# Patient Record
Sex: FEMALE | Race: WHITE | Employment: OTHER | URBAN - METROPOLITAN AREA
[De-identification: names, ages, dates, MRNs, and addresses within clinical notes are randomized per-mention and may not be internally consistent; named-entity substitution may affect disease eponyms.]

---

## 2017-01-11 RX ORDER — FENOFIBRATE 145 MG/1
TABLET, COATED ORAL
Qty: 30 TABLET | Refills: 4 | Status: SHIPPED | OUTPATIENT
Start: 2017-01-11 | End: 2017-06-13 | Stop reason: SDUPTHER

## 2017-01-30 ENCOUNTER — OFFICE VISIT (OUTPATIENT)
Dept: FAMILY MEDICINE CLINIC | Age: 62
End: 2017-01-30

## 2017-01-30 VITALS
SYSTOLIC BLOOD PRESSURE: 120 MMHG | DIASTOLIC BLOOD PRESSURE: 80 MMHG | HEIGHT: 63 IN | BODY MASS INDEX: 17.89 KG/M2 | TEMPERATURE: 98.8 F | RESPIRATION RATE: 16 BRPM | WEIGHT: 101 LBS | HEART RATE: 88 BPM

## 2017-01-30 DIAGNOSIS — J43.9 PULMONARY EMPHYSEMA, UNSPECIFIED EMPHYSEMA TYPE (HCC): ICD-10-CM

## 2017-01-30 DIAGNOSIS — Z72.0 TOBACCO ABUSE: ICD-10-CM

## 2017-01-30 DIAGNOSIS — F33.42 RECURRENT MAJOR DEPRESSIVE DISORDER, IN FULL REMISSION (HCC): ICD-10-CM

## 2017-01-30 DIAGNOSIS — F33.41 MAJOR DEPRESSIVE DISORDER, RECURRENT, IN PARTIAL REMISSION (HCC): ICD-10-CM

## 2017-01-30 DIAGNOSIS — M48.061 SPINAL STENOSIS, LUMBAR: ICD-10-CM

## 2017-01-30 DIAGNOSIS — J44.9 CHRONIC OBSTRUCTIVE PULMONARY DISEASE, UNSPECIFIED COPD TYPE (HCC): ICD-10-CM

## 2017-01-30 DIAGNOSIS — R29.6 FREQUENT FALLS: ICD-10-CM

## 2017-01-30 DIAGNOSIS — Z00.00 MEDICARE ANNUAL WELLNESS VISIT, SUBSEQUENT: Primary | ICD-10-CM

## 2017-01-30 DIAGNOSIS — Z11.59 NEED FOR HEPATITIS C SCREENING TEST: ICD-10-CM

## 2017-01-30 DIAGNOSIS — Z11.4 SCREENING FOR HIV (HUMAN IMMUNODEFICIENCY VIRUS): ICD-10-CM

## 2017-01-30 DIAGNOSIS — E78.00 HYPERCHOLESTEREMIA: ICD-10-CM

## 2017-01-30 DIAGNOSIS — M79.7 FIBROMYALGIA: ICD-10-CM

## 2017-01-30 DIAGNOSIS — M41.30 THORACOGENIC SCOLIOSIS, UNSPECIFIED SPINAL REGION: ICD-10-CM

## 2017-01-30 DIAGNOSIS — R26.89 BALANCE DISORDER: ICD-10-CM

## 2017-01-30 PROCEDURE — G8419 CALC BMI OUT NRM PARAM NOF/U: HCPCS | Performed by: FAMILY MEDICINE

## 2017-01-30 PROCEDURE — 3014F SCREEN MAMMO DOC REV: CPT | Performed by: FAMILY MEDICINE

## 2017-01-30 PROCEDURE — G8484 FLU IMMUNIZE NO ADMIN: HCPCS | Performed by: FAMILY MEDICINE

## 2017-01-30 PROCEDURE — G8926 SPIRO NO PERF OR DOC: HCPCS | Performed by: FAMILY MEDICINE

## 2017-01-30 PROCEDURE — 99214 OFFICE O/P EST MOD 30 MIN: CPT | Performed by: FAMILY MEDICINE

## 2017-01-30 PROCEDURE — G0444 DEPRESSION SCREEN ANNUAL: HCPCS | Performed by: FAMILY MEDICINE

## 2017-01-30 PROCEDURE — G0439 PPPS, SUBSEQ VISIT: HCPCS | Performed by: FAMILY MEDICINE

## 2017-01-30 PROCEDURE — 3017F COLORECTAL CA SCREEN DOC REV: CPT | Performed by: FAMILY MEDICINE

## 2017-01-30 PROCEDURE — 4004F PT TOBACCO SCREEN RCVD TLK: CPT | Performed by: FAMILY MEDICINE

## 2017-01-30 PROCEDURE — 3023F SPIROM DOC REV: CPT | Performed by: FAMILY MEDICINE

## 2017-01-30 PROCEDURE — G8427 DOCREV CUR MEDS BY ELIG CLIN: HCPCS | Performed by: FAMILY MEDICINE

## 2017-01-30 ASSESSMENT — ANXIETY QUESTIONNAIRES
GAD7 TOTAL SCORE: 2
GAD7 TOTAL SCORE: 2

## 2017-01-30 ASSESSMENT — LIFESTYLE VARIABLES: HOW OFTEN DO YOU HAVE A DRINK CONTAINING ALCOHOL: 0

## 2017-02-07 RX ORDER — FLUTICASONE PROPIONATE 50 MCG
SPRAY, SUSPENSION (ML) NASAL
Qty: 1 BOTTLE | Refills: 0 | Status: SHIPPED | OUTPATIENT
Start: 2017-02-07 | End: 2017-10-23 | Stop reason: SDUPTHER

## 2017-02-08 DIAGNOSIS — J43.9 PULMONARY EMPHYSEMA, UNSPECIFIED EMPHYSEMA TYPE (HCC): ICD-10-CM

## 2017-02-08 DIAGNOSIS — Z11.4 SCREENING FOR HIV (HUMAN IMMUNODEFICIENCY VIRUS): ICD-10-CM

## 2017-02-08 DIAGNOSIS — E78.00 HYPERCHOLESTEREMIA: ICD-10-CM

## 2017-02-08 DIAGNOSIS — Z11.59 NEED FOR HEPATITIS C SCREENING TEST: ICD-10-CM

## 2017-02-08 LAB
BASOPHILS ABSOLUTE: 0 K/UL (ref 0–0.2)
BASOPHILS RELATIVE PERCENT: 0.3 %
CHOLESTEROL, TOTAL: 175 MG/DL (ref 0–199)
EOSINOPHILS ABSOLUTE: 0.2 K/UL (ref 0–0.6)
EOSINOPHILS RELATIVE PERCENT: 3.1 %
HCT VFR BLD CALC: 37.7 % (ref 36–48)
HDLC SERPL-MCNC: 59 MG/DL (ref 40–60)
HEMOGLOBIN: 12.7 G/DL (ref 12–16)
HEPATITIS C ANTIBODY INTERPRETATION: NORMAL
LDL CHOLESTEROL CALCULATED: 100 MG/DL
LYMPHOCYTES ABSOLUTE: 1.7 K/UL (ref 1–5.1)
LYMPHOCYTES RELATIVE PERCENT: 22.8 %
MCH RBC QN AUTO: 35.6 PG (ref 26–34)
MCHC RBC AUTO-ENTMCNC: 33.8 G/DL (ref 31–36)
MCV RBC AUTO: 105.4 FL (ref 80–100)
MONOCYTES ABSOLUTE: 0.6 K/UL (ref 0–1.3)
MONOCYTES RELATIVE PERCENT: 7.4 %
NEUTROPHILS ABSOLUTE: 5.1 K/UL (ref 1.7–7.7)
NEUTROPHILS RELATIVE PERCENT: 66.4 %
PDW BLD-RTO: 13.5 % (ref 12.4–15.4)
PLATELET # BLD: 375 K/UL (ref 135–450)
PMV BLD AUTO: 7.7 FL (ref 5–10.5)
RBC # BLD: 3.58 M/UL (ref 4–5.2)
TRIGL SERPL-MCNC: 78 MG/DL (ref 0–150)
VLDLC SERPL CALC-MCNC: 16 MG/DL
WBC # BLD: 7.7 K/UL (ref 4–11)

## 2017-02-09 LAB — HIV-1 AND HIV-2 ANTIBODIES: NORMAL

## 2017-03-08 RX ORDER — GABAPENTIN 800 MG/1
TABLET ORAL
Qty: 270 TABLET | Refills: 1 | Status: SHIPPED | OUTPATIENT
Start: 2017-03-08 | End: 2017-08-02 | Stop reason: SDUPTHER

## 2017-03-12 RX ORDER — VALACYCLOVIR HYDROCHLORIDE 1 G/1
TABLET, FILM COATED ORAL
Qty: 90 TABLET | Refills: 1 | Status: SHIPPED | OUTPATIENT
Start: 2017-03-12 | End: 2017-09-07 | Stop reason: SDUPTHER

## 2017-06-14 RX ORDER — FENOFIBRATE 145 MG/1
TABLET, COATED ORAL
Qty: 30 TABLET | Refills: 3 | Status: SHIPPED | OUTPATIENT
Start: 2017-06-14 | End: 2017-10-14 | Stop reason: SDUPTHER

## 2017-08-02 ENCOUNTER — OFFICE VISIT (OUTPATIENT)
Dept: FAMILY MEDICINE CLINIC | Age: 62
End: 2017-08-02

## 2017-08-02 VITALS
HEIGHT: 63 IN | TEMPERATURE: 99.1 F | HEART RATE: 92 BPM | WEIGHT: 103 LBS | BODY MASS INDEX: 18.25 KG/M2 | RESPIRATION RATE: 16 BRPM | SYSTOLIC BLOOD PRESSURE: 120 MMHG | DIASTOLIC BLOOD PRESSURE: 70 MMHG

## 2017-08-02 DIAGNOSIS — M79.7 FIBROMYALGIA: ICD-10-CM

## 2017-08-02 DIAGNOSIS — T14.8XXA ANIMAL BITE: ICD-10-CM

## 2017-08-02 DIAGNOSIS — M54.81 BILATERAL OCCIPITAL NEURALGIA: ICD-10-CM

## 2017-08-02 DIAGNOSIS — F33.42 RECURRENT MAJOR DEPRESSIVE DISORDER, IN FULL REMISSION (HCC): ICD-10-CM

## 2017-08-02 DIAGNOSIS — E78.00 HYPERCHOLESTEREMIA: ICD-10-CM

## 2017-08-02 DIAGNOSIS — F41.9 ANXIETY: ICD-10-CM

## 2017-08-02 DIAGNOSIS — J43.9 PULMONARY EMPHYSEMA, UNSPECIFIED EMPHYSEMA TYPE (HCC): Primary | ICD-10-CM

## 2017-08-02 DIAGNOSIS — Z72.0 TOBACCO ABUSE: ICD-10-CM

## 2017-08-02 PROCEDURE — 3023F SPIROM DOC REV: CPT | Performed by: FAMILY MEDICINE

## 2017-08-02 PROCEDURE — 3014F SCREEN MAMMO DOC REV: CPT | Performed by: FAMILY MEDICINE

## 2017-08-02 PROCEDURE — G8427 DOCREV CUR MEDS BY ELIG CLIN: HCPCS | Performed by: FAMILY MEDICINE

## 2017-08-02 PROCEDURE — G8926 SPIRO NO PERF OR DOC: HCPCS | Performed by: FAMILY MEDICINE

## 2017-08-02 PROCEDURE — 4004F PT TOBACCO SCREEN RCVD TLK: CPT | Performed by: FAMILY MEDICINE

## 2017-08-02 PROCEDURE — G8420 CALC BMI NORM PARAMETERS: HCPCS | Performed by: FAMILY MEDICINE

## 2017-08-02 PROCEDURE — 90715 TDAP VACCINE 7 YRS/> IM: CPT | Performed by: FAMILY MEDICINE

## 2017-08-02 PROCEDURE — 3017F COLORECTAL CA SCREEN DOC REV: CPT | Performed by: FAMILY MEDICINE

## 2017-08-02 PROCEDURE — 99214 OFFICE O/P EST MOD 30 MIN: CPT | Performed by: FAMILY MEDICINE

## 2017-08-02 PROCEDURE — 90471 IMMUNIZATION ADMIN: CPT | Performed by: FAMILY MEDICINE

## 2017-08-02 RX ORDER — QUETIAPINE FUMARATE 50 MG/1
100 TABLET, FILM COATED ORAL NIGHTLY
COMMUNITY
End: 2019-01-18

## 2017-08-02 RX ORDER — GABAPENTIN 800 MG/1
800 TABLET ORAL 4 TIMES DAILY
Qty: 360 TABLET | Refills: 1 | Status: SHIPPED | OUTPATIENT
Start: 2017-08-02 | End: 2018-12-07 | Stop reason: SDUPTHER

## 2017-09-07 RX ORDER — VALACYCLOVIR HYDROCHLORIDE 1 G/1
TABLET, FILM COATED ORAL
Qty: 90 TABLET | Refills: 3 | Status: SHIPPED | OUTPATIENT
Start: 2017-09-07 | End: 2018-09-02 | Stop reason: SDUPTHER

## 2017-10-16 RX ORDER — FENOFIBRATE 145 MG/1
TABLET, COATED ORAL
Qty: 30 TABLET | Refills: 5 | Status: SHIPPED | OUTPATIENT
Start: 2017-10-16 | End: 2018-04-11 | Stop reason: SDUPTHER

## 2017-10-24 RX ORDER — FLUTICASONE PROPIONATE 50 MCG
SPRAY, SUSPENSION (ML) NASAL
Qty: 1 BOTTLE | Refills: 12 | Status: SHIPPED | OUTPATIENT
Start: 2017-10-24 | End: 2018-11-04 | Stop reason: SDUPTHER

## 2017-11-13 ENCOUNTER — HOSPITAL ENCOUNTER (OUTPATIENT)
Dept: MRI IMAGING | Age: 62
Discharge: HOME | End: 2017-11-13
Admitting: NEUROLOGICAL SURGERY

## 2017-11-13 DIAGNOSIS — R51.9 NONINTRACTABLE EPISODIC HEADACHE, UNSPECIFIED HEADACHE TYPE: ICD-10-CM

## 2017-11-13 DIAGNOSIS — M54.2 CERVICALGIA: ICD-10-CM

## 2017-11-13 DIAGNOSIS — M54.2 NECK PAIN: ICD-10-CM

## 2018-01-22 ENCOUNTER — HOSPITAL ENCOUNTER (OUTPATIENT)
Dept: WOMENS IMAGING | Age: 63
Discharge: HOME | End: 2018-01-22
Attending: FAMILY MEDICINE | Admitting: FAMILY MEDICINE

## 2018-01-22 DIAGNOSIS — Z12.31 VISIT FOR SCREENING MAMMOGRAM: ICD-10-CM

## 2018-02-02 ENCOUNTER — OFFICE VISIT (OUTPATIENT)
Dept: FAMILY MEDICINE CLINIC | Age: 63
End: 2018-02-02

## 2018-02-02 VITALS
HEART RATE: 86 BPM | BODY MASS INDEX: 19.51 KG/M2 | TEMPERATURE: 98.7 F | HEIGHT: 62 IN | WEIGHT: 106 LBS | RESPIRATION RATE: 16 BRPM | SYSTOLIC BLOOD PRESSURE: 128 MMHG | DIASTOLIC BLOOD PRESSURE: 80 MMHG

## 2018-02-02 DIAGNOSIS — J43.9 PULMONARY EMPHYSEMA, UNSPECIFIED EMPHYSEMA TYPE (HCC): ICD-10-CM

## 2018-02-02 DIAGNOSIS — R26.89 BALANCE DISORDER: ICD-10-CM

## 2018-02-02 DIAGNOSIS — R29.6 FREQUENT FALLS: ICD-10-CM

## 2018-02-02 DIAGNOSIS — Z80.3 FAMILY HISTORY OF MALIGNANT NEOPLASM OF BREAST IN FIRST DEGREE RELATIVE DIAGNOSED WHEN YOUNGER THAN 50 YEARS OF AGE: ICD-10-CM

## 2018-02-02 DIAGNOSIS — Z98.890 HISTORY OF CEREBRAL ANEURYSM REPAIR: ICD-10-CM

## 2018-02-02 DIAGNOSIS — M79.7 FIBROMYALGIA: ICD-10-CM

## 2018-02-02 DIAGNOSIS — M54.81 BILATERAL OCCIPITAL NEURALGIA: ICD-10-CM

## 2018-02-02 DIAGNOSIS — Z00.00 MEDICARE ANNUAL WELLNESS VISIT, SUBSEQUENT: Primary | ICD-10-CM

## 2018-02-02 DIAGNOSIS — E78.1 HYPERTRIGLYCERIDEMIA: ICD-10-CM

## 2018-02-02 DIAGNOSIS — F33.42 RECURRENT MAJOR DEPRESSIVE DISORDER, IN FULL REMISSION (HCC): ICD-10-CM

## 2018-02-02 DIAGNOSIS — K63.5 POLYP OF COLON, UNSPECIFIED PART OF COLON, UNSPECIFIED TYPE: ICD-10-CM

## 2018-02-02 DIAGNOSIS — E78.00 HYPERCHOLESTEREMIA: ICD-10-CM

## 2018-02-02 DIAGNOSIS — Z86.79 HISTORY OF CEREBRAL ANEURYSM REPAIR: ICD-10-CM

## 2018-02-02 DIAGNOSIS — Z72.0 TOBACCO ABUSE: ICD-10-CM

## 2018-02-02 LAB
A/G RATIO: 2.4 (ref 1.1–2.2)
ALBUMIN SERPL-MCNC: 4.4 G/DL (ref 3.4–5)
ALP BLD-CCNC: 57 U/L (ref 40–129)
ALT SERPL-CCNC: 10 U/L (ref 10–40)
ANION GAP SERPL CALCULATED.3IONS-SCNC: 14 MMOL/L (ref 3–16)
AST SERPL-CCNC: 27 U/L (ref 15–37)
BASOPHILS ABSOLUTE: 0 K/UL (ref 0–0.2)
BASOPHILS RELATIVE PERCENT: 0.2 %
BILIRUB SERPL-MCNC: 0.3 MG/DL (ref 0–1)
BUN BLDV-MCNC: 16 MG/DL (ref 7–20)
CALCIUM SERPL-MCNC: 9.8 MG/DL (ref 8.3–10.6)
CHLORIDE BLD-SCNC: 105 MMOL/L (ref 99–110)
CHOLESTEROL, TOTAL: 174 MG/DL (ref 0–199)
CO2: 28 MMOL/L (ref 21–32)
CREAT SERPL-MCNC: 0.8 MG/DL (ref 0.6–1.2)
EOSINOPHILS ABSOLUTE: 0.2 K/UL (ref 0–0.6)
EOSINOPHILS RELATIVE PERCENT: 1.6 %
GFR AFRICAN AMERICAN: >60
GFR NON-AFRICAN AMERICAN: >60
GLOBULIN: 1.8 G/DL
GLUCOSE BLD-MCNC: 90 MG/DL (ref 70–99)
HCT VFR BLD CALC: 37.9 % (ref 36–48)
HDLC SERPL-MCNC: 55 MG/DL (ref 40–60)
HEMOGLOBIN: 12.7 G/DL (ref 12–16)
LDL CHOLESTEROL CALCULATED: 103 MG/DL
LYMPHOCYTES ABSOLUTE: 1.8 K/UL (ref 1–5.1)
LYMPHOCYTES RELATIVE PERCENT: 17.2 %
MCH RBC QN AUTO: 35.1 PG (ref 26–34)
MCHC RBC AUTO-ENTMCNC: 33.4 G/DL (ref 31–36)
MCV RBC AUTO: 105 FL (ref 80–100)
MONOCYTES ABSOLUTE: 0.8 K/UL (ref 0–1.3)
MONOCYTES RELATIVE PERCENT: 7.6 %
NEUTROPHILS ABSOLUTE: 7.5 K/UL (ref 1.7–7.7)
NEUTROPHILS RELATIVE PERCENT: 73.4 %
PDW BLD-RTO: 14 % (ref 12.4–15.4)
PLATELET # BLD: 382 K/UL (ref 135–450)
PMV BLD AUTO: 8.1 FL (ref 5–10.5)
POTASSIUM SERPL-SCNC: 4.5 MMOL/L (ref 3.5–5.1)
RBC # BLD: 3.61 M/UL (ref 4–5.2)
SODIUM BLD-SCNC: 147 MMOL/L (ref 136–145)
TOTAL PROTEIN: 6.2 G/DL (ref 6.4–8.2)
TRIGL SERPL-MCNC: 78 MG/DL (ref 0–150)
VLDLC SERPL CALC-MCNC: 16 MG/DL
WBC # BLD: 10.2 K/UL (ref 4–11)

## 2018-02-02 PROCEDURE — G8427 DOCREV CUR MEDS BY ELIG CLIN: HCPCS | Performed by: FAMILY MEDICINE

## 2018-02-02 PROCEDURE — G8420 CALC BMI NORM PARAMETERS: HCPCS | Performed by: FAMILY MEDICINE

## 2018-02-02 PROCEDURE — 3017F COLORECTAL CA SCREEN DOC REV: CPT | Performed by: FAMILY MEDICINE

## 2018-02-02 PROCEDURE — 3014F SCREEN MAMMO DOC REV: CPT | Performed by: FAMILY MEDICINE

## 2018-02-02 PROCEDURE — 99214 OFFICE O/P EST MOD 30 MIN: CPT | Performed by: FAMILY MEDICINE

## 2018-02-02 PROCEDURE — G0439 PPPS, SUBSEQ VISIT: HCPCS | Performed by: FAMILY MEDICINE

## 2018-02-02 PROCEDURE — G8926 SPIRO NO PERF OR DOC: HCPCS | Performed by: FAMILY MEDICINE

## 2018-02-02 PROCEDURE — G8484 FLU IMMUNIZE NO ADMIN: HCPCS | Performed by: FAMILY MEDICINE

## 2018-02-02 PROCEDURE — 3023F SPIROM DOC REV: CPT | Performed by: FAMILY MEDICINE

## 2018-02-02 PROCEDURE — 4004F PT TOBACCO SCREEN RCVD TLK: CPT | Performed by: FAMILY MEDICINE

## 2018-02-02 ASSESSMENT — PATIENT HEALTH QUESTIONNAIRE - PHQ9: SUM OF ALL RESPONSES TO PHQ QUESTIONS 1-9: 2

## 2018-02-02 ASSESSMENT — LIFESTYLE VARIABLES: HOW OFTEN DO YOU HAVE A DRINK CONTAINING ALCOHOL: 0

## 2018-02-02 ASSESSMENT — ANXIETY QUESTIONNAIRES: GAD7 TOTAL SCORE: 4

## 2018-02-02 NOTE — PATIENT INSTRUCTIONS
FYI: While Medicare provides you with a FREE ANNUAL PREVENTIVE PHYSICAL, this visit does NOT include management of chronic medical problems or physical examination. Dr. Kuldip Peralta usually does a combination visit if you have other medical problems so you don't have to come back for another visit. However, this means that there will be a co-pay. INSTRUCTIONS  NEXT APPOINTMENT: Please schedule check-up in 6 months. · PLEASE GET BLOODWORK DRAWN TODAY ON FIRST FLOOR in 170. Take orders with you. RESULTS- most blood tests back in couple days. We will call you if any problems. If bloodwork good, you will get letter in mail or notified thru [address] (if signed up) within 2 weeks. If you do not, please call office. · Call GI to schedule colonoscopy soon for colon cancer screening and prevention. Will need to do bowel prep the day before and someone to drive home afterwards. · See genetics to discuss genetics testing.    Patient Education       Personalized Preventive Plan  Health Maintenance Due   Topic Date Due    Colon cancer screen colonoscopy  2017     Other Preventive Recommendations:  · A preventive eye exam performed by an eye specialist is recommended every 1-2 years to screen for glaucoma; cataracts, macular degeneration, and other eye disorders  · A preventive dental visit is recommended every 6 months  · Try to get at least 150 minutes of exercise per week or 10,000 steps per day on a pedometer  · Order FREE \"Exercise and Physical Activity\" book from the [employer] on Agin1-866.851.4740 or https://order.[place].nih.gov/health/publication/order/  · You need 0110-9769 mg of calcium and 0827-3954 IU of vitamin D per day- it is possible to meet your calcium requirement with diet alone, but a vitamin D supplement is usually necessary to meet this goal  · When exposed to the sun, use a sunscreen that protects against both UVA and UVB radiation with an SPF of 30 or greater- reapply every 2 to

## 2018-04-12 RX ORDER — FENOFIBRATE 145 MG/1
TABLET, COATED ORAL
Qty: 30 TABLET | Refills: 4 | Status: SHIPPED | OUTPATIENT
Start: 2018-04-12 | End: 2018-09-11 | Stop reason: SDUPTHER

## 2018-06-08 ENCOUNTER — OFFICE VISIT (OUTPATIENT)
Dept: FAMILY MEDICINE CLINIC | Age: 63
End: 2018-06-08

## 2018-06-08 VITALS
BODY MASS INDEX: 19.02 KG/M2 | WEIGHT: 104 LBS | DIASTOLIC BLOOD PRESSURE: 60 MMHG | SYSTOLIC BLOOD PRESSURE: 120 MMHG | HEART RATE: 93 BPM | RESPIRATION RATE: 12 BRPM

## 2018-06-08 DIAGNOSIS — M21.611 BUNION, RIGHT FOOT: Primary | ICD-10-CM

## 2018-06-08 DIAGNOSIS — Z98.890 HISTORY OF CEREBRAL ANEURYSM REPAIR: ICD-10-CM

## 2018-06-08 DIAGNOSIS — K83.1 BILIARY OBSTRUCTION: ICD-10-CM

## 2018-06-08 DIAGNOSIS — Z86.79 HISTORY OF CEREBRAL ANEURYSM REPAIR: ICD-10-CM

## 2018-06-08 DIAGNOSIS — Z01.810 PREOP CARDIOVASCULAR EXAM: ICD-10-CM

## 2018-06-08 DIAGNOSIS — J43.9 PULMONARY EMPHYSEMA, UNSPECIFIED EMPHYSEMA TYPE (HCC): ICD-10-CM

## 2018-06-08 DIAGNOSIS — Z72.0 TOBACCO ABUSE: ICD-10-CM

## 2018-06-08 PROCEDURE — 99214 OFFICE O/P EST MOD 30 MIN: CPT | Performed by: FAMILY MEDICINE

## 2018-06-08 PROCEDURE — G8420 CALC BMI NORM PARAMETERS: HCPCS | Performed by: FAMILY MEDICINE

## 2018-06-08 PROCEDURE — G8427 DOCREV CUR MEDS BY ELIG CLIN: HCPCS | Performed by: FAMILY MEDICINE

## 2018-06-08 PROCEDURE — 3017F COLORECTAL CA SCREEN DOC REV: CPT | Performed by: FAMILY MEDICINE

## 2018-06-08 PROCEDURE — 3023F SPIROM DOC REV: CPT | Performed by: FAMILY MEDICINE

## 2018-06-08 PROCEDURE — 4004F PT TOBACCO SCREEN RCVD TLK: CPT | Performed by: FAMILY MEDICINE

## 2018-06-08 PROCEDURE — G8926 SPIRO NO PERF OR DOC: HCPCS | Performed by: FAMILY MEDICINE

## 2018-06-08 RX ORDER — TERBINAFINE HYDROCHLORIDE 250 MG/1
250 TABLET ORAL DAILY
COMMUNITY
End: 2018-08-02

## 2018-06-18 ENCOUNTER — HOSPITAL ENCOUNTER (OUTPATIENT)
Dept: PREADMISSION TESTING | Age: 63
Discharge: HOME | End: 2018-06-19
Attending: PODIATRIST | Admitting: PODIATRIST

## 2018-06-18 VITALS — BODY MASS INDEX: 19.14 KG/M2 | WEIGHT: 104 LBS | HEIGHT: 62 IN

## 2018-06-19 ENCOUNTER — HOSPITAL ENCOUNTER (OUTPATIENT)
Dept: SURGERY | Age: 63
Discharge: HOME | End: 2018-06-19
Admitting: PODIATRIST

## 2018-06-19 VITALS
SYSTOLIC BLOOD PRESSURE: 135 MMHG | BODY MASS INDEX: 19.14 KG/M2 | RESPIRATION RATE: 14 BRPM | TEMPERATURE: 98.2 F | DIASTOLIC BLOOD PRESSURE: 78 MMHG | WEIGHT: 104 LBS | HEIGHT: 62 IN | HEART RATE: 78 BPM | OXYGEN SATURATION: 96 %

## 2018-06-19 DIAGNOSIS — M21.611 BUNION OF RIGHT FOOT: Primary | ICD-10-CM

## 2018-06-19 DIAGNOSIS — G89.18 ACUTE POST-OPERATIVE PAIN: ICD-10-CM

## 2018-06-19 RX ORDER — OXYCODONE HYDROCHLORIDE AND ACETAMINOPHEN 5; 325 MG/1; MG/1
1 TABLET ORAL ONCE
Status: COMPLETED | OUTPATIENT
Start: 2018-06-19 | End: 2018-06-19

## 2018-06-19 RX ORDER — LABETALOL HYDROCHLORIDE 5 MG/ML
5 INJECTION, SOLUTION INTRAVENOUS EVERY 10 MIN PRN
Status: DISCONTINUED | OUTPATIENT
Start: 2018-06-19 | End: 2018-06-20 | Stop reason: HOSPADM

## 2018-06-19 RX ORDER — PROMETHAZINE HYDROCHLORIDE 25 MG/ML
6.25 INJECTION, SOLUTION INTRAMUSCULAR; INTRAVENOUS
Status: ACTIVE | OUTPATIENT
Start: 2018-06-19 | End: 2018-06-19

## 2018-06-19 RX ORDER — FENTANYL CITRATE 50 UG/ML
25 INJECTION, SOLUTION INTRAMUSCULAR; INTRAVENOUS EVERY 5 MIN PRN
Status: DISCONTINUED | OUTPATIENT
Start: 2018-06-19 | End: 2018-06-20 | Stop reason: HOSPADM

## 2018-06-19 RX ORDER — SODIUM CHLORIDE, SODIUM LACTATE, POTASSIUM CHLORIDE, CALCIUM CHLORIDE 600; 310; 30; 20 MG/100ML; MG/100ML; MG/100ML; MG/100ML
INJECTION, SOLUTION INTRAVENOUS CONTINUOUS
Status: DISCONTINUED | OUTPATIENT
Start: 2018-06-19 | End: 2018-06-20 | Stop reason: HOSPADM

## 2018-06-19 RX ORDER — MEPERIDINE HYDROCHLORIDE 25 MG/ML
12.5 INJECTION INTRAMUSCULAR; INTRAVENOUS; SUBCUTANEOUS EVERY 5 MIN PRN
Status: DISCONTINUED | OUTPATIENT
Start: 2018-06-19 | End: 2018-06-20 | Stop reason: HOSPADM

## 2018-06-19 RX ORDER — OXYCODONE HYDROCHLORIDE AND ACETAMINOPHEN 5; 325 MG/1; MG/1
1 TABLET ORAL EVERY 4 HOURS PRN
Qty: 36 TABLET | Refills: 0 | Status: SHIPPED | OUTPATIENT
Start: 2018-06-19 | End: 2018-06-22

## 2018-06-19 RX ORDER — OXYCODONE HYDROCHLORIDE AND ACETAMINOPHEN 5; 325 MG/1; MG/1
TABLET ORAL
Status: COMPLETED
Start: 2018-06-19 | End: 2018-06-19

## 2018-06-19 RX ORDER — ACETAMINOPHEN 500 MG
500 TABLET ORAL ONCE
Status: COMPLETED | OUTPATIENT
Start: 2018-06-19 | End: 2018-06-19

## 2018-06-19 RX ORDER — HYDRALAZINE HYDROCHLORIDE 20 MG/ML
5 INJECTION INTRAMUSCULAR; INTRAVENOUS EVERY 10 MIN PRN
Status: DISCONTINUED | OUTPATIENT
Start: 2018-06-19 | End: 2018-06-20 | Stop reason: HOSPADM

## 2018-06-19 RX ORDER — ONDANSETRON 2 MG/ML
4 INJECTION INTRAMUSCULAR; INTRAVENOUS
Status: ACTIVE | OUTPATIENT
Start: 2018-06-19 | End: 2018-06-19

## 2018-06-19 RX ADMIN — OXYCODONE HYDROCHLORIDE AND ACETAMINOPHEN 1 TABLET: 5; 325 TABLET ORAL at 11:35

## 2018-06-19 RX ADMIN — SODIUM CHLORIDE, SODIUM LACTATE, POTASSIUM CHLORIDE, CALCIUM CHLORIDE: 600; 310; 30; 20 INJECTION, SOLUTION INTRAVENOUS at 06:57

## 2018-06-19 RX ADMIN — Medication 500 MG: at 06:57

## 2018-06-19 ASSESSMENT — PAIN DESCRIPTION - ONSET: ONSET: ON-GOING

## 2018-06-19 ASSESSMENT — PAIN DESCRIPTION - FREQUENCY: FREQUENCY: CONTINUOUS

## 2018-06-19 ASSESSMENT — PAIN SCALES - GENERAL
PAINLEVEL_OUTOF10: 8
PAINLEVEL_OUTOF10: 0
PAINLEVEL_OUTOF10: 7
PAINLEVEL_OUTOF10: 0
PAINLEVEL_OUTOF10: 8
PAINLEVEL_OUTOF10: 0

## 2018-06-19 ASSESSMENT — PAIN DESCRIPTION - DESCRIPTORS
DESCRIPTORS: ACHING;CONSTANT
DESCRIPTORS: ACHING;DISCOMFORT;DULL;HEADACHE

## 2018-06-19 ASSESSMENT — PAIN - FUNCTIONAL ASSESSMENT: PAIN_FUNCTIONAL_ASSESSMENT: 0-10

## 2018-06-19 ASSESSMENT — PAIN DESCRIPTION - LOCATION: LOCATION: HEAD;NECK

## 2018-06-19 ASSESSMENT — PAIN DESCRIPTION - PAIN TYPE: TYPE: CHRONIC PAIN

## 2018-08-02 ENCOUNTER — OFFICE VISIT (OUTPATIENT)
Dept: FAMILY MEDICINE CLINIC | Age: 63
End: 2018-08-02

## 2018-08-02 VITALS
HEART RATE: 74 BPM | BODY MASS INDEX: 19.69 KG/M2 | WEIGHT: 107 LBS | RESPIRATION RATE: 16 BRPM | TEMPERATURE: 98.3 F | DIASTOLIC BLOOD PRESSURE: 72 MMHG | HEIGHT: 62 IN | SYSTOLIC BLOOD PRESSURE: 120 MMHG

## 2018-08-02 DIAGNOSIS — F33.42 RECURRENT MAJOR DEPRESSIVE DISORDER, IN FULL REMISSION (HCC): Primary | ICD-10-CM

## 2018-08-02 DIAGNOSIS — Z72.0 TOBACCO ABUSE: ICD-10-CM

## 2018-08-02 DIAGNOSIS — Z23 NEED FOR PNEUMOCOCCAL VACCINATION: ICD-10-CM

## 2018-08-02 DIAGNOSIS — J43.9 PULMONARY EMPHYSEMA, UNSPECIFIED EMPHYSEMA TYPE (HCC): ICD-10-CM

## 2018-08-02 PROCEDURE — 4004F PT TOBACCO SCREEN RCVD TLK: CPT | Performed by: FAMILY MEDICINE

## 2018-08-02 PROCEDURE — G8427 DOCREV CUR MEDS BY ELIG CLIN: HCPCS | Performed by: FAMILY MEDICINE

## 2018-08-02 PROCEDURE — G0009 ADMIN PNEUMOCOCCAL VACCINE: HCPCS | Performed by: FAMILY MEDICINE

## 2018-08-02 PROCEDURE — 99213 OFFICE O/P EST LOW 20 MIN: CPT | Performed by: FAMILY MEDICINE

## 2018-08-02 PROCEDURE — 90670 PCV13 VACCINE IM: CPT | Performed by: FAMILY MEDICINE

## 2018-08-02 PROCEDURE — G8420 CALC BMI NORM PARAMETERS: HCPCS | Performed by: FAMILY MEDICINE

## 2018-08-02 PROCEDURE — 3017F COLORECTAL CA SCREEN DOC REV: CPT | Performed by: FAMILY MEDICINE

## 2018-08-02 PROCEDURE — 3023F SPIROM DOC REV: CPT | Performed by: FAMILY MEDICINE

## 2018-08-02 PROCEDURE — 4040F PNEUMOC VAC/ADMIN/RCVD: CPT | Performed by: FAMILY MEDICINE

## 2018-08-02 PROCEDURE — 1101F PT FALLS ASSESS-DOCD LE1/YR: CPT | Performed by: FAMILY MEDICINE

## 2018-08-02 PROCEDURE — 1123F ACP DISCUSS/DSCN MKR DOCD: CPT | Performed by: FAMILY MEDICINE

## 2018-08-02 PROCEDURE — G8400 PT W/DXA NO RESULTS DOC: HCPCS | Performed by: FAMILY MEDICINE

## 2018-08-02 PROCEDURE — G8926 SPIRO NO PERF OR DOC: HCPCS | Performed by: FAMILY MEDICINE

## 2018-08-02 PROCEDURE — 1090F PRES/ABSN URINE INCON ASSESS: CPT | Performed by: FAMILY MEDICINE

## 2018-08-02 ASSESSMENT — PATIENT HEALTH QUESTIONNAIRE - PHQ9
SUM OF ALL RESPONSES TO PHQ QUESTIONS 1-9: 0
SUM OF ALL RESPONSES TO PHQ9 QUESTIONS 1 & 2: 0
2. FEELING DOWN, DEPRESSED OR HOPELESS: 0
1. LITTLE INTEREST OR PLEASURE IN DOING THINGS: 0

## 2018-08-02 NOTE — PATIENT INSTRUCTIONS
INSTRUCTIONS  · NEXT APPOINTMENT: Please schedule annual complete physical (30 minutes) in 5-6 months. · PLEASE TAKE THIS FORM TO CHECK-OUT WINDOW TO SCHEDULE NEXT VISIT.   · REFILL POLICY:  If not getting refills today, then PLEASE make next appointment on way out today. Will need to see that future appointment scheudled when pharmmcy contacts Dr. John Johns for a refill. · Please get flu vaccine when available in fall. Can get either at this office or at stores such as [employer].

## 2018-08-02 NOTE — PROGRESS NOTES
Scribe documentation   I, Quilla Cogan , am scribing for Teddy Barclay MD. Electronically signed by Quilla Cogan  on 8/2/2018 at 1:11 PM.  MD Lane Lee,  personally performed the services described in this documentation, as scribed by the user listed above, and it is both accurate and complete. I agree with the Chief Complaint, ROS, and Past Histories independently gathered by the clinical support staff. CHART REVIEW  Health Maintenance   Topic Date Due    Shingles Vaccine (1 of 2 - 2 Dose Series) 12/15/2013    Pneumococcal low/med risk (1 of 2 - PCV13) 07/15/2018    Flu vaccine (1) 09/01/2018    Cervical cancer screen  12/10/2018    Breast cancer screen  01/22/2020    Colon cancer screen colonoscopy  05/24/2021    Lipid screen  02/02/2023    DTaP/Tdap/Td vaccine (2 - Td) 08/02/2027    DEXA (modify frequency per FRAX score)  Completed    Hepatitis C screen  Completed    HIV screen  Completed     Social History     Social History    Marital status: Single     Spouse name: N/A    Number of children: 3    Years of education: N/A     Occupational History    disabled      Social History Main Topics    Smoking status: Current Every Day Smoker     Packs/day: 0.50     Years: 48.00     Types: Cigarettes    Smokeless tobacco: Never Used      Comment: 7cig a day    Alcohol use No    Drug use: No    Sexual activity: No     Other Topics Concern    None     Social History Narrative    Exercise:  never. Living will:  no,   additional information provided. Lives alone. Seatbelt use: Always. Prior to Visit Medications    Medication Sig Taking?  Authorizing Provider   Probiotic Product (PROBIOTIC DAILY PO) Take by mouth daily Yes Historical Provider, MD   fenofibrate (TRICOR) 145 MG tablet TAKE ONE TABLET BY MOUTH DAILY Yes Angelique Zapata MD   fluticasone (FLONASE) 50 MCG/ACT nasal spray SHAKE LIQUID AND USE 2 SPRAYS IN EACH NOSTRIL DAILY Yes Angelique Zapata MD   valACYclovir (VALTREX) 1 g tablet TAKE ONE TABLET BY MOUTH DAILY Yes Pool Sharp MD   QUEtiapine (SEROQUEL) 50 MG tablet Take 100 mg by mouth nightly  Yes Historical Provider, MD   gabapentin (NEURONTIN) 800 MG tablet Take 1 tablet by mouth 4 times daily Yes Pool Sharp MD   guaiFENesin (MUCINEX) 600 MG extended release tablet Take 1 tablet by mouth 2 times daily  Patient taking differently: Take 600 mg by mouth 2 times daily as needed  Yes Narcisa Cowan MD   cetirizine (ZYRTEC) 10 MG tablet Take 10 mg by mouth nightly Yes Historical Provider, MD   Cranberry Fruit Concentrate 58847 MG CAPS Take by mouth Yes Historical Provider, MD   buPROPion (WELLBUTRIN XL) 150 MG XL tablet 450 mg every morning [address] Yes Pool Sharp MD   mirtazapine (REMERON) 30 MG tablet Take 30 mg by mouth nightly [address] Yes Pool Sharp MD   DULoxetine (CYMBALTA) 60 MG capsule Take 1 capsule by mouth daily. [address] Yes Pool Sharp MD   therapeutic multivitamin-minerals [facility]) tablet Take 1 tablet by mouth daily.    Yes Historical Provider, MD      Patient Active Problem List   Diagnosis    COPD (chronic obstructive pulmonary disease) mild- PFT 2008    Scoliosis, thoracogenic    Hypercholesteremia    Disc disorder of cervical region    Fibromyalgia    Anxiety    Tobacco abuse    Stress incontinence    Lumbar radiculopathy    Spinal stenosis, lumbar    Major depression, recurrent- Hx of multiple OD attempts    Herpes simplex- leg    Colon polyp    Screening mammogram, encounter for    Screening for cervical cancer    Hearing loss in left ear    Osteopenia- DXA -1.9 wrist (8/11)    Allergic rhinitis    Hypertriglyceridemia    Cholelithiases    Sciatic nerve disease    Frequent falls    Balance disorder    Bilateral occipital neuralgia    History of cerebral aneurysm repair 2015 anterior communicating clipped    Cervical stenosis of spine    Biliary obstruction      Cholesterol, Total   Date Value Ref Blake

## 2018-09-04 RX ORDER — VALACYCLOVIR HYDROCHLORIDE 1 G/1
TABLET, FILM COATED ORAL
Qty: 90 TABLET | Refills: 0 | Status: SHIPPED | OUTPATIENT
Start: 2018-09-04 | End: 2018-11-29 | Stop reason: SDUPTHER

## 2018-09-11 RX ORDER — FENOFIBRATE 145 MG/1
TABLET, COATED ORAL
Qty: 30 TABLET | Refills: 3 | Status: SHIPPED | OUTPATIENT
Start: 2018-09-11 | End: 2019-01-06 | Stop reason: SDUPTHER

## 2018-09-14 ENCOUNTER — OFFICE VISIT (OUTPATIENT)
Dept: FAMILY MEDICINE CLINIC | Age: 63
End: 2018-09-14

## 2018-09-14 VITALS
SYSTOLIC BLOOD PRESSURE: 138 MMHG | HEART RATE: 84 BPM | HEIGHT: 62 IN | DIASTOLIC BLOOD PRESSURE: 80 MMHG | BODY MASS INDEX: 18.95 KG/M2 | WEIGHT: 103 LBS | RESPIRATION RATE: 16 BRPM | TEMPERATURE: 98.5 F

## 2018-09-14 DIAGNOSIS — R51.9 SCALP PAIN: Primary | ICD-10-CM

## 2018-09-14 PROCEDURE — 99213 OFFICE O/P EST LOW 20 MIN: CPT | Performed by: FAMILY MEDICINE

## 2018-09-14 PROCEDURE — 1123F ACP DISCUSS/DSCN MKR DOCD: CPT | Performed by: FAMILY MEDICINE

## 2018-09-14 PROCEDURE — 1101F PT FALLS ASSESS-DOCD LE1/YR: CPT | Performed by: FAMILY MEDICINE

## 2018-09-14 PROCEDURE — G8400 PT W/DXA NO RESULTS DOC: HCPCS | Performed by: FAMILY MEDICINE

## 2018-09-14 PROCEDURE — G8420 CALC BMI NORM PARAMETERS: HCPCS | Performed by: FAMILY MEDICINE

## 2018-09-14 PROCEDURE — 4040F PNEUMOC VAC/ADMIN/RCVD: CPT | Performed by: FAMILY MEDICINE

## 2018-09-14 PROCEDURE — G8427 DOCREV CUR MEDS BY ELIG CLIN: HCPCS | Performed by: FAMILY MEDICINE

## 2018-09-14 PROCEDURE — 4004F PT TOBACCO SCREEN RCVD TLK: CPT | Performed by: FAMILY MEDICINE

## 2018-09-14 PROCEDURE — 1090F PRES/ABSN URINE INCON ASSESS: CPT | Performed by: FAMILY MEDICINE

## 2018-09-14 PROCEDURE — 3017F COLORECTAL CA SCREEN DOC REV: CPT | Performed by: FAMILY MEDICINE

## 2018-09-14 NOTE — PROGRESS NOTES
Scribe documentation: Fátima [place] , am scribing for Yvette Kirk MD. Electronically signed by Clarence Lin  on 2018 at 4:01 PM  MD Attestation: Corinne Anon,  personally performed the services described in this documentation, as scribed by the user listed above, and it is both accurate and complete. CHART REVIEW  Health Maintenance   Topic Date Due    Shingles Vaccine (1 of 2 - 2 Dose Series) 12/15/2013    Cervical cancer screen  12/10/2018    Pneumococcal low/med risk (2 of 2 - PPSV23) 2019    Breast cancer screen  2020    Colon cancer screen colonoscopy  2021    Lipid screen  2023    DTaP/Tdap/Td vaccine (2 - Td) 2027    DEXA (modify frequency per FRAX score)  Completed    Flu vaccine  Completed    Hepatitis C screen  Completed    HIV screen  Completed     Social History     Social History    Marital status: Single     Spouse name: N/A    Number of children: 3    Years of education: N/A     Occupational History    disabled      Social History Main Topics    Smoking status: Current Every Day Smoker     Packs/day: 0.50     Years: 48.00     Types: Cigarettes    Smokeless tobacco: Never Used      Comment: 7cig a day    Alcohol use No    Drug use: No    Sexual activity: No     Other Topics Concern    None     Social History Narrative    Exercise:  never. Living will:  no,   additional information provided. Lives alone. Seatbelt use: Always. The 10-year ASCVD risk score (James Crane., et al., 2013) is: 10.9%    Values used to calculate the score:      Age: 70 years      Sex: Female      Is Non- : No      Diabetic: No      Tobacco smoker: Yes      Systolic Blood Pressure: 242 mmHg      Is BP treated: No      HDL Cholesterol: 55 mg/dL      Total Cholesterol: 174 mg/dL  Prior to Visit Medications    Medication Sig Taking?  Authorizing Provider   fenofibrate (TRICOR) 145 MG tablet TAKE ONE TABLET BY MOUTH DAILY Yes 08/02/2019    Breast cancer screen  01/22/2020    Colon cancer screen colonoscopy  05/24/2021    Lipid screen  02/02/2023    DTaP/Tdap/Td vaccine (2 - Td) 08/02/2027    DEXA (modify frequency per FRAX score)  Completed    Flu vaccine  Completed    Hepatitis C screen  Completed    HIV screen  Completed      Patient Active Problem List   Diagnosis    COPD (chronic obstructive pulmonary disease) mild- PFT 2008    Scoliosis, thoracogenic    Hypercholesteremia    Disc disorder of cervical region    Fibromyalgia    Anxiety    Tobacco abuse    Stress incontinence    Lumbar radiculopathy    Spinal stenosis, lumbar    Major depression, recurrent- Hx of multiple OD attempts    Herpes simplex- leg    Colon polyp    Screening mammogram, encounter for    Screening for cervical cancer    Hearing loss in left ear    Osteopenia- DXA -1.9 wrist (8/11)    Allergic rhinitis    Hypertriglyceridemia    Cholelithiases    Sciatic nerve disease    Frequent falls    Balance disorder    Bilateral occipital neuralgia    History of cerebral aneurysm repair 2015 anterior communicating clipped    Cervical stenosis of spine    Biliary obstruction     Cholesterol, Total   Date Value Ref Range Status   02/02/2018 174 0 - 199 mg/dL Final     LDL Calculated   Date Value Ref Range Status   02/02/2018 103 (H) <100 mg/dL Final     HDL   Date Value Ref Range Status   02/02/2018 55 40 - 60 mg/dL Final     Triglycerides   Date Value Ref Range Status   02/02/2018 78 0 - 150 mg/dL Final     Lab Results   Component Value Date    GLUCOSE 90 02/02/2018     Lab Results   Component Value Date     (H) 02/02/2018    K 4.5 02/02/2018    CREATININE 0.8 02/02/2018     Lab Results   Component Value Date    WBC 10.2 02/02/2018    HGB 12.7 02/02/2018    HCT 37.9 02/02/2018    .0 (H) 02/02/2018     02/02/2018     Lab Results   Component Value Date    ALT 10 02/02/2018    AST 27 02/02/2018    ALKPHOS 57 02/02/2018 BILITOT 0.3 02/02/2018     TSH (uIU/mL)   Date Value   12/10/2015 2.36     No results found for: LABA1C  Current Outpatient Prescriptions   Medication Sig Dispense Refill    fenofibrate (TRICOR) 145 MG tablet TAKE ONE TABLET BY MOUTH DAILY 30 tablet 3    valACYclovir (VALTREX) 1 g tablet TAKE ONE TABLET BY MOUTH DAILY 90 tablet 0    Probiotic Product (PROBIOTIC DAILY PO) Take by mouth daily      fluticasone (FLONASE) 50 MCG/ACT nasal spray SHAKE LIQUID AND USE 2 SPRAYS IN EACH NOSTRIL DAILY 1 Bottle 12    QUEtiapine (SEROQUEL) 50 MG tablet Take 100 mg by mouth nightly       gabapentin (NEURONTIN) 800 MG tablet Take 1 tablet by mouth 4 times daily 360 tablet 1    guaiFENesin (MUCINEX) 600 MG extended release tablet Take 1 tablet by mouth 2 times daily (Patient taking differently: Take 600 mg by mouth 2 times daily as needed ) 30 tablet 0    cetirizine (ZYRTEC) 10 MG tablet Take 10 mg by mouth nightly      Cranberry Fruit Concentrate 56430 MG CAPS Take by mouth      buPROPion (WELLBUTRIN XL) 150 MG XL tablet 450 mg every morning [facility] 30 tablet     mirtazapine (REMERON) 30 MG tablet Take 30 mg by mouth nightly [facility] 30 tablet     DULoxetine (CYMBALTA) 60 MG capsule Take 1 capsule by mouth daily. [facility] 30 capsule     therapeutic multivitamin-minerals (THERAGRAN-M) tablet Take 1 tablet by mouth daily. No current facility-administered medications for this visit. Review of Systems   General ROS: fever? No,    Night sweats? No  Endocrine ROS:malaise/lethargy? No   Unexpected weight changes? No  Respiratory ROS: cough? No   Shortness of breath? No  Cardiovascular ROS:chest pain? No   Shortness of breath with exertion? No  Gastrointestinal ROS: abdominal pain? No   Change in stools? No  Genito-Urinary ROS: painful urination? No   Trouble voiding? No  Neurological ROS: TIA or stroke symptoms? No   Numbness/tingling in feet?  No     Objective:    PHYSICAL EXAM   /80 (Site: Right Upper Arm, Position: Sitting, Cuff Size: Small Adult)   Pulse 84   Temp 98.5 °F (36.9 °C) (Oral)   Resp 16   Ht 5' 2\" (1.575 m)   Wt 103 lb (46.7 kg)   LMP 08/11/1993 (Approximate)   BMI 18.84 kg/m²   Blood pressure is Good. BP Readings from Last 5 Encounters:   09/14/18 138/80   08/02/18 120/72   06/19/18 135/78   06/08/18 120/60   02/02/18 128/80     Weight is decreased. Wt Readings from Last 5 Encounters:   09/14/18 103 lb (46.7 kg)   08/02/18 107 lb (48.5 kg)   06/19/18 104 lb (47.2 kg)   06/18/18 104 lb (47.2 kg)   06/08/18 104 lb (47.2 kg)      GENERAL:   · well-developed, well-nourished, alert, no distress. SKIN: warm and dry  · No rashes or suspicious lesions; old scars right temporal region  · No nodules or induration  MUSCULOSKEL:    · Skull irregular left temple, non-tender to palpation  · Gait normal, assistive device: none  · No significant finger or nail findings     Assessment and Plan:      Diagnosis Orders   1. Scalp pain     Previous CT and MRI reviewed. Somatic scalp pain in old trauma region. Not neuropathic. INSTRUCTIONS  Alternate heat and cold. Tylenol as needed. Use lidocaine topically for pain.

## 2018-11-05 RX ORDER — FLUTICASONE PROPIONATE 50 MCG
SPRAY, SUSPENSION (ML) NASAL
Qty: 1 BOTTLE | Refills: 11 | Status: SHIPPED | OUTPATIENT
Start: 2018-11-05 | End: 2019-12-18 | Stop reason: SDUPTHER

## 2018-11-29 RX ORDER — VALACYCLOVIR HYDROCHLORIDE 1 G/1
TABLET, FILM COATED ORAL
Qty: 90 TABLET | Refills: 0 | Status: SHIPPED | OUTPATIENT
Start: 2018-11-29 | End: 2019-06-11 | Stop reason: SDUPTHER

## 2018-12-10 RX ORDER — GABAPENTIN 800 MG/1
TABLET ORAL
Qty: 360 TABLET | Refills: 0 | Status: SHIPPED | OUTPATIENT
Start: 2018-12-10 | End: 2019-06-30 | Stop reason: SDUPTHER

## 2019-01-07 RX ORDER — FENOFIBRATE 145 MG/1
TABLET, COATED ORAL
Qty: 30 TABLET | Refills: 2 | Status: SHIPPED | OUTPATIENT
Start: 2019-01-07 | End: 2019-04-09 | Stop reason: SDUPTHER

## 2019-01-18 ENCOUNTER — OFFICE VISIT (OUTPATIENT)
Dept: FAMILY MEDICINE CLINIC | Age: 64
End: 2019-01-18
Payer: MEDICARE

## 2019-01-18 VITALS
TEMPERATURE: 98.6 F | RESPIRATION RATE: 16 BRPM | SYSTOLIC BLOOD PRESSURE: 118 MMHG | WEIGHT: 103 LBS | BODY MASS INDEX: 18.95 KG/M2 | DIASTOLIC BLOOD PRESSURE: 74 MMHG | HEIGHT: 62 IN | HEART RATE: 88 BPM

## 2019-01-18 DIAGNOSIS — K13.79 MOUTH SORES: ICD-10-CM

## 2019-01-18 DIAGNOSIS — B00.9 RECURRENT HERPES SIMPLEX: ICD-10-CM

## 2019-01-18 DIAGNOSIS — J43.9 PULMONARY EMPHYSEMA, UNSPECIFIED EMPHYSEMA TYPE (HCC): ICD-10-CM

## 2019-01-18 DIAGNOSIS — M79.7 FIBROMYALGIA: ICD-10-CM

## 2019-01-18 DIAGNOSIS — Z72.0 TOBACCO ABUSE: ICD-10-CM

## 2019-01-18 DIAGNOSIS — Z00.00 MEDICARE ANNUAL WELLNESS VISIT, SUBSEQUENT: Primary | ICD-10-CM

## 2019-01-18 DIAGNOSIS — Z23 NEED FOR ZOSTER VACCINATION: ICD-10-CM

## 2019-01-18 DIAGNOSIS — M41.30 THORACOGENIC SCOLIOSIS, UNSPECIFIED SPINAL REGION: ICD-10-CM

## 2019-01-18 DIAGNOSIS — F33.42 RECURRENT MAJOR DEPRESSIVE DISORDER, IN FULL REMISSION (HCC): ICD-10-CM

## 2019-01-18 DIAGNOSIS — E78.00 HYPERCHOLESTEREMIA: ICD-10-CM

## 2019-01-18 PROCEDURE — 1101F PT FALLS ASSESS-DOCD LE1/YR: CPT | Performed by: FAMILY MEDICINE

## 2019-01-18 PROCEDURE — 1090F PRES/ABSN URINE INCON ASSESS: CPT | Performed by: FAMILY MEDICINE

## 2019-01-18 PROCEDURE — 4004F PT TOBACCO SCREEN RCVD TLK: CPT | Performed by: FAMILY MEDICINE

## 2019-01-18 PROCEDURE — G0439 PPPS, SUBSEQ VISIT: HCPCS | Performed by: FAMILY MEDICINE

## 2019-01-18 PROCEDURE — G8482 FLU IMMUNIZE ORDER/ADMIN: HCPCS | Performed by: FAMILY MEDICINE

## 2019-01-18 PROCEDURE — G8420 CALC BMI NORM PARAMETERS: HCPCS | Performed by: FAMILY MEDICINE

## 2019-01-18 PROCEDURE — 3023F SPIROM DOC REV: CPT | Performed by: FAMILY MEDICINE

## 2019-01-18 PROCEDURE — 4040F PNEUMOC VAC/ADMIN/RCVD: CPT | Performed by: FAMILY MEDICINE

## 2019-01-18 PROCEDURE — G8400 PT W/DXA NO RESULTS DOC: HCPCS | Performed by: FAMILY MEDICINE

## 2019-01-18 PROCEDURE — 3017F COLORECTAL CA SCREEN DOC REV: CPT | Performed by: FAMILY MEDICINE

## 2019-01-18 PROCEDURE — 99214 OFFICE O/P EST MOD 30 MIN: CPT | Performed by: FAMILY MEDICINE

## 2019-01-18 PROCEDURE — G8926 SPIRO NO PERF OR DOC: HCPCS | Performed by: FAMILY MEDICINE

## 2019-01-18 PROCEDURE — G8427 DOCREV CUR MEDS BY ELIG CLIN: HCPCS | Performed by: FAMILY MEDICINE

## 2019-01-18 PROCEDURE — 1123F ACP DISCUSS/DSCN MKR DOCD: CPT | Performed by: FAMILY MEDICINE

## 2019-01-18 ASSESSMENT — LIFESTYLE VARIABLES
HOW OFTEN DURING THE LAST YEAR HAVE YOU FAILED TO DO WHAT WAS NORMALLY EXPECTED FROM YOU BECAUSE OF DRINKING: 0
AUDIT TOTAL SCORE: 1
AUDIT-C TOTAL SCORE: 1
HOW OFTEN DURING THE LAST YEAR HAVE YOU FOUND THAT YOU WERE NOT ABLE TO STOP DRINKING ONCE YOU HAD STARTED: 0
HOW OFTEN DO YOU HAVE SIX OR MORE DRINKS ON ONE OCCASION: 0
HOW OFTEN DURING THE LAST YEAR HAVE YOU HAD A FEELING OF GUILT OR REMORSE AFTER DRINKING: 0
HOW MANY STANDARD DRINKS CONTAINING ALCOHOL DO YOU HAVE ON A TYPICAL DAY: 0
HOW OFTEN DURING THE LAST YEAR HAVE YOU BEEN UNABLE TO REMEMBER WHAT HAPPENED THE NIGHT BEFORE BECAUSE YOU HAD BEEN DRINKING: 0
HOW OFTEN DO YOU HAVE A DRINK CONTAINING ALCOHOL: 1
HAVE YOU OR SOMEONE ELSE BEEN INJURED AS A RESULT OF YOUR DRINKING: 0
HAS A RELATIVE, FRIEND, DOCTOR, OR ANOTHER HEALTH PROFESSIONAL EXPRESSED CONCERN ABOUT YOUR DRINKING OR SUGGESTED YOU CUT DOWN: 0
HOW OFTEN DURING THE LAST YEAR HAVE YOU NEEDED AN ALCOHOLIC DRINK FIRST THING IN THE MORNING TO GET YOURSELF GOING AFTER A NIGHT OF HEAVY DRINKING: 0

## 2019-01-18 ASSESSMENT — ANXIETY QUESTIONNAIRES: GAD7 TOTAL SCORE: 3

## 2019-01-18 ASSESSMENT — PATIENT HEALTH QUESTIONNAIRE - PHQ9
SUM OF ALL RESPONSES TO PHQ QUESTIONS 1-9: 2
SUM OF ALL RESPONSES TO PHQ QUESTIONS 1-9: 2

## 2019-02-06 ENCOUNTER — HOSPITAL ENCOUNTER (INPATIENT)
Age: 64
LOS: 1 days | Discharge: HOME | DRG: 189 | End: 2019-02-08
Attending: EMERGENCY MEDICINE | Admitting: HOSPITALIST
Payer: COMMERCIAL

## 2019-02-06 ENCOUNTER — APPOINTMENT (OUTPATIENT)
Dept: CT IMAGING | Age: 64
DRG: 189 | End: 2019-02-06
Payer: COMMERCIAL

## 2019-02-06 DIAGNOSIS — J96.01 ACUTE RESPIRATORY FAILURE WITH HYPOXIA (HCC): ICD-10-CM

## 2019-02-06 DIAGNOSIS — J44.1 ACUTE EXACERBATION OF COPD WITH ASTHMA (HCC): Primary | ICD-10-CM

## 2019-02-06 DIAGNOSIS — J45.901 ACUTE EXACERBATION OF COPD WITH ASTHMA (HCC): Primary | ICD-10-CM

## 2019-02-06 DIAGNOSIS — R91.1 PULMONARY NODULE SEEN ON IMAGING STUDY: ICD-10-CM

## 2019-02-06 LAB
A/G RATIO: 1.3 (ref 1.1–2.2)
ALBUMIN SERPL-MCNC: 3.5 G/DL (ref 3.4–5)
ALP BLD-CCNC: 72 U/L (ref 40–129)
ALT SERPL-CCNC: 10 U/L (ref 10–40)
ANION GAP SERPL CALCULATED.3IONS-SCNC: 13 MMOL/L (ref 3–16)
AST SERPL-CCNC: 20 U/L (ref 15–37)
BASOPHILS ABSOLUTE: 0.2 K/UL (ref 0–0.2)
BASOPHILS RELATIVE PERCENT: 1.3 %
BILIRUB SERPL-MCNC: 0.3 MG/DL (ref 0–1)
BILIRUBIN URINE: NEGATIVE
BLOOD, URINE: NEGATIVE
BUN BLDV-MCNC: 16 MG/DL (ref 7–20)
CALCIUM SERPL-MCNC: 9.7 MG/DL (ref 8.3–10.6)
CHLORIDE BLD-SCNC: 107 MMOL/L (ref 99–110)
CLARITY: CLEAR
CO2: 22 MMOL/L (ref 21–32)
COLOR: YELLOW
CREAT SERPL-MCNC: 0.7 MG/DL (ref 0.6–1.2)
EOSINOPHILS ABSOLUTE: 0.1 K/UL (ref 0–0.6)
EOSINOPHILS RELATIVE PERCENT: 0.4 %
GFR AFRICAN AMERICAN: >60
GFR NON-AFRICAN AMERICAN: >60
GLOBULIN: 2.8 G/DL
GLUCOSE BLD-MCNC: 141 MG/DL (ref 70–99)
GLUCOSE URINE: NEGATIVE MG/DL
HCT VFR BLD CALC: 30.5 % (ref 36–48)
HEMOGLOBIN: 10.3 G/DL (ref 12–16)
KETONES, URINE: NEGATIVE MG/DL
LACTIC ACID, SEPSIS: 0.7 MMOL/L (ref 0.4–1.9)
LACTIC ACID, SEPSIS: 1.2 MMOL/L (ref 0.4–1.9)
LEUKOCYTE ESTERASE, URINE: NEGATIVE
LYMPHOCYTES ABSOLUTE: 1.1 K/UL (ref 1–5.1)
LYMPHOCYTES RELATIVE PERCENT: 8.4 %
MCH RBC QN AUTO: 34.6 PG (ref 26–34)
MCHC RBC AUTO-ENTMCNC: 33.7 G/DL (ref 31–36)
MCV RBC AUTO: 102.5 FL (ref 80–100)
MICROSCOPIC EXAMINATION: NORMAL
MONOCYTES ABSOLUTE: 1.3 K/UL (ref 0–1.3)
MONOCYTES RELATIVE PERCENT: 9.8 %
NEUTROPHILS ABSOLUTE: 10.3 K/UL (ref 1.7–7.7)
NEUTROPHILS RELATIVE PERCENT: 80.1 %
NITRITE, URINE: NEGATIVE
PDW BLD-RTO: 13.3 % (ref 12.4–15.4)
PH UA: 7.5
PLATELET # BLD: 533 K/UL (ref 135–450)
PMV BLD AUTO: 7.8 FL (ref 5–10.5)
POTASSIUM REFLEX MAGNESIUM: 4.1 MMOL/L (ref 3.5–5.1)
PRO-BNP: 1119 PG/ML (ref 0–124)
PROTEIN UA: NEGATIVE MG/DL
RAPID INFLUENZA  B AGN: NEGATIVE
RAPID INFLUENZA A AGN: NEGATIVE
RBC # BLD: 2.97 M/UL (ref 4–5.2)
SODIUM BLD-SCNC: 142 MMOL/L (ref 136–145)
SPECIFIC GRAVITY UA: 1.01
TOTAL PROTEIN: 6.3 G/DL (ref 6.4–8.2)
TROPONIN: 0.01 NG/ML
URINE REFLEX TO CULTURE: NORMAL
URINE TYPE: NORMAL
UROBILINOGEN, URINE: 0.2 E.U./DL
WBC # BLD: 12.9 K/UL (ref 4–11)

## 2019-02-06 PROCEDURE — 36415 COLL VENOUS BLD VENIPUNCTURE: CPT

## 2019-02-06 PROCEDURE — 96365 THER/PROPH/DIAG IV INF INIT: CPT

## 2019-02-06 PROCEDURE — 99285 EMERGENCY DEPT VISIT HI MDM: CPT

## 2019-02-06 PROCEDURE — 2580000003 HC RX 258: Performed by: EMERGENCY MEDICINE

## 2019-02-06 PROCEDURE — 96361 HYDRATE IV INFUSION ADD-ON: CPT

## 2019-02-06 PROCEDURE — 83605 ASSAY OF LACTIC ACID: CPT

## 2019-02-06 PROCEDURE — 83880 ASSAY OF NATRIURETIC PEPTIDE: CPT

## 2019-02-06 PROCEDURE — 71260 CT THORAX DX C+: CPT

## 2019-02-06 PROCEDURE — 6370000000 HC RX 637 (ALT 250 FOR IP): Performed by: EMERGENCY MEDICINE

## 2019-02-06 PROCEDURE — 87040 BLOOD CULTURE FOR BACTERIA: CPT

## 2019-02-06 PROCEDURE — 87804 INFLUENZA ASSAY W/OPTIC: CPT

## 2019-02-06 PROCEDURE — 81003 URINALYSIS AUTO W/O SCOPE: CPT

## 2019-02-06 PROCEDURE — 80053 COMPREHEN METABOLIC PANEL: CPT

## 2019-02-06 PROCEDURE — 85025 COMPLETE CBC W/AUTO DIFF WBC: CPT

## 2019-02-06 PROCEDURE — 96375 TX/PRO/DX INJ NEW DRUG ADDON: CPT

## 2019-02-06 PROCEDURE — 84484 ASSAY OF TROPONIN QUANT: CPT

## 2019-02-06 PROCEDURE — 6360000004 HC RX CONTRAST MEDICATION: Performed by: EMERGENCY MEDICINE

## 2019-02-06 PROCEDURE — 93005 ELECTROCARDIOGRAM TRACING: CPT | Performed by: EMERGENCY MEDICINE

## 2019-02-06 PROCEDURE — 6360000002 HC RX W HCPCS: Performed by: EMERGENCY MEDICINE

## 2019-02-06 RX ORDER — SODIUM CHLORIDE 0.9 % (FLUSH) 0.9 %
10 SYRINGE (ML) INJECTION PRN
Status: DISCONTINUED | OUTPATIENT
Start: 2019-02-06 | End: 2019-02-07

## 2019-02-06 RX ORDER — SODIUM CHLORIDE 0.9 % (FLUSH) 0.9 %
10 SYRINGE (ML) INJECTION EVERY 12 HOURS SCHEDULED
Status: DISCONTINUED | OUTPATIENT
Start: 2019-02-06 | End: 2019-02-07

## 2019-02-06 RX ORDER — IPRATROPIUM BROMIDE AND ALBUTEROL SULFATE 2.5; .5 MG/3ML; MG/3ML
1 SOLUTION RESPIRATORY (INHALATION) ONCE
Status: COMPLETED | OUTPATIENT
Start: 2019-02-06 | End: 2019-02-06

## 2019-02-06 RX ORDER — 0.9 % SODIUM CHLORIDE 0.9 %
500 INTRAVENOUS SOLUTION INTRAVENOUS ONCE
Status: COMPLETED | OUTPATIENT
Start: 2019-02-06 | End: 2019-02-06

## 2019-02-06 RX ORDER — METHYLPREDNISOLONE SODIUM SUCCINATE 125 MG/2ML
125 INJECTION, POWDER, LYOPHILIZED, FOR SOLUTION INTRAMUSCULAR; INTRAVENOUS ONCE
Status: COMPLETED | OUTPATIENT
Start: 2019-02-06 | End: 2019-02-06

## 2019-02-06 RX ORDER — ALBUTEROL SULFATE 2.5 MG/3ML
5 SOLUTION RESPIRATORY (INHALATION) ONCE
Status: DISCONTINUED | OUTPATIENT
Start: 2019-02-06 | End: 2019-02-07

## 2019-02-06 RX ORDER — MAGNESIUM SULFATE IN WATER 40 MG/ML
2 INJECTION, SOLUTION INTRAVENOUS ONCE
Status: COMPLETED | OUTPATIENT
Start: 2019-02-06 | End: 2019-02-06

## 2019-02-06 RX ADMIN — IOVERSOL 100 ML: 678 INJECTION INTRA-ARTERIAL; INTRAVENOUS at 17:58

## 2019-02-06 RX ADMIN — IPRATROPIUM BROMIDE AND ALBUTEROL SULFATE 1 AMPULE: .5; 3 SOLUTION RESPIRATORY (INHALATION) at 20:03

## 2019-02-06 RX ADMIN — IPRATROPIUM BROMIDE AND ALBUTEROL SULFATE 1 AMPULE: .5; 3 SOLUTION RESPIRATORY (INHALATION) at 18:52

## 2019-02-06 RX ADMIN — METHYLPREDNISOLONE SODIUM SUCCINATE 125 MG: 125 INJECTION, POWDER, FOR SOLUTION INTRAMUSCULAR; INTRAVENOUS at 18:51

## 2019-02-06 RX ADMIN — MAGNESIUM SULFATE HEPTAHYDRATE 2 G: 40 INJECTION, SOLUTION INTRAVENOUS at 20:03

## 2019-02-06 RX ADMIN — SODIUM CHLORIDE 500 ML: 9 INJECTION, SOLUTION INTRAVENOUS at 20:03

## 2019-02-07 PROBLEM — J45.901 ACUTE EXACERBATION OF COPD WITH ASTHMA (HCC): Status: ACTIVE | Noted: 2019-02-06

## 2019-02-07 LAB
EKG ATRIAL RATE: 85 BPM
EKG DIAGNOSIS: NORMAL
EKG P AXIS: 83 DEGREES
EKG P-R INTERVAL: 144 MS
EKG Q-T INTERVAL: 382 MS
EKG QRS DURATION: 94 MS
EKG QTC CALCULATION (BAZETT): 454 MS
EKG R AXIS: -14 DEGREES
EKG T AXIS: 60 DEGREES
EKG VENTRICULAR RATE: 85 BPM
PROCALCITONIN: 0.08 NG/ML (ref 0–0.15)
REPORT: NORMAL
RESPIRATORY PANEL PCR: NORMAL

## 2019-02-07 PROCEDURE — 94760 N-INVAS EAR/PLS OXIMETRY 1: CPT

## 2019-02-07 PROCEDURE — 1200000000 HC SEMI PRIVATE

## 2019-02-07 PROCEDURE — 6370000000 HC RX 637 (ALT 250 FOR IP): Performed by: NURSE PRACTITIONER

## 2019-02-07 PROCEDURE — 6370000000 HC RX 637 (ALT 250 FOR IP): Performed by: HOSPITALIST

## 2019-02-07 PROCEDURE — G0378 HOSPITAL OBSERVATION PER HR: HCPCS

## 2019-02-07 PROCEDURE — 87581 M.PNEUMON DNA AMP PROBE: CPT

## 2019-02-07 PROCEDURE — 93010 ELECTROCARDIOGRAM REPORT: CPT | Performed by: INTERNAL MEDICINE

## 2019-02-07 PROCEDURE — 99223 1ST HOSP IP/OBS HIGH 75: CPT | Performed by: INTERNAL MEDICINE

## 2019-02-07 PROCEDURE — 87486 CHLMYD PNEUM DNA AMP PROBE: CPT

## 2019-02-07 PROCEDURE — 36415 COLL VENOUS BLD VENIPUNCTURE: CPT

## 2019-02-07 PROCEDURE — 6360000002 HC RX W HCPCS: Performed by: NURSE PRACTITIONER

## 2019-02-07 PROCEDURE — 87798 DETECT AGENT NOS DNA AMP: CPT

## 2019-02-07 PROCEDURE — 2700000000 HC OXYGEN THERAPY PER DAY

## 2019-02-07 PROCEDURE — 94640 AIRWAY INHALATION TREATMENT: CPT

## 2019-02-07 PROCEDURE — 2580000003 HC RX 258: Performed by: NURSE PRACTITIONER

## 2019-02-07 PROCEDURE — 84145 PROCALCITONIN (PCT): CPT

## 2019-02-07 PROCEDURE — 87633 RESP VIRUS 12-25 TARGETS: CPT

## 2019-02-07 PROCEDURE — 94664 DEMO&/EVAL PT USE INHALER: CPT

## 2019-02-07 RX ORDER — DULOXETIN HYDROCHLORIDE 60 MG/1
120 CAPSULE, DELAYED RELEASE ORAL DAILY
Status: DISCONTINUED | OUTPATIENT
Start: 2019-02-07 | End: 2019-02-08 | Stop reason: HOSPADM

## 2019-02-07 RX ORDER — CETIRIZINE HYDROCHLORIDE 10 MG/1
10 TABLET ORAL NIGHTLY
Status: DISCONTINUED | OUTPATIENT
Start: 2019-02-07 | End: 2019-02-08 | Stop reason: HOSPADM

## 2019-02-07 RX ORDER — BUPROPION HYDROCHLORIDE 150 MG/1
450 TABLET ORAL DAILY
Status: DISCONTINUED | OUTPATIENT
Start: 2019-02-07 | End: 2019-02-08 | Stop reason: HOSPADM

## 2019-02-07 RX ORDER — SODIUM CHLORIDE 0.9 % (FLUSH) 0.9 %
10 SYRINGE (ML) INJECTION PRN
Status: DISCONTINUED | OUTPATIENT
Start: 2019-02-07 | End: 2019-02-08 | Stop reason: HOSPADM

## 2019-02-07 RX ORDER — ALBUTEROL SULFATE 2.5 MG/3ML
2.5 SOLUTION RESPIRATORY (INHALATION)
Status: DISCONTINUED | OUTPATIENT
Start: 2019-02-07 | End: 2019-02-08 | Stop reason: HOSPADM

## 2019-02-07 RX ORDER — MIRTAZAPINE 30 MG/1
30 TABLET, FILM COATED ORAL NIGHTLY
Status: DISCONTINUED | OUTPATIENT
Start: 2019-02-07 | End: 2019-02-08 | Stop reason: HOSPADM

## 2019-02-07 RX ORDER — METHYLPREDNISOLONE SODIUM SUCCINATE 40 MG/ML
40 INJECTION, POWDER, LYOPHILIZED, FOR SOLUTION INTRAMUSCULAR; INTRAVENOUS EVERY 8 HOURS
Status: DISCONTINUED | OUTPATIENT
Start: 2019-02-07 | End: 2019-02-07

## 2019-02-07 RX ORDER — ONDANSETRON 2 MG/ML
4 INJECTION INTRAMUSCULAR; INTRAVENOUS EVERY 6 HOURS PRN
Status: DISCONTINUED | OUTPATIENT
Start: 2019-02-07 | End: 2019-02-08 | Stop reason: HOSPADM

## 2019-02-07 RX ORDER — SODIUM CHLORIDE 0.9 % (FLUSH) 0.9 %
10 SYRINGE (ML) INJECTION EVERY 12 HOURS SCHEDULED
Status: DISCONTINUED | OUTPATIENT
Start: 2019-02-07 | End: 2019-02-08 | Stop reason: HOSPADM

## 2019-02-07 RX ORDER — IPRATROPIUM BROMIDE AND ALBUTEROL SULFATE 2.5; .5 MG/3ML; MG/3ML
1 SOLUTION RESPIRATORY (INHALATION)
Status: DISCONTINUED | OUTPATIENT
Start: 2019-02-07 | End: 2019-02-08 | Stop reason: HOSPADM

## 2019-02-07 RX ORDER — PREDNISONE 20 MG/1
40 TABLET ORAL DAILY
Status: DISCONTINUED | OUTPATIENT
Start: 2019-02-07 | End: 2019-02-08 | Stop reason: HOSPADM

## 2019-02-07 RX ORDER — ACETAMINOPHEN 325 MG/1
650 TABLET ORAL EVERY 4 HOURS PRN
Status: DISCONTINUED | OUTPATIENT
Start: 2019-02-07 | End: 2019-02-08 | Stop reason: HOSPADM

## 2019-02-07 RX ORDER — FAMOTIDINE 20 MG/1
20 TABLET, FILM COATED ORAL 2 TIMES DAILY
Status: DISCONTINUED | OUTPATIENT
Start: 2019-02-07 | End: 2019-02-08 | Stop reason: HOSPADM

## 2019-02-07 RX ORDER — GABAPENTIN 400 MG/1
800 CAPSULE ORAL 3 TIMES DAILY
Status: DISCONTINUED | OUTPATIENT
Start: 2019-02-07 | End: 2019-02-08 | Stop reason: HOSPADM

## 2019-02-07 RX ADMIN — IPRATROPIUM BROMIDE AND ALBUTEROL SULFATE 1 AMPULE: .5; 3 SOLUTION RESPIRATORY (INHALATION) at 20:23

## 2019-02-07 RX ADMIN — METHYLPREDNISOLONE SODIUM SUCCINATE 40 MG: 40 INJECTION, POWDER, FOR SOLUTION INTRAMUSCULAR; INTRAVENOUS at 02:59

## 2019-02-07 RX ADMIN — FAMOTIDINE 20 MG: 20 TABLET, FILM COATED ORAL at 20:56

## 2019-02-07 RX ADMIN — BUPROPION HYDROCHLORIDE 450 MG: 150 TABLET, FILM COATED, EXTENDED RELEASE ORAL at 12:04

## 2019-02-07 RX ADMIN — MIRTAZAPINE 30 MG: 30 TABLET, FILM COATED ORAL at 20:57

## 2019-02-07 RX ADMIN — Medication 10 ML: at 20:57

## 2019-02-07 RX ADMIN — GABAPENTIN 800 MG: 400 CAPSULE ORAL at 20:56

## 2019-02-07 RX ADMIN — CEFTRIAXONE 1 G: 1 INJECTION, POWDER, FOR SOLUTION INTRAMUSCULAR; INTRAVENOUS at 02:58

## 2019-02-07 RX ADMIN — IPRATROPIUM BROMIDE AND ALBUTEROL SULFATE 1 AMPULE: .5; 3 SOLUTION RESPIRATORY (INHALATION) at 07:53

## 2019-02-07 RX ADMIN — FAMOTIDINE 20 MG: 20 TABLET, FILM COATED ORAL at 08:42

## 2019-02-07 RX ADMIN — PREDNISONE 40 MG: 20 TABLET ORAL at 10:57

## 2019-02-07 RX ADMIN — CETIRIZINE HYDROCHLORIDE 10 MG: 10 TABLET, FILM COATED ORAL at 20:57

## 2019-02-07 RX ADMIN — ENOXAPARIN SODIUM 40 MG: 40 INJECTION SUBCUTANEOUS at 08:42

## 2019-02-07 RX ADMIN — DULOXETINE HYDROCHLORIDE 120 MG: 60 CAPSULE, DELAYED RELEASE ORAL at 12:04

## 2019-02-07 RX ADMIN — MOMETASONE FUROATE AND FORMOTEROL FUMARATE DIHYDRATE 2 PUFF: 100; 5 AEROSOL RESPIRATORY (INHALATION) at 11:16

## 2019-02-07 RX ADMIN — MOMETASONE FUROATE AND FORMOTEROL FUMARATE DIHYDRATE 2 PUFF: 100; 5 AEROSOL RESPIRATORY (INHALATION) at 20:23

## 2019-02-07 RX ADMIN — GABAPENTIN 800 MG: 400 CAPSULE ORAL at 13:50

## 2019-02-07 RX ADMIN — IPRATROPIUM BROMIDE AND ALBUTEROL SULFATE 1 AMPULE: .5; 3 SOLUTION RESPIRATORY (INHALATION) at 15:45

## 2019-02-07 RX ADMIN — IPRATROPIUM BROMIDE AND ALBUTEROL SULFATE 1 AMPULE: .5; 3 SOLUTION RESPIRATORY (INHALATION) at 11:16

## 2019-02-07 RX ADMIN — AZITHROMYCIN MONOHYDRATE 500 MG: 500 INJECTION, POWDER, LYOPHILIZED, FOR SOLUTION INTRAVENOUS at 03:45

## 2019-02-07 RX ADMIN — Medication 10 ML: at 08:42

## 2019-02-07 ASSESSMENT — PAIN SCALES - GENERAL: PAINLEVEL_OUTOF10: 4

## 2019-02-07 ASSESSMENT — PAIN DESCRIPTION - LOCATION: LOCATION: CHEST;RIB CAGE

## 2019-02-07 ASSESSMENT — PAIN DESCRIPTION - PAIN TYPE: TYPE: ACUTE PAIN

## 2019-02-08 VITALS
WEIGHT: 106.7 LBS | HEIGHT: 62 IN | RESPIRATION RATE: 16 BRPM | SYSTOLIC BLOOD PRESSURE: 123 MMHG | DIASTOLIC BLOOD PRESSURE: 66 MMHG | OXYGEN SATURATION: 95 % | BODY MASS INDEX: 19.64 KG/M2 | HEART RATE: 76 BPM | TEMPERATURE: 98.3 F

## 2019-02-08 LAB
ANION GAP SERPL CALCULATED.3IONS-SCNC: 12 MMOL/L (ref 3–16)
BASOPHILS ABSOLUTE: 0 K/UL (ref 0–0.2)
BASOPHILS RELATIVE PERCENT: 0.4 %
BUN BLDV-MCNC: 28 MG/DL (ref 7–20)
CALCIUM SERPL-MCNC: 9.1 MG/DL (ref 8.3–10.6)
CHLORIDE BLD-SCNC: 111 MMOL/L (ref 99–110)
CO2: 23 MMOL/L (ref 21–32)
CREAT SERPL-MCNC: 0.7 MG/DL (ref 0.6–1.2)
EOSINOPHILS ABSOLUTE: 0 K/UL (ref 0–0.6)
EOSINOPHILS RELATIVE PERCENT: 0.2 %
ESTIMATED AVERAGE GLUCOSE: 108.3 MG/DL
GFR AFRICAN AMERICAN: >60
GFR NON-AFRICAN AMERICAN: >60
GLUCOSE BLD-MCNC: 107 MG/DL (ref 70–99)
HBA1C MFR BLD: 5.4 %
HCT VFR BLD CALC: 27.9 % (ref 36–48)
HEMOGLOBIN: 9.8 G/DL (ref 12–16)
LYMPHOCYTES ABSOLUTE: 1.2 K/UL (ref 1–5.1)
LYMPHOCYTES RELATIVE PERCENT: 10.2 %
MCH RBC QN AUTO: 35.1 PG (ref 26–34)
MCHC RBC AUTO-ENTMCNC: 35 G/DL (ref 31–36)
MCV RBC AUTO: 100.3 FL (ref 80–100)
MONOCYTES ABSOLUTE: 0.9 K/UL (ref 0–1.3)
MONOCYTES RELATIVE PERCENT: 7.2 %
NEUTROPHILS ABSOLUTE: 9.7 K/UL (ref 1.7–7.7)
NEUTROPHILS RELATIVE PERCENT: 82 %
PDW BLD-RTO: 13.5 % (ref 12.4–15.4)
PLATELET # BLD: 501 K/UL (ref 135–450)
PMV BLD AUTO: 7.5 FL (ref 5–10.5)
POTASSIUM REFLEX MAGNESIUM: 4.1 MMOL/L (ref 3.5–5.1)
RBC # BLD: 2.78 M/UL (ref 4–5.2)
SODIUM BLD-SCNC: 146 MMOL/L (ref 136–145)
WBC # BLD: 11.9 K/UL (ref 4–11)

## 2019-02-08 PROCEDURE — 6370000000 HC RX 637 (ALT 250 FOR IP): Performed by: NURSE PRACTITIONER

## 2019-02-08 PROCEDURE — 80048 BASIC METABOLIC PNL TOTAL CA: CPT

## 2019-02-08 PROCEDURE — 83036 HEMOGLOBIN GLYCOSYLATED A1C: CPT

## 2019-02-08 PROCEDURE — 6360000002 HC RX W HCPCS: Performed by: NURSE PRACTITIONER

## 2019-02-08 PROCEDURE — 94640 AIRWAY INHALATION TREATMENT: CPT

## 2019-02-08 PROCEDURE — 99232 SBSQ HOSP IP/OBS MODERATE 35: CPT | Performed by: INTERNAL MEDICINE

## 2019-02-08 PROCEDURE — 85025 COMPLETE CBC W/AUTO DIFF WBC: CPT

## 2019-02-08 PROCEDURE — 2580000003 HC RX 258: Performed by: NURSE PRACTITIONER

## 2019-02-08 PROCEDURE — 2700000000 HC OXYGEN THERAPY PER DAY

## 2019-02-08 PROCEDURE — 6370000000 HC RX 637 (ALT 250 FOR IP): Performed by: HOSPITALIST

## 2019-02-08 PROCEDURE — 94761 N-INVAS EAR/PLS OXIMETRY MLT: CPT

## 2019-02-08 RX ORDER — PREDNISONE 20 MG/1
40 TABLET ORAL DAILY
Qty: 6 TABLET | Refills: 0 | Status: SHIPPED | OUTPATIENT
Start: 2019-02-08 | End: 2019-02-11

## 2019-02-08 RX ORDER — ALBUTEROL SULFATE 90 UG/1
2 AEROSOL, METERED RESPIRATORY (INHALATION) 4 TIMES DAILY PRN
Qty: 3 INHALER | Refills: 1 | Status: SHIPPED | OUTPATIENT
Start: 2019-02-08 | End: 2019-03-20 | Stop reason: ALTCHOICE

## 2019-02-08 RX ORDER — AZITHROMYCIN 500 MG/1
500 TABLET, FILM COATED ORAL DAILY
Qty: 3 TABLET | Refills: 0 | Status: SHIPPED | OUTPATIENT
Start: 2019-02-08 | End: 2019-02-08 | Stop reason: HOSPADM

## 2019-02-08 RX ORDER — DOXYCYCLINE HYCLATE 100 MG
100 TABLET ORAL 2 TIMES DAILY
Qty: 8 TABLET | Refills: 0 | Status: SHIPPED | OUTPATIENT
Start: 2019-02-08 | End: 2019-02-12

## 2019-02-08 RX ADMIN — PREDNISONE 40 MG: 20 TABLET ORAL at 08:17

## 2019-02-08 RX ADMIN — ENOXAPARIN SODIUM 40 MG: 40 INJECTION SUBCUTANEOUS at 08:16

## 2019-02-08 RX ADMIN — IPRATROPIUM BROMIDE AND ALBUTEROL SULFATE 1 AMPULE: .5; 3 SOLUTION RESPIRATORY (INHALATION) at 12:32

## 2019-02-08 RX ADMIN — FAMOTIDINE 20 MG: 20 TABLET, FILM COATED ORAL at 08:16

## 2019-02-08 RX ADMIN — GABAPENTIN 800 MG: 400 CAPSULE ORAL at 08:16

## 2019-02-08 RX ADMIN — CEFTRIAXONE 1 G: 1 INJECTION, POWDER, FOR SOLUTION INTRAMUSCULAR; INTRAVENOUS at 02:01

## 2019-02-08 RX ADMIN — AZITHROMYCIN MONOHYDRATE 500 MG: 500 INJECTION, POWDER, LYOPHILIZED, FOR SOLUTION INTRAVENOUS at 02:51

## 2019-02-08 RX ADMIN — MOMETASONE FUROATE AND FORMOTEROL FUMARATE DIHYDRATE 2 PUFF: 100; 5 AEROSOL RESPIRATORY (INHALATION) at 08:42

## 2019-02-08 RX ADMIN — Medication 10 ML: at 08:19

## 2019-02-08 RX ADMIN — DULOXETINE HYDROCHLORIDE 120 MG: 60 CAPSULE, DELAYED RELEASE ORAL at 08:17

## 2019-02-08 RX ADMIN — BUPROPION HYDROCHLORIDE 450 MG: 150 TABLET, FILM COATED, EXTENDED RELEASE ORAL at 08:16

## 2019-02-08 RX ADMIN — IPRATROPIUM BROMIDE AND ALBUTEROL SULFATE 1 AMPULE: .5; 3 SOLUTION RESPIRATORY (INHALATION) at 08:31

## 2019-02-09 ENCOUNTER — CARE COORDINATION (OUTPATIENT)
Dept: CASE MANAGEMENT | Age: 64
End: 2019-02-09

## 2019-02-09 DIAGNOSIS — J44.1 ACUTE EXACERBATION OF COPD WITH ASTHMA (HCC): Primary | ICD-10-CM

## 2019-02-09 DIAGNOSIS — J45.901 ACUTE EXACERBATION OF COPD WITH ASTHMA (HCC): Primary | ICD-10-CM

## 2019-02-09 PROCEDURE — 1111F DSCHRG MED/CURRENT MED MERGE: CPT | Performed by: FAMILY MEDICINE

## 2019-02-12 LAB
BLOOD CULTURE, ROUTINE: NORMAL
CULTURE, BLOOD 2: NORMAL

## 2019-02-20 ENCOUNTER — HOSPITAL ENCOUNTER (OUTPATIENT)
Dept: WOMENS IMAGING | Age: 64
Discharge: HOME | End: 2019-02-20
Payer: COMMERCIAL

## 2019-02-20 DIAGNOSIS — Z12.31 VISIT FOR SCREENING MAMMOGRAM: ICD-10-CM

## 2019-02-20 PROCEDURE — 77063 BREAST TOMOSYNTHESIS BI: CPT

## 2019-02-22 ENCOUNTER — TELEPHONE (OUTPATIENT)
Dept: PULMONOLOGY | Age: 64
End: 2019-02-22

## 2019-02-22 ENCOUNTER — HOSPITAL ENCOUNTER (OUTPATIENT)
Dept: PET IMAGING | Age: 64
Discharge: HOME | End: 2019-02-22
Payer: COMMERCIAL

## 2019-02-22 DIAGNOSIS — R91.1 LUNG NODULE: ICD-10-CM

## 2019-02-25 ENCOUNTER — CARE COORDINATION (OUTPATIENT)
Dept: CASE MANAGEMENT | Age: 64
End: 2019-02-25

## 2019-03-01 ENCOUNTER — HOSPITAL ENCOUNTER (OUTPATIENT)
Dept: PET IMAGING | Age: 64
Discharge: HOME | End: 2019-03-01
Payer: COMMERCIAL

## 2019-03-01 VITALS — WEIGHT: 105 LBS | BODY MASS INDEX: 19.32 KG/M2 | HEIGHT: 62 IN

## 2019-03-01 DIAGNOSIS — R91.1 LUNG NODULE: ICD-10-CM

## 2019-03-01 PROCEDURE — A9552 F18 FDG: HCPCS | Performed by: INTERNAL MEDICINE

## 2019-03-01 PROCEDURE — 78815 PET IMAGE W/CT SKULL-THIGH: CPT

## 2019-03-01 PROCEDURE — 3430000000 HC RX DIAGNOSTIC RADIOPHARMACEUTICAL: Performed by: INTERNAL MEDICINE

## 2019-03-01 RX ORDER — FLUDEOXYGLUCOSE F 18 200 MCI/ML
14.3 INJECTION, SOLUTION INTRAVENOUS
Status: COMPLETED | OUTPATIENT
Start: 2019-03-01 | End: 2019-03-01

## 2019-03-01 RX ADMIN — FLUDEOXYGLUCOSE F 18 14.3 MILLICURIE: 200 INJECTION, SOLUTION INTRAVENOUS at 09:06

## 2019-03-04 ENCOUNTER — TELEPHONE (OUTPATIENT)
Dept: INTERVENTIONAL RADIOLOGY/VASCULAR | Age: 64
End: 2019-03-04

## 2019-03-05 ENCOUNTER — TELEPHONE (OUTPATIENT)
Dept: PULMONOLOGY | Age: 64
End: 2019-03-05

## 2019-03-05 DIAGNOSIS — R91.1 PULMONARY NODULE SEEN ON IMAGING STUDY: Primary | ICD-10-CM

## 2019-03-06 ENCOUNTER — TELEPHONE (OUTPATIENT)
Dept: PULMONOLOGY | Age: 64
End: 2019-03-06

## 2019-03-06 DIAGNOSIS — R91.1 PULMONARY NODULE SEEN ON IMAGING STUDY: Primary | ICD-10-CM

## 2019-03-06 NOTE — TELEPHONE ENCOUNTER
Cole Mayers called and stated dr Alber Childs, who is supposed to preform her  Biopsy and that he wont be back until 3/25/19 and that's when they scheduled her for then but wanted to check and make sure that was ok it being a few weeks out.  Please advise

## 2019-03-08 NOTE — TELEPHONE ENCOUNTER
Call [phone] to schedule your procedure / test.     And call Dr. Kareen Russell to schedule consult. Nany Jules

## 2019-03-13 ENCOUNTER — TELEPHONE (OUTPATIENT)
Dept: FAMILY MEDICINE CLINIC | Age: 64
End: 2019-03-13

## 2019-03-13 RX ORDER — GUAIFENESIN 600 MG/1
600-1200 TABLET, EXTENDED RELEASE ORAL 2 TIMES DAILY PRN
Qty: 120 TABLET | Refills: 1 | Status: SHIPPED | OUTPATIENT
Start: 2019-03-13 | End: 2019-03-20 | Stop reason: ALTCHOICE

## 2019-03-15 ENCOUNTER — OFFICE VISIT (OUTPATIENT)
Dept: CARDIOTHORACIC SURGERY | Age: 64
End: 2019-03-15
Payer: COMMERCIAL

## 2019-03-15 VITALS
SYSTOLIC BLOOD PRESSURE: 158 MMHG | DIASTOLIC BLOOD PRESSURE: 86 MMHG | OXYGEN SATURATION: 96 % | BODY MASS INDEX: 19.25 KG/M2 | WEIGHT: 104.6 LBS | TEMPERATURE: 97.9 F | HEART RATE: 90 BPM | HEIGHT: 62 IN

## 2019-03-15 DIAGNOSIS — R91.1 PULMONARY NODULE: Primary | ICD-10-CM

## 2019-03-15 PROCEDURE — 1101F PT FALLS ASSESS-DOCD LE1/YR: CPT | Performed by: THORACIC SURGERY (CARDIOTHORACIC VASCULAR SURGERY)

## 2019-03-15 PROCEDURE — 3017F COLORECTAL CA SCREEN DOC REV: CPT | Performed by: THORACIC SURGERY (CARDIOTHORACIC VASCULAR SURGERY)

## 2019-03-15 PROCEDURE — 1090F PRES/ABSN URINE INCON ASSESS: CPT | Performed by: THORACIC SURGERY (CARDIOTHORACIC VASCULAR SURGERY)

## 2019-03-15 PROCEDURE — G8427 DOCREV CUR MEDS BY ELIG CLIN: HCPCS | Performed by: THORACIC SURGERY (CARDIOTHORACIC VASCULAR SURGERY)

## 2019-03-15 PROCEDURE — 99205 OFFICE O/P NEW HI 60 MIN: CPT | Performed by: THORACIC SURGERY (CARDIOTHORACIC VASCULAR SURGERY)

## 2019-03-15 PROCEDURE — G8400 PT W/DXA NO RESULTS DOC: HCPCS | Performed by: THORACIC SURGERY (CARDIOTHORACIC VASCULAR SURGERY)

## 2019-03-15 PROCEDURE — G8420 CALC BMI NORM PARAMETERS: HCPCS | Performed by: THORACIC SURGERY (CARDIOTHORACIC VASCULAR SURGERY)

## 2019-03-15 PROCEDURE — 1123F ACP DISCUSS/DSCN MKR DOCD: CPT | Performed by: THORACIC SURGERY (CARDIOTHORACIC VASCULAR SURGERY)

## 2019-03-15 PROCEDURE — 4040F PNEUMOC VAC/ADMIN/RCVD: CPT | Performed by: THORACIC SURGERY (CARDIOTHORACIC VASCULAR SURGERY)

## 2019-03-15 PROCEDURE — 4004F PT TOBACCO SCREEN RCVD TLK: CPT | Performed by: THORACIC SURGERY (CARDIOTHORACIC VASCULAR SURGERY)

## 2019-03-15 PROCEDURE — G8482 FLU IMMUNIZE ORDER/ADMIN: HCPCS | Performed by: THORACIC SURGERY (CARDIOTHORACIC VASCULAR SURGERY)

## 2019-03-15 RX ORDER — OXCARBAZEPINE 300 MG/1
300 TABLET, FILM COATED ORAL 2 TIMES DAILY
COMMUNITY
End: 2019-08-21 | Stop reason: DRUGHIGH

## 2019-03-19 ENCOUNTER — TELEPHONE (OUTPATIENT)
Dept: VASCULAR SURGERY | Age: 64
End: 2019-03-19

## 2019-03-20 ENCOUNTER — HOSPITAL ENCOUNTER (OUTPATIENT)
Dept: GENERAL RADIOLOGY | Age: 64
Discharge: HOME | End: 2019-03-20
Payer: COMMERCIAL

## 2019-03-20 ENCOUNTER — HOSPITAL ENCOUNTER (OUTPATIENT)
Dept: PULMONOLOGY | Age: 64
Discharge: HOME | End: 2019-03-20
Payer: COMMERCIAL

## 2019-03-20 ENCOUNTER — ANESTHESIA EVENT (OUTPATIENT)
Dept: OPERATING ROOM | Age: 64
DRG: 163 | End: 2019-03-20
Payer: COMMERCIAL

## 2019-03-20 ENCOUNTER — HOSPITAL ENCOUNTER (OUTPATIENT)
Dept: PREADMISSION TESTING | Age: 64
Discharge: HOME | DRG: 163 | End: 2019-03-24
Payer: COMMERCIAL

## 2019-03-20 DIAGNOSIS — R91.1 PULMONARY NODULE: ICD-10-CM

## 2019-03-20 DIAGNOSIS — Z01.811 PRE-OP CHEST EXAM: ICD-10-CM

## 2019-03-20 DIAGNOSIS — R91.1 PULMONARY NODULE SEEN ON IMAGING STUDY: ICD-10-CM

## 2019-03-20 LAB
ABO/RH: NORMAL
ANION GAP SERPL CALCULATED.3IONS-SCNC: 9 MMOL/L (ref 3–16)
ANTIBODY SCREEN: NORMAL
BASE EXCESS ARTERIAL: 0.2 MMOL/L (ref -3–3)
BASOPHILS ABSOLUTE: 0 K/UL (ref 0–0.2)
BASOPHILS RELATIVE PERCENT: 0.4 %
BILIRUBIN URINE: NEGATIVE
BLOOD, URINE: NEGATIVE
BUN BLDV-MCNC: 18 MG/DL (ref 7–20)
CALCIUM SERPL-MCNC: 9.4 MG/DL (ref 8.3–10.6)
CARBOXYHEMOGLOBIN ARTERIAL: 3.9 % (ref 0–1.5)
CHLORIDE BLD-SCNC: 104 MMOL/L (ref 99–110)
CLARITY: CLEAR
CO2: 25 MMOL/L (ref 21–32)
COLOR: ABNORMAL
CREAT SERPL-MCNC: 0.7 MG/DL (ref 0.6–1.2)
EOSINOPHILS ABSOLUTE: 0.2 K/UL (ref 0–0.6)
EOSINOPHILS RELATIVE PERCENT: 2.3 %
EPITHELIAL CELLS, UA: 2 /HPF (ref 0–5)
GFR AFRICAN AMERICAN: >60
GFR NON-AFRICAN AMERICAN: >60
GLUCOSE BLD-MCNC: 99 MG/DL (ref 70–99)
GLUCOSE URINE: NEGATIVE MG/DL
HCO3 ARTERIAL: 23.8 MMOL/L (ref 21–29)
HCT VFR BLD CALC: 33.6 % (ref 36–48)
HEMOGLOBIN, ART, EXTENDED: 11.4 G/DL (ref 12–16)
HEMOGLOBIN: 11.6 G/DL (ref 12–16)
HYALINE CASTS: 2 /LPF (ref 0–8)
KETONES, URINE: NEGATIVE MG/DL
LEUKOCYTE ESTERASE, URINE: ABNORMAL
LYMPHOCYTES ABSOLUTE: 1.4 K/UL (ref 1–5.1)
LYMPHOCYTES RELATIVE PERCENT: 20.1 %
MCH RBC QN AUTO: 35.6 PG (ref 26–34)
MCHC RBC AUTO-ENTMCNC: 34.5 G/DL (ref 31–36)
MCV RBC AUTO: 103.2 FL (ref 80–100)
METHEMOGLOBIN ARTERIAL: 0.8 %
MICROSCOPIC EXAMINATION: YES
MONOCYTES ABSOLUTE: 0.7 K/UL (ref 0–1.3)
MONOCYTES RELATIVE PERCENT: 10.4 %
NEUTROPHILS ABSOLUTE: 4.5 K/UL (ref 1.7–7.7)
NEUTROPHILS RELATIVE PERCENT: 66.8 %
NITRITE, URINE: NEGATIVE
O2 CONTENT ARTERIAL: 15 ML/DL
O2 SAT, ARTERIAL: 97 %
O2 THERAPY: ABNORMAL
PCO2 ARTERIAL: 36.6 MMHG (ref 35–45)
PDW BLD-RTO: 14.1 % (ref 12.4–15.4)
PH ARTERIAL: 7.43 (ref 7.35–7.45)
PH UA: 5.5 (ref 5–8)
PLATELET # BLD: 337 K/UL (ref 135–450)
PMV BLD AUTO: 7.6 FL (ref 5–10.5)
PO2 ARTERIAL: 73.9 MMHG (ref 75–108)
POTASSIUM SERPL-SCNC: 3.6 MMOL/L (ref 3.5–5.1)
PROTEIN UA: NEGATIVE MG/DL
RBC # BLD: 3.25 M/UL (ref 4–5.2)
RBC UA: 5 /HPF (ref 0–4)
SODIUM BLD-SCNC: 138 MMOL/L (ref 136–145)
SPECIFIC GRAVITY UA: >1.03 (ref 1–1.03)
TCO2 ARTERIAL: 24.9 MMOL/L
URINE REFLEX TO CULTURE: YES
URINE TYPE: ABNORMAL
UROBILINOGEN, URINE: 1 E.U./DL
WBC # BLD: 6.8 K/UL (ref 4–11)
WBC UA: 10 /HPF (ref 0–5)

## 2019-03-20 PROCEDURE — 71046 X-RAY EXAM CHEST 2 VIEWS: CPT

## 2019-03-20 PROCEDURE — 94640 AIRWAY INHALATION TREATMENT: CPT

## 2019-03-20 PROCEDURE — 94729 DIFFUSING CAPACITY: CPT | Performed by: INTERNAL MEDICINE

## 2019-03-20 PROCEDURE — 87186 SC STD MICRODIL/AGAR DIL: CPT

## 2019-03-20 PROCEDURE — 86900 BLOOD TYPING SEROLOGIC ABO: CPT

## 2019-03-20 PROCEDURE — 94726 PLETHYSMOGRAPHY LUNG VOLUMES: CPT

## 2019-03-20 PROCEDURE — 94060 EVALUATION OF WHEEZING: CPT

## 2019-03-20 PROCEDURE — 6370000000 HC RX 637 (ALT 250 FOR IP): Performed by: THORACIC SURGERY (CARDIOTHORACIC VASCULAR SURGERY)

## 2019-03-20 PROCEDURE — 86850 RBC ANTIBODY SCREEN: CPT

## 2019-03-20 PROCEDURE — 85025 COMPLETE CBC W/AUTO DIFF WBC: CPT

## 2019-03-20 PROCEDURE — 82803 BLOOD GASES ANY COMBINATION: CPT

## 2019-03-20 PROCEDURE — 94727 GAS DIL/WSHOT DETER LNG VOL: CPT | Performed by: INTERNAL MEDICINE

## 2019-03-20 PROCEDURE — 94664 DEMO&/EVAL PT USE INHALER: CPT

## 2019-03-20 PROCEDURE — 87077 CULTURE AEROBIC IDENTIFY: CPT

## 2019-03-20 PROCEDURE — 93005 ELECTROCARDIOGRAM TRACING: CPT | Performed by: FAMILY MEDICINE

## 2019-03-20 PROCEDURE — 36415 COLL VENOUS BLD VENIPUNCTURE: CPT

## 2019-03-20 PROCEDURE — 81001 URINALYSIS AUTO W/SCOPE: CPT

## 2019-03-20 PROCEDURE — 36600 WITHDRAWAL OF ARTERIAL BLOOD: CPT

## 2019-03-20 PROCEDURE — 87086 URINE CULTURE/COLONY COUNT: CPT

## 2019-03-20 PROCEDURE — 94060 EVALUATION OF WHEEZING: CPT | Performed by: INTERNAL MEDICINE

## 2019-03-20 PROCEDURE — 80048 BASIC METABOLIC PNL TOTAL CA: CPT

## 2019-03-20 PROCEDURE — 86901 BLOOD TYPING SEROLOGIC RH(D): CPT

## 2019-03-20 PROCEDURE — 94729 DIFFUSING CAPACITY: CPT

## 2019-03-20 RX ORDER — ALBUTEROL SULFATE 90 UG/1
4 AEROSOL, METERED RESPIRATORY (INHALATION) ONCE
Status: COMPLETED | OUTPATIENT
Start: 2019-03-20 | End: 2019-03-20

## 2019-03-20 RX ORDER — OXYCODONE HYDROCHLORIDE 30 MG/1
TABLET, FILM COATED, EXTENDED RELEASE ORAL
Refills: 0 | Status: ON HOLD | COMMUNITY
Start: 2019-03-05 | End: 2021-09-26

## 2019-03-20 RX ADMIN — ALBUTEROL SULFATE 4 PUFF: 90 AEROSOL, METERED RESPIRATORY (INHALATION) at 08:26

## 2019-03-20 NOTE — PROGRESS NOTES
[facility] CARDIOVASCULAR AND THORACIC SURGEONS    PREOPERATIVE INSTRUCTIONS    Arrival time _1030___________        Surgery time_1230___________    [place] do not need to see your primary care physician (PCP) for a history and physical prior to your surgery. If you are having heart surgery, stop taking your ACE (e.g. lisinopril, enalapril, ramipril, benazepril) or ARB (e.g. candesartan, losartan, olmesartan, valsartan) 1 day (24 hours) prior to your surgery. You will need to stop blood thinners such as: clopidogrel (Plavix), ticagrelor (Brilinta), prasugrel (Effient), warfarin (Coumadin), dabigatran (Pradaxa), rivaroxaban (Xarelto), apixaban (Eliquis), edoxaban (Savaysa), and enoxaparin (Lovenox). If you have not been given instructions, please contact the cardiac surgeons' office at [phone]. You may be asked to stop blood thinners such as: ibuprofen, Advil, naproxen prior to your surgery. We also ask that you stop any over the counter medications such as fish oil, vitamin E, glucosamine, and garlic. If you take sildenafil (Viagra, Revatio), tadalafil (Cialis, Adcirca), vardenafil (Levitra, Staxyn), or avanafil [place]), please contact the cardiac surgeons' office at [phone] to discuss when these will need to be stopped prior to surgery. If you are diabetic and take long-acting insulin, take half of your usual dose the night or evening prior to your surgery. Take no insulin the morning of your surgery. If you are diabetic and take oral medication, do not take these medications the evening prior to or the morning of your surgery. Take only the following medications the morning of surgery with a sip of water: Beta Blocker (e.g.  metoprolol, carvedilol or atenolol) and/or rate or rhythm controlling heart medications (e.g. diltiazem and amiodarone). Do not eat or drink anything after 12:00 midnight prior to your surgery. This includes water, chewing gum, mints and ice chips. You may brush your teeth and gargle the morning of your surgery, but do not swallow the water. Please do not smoke 24 hours prior to surgery- no marijuana    Please do not drink any alcoholic beverages or chamomile tea 24 hours prior to surgery    For your comfort, please wear simple loose fitting clothing to the hospital.  Please do not bring valuables. Do not wear any make-up or nail polish on your fingers or toes    For your safety, please do not wear any jewelry or body piercings on the day of surgery. All jewelry must be removed. If you have dentures, they will be removed before going to operating room. For your convenience, we will provide you with a container. If you wear contact lenses or glasses, they will be removed, please bring a case for them. If you have a living will and a durable power of  for healthcare, please bring in a copy. As part of our patient safety program to minimize surgical site infections, we ask you to do the following:    · Please notify your surgeon if you develop any illness between now and the day of your surgery. This includes a cough, cold, fever, sore throat, nausea, or vomiting, and diarrhea, etc.  · Please notify your surgeon if you experience dizziness, shortness of breath or blurred vision between now and the time of your surgery. Do not shave your chest, legs, or arms for 96 hours prior to surgery. An electric razor may be used on your neck and face for up to 48 hours prior to surgery. Shower the night before surgery with the bottle of Hibiclens that has been provided. All over your body including your hair    You will need to bring a photo ID and insurance card    If you use a C-pap machine, please bring your C-pap machine with you to the hospital the day of surgery. Please contact the surgeon's office at [phone] if you have any further questions regarding your surgery.

## 2019-03-21 ENCOUNTER — ANESTHESIA (OUTPATIENT)
Dept: OPERATING ROOM | Age: 64
DRG: 163 | End: 2019-03-21
Payer: COMMERCIAL

## 2019-03-21 LAB
DLCO %PRED: 45 %
DLCO PRED: NORMAL ML/MIN/MMHG
DLCO/VA %PRED: NORMAL %
DLCO/VA PRED: NORMAL ML/MIN/MMHG
DLCO/VA: NORMAL ML/MIN/MMHG
DLCO: NORMAL ML/MIN/MMHG
EKG ATRIAL RATE: 60 BPM
EKG DIAGNOSIS: NORMAL
EKG P AXIS: 69 DEGREES
EKG P-R INTERVAL: 150 MS
EKG Q-T INTERVAL: 426 MS
EKG QRS DURATION: 92 MS
EKG QTC CALCULATION (BAZETT): 426 MS
EKG R AXIS: -28 DEGREES
EKG T AXIS: 28 DEGREES
EKG VENTRICULAR RATE: 60 BPM
EXPIRATORY TIME-POST: NORMAL SEC
EXPIRATORY TIME: NORMAL SEC
FEF 25-75% %CHNG: NORMAL
FEF 25-75% %PRED-POST: NORMAL %
FEF 25-75% %PRED-PRE: NORMAL L/SEC
FEF 25-75% PRED: NORMAL L/SEC
FEF 25-75%-POST: NORMAL L/SEC
FEF 25-75%-PRE: NORMAL L/SEC
FEV1 %PRED-POST: 96 %
FEV1 %PRED-PRE: 100 %
FEV1 PRED: NORMAL L
FEV1-POST: NORMAL L
FEV1-PRE: NORMAL L
FEV1/FVC %PRED-POST: NORMAL %
FEV1/FVC %PRED-PRE: NORMAL %
FEV1/FVC PRED: NORMAL %
FEV1/FVC-POST: 77 %
FEV1/FVC-PRE: 63 %
FVC %PRED-POST: NORMAL L
FVC %PRED-PRE: NORMAL %
FVC PRED: NORMAL L
FVC-POST: NORMAL L
FVC-PRE: NORMAL L
GAW %PRED: NORMAL %
GAW PRED: NORMAL L/S/CMH2O
GAW: NORMAL L/S/CMH2O
IC %PRED: NORMAL %
IC PRED: NORMAL L
IC: NORMAL L
MEP: NORMAL
MIP: NORMAL
MVV %PRED-PRE: NORMAL %
MVV PRED: NORMAL L/MIN
MVV-PRE: NORMAL L/MIN
PEF %PRED-POST: NORMAL %
PEF %PRED-PRE: NORMAL L/SEC
PEF PRED: NORMAL L/SEC
PEF%CHNG: NORMAL
PEF-POST: NORMAL L/SEC
PEF-PRE: NORMAL L/SEC
RAW %PRED: NORMAL %
RAW PRED: NORMAL CMH2O/L/S
RAW: NORMAL CMH2O/L/S
RV %PRED: NORMAL %
RV PRED: NORMAL L
RV: NORMAL L
SVC %PRED: NORMAL %
SVC PRED: NORMAL L
SVC: NORMAL L
TLC %PRED: 109 %
TLC PRED: NORMAL L
TLC: NORMAL L
VA %PRED: NORMAL %
VA PRED: NORMAL L
VA: NORMAL L
VTG %PRED: NORMAL %
VTG PRED: NORMAL L
VTG: NORMAL L

## 2019-03-21 PROCEDURE — 2500000003 HC RX 250 WO HCPCS

## 2019-03-21 PROCEDURE — 93010 ELECTROCARDIOGRAM REPORT: CPT | Performed by: INTERNAL MEDICINE

## 2019-03-21 ASSESSMENT — PULMONARY FUNCTION TESTS
FEV1/FVC_PRE: 63
FEV1_PERCENT_PREDICTED_PRE: 100
FEV1_PERCENT_PREDICTED_POST: 96
FEV1/FVC_POST: 77

## 2019-03-22 ENCOUNTER — TELEPHONE (OUTPATIENT)
Dept: CARDIOTHORACIC SURGERY | Age: 64
End: 2019-03-22

## 2019-03-22 DIAGNOSIS — N39.0 URINARY TRACT INFECTION WITHOUT HEMATURIA, SITE UNSPECIFIED: Primary | ICD-10-CM

## 2019-03-22 LAB
ORGANISM: ABNORMAL
URINE CULTURE, ROUTINE: ABNORMAL
URINE CULTURE, ROUTINE: ABNORMAL

## 2019-03-22 RX ORDER — CIPROFLOXACIN 500 MG/1
500 TABLET, FILM COATED ORAL 2 TIMES DAILY
Qty: 6 TABLET | Refills: 0 | Status: ON HOLD | OUTPATIENT
Start: 2019-03-22 | End: 2019-04-02 | Stop reason: HOSPADM

## 2019-03-22 NOTE — PROGRESS NOTES
CALL PLACED TO DR. BREAUX'S OFFICE REGARDING URINE CULTURE RESULTS-LEFT MESSAGE WITH RICHY FOR DR. BREAUX

## 2019-03-25 ENCOUNTER — HOSPITAL ENCOUNTER (INPATIENT)
Age: 64
LOS: 8 days | Discharge: HOME | DRG: 163 | End: 2019-04-02
Attending: THORACIC SURGERY (CARDIOTHORACIC VASCULAR SURGERY) | Admitting: THORACIC SURGERY (CARDIOTHORACIC VASCULAR SURGERY)
Payer: COMMERCIAL

## 2019-03-25 ENCOUNTER — APPOINTMENT (OUTPATIENT)
Dept: GENERAL RADIOLOGY | Age: 64
DRG: 163 | End: 2019-03-25
Attending: THORACIC SURGERY (CARDIOTHORACIC VASCULAR SURGERY)
Payer: COMMERCIAL

## 2019-03-25 VITALS
OXYGEN SATURATION: 100 % | SYSTOLIC BLOOD PRESSURE: 97 MMHG | DIASTOLIC BLOOD PRESSURE: 51 MMHG | RESPIRATION RATE: 36 BRPM | TEMPERATURE: 96.4 F

## 2019-03-25 DIAGNOSIS — Z98.890 S/P THORACOTOMY: Primary | ICD-10-CM

## 2019-03-25 DIAGNOSIS — R91.1 PULMONARY NODULE: ICD-10-CM

## 2019-03-25 LAB
ABO/RH: NORMAL
ANTIBODY SCREEN: NORMAL

## 2019-03-25 PROCEDURE — 2700000000 HC OXYGEN THERAPY PER DAY

## 2019-03-25 PROCEDURE — 2580000003 HC RX 258: Performed by: THORACIC SURGERY (CARDIOTHORACIC VASCULAR SURGERY)

## 2019-03-25 PROCEDURE — 3700000000 HC ANESTHESIA ATTENDED CARE: Performed by: THORACIC SURGERY (CARDIOTHORACIC VASCULAR SURGERY)

## 2019-03-25 PROCEDURE — 88342 IMHCHEM/IMCYTCHM 1ST ANTB: CPT

## 2019-03-25 PROCEDURE — C2626 INFUSION PUMP, NON-PROG,TEMP: HCPCS | Performed by: THORACIC SURGERY (CARDIOTHORACIC VASCULAR SURGERY)

## 2019-03-25 PROCEDURE — 71045 X-RAY EXAM CHEST 1 VIEW: CPT

## 2019-03-25 PROCEDURE — 88341 IMHCHEM/IMCYTCHM EA ADD ANTB: CPT

## 2019-03-25 PROCEDURE — 2500000003 HC RX 250 WO HCPCS: Performed by: THORACIC SURGERY (CARDIOTHORACIC VASCULAR SURGERY)

## 2019-03-25 PROCEDURE — 2780000010 HC IMPLANT OTHER: Performed by: THORACIC SURGERY (CARDIOTHORACIC VASCULAR SURGERY)

## 2019-03-25 PROCEDURE — 2140000000 HC CCU INTERMEDIATE R&B

## 2019-03-25 PROCEDURE — 86901 BLOOD TYPING SEROLOGIC RH(D): CPT

## 2019-03-25 PROCEDURE — 2500000003 HC RX 250 WO HCPCS: Performed by: ANESTHESIOLOGY

## 2019-03-25 PROCEDURE — 94770 HC ETCO2 MONITOR DAILY: CPT

## 2019-03-25 PROCEDURE — 07T70ZZ RESECTION OF THORAX LYMPHATIC, OPEN APPROACH: ICD-10-PCS | Performed by: THORACIC SURGERY (CARDIOTHORACIC VASCULAR SURGERY)

## 2019-03-25 PROCEDURE — 6360000002 HC RX W HCPCS: Performed by: THORACIC SURGERY (CARDIOTHORACIC VASCULAR SURGERY)

## 2019-03-25 PROCEDURE — 88331 PATH CONSLTJ SURG 1 BLK 1SPC: CPT

## 2019-03-25 PROCEDURE — 2709999900 HC NON-CHARGEABLE SUPPLY: Performed by: THORACIC SURGERY (CARDIOTHORACIC VASCULAR SURGERY)

## 2019-03-25 PROCEDURE — C1729 CATH, DRAINAGE: HCPCS | Performed by: THORACIC SURGERY (CARDIOTHORACIC VASCULAR SURGERY)

## 2019-03-25 PROCEDURE — 7100000000 HC PACU RECOVERY - FIRST 15 MIN

## 2019-03-25 PROCEDURE — 2720000010 HC SURG SUPPLY STERILE: Performed by: THORACIC SURGERY (CARDIOTHORACIC VASCULAR SURGERY)

## 2019-03-25 PROCEDURE — 94664 DEMO&/EVAL PT USE INHALER: CPT

## 2019-03-25 PROCEDURE — 94762 N-INVAS EAR/PLS OXIMTRY CONT: CPT

## 2019-03-25 PROCEDURE — 3E0T3BZ INTRODUCTION OF ANESTHETIC AGENT INTO PERIPHERAL NERVES AND PLEXI, PERCUTANEOUS APPROACH: ICD-10-PCS | Performed by: THORACIC SURGERY (CARDIOTHORACIC VASCULAR SURGERY)

## 2019-03-25 PROCEDURE — 2580000003 HC RX 258: Performed by: ANESTHESIOLOGY

## 2019-03-25 PROCEDURE — 6370000000 HC RX 637 (ALT 250 FOR IP): Performed by: THORACIC SURGERY (CARDIOTHORACIC VASCULAR SURGERY)

## 2019-03-25 PROCEDURE — 3600000018 HC SURGERY OHS ADDTL 15MIN: Performed by: THORACIC SURGERY (CARDIOTHORACIC VASCULAR SURGERY)

## 2019-03-25 PROCEDURE — 0BBJ0ZX EXCISION OF LEFT LOWER LUNG LOBE, OPEN APPROACH, DIAGNOSTIC: ICD-10-PCS | Performed by: THORACIC SURGERY (CARDIOTHORACIC VASCULAR SURGERY)

## 2019-03-25 PROCEDURE — 6360000002 HC RX W HCPCS: Performed by: NURSE ANESTHETIST, CERTIFIED REGISTERED

## 2019-03-25 PROCEDURE — C1713 ANCHOR/SCREW BN/BN,TIS/BN: HCPCS | Performed by: THORACIC SURGERY (CARDIOTHORACIC VASCULAR SURGERY)

## 2019-03-25 PROCEDURE — 2500000003 HC RX 250 WO HCPCS: Performed by: NURSE ANESTHETIST, CERTIFIED REGISTERED

## 2019-03-25 PROCEDURE — 32097 OPEN WEDGE/BX LUNG NODULE: CPT | Performed by: THORACIC SURGERY (CARDIOTHORACIC VASCULAR SURGERY)

## 2019-03-25 PROCEDURE — 88305 TISSUE EXAM BY PATHOLOGIST: CPT

## 2019-03-25 PROCEDURE — 38746 REMOVE THORACIC LYMPH NODES: CPT | Performed by: THORACIC SURGERY (CARDIOTHORACIC VASCULAR SURGERY)

## 2019-03-25 PROCEDURE — 86900 BLOOD TYPING SEROLOGIC ABO: CPT

## 2019-03-25 PROCEDURE — 86850 RBC ANTIBODY SCREEN: CPT

## 2019-03-25 PROCEDURE — 36415 COLL VENOUS BLD VENIPUNCTURE: CPT

## 2019-03-25 PROCEDURE — 3700000001 HC ADD 15 MINUTES (ANESTHESIA): Performed by: THORACIC SURGERY (CARDIOTHORACIC VASCULAR SURGERY)

## 2019-03-25 PROCEDURE — 3600000008 HC SURGERY OHS BASE: Performed by: THORACIC SURGERY (CARDIOTHORACIC VASCULAR SURGERY)

## 2019-03-25 PROCEDURE — 88307 TISSUE EXAM BY PATHOLOGIST: CPT

## 2019-03-25 PROCEDURE — 7100000001 HC PACU RECOVERY - ADDTL 15 MIN

## 2019-03-25 PROCEDURE — 0JHT3VZ INSERTION OF INFUSION PUMP INTO TRUNK SUBCUTANEOUS TISSUE AND FASCIA, PERCUTANEOUS APPROACH: ICD-10-PCS | Performed by: THORACIC SURGERY (CARDIOTHORACIC VASCULAR SURGERY)

## 2019-03-25 DEVICE — SEALANT TISS GLUE 4ML PLEUR AIR LEAK PROGEL: Type: IMPLANTABLE DEVICE | Site: LUNG | Status: FUNCTIONAL

## 2019-03-25 RX ORDER — SODIUM CHLORIDE 9 MG/ML
INJECTION, SOLUTION INTRAVENOUS CONTINUOUS
Status: DISCONTINUED | OUTPATIENT
Start: 2019-03-25 | End: 2019-03-26

## 2019-03-25 RX ORDER — MIDAZOLAM HYDROCHLORIDE 1 MG/ML
INJECTION INTRAMUSCULAR; INTRAVENOUS PRN
Status: DISCONTINUED | OUTPATIENT
Start: 2019-03-25 | End: 2019-03-25 | Stop reason: SDUPTHER

## 2019-03-25 RX ORDER — HYDRALAZINE HYDROCHLORIDE 20 MG/ML
INJECTION INTRAMUSCULAR; INTRAVENOUS PRN
Status: DISCONTINUED | OUTPATIENT
Start: 2019-03-25 | End: 2019-03-25 | Stop reason: SDUPTHER

## 2019-03-25 RX ORDER — GABAPENTIN 400 MG/1
800 CAPSULE ORAL 3 TIMES DAILY
Status: DISCONTINUED | OUTPATIENT
Start: 2019-03-25 | End: 2019-04-02 | Stop reason: HOSPADM

## 2019-03-25 RX ORDER — SODIUM CHLORIDE 0.9 % (FLUSH) 0.9 %
10 SYRINGE (ML) INJECTION PRN
Status: DISCONTINUED | OUTPATIENT
Start: 2019-03-25 | End: 2019-04-02 | Stop reason: HOSPADM

## 2019-03-25 RX ORDER — FENOFIBRATE 160 MG/1
160 TABLET ORAL
Status: DISCONTINUED | OUTPATIENT
Start: 2019-03-26 | End: 2019-04-02 | Stop reason: HOSPADM

## 2019-03-25 RX ORDER — SODIUM CHLORIDE 0.9 % (FLUSH) 0.9 %
10 SYRINGE (ML) INJECTION EVERY 12 HOURS SCHEDULED
Status: DISCONTINUED | OUTPATIENT
Start: 2019-03-25 | End: 2019-03-25 | Stop reason: SDUPTHER

## 2019-03-25 RX ORDER — KETAMINE HCL IN NACL, ISO-OSM 100MG/10ML
SYRINGE (ML) INJECTION PRN
Status: DISCONTINUED | OUTPATIENT
Start: 2019-03-25 | End: 2019-03-25 | Stop reason: SDUPTHER

## 2019-03-25 RX ORDER — LIDOCAINE HYDROCHLORIDE 20 MG/ML
INJECTION, SOLUTION EPIDURAL; INFILTRATION; INTRACAUDAL; PERINEURAL PRN
Status: DISCONTINUED | OUTPATIENT
Start: 2019-03-25 | End: 2019-03-25 | Stop reason: SDUPTHER

## 2019-03-25 RX ORDER — DULOXETIN HYDROCHLORIDE 60 MG/1
120 CAPSULE, DELAYED RELEASE ORAL DAILY
Status: DISCONTINUED | OUTPATIENT
Start: 2019-03-25 | End: 2019-04-02 | Stop reason: HOSPADM

## 2019-03-25 RX ORDER — MORPHINE SULFATE/0.9% NACL/PF 1 MG/ML
SYRINGE (ML) INJECTION CONTINUOUS
Status: DISCONTINUED | OUTPATIENT
Start: 2019-03-25 | End: 2019-03-26

## 2019-03-25 RX ORDER — FLUTICASONE PROPIONATE 50 MCG
2 SPRAY, SUSPENSION (ML) NASAL DAILY
Status: DISCONTINUED | OUTPATIENT
Start: 2019-03-25 | End: 2019-04-02 | Stop reason: HOSPADM

## 2019-03-25 RX ORDER — SODIUM CHLORIDE 0.9 % (FLUSH) 0.9 %
10 SYRINGE (ML) INJECTION EVERY 12 HOURS SCHEDULED
Status: DISCONTINUED | OUTPATIENT
Start: 2019-03-25 | End: 2019-03-31 | Stop reason: SDUPTHER

## 2019-03-25 RX ORDER — SUCCINYLCHOLINE/SOD CL,ISO/PF 200MG/10ML
SYRINGE (ML) INTRAVENOUS PRN
Status: DISCONTINUED | OUTPATIENT
Start: 2019-03-25 | End: 2019-03-25 | Stop reason: SDUPTHER

## 2019-03-25 RX ORDER — NALOXONE HYDROCHLORIDE 0.4 MG/ML
0.4 INJECTION, SOLUTION INTRAMUSCULAR; INTRAVENOUS; SUBCUTANEOUS PRN
Status: DISCONTINUED | OUTPATIENT
Start: 2019-03-25 | End: 2019-03-28

## 2019-03-25 RX ORDER — MAGNESIUM HYDROXIDE 1200 MG/15ML
LIQUID ORAL
Status: COMPLETED | OUTPATIENT
Start: 2019-03-25 | End: 2019-03-25

## 2019-03-25 RX ORDER — FAMOTIDINE 20 MG/1
20 TABLET, FILM COATED ORAL 2 TIMES DAILY
Status: DISCONTINUED | OUTPATIENT
Start: 2019-03-25 | End: 2019-04-02 | Stop reason: HOSPADM

## 2019-03-25 RX ORDER — GLYCOPYRROLATE 0.2 MG/ML
INJECTION INTRAMUSCULAR; INTRAVENOUS PRN
Status: DISCONTINUED | OUTPATIENT
Start: 2019-03-25 | End: 2019-03-25 | Stop reason: SDUPTHER

## 2019-03-25 RX ORDER — SODIUM CHLORIDE 0.9 % (FLUSH) 0.9 %
10 SYRINGE (ML) INJECTION EVERY 12 HOURS SCHEDULED
Status: DISCONTINUED | OUTPATIENT
Start: 2019-03-25 | End: 2019-04-02 | Stop reason: HOSPADM

## 2019-03-25 RX ORDER — ONDANSETRON 2 MG/ML
INJECTION INTRAMUSCULAR; INTRAVENOUS PRN
Status: DISCONTINUED | OUTPATIENT
Start: 2019-03-25 | End: 2019-03-25 | Stop reason: SDUPTHER

## 2019-03-25 RX ORDER — BUPIVACAINE HYDROCHLORIDE 5 MG/ML
INJECTION, SOLUTION EPIDURAL; INTRACAUDAL
Status: COMPLETED | OUTPATIENT
Start: 2019-03-25 | End: 2019-03-25

## 2019-03-25 RX ORDER — ROCURONIUM BROMIDE 10 MG/ML
INJECTION, SOLUTION INTRAVENOUS PRN
Status: DISCONTINUED | OUTPATIENT
Start: 2019-03-25 | End: 2019-03-25 | Stop reason: SDUPTHER

## 2019-03-25 RX ORDER — FENTANYL CITRATE 50 UG/ML
INJECTION, SOLUTION INTRAMUSCULAR; INTRAVENOUS PRN
Status: DISCONTINUED | OUTPATIENT
Start: 2019-03-25 | End: 2019-03-25 | Stop reason: SDUPTHER

## 2019-03-25 RX ORDER — PROPOFOL 10 MG/ML
INJECTION, EMULSION INTRAVENOUS PRN
Status: DISCONTINUED | OUTPATIENT
Start: 2019-03-25 | End: 2019-03-25 | Stop reason: SDUPTHER

## 2019-03-25 RX ORDER — MAGNESIUM HYDROXIDE 1200 MG/15ML
LIQUID ORAL CONTINUOUS PRN
Status: COMPLETED | OUTPATIENT
Start: 2019-03-25 | End: 2019-03-25

## 2019-03-25 RX ORDER — DOCUSATE SODIUM 100 MG/1
100 CAPSULE, LIQUID FILLED ORAL 2 TIMES DAILY
Status: DISCONTINUED | OUTPATIENT
Start: 2019-03-25 | End: 2019-04-02 | Stop reason: HOSPADM

## 2019-03-25 RX ORDER — BUPROPION HYDROCHLORIDE 150 MG/1
450 TABLET ORAL DAILY
Status: DISCONTINUED | OUTPATIENT
Start: 2019-03-25 | End: 2019-04-02 | Stop reason: HOSPADM

## 2019-03-25 RX ORDER — DIPHENHYDRAMINE HYDROCHLORIDE 50 MG/ML
25 INJECTION INTRAMUSCULAR; INTRAVENOUS EVERY 6 HOURS PRN
Status: DISCONTINUED | OUTPATIENT
Start: 2019-03-25 | End: 2019-03-28

## 2019-03-25 RX ORDER — ACETAMINOPHEN 10 MG/ML
INJECTION, SOLUTION INTRAVENOUS PRN
Status: DISCONTINUED | OUTPATIENT
Start: 2019-03-25 | End: 2019-03-25 | Stop reason: SDUPTHER

## 2019-03-25 RX ORDER — DEXAMETHASONE SODIUM PHOSPHATE 4 MG/ML
INJECTION, SOLUTION INTRA-ARTICULAR; INTRALESIONAL; INTRAMUSCULAR; INTRAVENOUS; SOFT TISSUE PRN
Status: DISCONTINUED | OUTPATIENT
Start: 2019-03-25 | End: 2019-03-25 | Stop reason: SDUPTHER

## 2019-03-25 RX ORDER — SODIUM CHLORIDE 0.9 % (FLUSH) 0.9 %
10 SYRINGE (ML) INJECTION PRN
Status: DISCONTINUED | OUTPATIENT
Start: 2019-03-25 | End: 2019-03-25 | Stop reason: SDUPTHER

## 2019-03-25 RX ADMIN — GLYCOPYRROLATE 0.2 MG: 0.2 INJECTION, SOLUTION INTRAMUSCULAR; INTRAVENOUS at 13:11

## 2019-03-25 RX ADMIN — PROPOFOL 50 MG: 10 INJECTION, EMULSION INTRAVENOUS at 11:58

## 2019-03-25 RX ADMIN — SODIUM CHLORIDE: 9 INJECTION, SOLUTION INTRAVENOUS at 16:36

## 2019-03-25 RX ADMIN — ROCURONIUM BROMIDE 10 MG: 10 INJECTION INTRAVENOUS at 13:46

## 2019-03-25 RX ADMIN — DOCUSATE SODIUM 100 MG: 100 CAPSULE, LIQUID FILLED ORAL at 21:14

## 2019-03-25 RX ADMIN — Medication 15 MG: at 12:01

## 2019-03-25 RX ADMIN — MORPHINE SULFATE 30 MG: 1 INJECTION INTRAVENOUS at 18:59

## 2019-03-25 RX ADMIN — ROCURONIUM BROMIDE 10 MG: 10 INJECTION INTRAVENOUS at 13:16

## 2019-03-25 RX ADMIN — ROCURONIUM BROMIDE 10 MG: 10 INJECTION INTRAVENOUS at 14:18

## 2019-03-25 RX ADMIN — PROPOFOL 150 MG: 10 INJECTION, EMULSION INTRAVENOUS at 11:55

## 2019-03-25 RX ADMIN — DEXAMETHASONE SODIUM PHOSPHATE 8 MG: 4 INJECTION, SOLUTION INTRAMUSCULAR; INTRAVENOUS at 12:01

## 2019-03-25 RX ADMIN — SODIUM CHLORIDE: 9 INJECTION, SOLUTION INTRAVENOUS at 11:44

## 2019-03-25 RX ADMIN — BUPROPION HYDROCHLORIDE 450 MG: 150 TABLET, FILM COATED, EXTENDED RELEASE ORAL at 21:15

## 2019-03-25 RX ADMIN — Medication 100 MG: at 11:55

## 2019-03-25 RX ADMIN — FENTANYL CITRATE 50 MCG: 50 INJECTION INTRAMUSCULAR; INTRAVENOUS at 12:29

## 2019-03-25 RX ADMIN — GABAPENTIN 800 MG: 400 CAPSULE ORAL at 21:14

## 2019-03-25 RX ADMIN — ROCURONIUM BROMIDE 10 MG: 10 INJECTION INTRAVENOUS at 12:50

## 2019-03-25 RX ADMIN — HYDRALAZINE HYDROCHLORIDE 5 MG: 20 INJECTION INTRAMUSCULAR; INTRAVENOUS at 12:49

## 2019-03-25 RX ADMIN — Medication 15 MG: at 12:25

## 2019-03-25 RX ADMIN — FENTANYL CITRATE 100 MCG: 50 INJECTION INTRAMUSCULAR; INTRAVENOUS at 11:55

## 2019-03-25 RX ADMIN — GLYCOPYRROLATE 0.2 MG: 0.2 INJECTION, SOLUTION INTRAMUSCULAR; INTRAVENOUS at 14:56

## 2019-03-25 RX ADMIN — MIDAZOLAM 2 MG: 1 INJECTION INTRAMUSCULAR; INTRAVENOUS at 11:52

## 2019-03-25 RX ADMIN — CEFAZOLIN SODIUM 1 G: 1 INJECTION, POWDER, FOR SOLUTION INTRAMUSCULAR; INTRAVENOUS at 21:26

## 2019-03-25 RX ADMIN — ROCURONIUM BROMIDE 20 MG: 10 INJECTION INTRAVENOUS at 12:25

## 2019-03-25 RX ADMIN — HYDROMORPHONE HYDROCHLORIDE 0.5 MG: 1 INJECTION, SOLUTION INTRAMUSCULAR; INTRAVENOUS; SUBCUTANEOUS at 15:04

## 2019-03-25 RX ADMIN — FENTANYL CITRATE 50 MCG: 50 INJECTION INTRAMUSCULAR; INTRAVENOUS at 12:43

## 2019-03-25 RX ADMIN — ACETAMINOPHEN 1000 MG: 10 INJECTION, SOLUTION INTRAVENOUS at 12:12

## 2019-03-25 RX ADMIN — FAMOTIDINE 20 MG: 10 INJECTION, SOLUTION INTRAVENOUS at 11:46

## 2019-03-25 RX ADMIN — ROCURONIUM BROMIDE 50 MG: 10 INJECTION INTRAVENOUS at 12:01

## 2019-03-25 RX ADMIN — ONDANSETRON 4 MG: 2 INJECTION INTRAMUSCULAR; INTRAVENOUS at 14:49

## 2019-03-25 RX ADMIN — LIDOCAINE HYDROCHLORIDE 60 MG: 20 INJECTION, SOLUTION EPIDURAL; INFILTRATION; INTRACAUDAL; PERINEURAL at 11:55

## 2019-03-25 RX ADMIN — SUGAMMADEX 200 MG: 100 INJECTION, SOLUTION INTRAVENOUS at 15:33

## 2019-03-25 RX ADMIN — FAMOTIDINE 20 MG: 20 TABLET ORAL at 21:14

## 2019-03-25 ASSESSMENT — PULMONARY FUNCTION TESTS
PIF_VALUE: 16
PIF_VALUE: 20
PIF_VALUE: 17
PIF_VALUE: 19
PIF_VALUE: 16
PIF_VALUE: 16
PIF_VALUE: 23
PIF_VALUE: 24
PIF_VALUE: 16
PIF_VALUE: 17
PIF_VALUE: 21
PIF_VALUE: 19
PIF_VALUE: 21
PIF_VALUE: 16
PIF_VALUE: 15
PIF_VALUE: 26
PIF_VALUE: 16
PIF_VALUE: 15
PIF_VALUE: 16
PIF_VALUE: 16
PIF_VALUE: 17
PIF_VALUE: 21
PIF_VALUE: 16
PIF_VALUE: 17
PIF_VALUE: 17
PIF_VALUE: 15
PIF_VALUE: 24
PIF_VALUE: 24
PIF_VALUE: 16
PIF_VALUE: 17
PIF_VALUE: 17
PIF_VALUE: 16
PIF_VALUE: 16
PIF_VALUE: 19
PIF_VALUE: 17
PIF_VALUE: 16
PIF_VALUE: 23
PIF_VALUE: 21
PIF_VALUE: 16
PIF_VALUE: 18
PIF_VALUE: 21
PIF_VALUE: 17
PIF_VALUE: 16
PIF_VALUE: 15
PIF_VALUE: 16
PIF_VALUE: 17
PIF_VALUE: 17
PIF_VALUE: 36
PIF_VALUE: 17
PIF_VALUE: 20
PIF_VALUE: 17
PIF_VALUE: 16
PIF_VALUE: 21
PIF_VALUE: 17
PIF_VALUE: 21
PIF_VALUE: 17
PIF_VALUE: 16
PIF_VALUE: 2
PIF_VALUE: 24
PIF_VALUE: 16
PIF_VALUE: 18
PIF_VALUE: 21
PIF_VALUE: 15
PIF_VALUE: 16
PIF_VALUE: 34
PIF_VALUE: 21
PIF_VALUE: 16
PIF_VALUE: 18
PIF_VALUE: 19
PIF_VALUE: 19
PIF_VALUE: 17
PIF_VALUE: 17
PIF_VALUE: 24
PIF_VALUE: 21
PIF_VALUE: 16
PIF_VALUE: 16
PIF_VALUE: 19
PIF_VALUE: 17
PIF_VALUE: 16
PIF_VALUE: 20
PIF_VALUE: 18
PIF_VALUE: 22
PIF_VALUE: 20
PIF_VALUE: 17
PIF_VALUE: 2
PIF_VALUE: 16
PIF_VALUE: 17
PIF_VALUE: 16
PIF_VALUE: 24
PIF_VALUE: 16
PIF_VALUE: 20
PIF_VALUE: 21
PIF_VALUE: 21
PIF_VALUE: 16
PIF_VALUE: 21
PIF_VALUE: 22
PIF_VALUE: 20
PIF_VALUE: 16
PIF_VALUE: 17
PIF_VALUE: 24
PIF_VALUE: 6
PIF_VALUE: 16
PIF_VALUE: 16
PIF_VALUE: 18
PIF_VALUE: 17
PIF_VALUE: 19
PIF_VALUE: 16
PIF_VALUE: 19
PIF_VALUE: 17
PIF_VALUE: 24
PIF_VALUE: 17
PIF_VALUE: 16
PIF_VALUE: 18
PIF_VALUE: 24
PIF_VALUE: 22
PIF_VALUE: 16
PIF_VALUE: 17
PIF_VALUE: 17
PIF_VALUE: 16
PIF_VALUE: 17
PIF_VALUE: 16
PIF_VALUE: 17
PIF_VALUE: 16
PIF_VALUE: 16
PIF_VALUE: 17
PIF_VALUE: 16
PIF_VALUE: 23
PIF_VALUE: 1
PIF_VALUE: 17
PIF_VALUE: 16
PIF_VALUE: 7
PIF_VALUE: 16
PIF_VALUE: 16
PIF_VALUE: 22
PIF_VALUE: 16
PIF_VALUE: 17
PIF_VALUE: 20
PIF_VALUE: 17
PIF_VALUE: 16
PIF_VALUE: 21
PIF_VALUE: 16
PIF_VALUE: 2
PIF_VALUE: 21
PIF_VALUE: 16
PIF_VALUE: 16
PIF_VALUE: 23
PIF_VALUE: 15
PIF_VALUE: 16
PIF_VALUE: 16
PIF_VALUE: 17
PIF_VALUE: 17
PIF_VALUE: 20
PIF_VALUE: 17
PIF_VALUE: 17
PIF_VALUE: 23
PIF_VALUE: 16
PIF_VALUE: 20
PIF_VALUE: 16
PIF_VALUE: 15
PIF_VALUE: 21
PIF_VALUE: 1
PIF_VALUE: 16
PIF_VALUE: 15
PIF_VALUE: 15
PIF_VALUE: 17
PIF_VALUE: 17
PIF_VALUE: 16
PIF_VALUE: 17
PIF_VALUE: 9
PIF_VALUE: 17
PIF_VALUE: 16
PIF_VALUE: 15
PIF_VALUE: 16
PIF_VALUE: 16
PIF_VALUE: 24
PIF_VALUE: 18
PIF_VALUE: 14
PIF_VALUE: 16
PIF_VALUE: 16
PIF_VALUE: 17
PIF_VALUE: 17
PIF_VALUE: 16
PIF_VALUE: 21
PIF_VALUE: 16
PIF_VALUE: 17
PIF_VALUE: 17
PIF_VALUE: 18
PIF_VALUE: 16
PIF_VALUE: 21
PIF_VALUE: 17
PIF_VALUE: 16
PIF_VALUE: 19
PIF_VALUE: 16
PIF_VALUE: 21
PIF_VALUE: 17
PIF_VALUE: 21
PIF_VALUE: 18
PIF_VALUE: 16

## 2019-03-25 ASSESSMENT — PAIN DESCRIPTION - DESCRIPTORS: DESCRIPTORS: SHARP

## 2019-03-25 ASSESSMENT — PAIN DESCRIPTION - ONSET: ONSET: ON-GOING

## 2019-03-25 ASSESSMENT — PAIN SCALES - GENERAL
PAINLEVEL_OUTOF10: 8
PAINLEVEL_OUTOF10: 5
PAINLEVEL_OUTOF10: 7
PAINLEVEL_OUTOF10: 6
PAINLEVEL_OUTOF10: 8
PAINLEVEL_OUTOF10: 6
PAINLEVEL_OUTOF10: 6
PAINLEVEL_OUTOF10: 0
PAINLEVEL_OUTOF10: 9

## 2019-03-25 ASSESSMENT — PAIN DESCRIPTION - ORIENTATION
ORIENTATION: LEFT
ORIENTATION: LEFT;ANTERIOR

## 2019-03-25 ASSESSMENT — PAIN DESCRIPTION - PAIN TYPE
TYPE: SURGICAL PAIN
TYPE: SURGICAL PAIN

## 2019-03-25 ASSESSMENT — PAIN DESCRIPTION - PROGRESSION
CLINICAL_PROGRESSION: NOT CHANGED
CLINICAL_PROGRESSION: GRADUALLY IMPROVING

## 2019-03-25 ASSESSMENT — PAIN - FUNCTIONAL ASSESSMENT: PAIN_FUNCTIONAL_ASSESSMENT: PREVENTS OR INTERFERES SOME ACTIVE ACTIVITIES AND ADLS

## 2019-03-25 ASSESSMENT — PAIN DESCRIPTION - LOCATION
LOCATION: CHEST;RIB CAGE
LOCATION: CHEST

## 2019-03-25 ASSESSMENT — PAIN DESCRIPTION - FREQUENCY: FREQUENCY: CONTINUOUS

## 2019-03-25 ASSESSMENT — LIFESTYLE VARIABLES: SMOKING_STATUS: 1

## 2019-03-25 ASSESSMENT — ENCOUNTER SYMPTOMS: SHORTNESS OF BREATH: 0

## 2019-03-25 NOTE — BRIEF OP NOTE
Date: 3/25/2019  Patient:  Reny Rodriguez        Brief Operative Note       Pre-op Diagnosis:  LLL nodules x 2    Post-op Diagnosis:  same    Procedure:  L thoracotomy, takedown of adhesions, wedge excision LLL nodule, debulking LLL nodule #2, mediastinal lymph node sampling, intercostal cryo nerve block 3-8 (AtriCure), placement of OnQ pain pump    Surgeon:  Lester Chavez MD    Assistant(s):  Josephine    Anesthesia:  General    EBL: <50cc    Specimens:  LLL nodule #1, inferior pulm lig lymph nodes, fragments of LLL nodule #2    Findings: #1 adenoCa. #2 debulked, attached to PA     Drains:  Left pleural x 2     Drips:  none    Complications:  none    Disposition: To CVU in stable condition    Post-Op Note:  Dictated.     Jessenia Baez MD  3/25/2019  3:44 PM

## 2019-03-25 NOTE — PROGRESS NOTES
1550 Patient arrived to CVU from OR for L VATs procedure. Chest tubes x 2 to water seal per Dr Burke Bernheim. Chest tube sites C/D/I with no crepitus palpated. Patient drowsy, flutters eyes open to verbal command and nods head yes/no to questions then returns to rest. ON-Q ball intact and unclamped. [place] at the bedside for ordered test. Dr Burke Bernheim at bedside to review. 1800 Patient remains drowsy. Resting with eyes closed. Awakens with verbal command. Mouth care provided. 1900 Morphine PCA started as patient more alert and B/P increasing into the 160's, rating pain 9/10. López Choudhary, RN to take over care at the bedside.

## 2019-03-25 NOTE — PROGRESS NOTES
Pt verified information regarding surgery, name, birth date, surgeon, procedure and allergies: Adhesive tape ibuprofen, naproxen, and vicodin. Consent and labs reviewed. Patient transferred to  preop for surgery. Appropriate antibiotics ordered: Ancef 2g . Beta blocker: contraindicated . DVT prophyaxis: SCDs applied. Pt washed with 2% chlorhexidine gluconate skin prep and alcohol wipe down. Patient educated about surgery and pain management. Arterial line placed in right radial at 1122 by Dr. Lauryn Pimentel. VSS. Tolerated well. To surgery per bed at 1147.

## 2019-03-25 NOTE — H&P
PCP:  Brent Boucher MD      [facility]    LLL nodule x 2 -  Previous H+P on chart 3/15/19:  Quentin Nelson is a 70 y.o. female who was admitted 2/6/19, resp issues/viral pna, abx, steroids. CT chest - LLL nodule x 2. D/c 2/8.   CT/PET, L infrahilar nodule 1.4cm SUV 19.8, LLL 1cm nodule near diaphragm, SUV 9.1  PFTs scheduled for 3/20\"  No changes      Past Medical History:  Past Medical History:   Diagnosis Date    Anxiety     Chronic headaches     CT done 4/2016    Colon polyp     COPD, mild ([address]     PFT 8/08 a smoker no symptoms-no meds    DDD (degenerative disc disease)     Depression     Fibromyalgia     Frequent UTI     Full dentures     Hallux valgus     Hearing loss in left ear 7/29/2011    HSV (herpes simplex virus) anogenital infection     leg    Hyperlipidemia     Lung nodule     2019    Menopausal state 1994    Occipital neuralgia     Osteoarthritis     Scoliosis     neck and back brace intermittently, based on pain    Smoker     Spinal stenosis     cane, walker    Unexplained weight loss     Patient states over 30# weight loss over 6 months and pcp is aware       Past Surgical History:  Past Surgical History:   Procedure Laterality Date    BLADDER SUSPENSION  1988    BRAIN ANEURYSM SURGERY  2015    anterior communicating clipped    BUNIONECTOMY Left 08/23/2016    LEFT ARCHANA BUNIONECTOMY                  BUNIONECTOMY Left 2016    CATARACT REMOVAL  2008    bilat    CERVICAL DISCECTOMY  2010    cervical    CERVICAL LAMINECTOMY  2003    cervical reese ant. c4-7    CERVICAL LAMINECTOMY N/A 11/18/2016    Posterior, C2 - C5    CHOLECYSTECTOMY, LAPAROSCOPIC  2013    COLONOSCOPY      DILATION AND CURETTAGE OF UTERUS      LUMBAR LAMINECTOMY  2009    lumbar reese x2    SINUS SURGERY      x2    SPINAL FUSION  2009, 2012    post T12-L5 fusion and redone    TUBAL LIGATION         Home Medications:   Prior to Admission medications    Medication Sig Start Date End Date Taking? Authorizing Provider   OXYCONTIN 30 MG T12A extended release tablet TK 1 T PO QID PRN P 3/5/19  Yes Historical Provider, MD   OXcarbazepine (TRILEPTAL) 300 MG tablet Take 300 mg by mouth 2 times daily   Yes Historical Provider, MD   fenofibrate (TRICOR) 145 MG tablet TAKE ONE TABLET BY MOUTH DAILY 1/7/19  Yes Jason Corona MD   valACYclovir (VALTREX) 1 g tablet TAKE ONE TABLET BY MOUTH DAILY 11/29/18  Yes Jason Corona MD   fluticasone (FLONASE) 50 MCG/ACT nasal spray SHAKE AND SPRAY TWO SPRAYS IN EACH NOSTRIL ONCE DAILY 11/5/18  Yes Jason Corona MD   Probiotic Product (PROBIOTIC DAILY PO) Take 1 tablet by mouth daily    Yes Historical Provider, MD   cetirizine (ZYRTEC) 10 MG tablet Take 10 mg by mouth nightly as needed    Yes Historical Provider, MD   Cranberry Fruit Concentrate 36413 MG CAPS Take 1 tablet by mouth daily    Yes Historical Provider, MD   buPROPion (WELLBUTRIN XL) 150 MG XL tablet 450 mg every morning [facility] 2/25/15  Yes Jason Corona MD   mirtazapine (REMERON) 30 MG tablet Take 30 mg by mouth nightly [employer] 2/25/15  Yes Jason Corona MD   DULoxetine (CYMBALTA) 60 MG capsule Take 120 mg by mouth daily [employer] 2/25/15  Yes Jason Corona MD   therapeutic multivitamin-minerals [facility]) tablet Take 1 tablet by mouth daily. Yes Historical Provider, MD   ciprofloxacin (CIPRO) 500 MG tablet Take 1 tablet by mouth 2 times daily 3/22/19   Yessy Arce MD   Acetaminophen (TYLENOL) 325 MG CAPS Take 650 mg by mouth    Historical Provider, MD   gabapentin (NEURONTIN) 800 MG tablet TAKE ONE TABLET BY MOUTH FOUR TIMES A DAY  Patient taking differently: TAKE ONE TABLET BY MOUTH THREE TIMES A DAY 12/10/18 3/10/19  Jason Corona MD        Facility Administered Medications:    sodium chloride flush  10 mL Intravenous 2 times per day    ceFAZolin (ANCEF) IVPB  2 g Intravenous On Call to OR       Allergies:     Allergies   Allergen Reactions    Adhesive Tape      Blisters on GLUCOSEU NEGATIVE 04/26/2012    AMORPHOUS 2+ 04/26/2012       Reviewed above, reason for surgery, diagnosis and treatment plan. Will proceed with resection LLL nodulesx2 for definitive diagnosis.       Gayla Morley MD  3/25/2019  11:20 AM

## 2019-03-26 LAB
ANION GAP SERPL CALCULATED.3IONS-SCNC: 12 MMOL/L (ref 3–16)
BUN BLDV-MCNC: 15 MG/DL (ref 7–20)
CALCIUM SERPL-MCNC: 8.5 MG/DL (ref 8.3–10.6)
CHLORIDE BLD-SCNC: 112 MMOL/L (ref 99–110)
CO2: 21 MMOL/L (ref 21–32)
CREAT SERPL-MCNC: <0.5 MG/DL (ref 0.6–1.2)
GFR AFRICAN AMERICAN: >60
GFR NON-AFRICAN AMERICAN: >60
GLUCOSE BLD-MCNC: 124 MG/DL (ref 70–99)
HCT VFR BLD CALC: 34.4 % (ref 36–48)
HEMOGLOBIN: 11.7 G/DL (ref 12–16)
MCH RBC QN AUTO: 34.9 PG (ref 26–34)
MCHC RBC AUTO-ENTMCNC: 33.9 G/DL (ref 31–36)
MCV RBC AUTO: 102.8 FL (ref 80–100)
PDW BLD-RTO: 14.7 % (ref 12.4–15.4)
PLATELET # BLD: 378 K/UL (ref 135–450)
PMV BLD AUTO: 7.3 FL (ref 5–10.5)
POTASSIUM REFLEX MAGNESIUM: 4.1 MMOL/L (ref 3.5–5.1)
RBC # BLD: 3.35 M/UL (ref 4–5.2)
SODIUM BLD-SCNC: 145 MMOL/L (ref 136–145)
WBC # BLD: 10.6 K/UL (ref 4–11)

## 2019-03-26 PROCEDURE — 94770 HC ETCO2 MONITOR DAILY: CPT

## 2019-03-26 PROCEDURE — 80048 BASIC METABOLIC PNL TOTAL CA: CPT

## 2019-03-26 PROCEDURE — 6370000000 HC RX 637 (ALT 250 FOR IP): Performed by: THORACIC SURGERY (CARDIOTHORACIC VASCULAR SURGERY)

## 2019-03-26 PROCEDURE — 85027 COMPLETE CBC AUTOMATED: CPT

## 2019-03-26 PROCEDURE — 97110 THERAPEUTIC EXERCISES: CPT

## 2019-03-26 PROCEDURE — 97530 THERAPEUTIC ACTIVITIES: CPT

## 2019-03-26 PROCEDURE — 2580000003 HC RX 258: Performed by: ANESTHESIOLOGY

## 2019-03-26 PROCEDURE — 2700000000 HC OXYGEN THERAPY PER DAY

## 2019-03-26 PROCEDURE — 6360000002 HC RX W HCPCS: Performed by: THORACIC SURGERY (CARDIOTHORACIC VASCULAR SURGERY)

## 2019-03-26 PROCEDURE — 2580000003 HC RX 258: Performed by: THORACIC SURGERY (CARDIOTHORACIC VASCULAR SURGERY)

## 2019-03-26 PROCEDURE — 94762 N-INVAS EAR/PLS OXIMTRY CONT: CPT

## 2019-03-26 PROCEDURE — 2140000000 HC CCU INTERMEDIATE R&B

## 2019-03-26 PROCEDURE — 99024 POSTOP FOLLOW-UP VISIT: CPT | Performed by: THORACIC SURGERY (CARDIOTHORACIC VASCULAR SURGERY)

## 2019-03-26 PROCEDURE — 6360000002 HC RX W HCPCS: Performed by: NURSE PRACTITIONER

## 2019-03-26 PROCEDURE — 6370000000 HC RX 637 (ALT 250 FOR IP): Performed by: NURSE PRACTITIONER

## 2019-03-26 PROCEDURE — 97162 PT EVAL MOD COMPLEX 30 MIN: CPT

## 2019-03-26 RX ORDER — 0.9 % SODIUM CHLORIDE 0.9 %
500 INTRAVENOUS SOLUTION INTRAVENOUS ONCE
Status: DISCONTINUED | OUTPATIENT
Start: 2019-03-26 | End: 2019-04-02 | Stop reason: HOSPADM

## 2019-03-26 RX ORDER — OXYCODONE HCL 10 MG/1
30 TABLET, FILM COATED, EXTENDED RELEASE ORAL EVERY 12 HOURS SCHEDULED
Status: DISCONTINUED | OUTPATIENT
Start: 2019-03-26 | End: 2019-04-02 | Stop reason: HOSPADM

## 2019-03-26 RX ORDER — MORPHINE SULFATE 2 MG/ML
2 INJECTION, SOLUTION INTRAMUSCULAR; INTRAVENOUS EVERY 4 HOURS PRN
Status: DISCONTINUED | OUTPATIENT
Start: 2019-03-26 | End: 2019-03-27

## 2019-03-26 RX ADMIN — FENOFIBRATE 160 MG: 160 TABLET ORAL at 08:57

## 2019-03-26 RX ADMIN — MORPHINE SULFATE 2 MG: 2 INJECTION, SOLUTION INTRAMUSCULAR; INTRAVENOUS at 15:32

## 2019-03-26 RX ADMIN — GABAPENTIN 800 MG: 400 CAPSULE ORAL at 14:45

## 2019-03-26 RX ADMIN — BUPROPION HYDROCHLORIDE 450 MG: 150 TABLET, FILM COATED, EXTENDED RELEASE ORAL at 08:57

## 2019-03-26 RX ADMIN — CEFAZOLIN SODIUM 1 G: 1 INJECTION, POWDER, FOR SOLUTION INTRAMUSCULAR; INTRAVENOUS at 05:01

## 2019-03-26 RX ADMIN — DOCUSATE SODIUM 100 MG: 100 CAPSULE, LIQUID FILLED ORAL at 21:04

## 2019-03-26 RX ADMIN — DOCUSATE SODIUM 100 MG: 100 CAPSULE, LIQUID FILLED ORAL at 08:57

## 2019-03-26 RX ADMIN — MORPHINE SULFATE 2 MG: 2 INJECTION, SOLUTION INTRAMUSCULAR; INTRAVENOUS at 19:35

## 2019-03-26 RX ADMIN — DULOXETINE HYDROCHLORIDE 120 MG: 60 CAPSULE, DELAYED RELEASE ORAL at 08:57

## 2019-03-26 RX ADMIN — FLUTICASONE PROPIONATE 2 SPRAY: 50 SPRAY, METERED NASAL at 11:08

## 2019-03-26 RX ADMIN — Medication 10 ML: at 21:08

## 2019-03-26 RX ADMIN — Medication 10 ML: at 21:04

## 2019-03-26 RX ADMIN — GABAPENTIN 800 MG: 400 CAPSULE ORAL at 21:04

## 2019-03-26 RX ADMIN — OXYCODONE HYDROCHLORIDE 30 MG: 10 TABLET, FILM COATED, EXTENDED RELEASE ORAL at 21:07

## 2019-03-26 RX ADMIN — OXYCODONE HYDROCHLORIDE 30 MG: 10 TABLET, FILM COATED, EXTENDED RELEASE ORAL at 11:08

## 2019-03-26 RX ADMIN — GABAPENTIN 800 MG: 400 CAPSULE ORAL at 08:57

## 2019-03-26 RX ADMIN — FAMOTIDINE 20 MG: 20 TABLET ORAL at 21:04

## 2019-03-26 RX ADMIN — FAMOTIDINE 20 MG: 20 TABLET ORAL at 08:57

## 2019-03-26 ASSESSMENT — PAIN SCALES - GENERAL
PAINLEVEL_OUTOF10: 5
PAINLEVEL_OUTOF10: 4
PAINLEVEL_OUTOF10: 5
PAINLEVEL_OUTOF10: 8
PAINLEVEL_OUTOF10: 8
PAINLEVEL_OUTOF10: 1
PAINLEVEL_OUTOF10: 5
PAINLEVEL_OUTOF10: 5
PAINLEVEL_OUTOF10: 6
PAINLEVEL_OUTOF10: 5
PAINLEVEL_OUTOF10: 8
PAINLEVEL_OUTOF10: 5
PAINLEVEL_OUTOF10: 9
PAINLEVEL_OUTOF10: 5

## 2019-03-26 ASSESSMENT — PAIN DESCRIPTION - LOCATION
LOCATION: RIB CAGE
LOCATION: HEAD;RIB CAGE

## 2019-03-26 ASSESSMENT — PAIN DESCRIPTION - ORIENTATION
ORIENTATION: LEFT

## 2019-03-26 ASSESSMENT — PAIN DESCRIPTION - PAIN TYPE
TYPE: SURGICAL PAIN
TYPE: SURGICAL PAIN
TYPE: ACUTE PAIN;SURGICAL PAIN
TYPE: SURGICAL PAIN

## 2019-03-26 ASSESSMENT — PAIN DESCRIPTION - FREQUENCY
FREQUENCY: CONTINUOUS
FREQUENCY: CONTINUOUS

## 2019-03-26 ASSESSMENT — PAIN DESCRIPTION - PROGRESSION
CLINICAL_PROGRESSION: GRADUALLY IMPROVING
CLINICAL_PROGRESSION: GRADUALLY IMPROVING

## 2019-03-26 ASSESSMENT — PAIN - FUNCTIONAL ASSESSMENT
PAIN_FUNCTIONAL_ASSESSMENT: PREVENTS OR INTERFERES SOME ACTIVE ACTIVITIES AND ADLS
PAIN_FUNCTIONAL_ASSESSMENT: PREVENTS OR INTERFERES SOME ACTIVE ACTIVITIES AND ADLS

## 2019-03-26 ASSESSMENT — PAIN DESCRIPTION - DESCRIPTORS
DESCRIPTORS: SHARP
DESCRIPTORS: SHARP;STABBING

## 2019-03-26 ASSESSMENT — PAIN DESCRIPTION - ONSET: ONSET: ON-GOING

## 2019-03-26 NOTE — PROGRESS NOTES
7789- handoff with [address] [place] [address] [address]- patient in chair having dinner, family at bedside, CT x 2 with intermittent air leak in Apical # 1  1930- patient called requesting pain meds, pain is 8/10 sharp at surgical site  2000-  Shift assessment completed, VSS, denies SOB, c/o pain @ 5/10 sharp surgical site, CT x 2, PIV in place  2100- in bed asleep  0000- reassessed, no significant changes, c/o pain 4/10, denies SOB, N& V, CT/PIV intact  0500- reassessed, no significant changes, c/o pain 4/10, denies SOB, N& V, CT/PIV intact  0715- handoff with [facility]

## 2019-03-26 NOTE — OP NOTE
[address] [place] [address] Jhoan Miller                                 OPERATIVE REPORT    PATIENT NAME: Leo Teresa                     :        1955  MED REC NO:   [id number]                          ROOM:       3313  ACCOUNT NO:   [de-identified]                           ADMIT DATE: 2019  PROVIDER:     Kai Stewart MD    DATE OF PROCEDURE:  2019    PREOPERATIVE DIAGNOSIS:  Left lower lobe nodule x2. POSTOPERATIVE DIAGNOSIS:  Left lower lobe nodule x2. OPERATION PERFORMED:  Left thoracotomy, take down of adhesions, wedge  excision of left lower lobe nodule #1, debulking of left lower lobe  nodule #2, resection of mediastinal lymph nodes, intercostal cryo nerve  block levels 3 through 8 (AtriCure), placement of On-Q pain pump. SURGEON:  Kai Stewart MD    ASSISTANT:  Oc Pinzon SA    ANESTHESIA:  General.    INDICATIONS:  The patient is a 25-year-old female who had presented with  respiratory symptoms and was treated for pneumonia with antibiotics and  steroids. CT of the chest demonstrated left lower lobe nodules x2. Both were PET-avid and not amenable to percutaneous biopsy. The patient  presents today for resection for definitive diagnosis. She and her  family are aware of the risks and possible complications of the  procedure and wished to proceed. OPERATIVE FINDINGS:  In attempting to place a thoracoscopic port, it  became evident that there was no potential pleural space. There was  complete synthesis of the visceral and parietal pleura with filmy  adhesions. In the basal segment of the left lower lobe, spiculated mass  was palpated adjacent to the diaphragm. This was removed as a wedge  specimen with clear margins and proved to be adenocarcinoma.   The second  nodule was located at the superior aspect of the inferior pulmonary  ligament and was able to be freed from the main pulmonary vein Superiorly, it was bordered by the  pulmonary artery. Inferiorly, it was freed from the inferior pulmonary  ligament. Laterally, it  itself with a fairly clean plane from  the lung parenchyma. It was not clear to me whether this originated in  the lung parenchyma or whether this was a lobulated clump of lymph  nodes. It did however have a completely different appearance than the  other inferior pulmonary ligament lymph nodes, which were anthracotic. This was dense and fibrotic. One of the lobules was sent to Pathology confirming adenocarcinoma. As  much of the nodule as possible was resected in a piecemeal fashion. The  portion that was densely adherent to the pulmonary artery was left in  place. I did not think lobectomy or even pneumectomy would provide  completely clear margins given that this had been peeled off the  pericardium as well. An intercostal nerve block was performed using the AtriCure cryo device  from levels 3 through 8. ProGEL was applied to the staple line. Diaphragmatic and apical chest tubes were placed. #2 pericostal  stitches were placed and an On-Q pain pump catheter at the level of the  ribs. 0 Vicryl muscular closure was performed followed by placement of  a second On-Q pain catheter in the subcutaneous tissues. Fairly robust  air leak was noted that was affecting ventilation. Therefore, the wound  was reopened and examined. The lung parenchyma from which the second  nodule had been peeled away had air leak. ProGEL was applied along with  a piece of Nu-Knit. The wound was reclosed in an identical fashion. The skin was closed with 4-0 Monocryl in a running subcuticular fashion  and Skin Affix applied. Sterile dressing were applied at the chest tube  sites. The patient tolerated the procedure well without any complications. Estimated blood loss was less than 50 mL.   The patient was transferred,  extubated in stable condition on no drips to the cardiovascular unit. Sponge and needle count was correct at the end of the case.         Edson Orlando MD    D: 03/25/2019 16:46:07       T: 03/25/2019 17:21:37     [email or web address]  Job#: [id number]     Doc#: [id number]    CC:

## 2019-03-26 NOTE — PROGRESS NOTES
Physical Therapy    Facility/Department: [address] CVICU  Initial Assessment    NAME: Kevin Gaona  : 1955  MRN: [id number]    Date of Service: 3/26/2019    Discharge Recommendations:  Continue to assess pending progress   PT Equipment Recommendations  Other: Will monitor for potential equipt needs. Assessment   Assessment: 69 y/o female admit 3/25/19 with L Lower Lobe Nodule x 2.  3/25/19 S/P L Thoracotomy, Take Down Adhesions,Wedge Excision L LL Nodule #1, Debulking L LL Nodule #2, Resection Mediastinal Lymph Nodule, Intercostal Cryo Nerve Block 3-8, On Q Pain Pump. PMH as noted including Brain Aneurysm/Surg, Neck/Back Surg, COPD, OA, Fibromyalgia, Occipital Neuralgia, Lung Nodule, HSV (Leg). PTA pt living alone in apt with steps to access. PTA pt independent with daily care and functional mobility. Need to ensure adequate assist/support for d/c home; may benefit from brief Home PT Services given limited family nearby. Will monitor pt's progress. History: 69 y/o female admit 3/25/19 with L Lower Lobe Nodule x 2.  3/25/19 S/P L Thoracotomy, Take Down Adhesions,Wedge Excision L LL Nodule #1, Debulking L LL Nodule #2, Resection Mediastinal Lymph Nodule, Intercostal Cryo Nerve Block 3-8, On Q Pain Pump. PMH as noted including Brain Aneurysm/Surg, Neck/Back Surg, COPD, OA, Fibromyalgia, Occipital Neuralgia, Lung Nodule, HSV (Leg). Exam: See above. Clinical Presentation: See above. Patient Education: Role of PT, POC, Need to call for assist.   Barriers to Learning: None. REQUIRES PT FOLLOW UP: Yes  Activity Tolerance  Activity Tolerance: Patient limited by fatigue;Patient limited by endurance       Patient Diagnosis(es): The encounter diagnosis was Pulmonary nodule.      has a past medical history of Anxiety, Chronic headaches, Colon polyp, COPD, mild ([address] DDD (degenerative disc disease), Depression, Fibromyalgia, Frequent UTI, Full dentures, Hallux valgus, Hearing loss in left ear, HSV (herpes simplex virus) anogenital infection, Hyperlipidemia, Lung nodule, Menopausal state, Occipital neuralgia, Osteoarthritis, Scoliosis, Smoker, Spinal stenosis, and Unexplained weight loss. has a past surgical history that includes sinus surgery; Cataract removal (2008); Tubal ligation; cervical laminectomy (2003); lumbar laminectomy (2009); Spinal fusion (2009, 2012); Cervical discectomy (2010); bladder suspension (1988); Dilation and curettage of uterus; Cholecystectomy, laparoscopic (2013); Brain aneurysm surgery (2015); Bunionectomy (Left, 08/23/2016); cervical laminectomy (N/A, 11/18/2016); Colonoscopy; Bunionectomy (Left, 2016); thoracotomy (Left, 03/25/2019); and Thoracoscopy (Left, 3/25/2019). Restrictions  Restrictions/Precautions  Restrictions/Precautions: Fall Risk  Position Activity Restriction  Other position/activity restrictions: Chest Tube x 2 (Both Waterseal), PCA, On Q Pain Ball, O2 2L via NC. Vision/Hearing  Vision: Within Functional Limits  Hearing: Within functional limits     Subjective  General  Chart Reviewed: Yes  Patient assessed for rehabilitation services?: Yes  Additional Pertinent Hx: 71 y/o female admit 3/25/19 with L Lower Lobe Nodule x 2.  3/25/19 S/P L Thoracotomy, Take Down Adhesions,Wedge Excision L LL Nodule #1, Debulking L LL Nodule #2, Resection Mediastinal Lymph Nodule, Intercostal Cryo Nerve Block 3-8, On Q Pain Pump. PMH as noted including Brain Aneurysm/Surg, Neck/Back Surg, COPD, OA, Fibromyalgia, Occipital Neuralgia, Lung Nodule, HSV (Leg). Family / Caregiver Present: No  Referring Practitioner: Dr. Emily Anderson  Diagnosis:  L Lower Lobe Nodule x 2  S/P L Thoracotomy, Take Down Adhesions,Wedge Excision L LL Nodule #1, Debulking L LL Nodule #2, Resection Mediastinal Lymph Nodule, Intercostal Cryo Nerve Block 3-8, On Q Pain Pump. Follows Commands: Within Functional Limits  Subjective  Subjective: Pt agreeable to PT Eval/Rx.    Pain Screening  Patient Currently in Pain: Yes  Pain Assessment  Pain Assessment: 0-10  Pain Level: 5  Pain Type: Surgical pain  Pain Location: Rib cage  Pain Orientation: Left  Vital Signs  Patient Currently in Pain: Yes  Pre Treatment Pain Screening  Intervention List: Patient able to continue with treatment    Orientation  Orientation  Overall Orientation Status: Within Functional Limits  Social/Functional History  Social/Functional History  Lives With: Alone  Type of Home: Apartment  Home Layout: Two level  Home Access: Stairs to enter with rails(Front (outside entrance) - 2 steps without assist device. Basement/Garage Entrance - 10-12 steps with handrail to main level apt. )  Bathroom Shower/Tub: Tub/Shower unit  Bathroom Toilet: Handicap height  Bathroom Equipment: Grab bars in shower, Hand-held shower  Home Equipment: Rolling walker, Cane  ADL Assistance: Independent  Homemaking Assistance: Independent  Ambulation Assistance: Independent(Without assist device PTA. )  Transfer Assistance: Independent  Active : Yes  Occupation: On disability  Type of occupation: Worked [employer] Services. Additional Comments: Limited family assist nearby. Cognition        Objective          AROM RLE (degrees)  RLE AROM: WFL  AROM LLE (degrees)  LLE AROM : WFL  AROM RUE (degrees)  RUE AROM : WFL  AROM LUE (degrees)  LUE General AROM: Limited due to recent L Thoracotomy. Strength RLE  Strength RLE: WFL  Strength LLE  Strength LLE: WFL     Sensation  Overall Sensation Status: WFL  Bed mobility  Supine to Sit: Minimal assistance  Transfers  Sit to Stand: Minimal Assistance  Stand to sit: Minimal Assistance  Ambulation  Ambulation?: Yes  Ambulation 1  Surface: level tile  Device: No Device  Quality of Gait: 4-5 steps bed to chair with Min assist.  Pt able to wgt bear/advance LEs, no LE buckling/giving way. Increase assist/close monitor lines. Plan   Plan  Times per week: 3-5x week while in acute care setting.    Current Treatment Recommendations: Functional Mobility Training, Transfer Training, Gait Training, Stair training, Safety Education & Training, Patient/Caregiver Education & Training  Safety Devices  Type of devices: Call light within reach, Chair alarm in place, Left in chair, Nurse notified(Pt aware to call for assist.  PT spoke with pt's nurse (Lynsey Leos).  )    G-Code       OutComes Score                                                  AM-PAC Score  AM-PAC Inpatient Mobility Raw Score : 17  AM-PAC Inpatient T-Scale Score : 42.13  Mobility Inpatient CMS 0-100% Score: 50.57  Mobility Inpatient CMS G-Code Modifier : CK          Goals  Short term goals  Time Frame for Short term goals: Upon d/c acute care setting. Short term goal 1: Bed Mob Independent. Short term goal 2: Transfers with/without assist device Supervision. Short term goal 3: Amb with/without assist device 150' SBA/Supervision. Short term goal 4: Stair Negotiation SBA/CGA. Patient Goals   Patient goals : Be able to walk/take care of self and go back home.         Therapy Time   Individual Concurrent Group Co-treatment   Time In           Time Out           Minutes  [address] [place], PT

## 2019-03-26 NOTE — PROGRESS NOTES
activity. Son and friend at bedside. @ [facility] 23 arrived, assisted pt to set up tray. @ 1918 Bedside Shift handoff with Hardik Arnett RN. Pt awake/alert sitting up in recliner chair eating supper.

## 2019-03-26 NOTE — PROGRESS NOTES
1930 Shift report received from [facility]. Right radial a-line with appropriate wave form, IV fluids infusing. PCA initiated per MAR, pain scale & management pre order set. Patient awake & alert. CT x 2 to 2 atrium with air leak & drainage to apical, no air leak & very little drainage to diaphragmatic. Person drain clear yellow urine. 2000 Shift assessment performed. VSS, afebrile. Tolerating small sips of fluids. 2100 PCA managing pain effectively, patient satisfied. 2300 Pain level decreased further, patient sleepy but easy to arouse. 0000 Reassessment completed. VSS, afebrile. UO now <120 last 4 hours. 4354 Dr Maria Hidden paged. 5814 Dr Moralez called; give 500ml NS bolus over 1hr.    0400 Past 2 hours since bolus finished UO >30ml/hr. Reassessment completed, VSS, afebrile. CT tidaling with intermittent air leak, volumes per I&O's. PCA working effectively with less than pain goal.     0530 During CT dressing change there were shallow blisters noted underneath tegaderm which opened up and were weeping clear fluid secondary to patient sensitivity to adhesives. Minimally small tegarderm used as occlusive barrier over CT insertion sites with small island dressing over ON-Q pain pain site.     0700 Shift report given to [facility]

## 2019-03-26 NOTE — PLAN OF CARE
\  Problem: Falls - Risk of:  Goal: Will remain free from falls  Description  Will remain free from falls  3/26/2019 1255 by Phan Boyd RN  Outcome: Ongoing  Note:   A: Sound com dome light, bed/chair alarm, bed in lowest position, wheels of bed/chair locked, non skid footwear, call light in reach, fall precaution education, intentional rounding. PT eval/treat    3/26/2019 5856 by Jane Zavala RN  Outcome: Met This Shift  Note:   Patient remained free from falls overnight. Problem: Pain:  Goal: Pain level will decrease  Description  Pain level will decrease  3/26/2019 1255 by Phan Boyd RN  Outcome: Ongoing  Note:   A: Assess for pain using appropriate pain scale. Reposition/non-pharmaceutical interventions and/or medicate as needed for pain. Reassess for effectiveness of intervention. Notify physician of unmanaged pain. 3/26/2019 4371 by Jane Zavala RN  Outcome: Met This Shift  Note:   Pain management with Morphine PCA achieved score lower than patients stated pain goal.  Goal: Control of acute pain  Description  Control of acute pain  Outcome: Ongoing  Goal: Control of chronic pain  Description  Control of chronic pain  Outcome: Ongoing     Problem: Fluid Volume - Imbalance:  Goal: Chest tube drainage is within specified parameters  Description  Chest tube drainage is within specified parameters  Outcome: Ongoing  Note:   A: CT to waterseal as ordered. Assess for air leak and crepitus. Assess quality and quantity of CT drainage. Notify physician if drainage greater then ordered parameters or meets criteria for removal.       Problem: Discharge Planning:  Goal: Ability to perform activities of daily living will improve  Description  Ability to perform activities of daily living will improve  Outcome: Ongoing  Note:   A: Will assess for potential needs at discharge. Will consult SW as needed.        Problem: Gas Exchange - Impaired:  Goal: Ability to maintain normal pulse oximetry readings will improve to within specified parameters  Description  Ability to maintain normal pulse oximetry readings will improve to within specified parameters  Outcome: Ongoing  Note:   A: SpO2 monitoring, titrate O2 to keep SpO2 >90%. Encourage cough/deep breath, IS and Acapella. Progressive activity plan. Problem: Urinary Retention:  Goal: Urinary elimination within specified parameters  Description  Urinary elimination within specified parameters  Outcome: Ongoing  Goal: Ability to reestablish a normal urinary elimination pattern will improve - after catheter removal  Description  Ability to reestablish a normal urinary elimination pattern will improve  Outcome: Ongoing  Note:   A: Assess need for continued use. Assess and educate for signs/symptoms of infection. Monitor quality and quantity of urine output. Stat lock in place, drainage bag hanging below level of bladder, routine catheter care. Goal: Absence of postvoid residual urine  Description  Absence of postvoid residual urine  Outcome: Ongoing  Note:   A: Will assess urine output for QS after removal of urinary catheter. Bladder scan as needed to check for post void residual if c/o's of bladder fullness after voiding. Goal: Absence of urinary tract infection signs and symptoms  Description  Absence of urinary tract infection signs and symptoms  Outcome: Ongoing     Problem: Fluid Volume - Risk of, Imbalance:  Goal: Absence of imbalanced fluid volume signs and symptoms  Description  Absence of imbalanced fluid volume signs and symptoms  Outcome: Ongoing  Note:   A: Strict I/O, daily weight, follow ordered fluid restriction. Educate patient and family.     Goal: Will show no signs and symptoms of excessive bleeding  Description  Will show no signs and symptoms of excessive bleeding  Outcome: Ongoing  Note:   A: Assess and educate for signs/symptoms of excessive bleeding, impaired coagulation    Goal: Ability to maintain a balanced intake and output will improve  Description  Ability to maintain a balanced intake and output will improve  Outcome: Ongoing     Problem: Bowel Function - Altered:  Goal: Bowel function will improve to within specified parameters  Description  Bowel function will improve to within specified parameters  Outcome: Ongoing  Goal: Active bowel sounds  Description  Active bowel sounds  Outcome: Ongoing  Note:   A: Assess bowel sounds. Goal: Bowel elimination is within specified parameters  Description  Bowel elimination is within specified parameters  Outcome: Ongoing  Note:   A: Assess for constipation. Promote progressive activity plan. Medicate as needed for constipation. Problem: Infection - Risk of, Surgical Site:  Goal: Demonstration of wound healing without infection will improve  Description  Demonstration of wound healing without infection will improve  Outcome: Ongoing  Note:   A: Assess surgical site and educate for sign/symptoms of infection. Problem: Venous Thromboembolism - Risk of:  Goal: Will show no signs or symptoms of venous thromboembolism  Description  Will show no signs or symptoms of venous thromboembolism  Outcome: Ongoing  Note:   A: Assess and educate for signs/symptoms of VTE, PE or bleeding. Compression stockings, sequential compression device, promote progress activity plan.

## 2019-03-26 NOTE — PROGRESS NOTES
Progress Note    S/P Left thoracotomy, take down of adhesions, wedge  excision of left lower lobe nodule #1, debulking of left lower lobe  nodule #2, resection of mediastinal lymph nodes, intercostal cryo nerve  block levels 3 through 8 (AtriCure), placement of On-Q pain pump 3/25/19    Vital Signs:                                                 BP (!) 170/67   Pulse 67   Temp 98.4 °F (36.9 °C)   Resp 19   Ht 5' 2\" (1.575 m)   Wt 100 lb 8.5 oz (45.6 kg)   LMP 08/11/1993 (Approximate)   SpO2 94%   BMI 18.39 kg/m²  O2 Flow Rate (L/min): 2 L/min   NSR  Admission Weight: 104 lb (47.2 kg)      Drips: pca     I/O:      Intake/Output Summary (Last 24 hours) at 3/26/2019 0839  Last data filed at 3/26/2019 0756  Gross per 24 hour   Intake 2267 ml   Output 1301 ml   Net 966 ml     Chest Tube: 148, 272    CV: reg  Pulm: decreased, wound c/d/i  Abd: soft  Ext: warm, no edema    Data Review:  CBC:   Recent Labs     03/26/19  0501   WBC 10.6   HGB 11.7*   HCT 34.4*   .8*        BMP:   Recent Labs     03/26/19  0501      K 4.1   *   CO2 21   BUN 15   CREATININE <0.5*   CALCIUM 8.5     Cardiac Enzymes: No results for input(s): CKTOTAL, CKMB, CKMBINDEX, TROPONINI in the last 72 hours. PT/INR: No results for input(s): PROTIME, INR in the last 72 hours. APTT: No results for input(s): APTT in the last 72 hours. Assessment/Plan:  CV - stable in SR   - remove art line  pulm - pulm toilet, wean O2   - small air leak. No ptx. Leave to waterseal.  Renal - Cr nl   - remove Person  Heme - acute blood loss anemia.  pain - cryo nerve block , OnQ, pca.  Switch to oral meds    Chela Posada MD  3/26/2019  8:39 AM

## 2019-03-27 ENCOUNTER — APPOINTMENT (OUTPATIENT)
Dept: GENERAL RADIOLOGY | Age: 64
DRG: 163 | End: 2019-03-27
Attending: THORACIC SURGERY (CARDIOTHORACIC VASCULAR SURGERY)
Payer: COMMERCIAL

## 2019-03-27 PROCEDURE — 97116 GAIT TRAINING THERAPY: CPT

## 2019-03-27 PROCEDURE — 71045 X-RAY EXAM CHEST 1 VIEW: CPT

## 2019-03-27 PROCEDURE — 2140000000 HC CCU INTERMEDIATE R&B

## 2019-03-27 PROCEDURE — 2700000000 HC OXYGEN THERAPY PER DAY

## 2019-03-27 PROCEDURE — 97530 THERAPEUTIC ACTIVITIES: CPT

## 2019-03-27 PROCEDURE — 6360000002 HC RX W HCPCS: Performed by: NURSE PRACTITIONER

## 2019-03-27 PROCEDURE — 6370000000 HC RX 637 (ALT 250 FOR IP): Performed by: THORACIC SURGERY (CARDIOTHORACIC VASCULAR SURGERY)

## 2019-03-27 PROCEDURE — 99024 POSTOP FOLLOW-UP VISIT: CPT | Performed by: THORACIC SURGERY (CARDIOTHORACIC VASCULAR SURGERY)

## 2019-03-27 PROCEDURE — 97110 THERAPEUTIC EXERCISES: CPT

## 2019-03-27 PROCEDURE — 2580000003 HC RX 258: Performed by: ANESTHESIOLOGY

## 2019-03-27 PROCEDURE — 2580000003 HC RX 258: Performed by: THORACIC SURGERY (CARDIOTHORACIC VASCULAR SURGERY)

## 2019-03-27 PROCEDURE — 6370000000 HC RX 637 (ALT 250 FOR IP): Performed by: NURSE PRACTITIONER

## 2019-03-27 PROCEDURE — 94762 N-INVAS EAR/PLS OXIMTRY CONT: CPT

## 2019-03-27 RX ORDER — MORPHINE SULFATE 2 MG/ML
2 INJECTION, SOLUTION INTRAMUSCULAR; INTRAVENOUS
Status: DISCONTINUED | OUTPATIENT
Start: 2019-03-27 | End: 2019-04-02 | Stop reason: HOSPADM

## 2019-03-27 RX ORDER — ACETAMINOPHEN 325 MG/1
650 TABLET ORAL EVERY 8 HOURS SCHEDULED
Status: DISCONTINUED | OUTPATIENT
Start: 2019-03-27 | End: 2019-04-02 | Stop reason: HOSPADM

## 2019-03-27 RX ADMIN — FENOFIBRATE 160 MG: 160 TABLET ORAL at 09:16

## 2019-03-27 RX ADMIN — DOCUSATE SODIUM 100 MG: 100 CAPSULE, LIQUID FILLED ORAL at 08:00

## 2019-03-27 RX ADMIN — FAMOTIDINE 20 MG: 20 TABLET ORAL at 08:00

## 2019-03-27 RX ADMIN — OXYCODONE HYDROCHLORIDE 30 MG: 10 TABLET, FILM COATED, EXTENDED RELEASE ORAL at 09:03

## 2019-03-27 RX ADMIN — MORPHINE SULFATE 2 MG: 2 INJECTION, SOLUTION INTRAMUSCULAR; INTRAVENOUS at 17:51

## 2019-03-27 RX ADMIN — Medication 10 ML: at 08:04

## 2019-03-27 RX ADMIN — FAMOTIDINE 20 MG: 20 TABLET ORAL at 20:41

## 2019-03-27 RX ADMIN — GABAPENTIN 800 MG: 400 CAPSULE ORAL at 20:42

## 2019-03-27 RX ADMIN — ACETAMINOPHEN 650 MG: 325 TABLET ORAL at 22:00

## 2019-03-27 RX ADMIN — GABAPENTIN 800 MG: 400 CAPSULE ORAL at 08:01

## 2019-03-27 RX ADMIN — Medication 10 ML: at 08:01

## 2019-03-27 RX ADMIN — MORPHINE SULFATE 2 MG: 2 INJECTION, SOLUTION INTRAMUSCULAR; INTRAVENOUS at 08:01

## 2019-03-27 RX ADMIN — DOCUSATE SODIUM 100 MG: 100 CAPSULE, LIQUID FILLED ORAL at 20:42

## 2019-03-27 RX ADMIN — GABAPENTIN 800 MG: 400 CAPSULE ORAL at 15:31

## 2019-03-27 RX ADMIN — Medication 10 ML: at 20:42

## 2019-03-27 RX ADMIN — DULOXETINE HYDROCHLORIDE 120 MG: 60 CAPSULE, DELAYED RELEASE ORAL at 08:00

## 2019-03-27 RX ADMIN — ACETAMINOPHEN 650 MG: 325 TABLET ORAL at 15:30

## 2019-03-27 RX ADMIN — BUPROPION HYDROCHLORIDE 450 MG: 150 TABLET, FILM COATED, EXTENDED RELEASE ORAL at 08:01

## 2019-03-27 RX ADMIN — Medication 10 ML: at 21:00

## 2019-03-27 RX ADMIN — FLUTICASONE PROPIONATE 2 SPRAY: 50 SPRAY, METERED NASAL at 08:01

## 2019-03-27 RX ADMIN — OXYCODONE HYDROCHLORIDE 30 MG: 10 TABLET, FILM COATED, EXTENDED RELEASE ORAL at 20:42

## 2019-03-27 ASSESSMENT — PAIN DESCRIPTION - ONSET
ONSET: ON-GOING

## 2019-03-27 ASSESSMENT — PAIN DESCRIPTION - DESCRIPTORS
DESCRIPTORS: SHARP

## 2019-03-27 ASSESSMENT — PAIN DESCRIPTION - PROGRESSION
CLINICAL_PROGRESSION: GRADUALLY IMPROVING

## 2019-03-27 ASSESSMENT — PAIN DESCRIPTION - LOCATION
LOCATION: RIB CAGE

## 2019-03-27 ASSESSMENT — PAIN SCALES - GENERAL
PAINLEVEL_OUTOF10: 3
PAINLEVEL_OUTOF10: 9
PAINLEVEL_OUTOF10: 7
PAINLEVEL_OUTOF10: 9
PAINLEVEL_OUTOF10: 3
PAINLEVEL_OUTOF10: 3
PAINLEVEL_OUTOF10: 6
PAINLEVEL_OUTOF10: 9
PAINLEVEL_OUTOF10: 10
PAINLEVEL_OUTOF10: 4

## 2019-03-27 ASSESSMENT — PAIN DESCRIPTION - FREQUENCY
FREQUENCY: CONTINUOUS

## 2019-03-27 ASSESSMENT — PAIN DESCRIPTION - ORIENTATION
ORIENTATION: LEFT

## 2019-03-27 ASSESSMENT — PAIN - FUNCTIONAL ASSESSMENT
PAIN_FUNCTIONAL_ASSESSMENT: PREVENTS OR INTERFERES SOME ACTIVE ACTIVITIES AND ADLS

## 2019-03-27 ASSESSMENT — PAIN DESCRIPTION - PAIN TYPE
TYPE: SURGICAL PAIN

## 2019-03-27 NOTE — PROGRESS NOTES
Physical Therapy  Facility/Department: [facility]  Daily Treatment Note  NAME: Shellie Wagner  : 1955  MRN: [id number]    Date of Service: 3/27/2019    Discharge Recommendations:  Continue to assess pending progress   PT Equipment Recommendations  Other: Will monitor for potential equipt needs. Patient Diagnosis(es): The encounter diagnosis was Pulmonary nodule. has a past medical history of Anxiety, Chronic headaches, Colon polyp, COPD, mild ([address] DDD (degenerative disc disease), Depression, Fibromyalgia, Frequent UTI, Full dentures, Hallux valgus, Hearing loss in left ear, HSV (herpes simplex virus) anogenital infection, Hyperlipidemia, Lung nodule, Menopausal state, Occipital neuralgia, Osteoarthritis, Scoliosis, Smoker, Spinal stenosis, and Unexplained weight loss. has a past surgical history that includes sinus surgery; Cataract removal (); Tubal ligation; cervical laminectomy (); lumbar laminectomy (); Spinal fusion (, ); Cervical discectomy (); bladder suspension (); Dilation and curettage of uterus; Cholecystectomy, laparoscopic (); Brain aneurysm surgery (); Bunionectomy (Left, 2016); cervical laminectomy (N/A, 2016); Colonoscopy; Bunionectomy (Left, ); thoracotomy (Left, 2019); and Thoracoscopy (Left, 3/25/2019). Restrictions  Restrictions/Precautions  Restrictions/Precautions: Fall Risk  Position Activity Restriction  Other position/activity restrictions: Chest Tube x 2 (Both Waterseal), On Q Pain Ball, O2 2L via NC. Subjective   General  Chart Reviewed: Yes  Additional Pertinent Hx: 69 y/o female admit 3/25/19 with L Lower Lobe Nodule x 2.  3/25/19 S/P L Thoracotomy, Take Down Adhesions,Wedge Excision L LL Nodule #1, Debulking L LL Nodule #2, Resection Mediastinal Lymph Nodule, Intercostal Cryo Nerve Block 3-8, On Q Pain Pump.   PMH as noted including Brain Aneurysm/Surg, Neck/Back Surg, COPD, OA, Fibromyalgia, Occipital monitor pt's progress. Prognosis: Good  Decision Making: Medium Complexity  Patient Education: Role of PT, POC, Need to call for assist, Encouragement OOB Activities. REQUIRES PT FOLLOW UP: Yes  Activity Tolerance  Activity Tolerance: Patient limited by fatigue;Patient limited by endurance     G-Code     OutComes Score                                                  AM-PAC Score  AM-PAC Inpatient Mobility Raw Score : 17  AM-PAC Inpatient T-Scale Score : 42.13  Mobility Inpatient CMS 0-100% Score: 50.57  Mobility Inpatient CMS G-Code Modifier : CK          Goals  Short term goals  Time Frame for Short term goals: Upon d/c acute care setting. Short term goal 1: Bed Mob Independent. Short term goal 2: Transfers with/without assist device Supervision. Short term goal 3: Amb with/without assist device 150' SBA/Supervision. Short term goal 4: Stair Negotiation SBA/CGA. Patient Goals   Patient goals : Be able to walk/take care of self and go back home. Plan    Plan  Times per week: 3-5x week while in acute care setting.    Current Treatment Recommendations: Functional Mobility Training, Transfer Training, Gait Training, Stair training, Safety Education & Training, Patient/Caregiver Education & Training  Safety Devices  Type of devices: Call light within reach, Chair alarm in place, Left in chair, Nurse notified(Pt aware to call for assist.  PT spoke with pt's nurse.  )     Therapy Time   Individual Concurrent Group Co-treatment   Time In 0745         Time Out 0830         Minutes [address], PT

## 2019-03-27 NOTE — PROGRESS NOTES
1249- handoff with [facility]- patient in chair, A x 4, CT x 2-# 2 clamped, RA, PIV intact  2000-Shift assessment completed, VSS, denies SOB, c/o pain @ 5/10 sharp surgical site, CT x 2, PIV in place  2100- in bed asleep  0000- reassessed, no significant changes, c/o pain 4/10, denies SOB, N& V, CT/PIV intact, up to bedside commode. CHG wipes, gown changed  0200- up to bed side commode  0300- called for bed pan  0400  reassessed, no significant changes, c/o pain 6/10 refuses pain medication at this time stating its bearable denies SOB, N& V, CT/PIV intact  0645- c/o sharp surgical site pain @ 8/10. med given  0715-handoff with Theodore Harper RN  6155- 1 CT and pain ball removed, drsg changed on second CT

## 2019-03-27 NOTE — CONSULTS
[facility]                   [facility]    Consult Note      Requesting Physician: valerie  CHIEF COMPLAINT: new lung cancer      HISTORY OF PRESENT ILLNESS:      Ms. Elyse Muro  is a 70 y.o. female we are seeing in consultation for new lung cancer  She had surgery for a lesion found on pet a few months ago when she was in for pneumonia and a spot lit up on pet   She had surgery whiich showed removal of the lesion but has pos nodes  Path was poorly diff squamous cell    Past Medical History:        Diagnosis Date    Anxiety     Chronic headaches     CT done 4/2016    Colon polyp     COPD, mild ([address]     PFT 8/08 a smoker no symptoms-no meds    DDD (degenerative disc disease)     Depression     Fibromyalgia     Frequent UTI     Full dentures     Hallux valgus     Hearing loss in left ear 7/29/2011    HSV (herpes simplex virus) anogenital infection     leg    Hyperlipidemia     Lung nodule 2019    2 LLL nodules    Menopausal state 1994    Occipital neuralgia     Osteoarthritis     Scoliosis     neck and back brace intermittently, based on pain    Smoker     Spinal stenosis     cane, walker    Unexplained weight loss     Patient states over 30# weight loss over 6 months and pcp is aware     Past Surgical History:        Procedure Laterality Date    BLADDER SUSPENSION  1988    BRAIN ANEURYSM SURGERY  2015    anterior communicating clipped    BUNIONECTOMY Left 08/23/2016    LEFT ARCHANA BUNIONECTOMY                  BUNIONECTOMY Left 2016    CATARACT REMOVAL  2008    bilat    CERVICAL DISCECTOMY  2010    cervical    CERVICAL LAMINECTOMY  2003    cervical reese ant. c4-7    CERVICAL LAMINECTOMY N/A 11/18/2016    Posterior, C2 - C5    CHOLECYSTECTOMY, LAPAROSCOPIC  2013    COLONOSCOPY      DILATION AND CURETTAGE OF UTERUS      LUMBAR LAMINECTOMY  2009    lumbar reese x2    SINUS SURGERY      x2    SPINAL FUSION  2009, 2012    post T12-L5 fusion and redone    THORACOSCOPY Left 3/25/2019    LEFT VIDEO ASSISTED THORACOSCOPIC SURGERY, THORACOTOMY, WEDGE EXCISION, LEFT LOWER LOBE NODULE X 2, CRYO NERVE BLOCK performed by Gayla Morley MD at [facility] 03/25/2019    wedge excision LLL nodule#1, debulking LLL nodule #2    TUBAL LIGATION         Current Medications:    Current Facility-Administered Medications: acetaminophen (TYLENOL) tablet 650 mg, 650 mg, Oral, 3 times per day  morphine (PF) injection 2 mg, 2 mg, Intravenous, Q2H PRN  0.9 % sodium chloride bolus, 500 mL, Intravenous, Once  oxyCODONE (OXYCONTIN) extended release tablet 30 mg, 30 mg, Oral, 2 times per day  sodium chloride flush 0.9 % injection 10 mL, 10 mL, Intravenous, 2 times per day  sodium chloride flush 0.9 % injection 10 mL, 10 mL, Intravenous, PRN  buPROPion (WELLBUTRIN XL) extended release tablet 450 mg, 450 mg, Oral, Daily  DULoxetine (CYMBALTA) extended release capsule 120 mg, 120 mg, Oral, Daily  fenofibrate tablet 160 mg, 160 mg, Oral, Daily with breakfast  fluticasone (FLONASE) 50 MCG/ACT nasal spray 2 spray, 2 spray, Each Nare, Daily  gabapentin (NEURONTIN) capsule 800 mg, 800 mg, Oral, TID  sodium chloride flush 0.9 % injection 10 mL, 10 mL, Intravenous, 2 times per day  sodium chloride flush 0.9 % injection 10 mL, 10 mL, Intravenous, PRN  docusate sodium (COLACE) capsule 100 mg, 100 mg, Oral, BID  famotidine (PEPCID) tablet 20 mg, 20 mg, Oral, BID  Allergies:  Adhesive tape;  Ibuprofen; Naproxen; and Vicodin [hydrocodone-acetaminophen]    Social History:      Social History     Socioeconomic History    Marital status: Single     Spouse name: Not on file    Number of children: 3    Years of education: Not on file    Highest education level: Not on file   Occupational History    Occupation: disabled   Social Needs    Financial resource strain: Not on file    Food insecurity:     Worry: Not on file     Inability: Not on file   [employer] needs:     Medical: Not on file     Non-medical: Not on file Tobacco Use    Smoking status: Former Smoker     Packs/day: 0.25     Years: 48.00     Pack years: 12.00     Types: Cigarettes     Last attempt to quit: 2019     Years since quittin.1    Smokeless tobacco: Never Used    Tobacco comment: started age 11, down to [address] a day;   Substance and Sexual Activity    Alcohol use: Yes     Comment: rare    Drug use: Yes     Comment: marijuana daily for pain control-last used-3/18/19    Sexual activity: Never   Lifestyle    Physical activity:     Days per week: Not on file     Minutes per session: Not on file    Stress: Not on file   Relationships    Social connections:     Talks on phone: Not on file     Gets together: Not on file     Attends Zoroastrian service: Not on file     Active member of club or organization: Not on file     Attends meetings of clubs or organizations: Not on file     Relationship status: Not on file    Intimate partner violence:     Fear of current or ex partner: Not on file     Emotionally abused: Not on file     Physically abused: Not on file     Forced sexual activity: Not on file   Other Topics Concern    Not on file   Social History Narrative    Exercise:  never. Living will:  no,   additional information provided. Lives alone. Seatbelt use: Always.              Family History:         Problem Relation Age of Onset    Cancer Mother 43        breast    Cancer Brother 77        throat    Other Father         Macular Degeration     REVIEW OF SYSTEMS:    ROS per the HPI   Otherwise  10 point ROS negative     PHYSICAL EXAM:      Vitals:  BP (!) 155/80   Pulse 93   Temp 98.2 °F (36.8 °C) (Oral)   Resp 19   Ht 5' 2\" (1.575 m)   Wt 104 lb 8 oz (47.4 kg)   LMP 1993 (Approximate)   SpO2 96%   BMI 19.11 kg/m²     CONSTITUTIONAL:  awake, alert, cooperative, no apparent distress, and appears stated age NAD  EYES:  pupils equal, round and reactive to light, extra ocular muscles intact, sclera clear, conjunctiva normal  NECK:  Supple, symmetrical, trachea midline, no adenopathy, thyroid symmetric, not enlarged and no tenderness, skin normal  HEMATOLOGIC/LYMPHATICS:  no cervical lymphadenopathy, no supraclavicular lymphadenopathy,   LUNGS:  No increased work of breathing, good air exchange, clear to auscultation bilaterally, no crackles or wheezing  CARDIOVASCULAR:  , regular rate and rhythm, normal S1 and S2, no S3 or S4, and no murmur noted  ABDOMEN:  No scars, normal bowel sounds, soft, non-distended, non-tender, no masses palpated, no hepatosplenomegally      MUSCULOSKELETAL:  There is no redness, warmth, or swelling of the joints. Full range of motion noted. Motor strength is 5 out of 5 all extremities bilaterally. NEUROLOGIC:  Awake, alert, oriented to name, place and time. Cranial nerves II-XII are grossly intact. Motor is 5 out of 5 bilaterally. SKIN:  no bruising or bleeding      DATA:    PT/INR:  No results for input(s): PROT, INR in the last 72 hours. PTT:  No results for input(s): APTT in the last 72 hours.   CMP:    Lab Results   Component Value Date     03/26/2019    K 4.1 03/26/2019     03/26/2019    CO2 21 03/26/2019    BUN 15 03/26/2019    PROT 6.3 02/06/2019    PROT 6.7 03/21/2013     Magnesium:  No results found for: MG  Phosphorus:  No components found for: PO4  Calcium:  No components found for: CA  CBC:    Lab Results   Component Value Date    WBC 10.6 03/26/2019    RBC 3.35 03/26/2019    HGB 11.7 03/26/2019    HCT 34.4 03/26/2019    .8 03/26/2019    RDW 14.7 03/26/2019     03/26/2019     DIFF:    Lab Results   Component Value Date    .8 03/26/2019    RDW 14.7 03/26/2019      LDH:  @labcrnt(LDH)@  Uric Acid:  @labcrnt(URIC)@    Radiology Review: Xr Chest Standard (2 Vw)    Result Date: 3/20/2019  EXAMINATION: TWO VIEWS OF THE CHEST 3/20/2019 9:22 am COMPARISON: 11/11/2014 HISTORY: ORDERING SYSTEM PROVIDED HISTORY: Pre-op chest exam TECHNOLOGIST PROVIDED HISTORY: Ordering Physician Provided Reason for Exam: pre op for VATS Acuity: Acute Type of Exam: Initial FINDINGS: Frontal and lateral views of the chest are submitted for review. The cardiac silhouette is normal in size. Lung parenchyma is clear without focal airspace consolidation, sizeable pleural effusion, or pneumothorax. Trachea is midline. Status post lower cervical spine fusion. Dextroscoliotic curvature of the lower thoracic spine. No evidence for acute cardiopulmonary pathology. Xr Chest Portable    Result Date: 3/27/2019  EXAMINATION: SINGLE XRAY VIEW OF THE CHEST 3/27/2019 1:01 pm COMPARISON: 03/25/2019, 1618 hours HISTORY: ORDERING SYSTEM PROVIDED HISTORY: r/o pneumothorax, diaphragmatic tube clamped TECHNOLOGIST PROVIDED HISTORY: Reason for exam:->r/o pneumothorax, diaphragmatic tube clamped Ordering Physician Provided Reason for Exam: timed portable Acuity: Unknown Type of Exam: Ongoing Relevant Medical/Surgical History: pnrumothorax 43-year-old female with possible pneumothorax FINDINGS: Fusion hardware projects over the cervical spine and upper lumbar spine. Cardiac monitor leads overlie the chest. Left-sided chest tube projects over the left upper/mid lung zone junction. Another left-sided chest tube appears retracted inferior to the right costophrenic angle. Extensive subcutaneous emphysema along the left axilla and left lateral chest wall. This is markedly increased since the prior study. Postsurgical left lower rib deformities are noted. Stable biapical pleural thickening. Mild bibasilar atelectasis. Trace bilateral pleural effusions. Cardiac and mediastinal contours remain unchanged. S-shaped scoliosis of the thoracolumbar spine. Atherosclerotic calcification of the vasculature. Retrocardiac/left basilar airspace consolidation. Visualized osseous structures remain unchanged. No free air. No large obvious pneumothorax or mediastinal shift is evident.      1. Left-sided chest tube projecting over the left upper-mid lung zone region. No large obvious pneumothorax or mediastinal shift. Extensive subcutaneous emphysema along the left axilla and left chest wall, markedly increased from the prior study. Another left-sided chest tube appears retracted from the prior study inferior to the left costophrenic angle which may be resulting in the marked subcutaneous emphysema. 2. Retrocardiac/left basilar airspace consolidation. 3. Mild bibasilar atelectasis. Stable biapical pleural thickening. Trace bilateral pleural effusions. 4. Postsurgical left lower rib deformities again noted. The findings were sent to the Radiology Results [id number] at 2:44 pm on 3/27/2019to be communicated to a licensed caregiver. Xr Chest Portable    Result Date: 3/25/2019  EXAMINATION: SINGLE XRAY VIEW OF THE CHEST 3/25/2019 4:12 pm COMPARISON: 03/20/2019 radiograph HISTORY: ORDERING SYSTEM PROVIDED HISTORY: s/p wedge excision LLL nodules x 2  - r/o ptx TECHNOLOGIST PROVIDED HISTORY: Reason for exam:->s/p wedge excision LLL nodules x 2  - r/o ptx Ordering Physician Provided Reason for Exam: s/p wedge excision LLL nodules x 2  - r/o ptx Acuity: Acute Type of Exam: Initial FINDINGS: The patient has undergone a recent left lower lobe partial lobectomy. 2 chest tubes are in place with no radiographic evidence of pneumothorax. Mild bibasilar atelectasis. Heart and mediastinum are normal.  Postsurgical changes in the lower thoracic and visualized lumbar spine with scoliosis seen in the thoracic spine. Recent postsurgical change of partial left lower lobectomy. Chest tubes are positioned appropriately, and there is no definite pneumothorax. Pet Ct Skull Base To Mid Thigh    Result Date: 3/1/2019  EXAMINATION: WHOLE BODY PET/CT 3/1/2019 TECHNIQUE: Following IV injection of 14.3 mCi of F 18 -FDG, PET  tumor imaging was acquired from the base of the skull to the mid thighs.   Computed tomography was used Scoliosis, thoracogenic    Hypercholesteremia    Disc disorder of cervical region    Fibromyalgia    Anxiety    Tobacco abuse    Stress incontinence    Lumbar radiculopathy    Spinal stenosis, lumbar    Major depression, recurrent- Hx of multiple OD attempts    Herpes simplex- leg    Colon polyp    Hearing loss in left ear    Osteopenia- DXA -1.9 wrist (8/11)    Allergic rhinitis    Hypertriglyceridemia    Cholelithiases    Sciatic nerve disease    Frequent falls    Balance disorder    Bilateral occipital neuralgia    History of cerebral aneurysm repair 2015 anterior communicating clipped    Cervical stenosis of spine    Biliary obstruction    Recurrent herpes simplex    Acute exacerbation of COPD with asthma ([facility] [address]    Pulmonary nodule seen on imaging study    Pulmonary nodule    S/P thoracotomy       IMPRESSION/RECOMMENDATIONS:    Lung cancer with lymph node positive dx     We will establish outpt fu to discuss upon dc but likely will need adj chemo   Will place on tumor board   I will see if we have any trials-  Doubt any inpt recs but will need to see us upon dc about one week or so out

## 2019-03-27 NOTE — PROGRESS NOTES
0747- Pt assessed by student nurse. Pt complains of pain with chest tubes. 1200- Pt moved from chair to bed. Status remains unchanged. Pt with visitors. 1600- Pt in bed. Status unchanged and no other issues to report.     1800- Crepitus noted in Pts right breast. Handoff given to night RN

## 2019-03-28 PROCEDURE — 2700000000 HC OXYGEN THERAPY PER DAY

## 2019-03-28 PROCEDURE — 2580000003 HC RX 258: Performed by: ANESTHESIOLOGY

## 2019-03-28 PROCEDURE — 2580000003 HC RX 258: Performed by: THORACIC SURGERY (CARDIOTHORACIC VASCULAR SURGERY)

## 2019-03-28 PROCEDURE — 94762 N-INVAS EAR/PLS OXIMTRY CONT: CPT

## 2019-03-28 PROCEDURE — 6370000000 HC RX 637 (ALT 250 FOR IP): Performed by: THORACIC SURGERY (CARDIOTHORACIC VASCULAR SURGERY)

## 2019-03-28 PROCEDURE — 97530 THERAPEUTIC ACTIVITIES: CPT

## 2019-03-28 PROCEDURE — 97116 GAIT TRAINING THERAPY: CPT

## 2019-03-28 PROCEDURE — 6370000000 HC RX 637 (ALT 250 FOR IP): Performed by: NURSE PRACTITIONER

## 2019-03-28 PROCEDURE — 6360000002 HC RX W HCPCS: Performed by: NURSE PRACTITIONER

## 2019-03-28 PROCEDURE — 2140000000 HC CCU INTERMEDIATE R&B

## 2019-03-28 PROCEDURE — 99024 POSTOP FOLLOW-UP VISIT: CPT | Performed by: THORACIC SURGERY (CARDIOTHORACIC VASCULAR SURGERY)

## 2019-03-28 RX ADMIN — ACETAMINOPHEN 650 MG: 325 TABLET ORAL at 06:46

## 2019-03-28 RX ADMIN — GABAPENTIN 800 MG: 400 CAPSULE ORAL at 21:12

## 2019-03-28 RX ADMIN — ACETAMINOPHEN 650 MG: 325 TABLET ORAL at 14:36

## 2019-03-28 RX ADMIN — GABAPENTIN 800 MG: 400 CAPSULE ORAL at 14:36

## 2019-03-28 RX ADMIN — FAMOTIDINE 20 MG: 20 TABLET ORAL at 21:11

## 2019-03-28 RX ADMIN — FENOFIBRATE 160 MG: 160 TABLET ORAL at 08:41

## 2019-03-28 RX ADMIN — DOCUSATE SODIUM 100 MG: 100 CAPSULE, LIQUID FILLED ORAL at 21:11

## 2019-03-28 RX ADMIN — DULOXETINE HYDROCHLORIDE 120 MG: 60 CAPSULE, DELAYED RELEASE ORAL at 08:13

## 2019-03-28 RX ADMIN — OXYCODONE HYDROCHLORIDE 30 MG: 10 TABLET, FILM COATED, EXTENDED RELEASE ORAL at 08:41

## 2019-03-28 RX ADMIN — BUPROPION HYDROCHLORIDE 450 MG: 150 TABLET, FILM COATED, EXTENDED RELEASE ORAL at 08:13

## 2019-03-28 RX ADMIN — OXYCODONE HYDROCHLORIDE 30 MG: 10 TABLET, FILM COATED, EXTENDED RELEASE ORAL at 21:12

## 2019-03-28 RX ADMIN — FLUTICASONE PROPIONATE 2 SPRAY: 50 SPRAY, METERED NASAL at 08:14

## 2019-03-28 RX ADMIN — FAMOTIDINE 20 MG: 20 TABLET ORAL at 08:13

## 2019-03-28 RX ADMIN — Medication 10 ML: at 08:44

## 2019-03-28 RX ADMIN — MORPHINE SULFATE 2 MG: 2 INJECTION, SOLUTION INTRAMUSCULAR; INTRAVENOUS at 06:46

## 2019-03-28 RX ADMIN — Medication 10 ML: at 21:15

## 2019-03-28 RX ADMIN — DOCUSATE SODIUM 100 MG: 100 CAPSULE, LIQUID FILLED ORAL at 08:13

## 2019-03-28 RX ADMIN — Medication 10 ML: at 21:13

## 2019-03-28 RX ADMIN — MORPHINE SULFATE 2 MG: 2 INJECTION, SOLUTION INTRAMUSCULAR; INTRAVENOUS at 19:55

## 2019-03-28 RX ADMIN — Medication 10 ML: at 08:43

## 2019-03-28 RX ADMIN — ACETAMINOPHEN 650 MG: 325 TABLET ORAL at 21:11

## 2019-03-28 RX ADMIN — GABAPENTIN 800 MG: 400 CAPSULE ORAL at 08:41

## 2019-03-28 ASSESSMENT — PAIN SCALES - GENERAL
PAINLEVEL_OUTOF10: 5
PAINLEVEL_OUTOF10: 3
PAINLEVEL_OUTOF10: 6
PAINLEVEL_OUTOF10: 0
PAINLEVEL_OUTOF10: 3
PAINLEVEL_OUTOF10: 8
PAINLEVEL_OUTOF10: 3
PAINLEVEL_OUTOF10: 6
PAINLEVEL_OUTOF10: 9
PAINLEVEL_OUTOF10: 1
PAINLEVEL_OUTOF10: 4
PAINLEVEL_OUTOF10: 4
PAINLEVEL_OUTOF10: 5
PAINLEVEL_OUTOF10: 5

## 2019-03-28 ASSESSMENT — PAIN DESCRIPTION - ORIENTATION
ORIENTATION: LEFT;ANTERIOR
ORIENTATION: LEFT
ORIENTATION: LEFT

## 2019-03-28 ASSESSMENT — PAIN DESCRIPTION - PAIN TYPE
TYPE: SURGICAL PAIN

## 2019-03-28 ASSESSMENT — PAIN DESCRIPTION - FREQUENCY
FREQUENCY: CONTINUOUS
FREQUENCY: CONTINUOUS

## 2019-03-28 ASSESSMENT — PAIN DESCRIPTION - ONSET
ONSET: ON-GOING
ONSET: ON-GOING

## 2019-03-28 ASSESSMENT — PAIN DESCRIPTION - DESCRIPTORS
DESCRIPTORS: DISCOMFORT
DESCRIPTORS: SHARP
DESCRIPTORS: SHARP

## 2019-03-28 ASSESSMENT — PAIN DESCRIPTION - LOCATION
LOCATION: CHEST
LOCATION: STERNUM
LOCATION: CHEST

## 2019-03-28 ASSESSMENT — PAIN DESCRIPTION - PROGRESSION: CLINICAL_PROGRESSION: GRADUALLY IMPROVING

## 2019-03-28 ASSESSMENT — PAIN - FUNCTIONAL ASSESSMENT: PAIN_FUNCTIONAL_ASSESSMENT: PREVENTS OR INTERFERES SOME ACTIVE ACTIVITIES AND ADLS

## 2019-03-28 NOTE — PLAN OF CARE
Problem: Falls - Risk of:  Goal: Will remain free from falls  Description  Will remain free from falls  Outcome: Ongoing  Patient remained free of falls entire shift. Educated on safe ambulation. Educated on safe environment. Goal: Absence of physical injury  Description  Absence of physical injury  Outcome: Ongoing  Patient remained free from injury throughout the shift   Problem: Pain:  Goal: Pain level will decrease  Description  Pain level will decrease  Outcome: Ongoing  Patient chronic/ surgical pain managed throughout shift with scheduled pain medications and nonpharmacologic pain interventions. Such as distraction from entertainment, emotional support, repositioning, and pillow support. Goal: Control of acute pain  Description  Control of acute pain  Outcome: Ongoing  Patient chronic/ surgical pain managed throughout shift with scheduled pain medications and nonpharmacologic pain interventions. Such as distraction from entertainment, emotional support, repositioning, and pillow support. Goal: Control of chronic pain  Description  Control of chronic pain  Outcome: Ongoing  Patient chronic/ surgical pain managed throughout shift with scheduled pain medications and nonpharmacologic pain interventions. Such as distraction from entertainment, emotional support, repositioning, and pillow support. Problem: Fluid Volume - Imbalance:  Goal: Chest tube drainage is within specified parameters  Description  Chest tube drainage is within specified parameters  Outcome: Ongoing  Chest tube drainage remained in specified parameters.   Problem: Discharge Planning:  Goal: Ability to perform activities of daily living will improve  Description  Ability to perform activities of daily living will improve  Outcome: Ongoing     Problem: Gas Exchange - Impaired:  Goal: Ability to maintain normal pulse oximetry readings will improve to within specified parameters  Description  Ability to maintain normal pulse oximetry readings will improve to within specified parameters  Outcome: Ongoing     Problem: Urinary Retention:  Goal: Urinary elimination within specified parameters  Description  Urinary elimination within specified parameters  Outcome: Ongoing  Goal: Ability to reestablish a normal urinary elimination pattern will improve - after catheter removal  Description  Ability to reestablish a normal urinary elimination pattern will improve  Outcome: Ongoing  Goal: Absence of postvoid residual urine  Description  Absence of postvoid residual urine  Outcome: Ongoing  Goal: Absence of urinary tract infection signs and symptoms  Description  Absence of urinary tract infection signs and symptoms  Outcome: Ongoing     Problem: Fluid Volume - Risk of, Imbalance:  Goal: Absence of imbalanced fluid volume signs and symptoms  Description  Absence of imbalanced fluid volume signs and symptoms  Outcome: Ongoing  Goal: Will show no signs and symptoms of excessive bleeding  Description  Will show no signs and symptoms of excessive bleeding  Outcome: Ongoing  Goal: Ability to maintain a balanced intake and output will improve  Description  Ability to maintain a balanced intake and output will improve  Outcome: Ongoing     Problem:  Bowel Function - Altered:  Goal: Bowel function will improve to within specified parameters  Description  Bowel function will improve to within specified parameters  Outcome: Ongoing  Goal: Active bowel sounds  Description  Active bowel sounds  Outcome: Ongoing  Goal: Bowel elimination is within specified parameters  Description  Bowel elimination is within specified parameters  Outcome: Ongoing  Patient did not pass a bowel movement today   Problem: Infection - Risk of, Surgical Site:  Goal: Demonstration of wound healing without infection will improve  Description  Demonstration of wound healing without infection will improve  Outcome: Ongoing     Problem: Venous Thromboembolism - Risk of:  Goal: Will show no signs or symptoms of venous thromboembolism  Description  Will show no signs or symptoms of venous thromboembolism. Patient wears SCD when in bed. Patient ambulated in the hallway.    Outcome: Ongoing

## 2019-03-28 NOTE — PROGRESS NOTES
[facility] [place]  [facility]  Progress Note      SUBJECTIVE:   She is up in a chair more awake today   She remembers she will need to see us as outpt to discuss further therapy   ROS: No fever chills sweats, no nausea, vomiting, diarrhea  shortness of breath chest pain or other pain  OBJECTIVE      Medications    Current Facility-Administered Medications: acetaminophen (TYLENOL) tablet 650 mg, 650 mg, Oral, 3 times per day  morphine (PF) injection 2 mg, 2 mg, Intravenous, Q2H PRN  0.9 % sodium chloride bolus, 500 mL, Intravenous, Once  oxyCODONE (OXYCONTIN) extended release tablet 30 mg, 30 mg, Oral, 2 times per day  sodium chloride flush 0.9 % injection 10 mL, 10 mL, Intravenous, 2 times per day  sodium chloride flush 0.9 % injection 10 mL, 10 mL, Intravenous, PRN  buPROPion (WELLBUTRIN XL) extended release tablet 450 mg, 450 mg, Oral, Daily  DULoxetine (CYMBALTA) extended release capsule 120 mg, 120 mg, Oral, Daily  fenofibrate tablet 160 mg, 160 mg, Oral, Daily with breakfast  fluticasone (FLONASE) 50 MCG/ACT nasal spray 2 spray, 2 spray, Each Nare, Daily  gabapentin (NEURONTIN) capsule 800 mg, 800 mg, Oral, TID  sodium chloride flush 0.9 % injection 10 mL, 10 mL, Intravenous, 2 times per day  sodium chloride flush 0.9 % injection 10 mL, 10 mL, Intravenous, PRN  docusate sodium (COLACE) capsule 100 mg, 100 mg, Oral, BID  famotidine (PEPCID) tablet 20 mg, 20 mg, Oral, BID  Physical    VITALS:  BP (!) 155/80   Pulse 93   Temp 98.2 °F (36.8 °C) (Oral)   Resp 19   Ht 5' 2\" (1.575 m)   Wt 104 lb 8 oz (47.4 kg)   LMP 1993 (Approximate)   SpO2 96%   BMI 19.11 kg/m²   TEMPERATURE:  Current - Temp: 98.2 °F (36.8 °C);  Max - Temp  Av.5 °F (36.9 °C)  Min: 98.2 °F (36.8 °C)  Max: 98.7 °F (37.1 °C)  PULSE OXIMETRY RANGE: SpO2  Av.9 %  Min: 92 %  Max: 99 %  24HR INTAKE/OUTPUT:      Intake/Output Summary (Last 24 hours) at 3/28/2019 0965  Last data filed at 3/28/2019 0759  Gross per 24 hour   Intake 240 ml   Output 825 ml   Net -585 ml       CONSTITUTIONAL:  awake, alert, cooperative, no apparent distress, HEENT oral pharynx , no scleral icterus  HEMATOLOGIC/LYMPHATICS:  no cervical lymphadenopathy, no supraclavicular lymphadenopathy, no axillary lymphadenopathy and no inguinal lymphadenopathy  LUNGS:  No increased work of breathing, good air exchange, clear to auscultation bilaterally, no crackles or wheezing  CARDIOVASCULAR:  , regular rate and rhythm, normal S1 and S2, no S3 or S4, and no murmur noted  ABDOMEN:  No scars, normal bowel sounds, soft, non-distended, non-tender, no masses palpated, no hepatosplenomegally  MUSCULOSKELETAL:  There is no redness, warmth, or swelling of the joints. EXTREMETIES: No clubbing cynosis or edema  NEUROLOGIC:  Awake, alert, oriented to name, place and time. Cranial nerves II-XII are grossly intact. Motor is 5 out of 5 bilaterally. SKIN:  no bruising or bleeding      Data      Recent Labs     03/26/19  0501   WBC 10.6   HGB 11.7*   HCT 34.4*      .8*        Recent Labs     03/26/19  0501      K 4.1   *   CO2 21   BUN 15   CREATININE <0.5*     No results for input(s): AST, ALT, ALB, BILIDIR, BILITOT, ALKPHOS in the last 72 hours. Magnesium:  No results found for: MG    Imaging Xr Chest Standard (2 Vw)    Result Date: 3/20/2019  EXAMINATION: TWO VIEWS OF THE CHEST 3/20/2019 9:22 am COMPARISON: 11/11/2014 HISTORY: ORDERING SYSTEM PROVIDED HISTORY: Pre-op chest exam TECHNOLOGIST PROVIDED HISTORY: Ordering Physician Provided Reason for Exam: pre op for VATS Acuity: Acute Type of Exam: Initial FINDINGS: Frontal and lateral views of the chest are submitted for review. The cardiac silhouette is normal in size. Lung parenchyma is clear without focal airspace consolidation, sizeable pleural effusion, or pneumothorax. Trachea is midline. Status post lower cervical spine fusion. Dextroscoliotic curvature of the lower thoracic spine.      No evidence for acute cardiopulmonary pathology. Xr Chest Portable    Result Date: 3/27/2019  EXAMINATION: SINGLE XRAY VIEW OF THE CHEST 3/27/2019 1:01 pm COMPARISON: 03/25/2019, 1618 hours HISTORY: ORDERING SYSTEM PROVIDED HISTORY: r/o pneumothorax, diaphragmatic tube clamped TECHNOLOGIST PROVIDED HISTORY: Reason for exam:->r/o pneumothorax, diaphragmatic tube clamped Ordering Physician Provided Reason for Exam: timed portable Acuity: Unknown Type of Exam: Ongoing Relevant Medical/Surgical History: pnrumothorax 66-year-old female with possible pneumothorax FINDINGS: Fusion hardware projects over the cervical spine and upper lumbar spine. Cardiac monitor leads overlie the chest. Left-sided chest tube projects over the left upper/mid lung zone junction. Another left-sided chest tube appears retracted inferior to the right costophrenic angle. Extensive subcutaneous emphysema along the left axilla and left lateral chest wall. This is markedly increased since the prior study. Postsurgical left lower rib deformities are noted. Stable biapical pleural thickening. Mild bibasilar atelectasis. Trace bilateral pleural effusions. Cardiac and mediastinal contours remain unchanged. S-shaped scoliosis of the thoracolumbar spine. Atherosclerotic calcification of the vasculature. Retrocardiac/left basilar airspace consolidation. Visualized osseous structures remain unchanged. No free air. No large obvious pneumothorax or mediastinal shift is evident. 1. Left-sided chest tube projecting over the left upper-mid lung zone region. No large obvious pneumothorax or mediastinal shift. Extensive subcutaneous emphysema along the left axilla and left chest wall, markedly increased from the prior study. Another left-sided chest tube appears retracted from the prior study inferior to the left costophrenic angle which may be resulting in the marked subcutaneous emphysema. 2. Retrocardiac/left basilar airspace consolidation.  3.

## 2019-03-28 NOTE — PROGRESS NOTES
0710 Handoff report complete with jamel Funes RN. Patient awake in bed, vital signs are stable. 1231 Steph Claros, RHODA and Gume Dubose RN at patient bedside, removed diaphragmic CT. CT site covered with gauze. Surrounding skin healing.    0756 Shift assessment complete. Patient alert and oriented x4. NSR, 93 bpm HR. Lung sounds clear, diminished. Bowel sounds active in all 4 quadrants. Passing flatus, still awaiting first bowel movement post OP. Patient reports pain of 5/10. Scheduled dose of Oxy due at 0900.     0841 Scheduled dose of oxy given. Patient pain 6/10. Will follow up on pain assessment. 6105 Patient reports pain of 5/10. Patient stated pain goal 3/10. Patient states \"I don't know how to live without pain. \" States pain level is tolerable. Educated on pain management. Will notify RN if pain level becomes intolerable. Tor PT at patient bedside. Ambulated in hallway with patient. Tolerated well. Patient returned to chair. 1130 Patient up to restroom. Returned to bed.    1209 Reassessment complete. VSS. No new changes in patient. Patient reports pain of 3/10. Patient's previously stated pain goal. Patient in bed, call light within reach. Patient able to make own needs known. 1444 Asked patient to ambulate. Patient reported headache, and pain of 6/10 and asked to try asking later. Scheduled tylenol and gabapentin given. Will reassess pain. 1613 Reassessment complete. VSS. No new changes in patient. Patient awake in bed, call light within reach. Patient able to make own needs known. [id number] Patient ambulated 300 ft in hallway with myself and son. Patient tolerated ambulation well. Patient returned to chair. VSS. Son at the bedside. Updated on plan of care and questions addressed. 1955 Patient reports pain of 9/10. PRN dose of morphine administered. 2011 Handoff report completed with jamel Benson RN. Patient awake, helped her from the chair back to bed. VSS.  Call light

## 2019-03-28 NOTE — CARE COORDINATION
PRABHAW reviewed chart. LSW met with patient to introduce  role and to initiate discussion regarding DC planning. She reports her goal is to DC to one of her kids' house for a while. Prior to admit, she lives alone in an apartment with 6 plus 6 steps to enter from the parking garage. 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 DME:   emergency response button, 4 wh walker, shower chair. She is active with Dr Lina Macdonald for PCP needs. She uses [facility] for all her pharmacy needs. Today, she is wearing oxygen that she does not have at home. Discussion held regarding need to wean or determine if she will need at discharge. LSW informed her of possible need for services from SN or PT once discharge and LSW provided her with a list of home care agencies for her review. She will begin talking with her children about who's house she will dc to for a while.   [place], [place]     Case Management   [phone]    3/28/2019  4:19 PM

## 2019-03-28 NOTE — PROGRESS NOTES
Adhesions,Wedge Excision L LL Nodule #1, Debulking L LL Nodule #2, Resection Mediastinal Lymph Nodule, Intercostal Cryo Nerve Block 3-8, On Q Pain Pump. PMH as noted including Brain Aneurysm/Surg, Neck/Back Surg, COPD, OA, Fibromyalgia, Occipital Neuralgia, Lung Nodule, HSV (Leg). Response To Previous Treatment: Patient with no complaints from previous session. Family / Caregiver Present: No  Referring Practitioner: Dr. Zandra Garinca  Subjective  Subjective: Pt agreeable to con PT Rx. Cont to improve. Pt reports 3/10 pain L Ribcage region (surg). Orientation  Orientation  Overall Orientation Status: Within Functional Limits  Cognition      Objective   Bed mobility  Supine to Sit: Minimal assistance  Transfers  Sit to Stand: Contact guard assistance(With Walker.  )  Stand to sit: Contact guard assistance(With Walker. )  Comment: Initial cues for safe hand placement with Walker. Ambulation  Ambulation?: Yes  Ambulation 1  Surface: level tile  Quality of Gait: Pt amb 150' x 2 with Rollator Walker, O2 1L via NC CGA/SBA. Gait  slow/guarded, very short/shuffling steps. Improving alittle as distance progressed. Endurance improving. Assessment   Body structures, Functions, Activity limitations: Decreased functional mobility ; Decreased endurance  Assessment: 71 y/o female admit 3/25/19 with L Lower Lobe Nodule x 2.  3/25/19 S/P L Thoracotomy, Take Down Adhesions,Wedge Excision L LL Nodule #1, Debulking L LL Nodule #2, Resection Mediastinal Lymph Nodule, Intercostal Cryo Nerve Block 3-8, On Q Pain Pump. PMH as noted including Brain Aneurysm/Surg, Neck/Back Surg, COPD, OA, Fibromyalgia, Occipital Neuralgia, Lung Nodule, HSV (Leg). PTA pt living alone in apt with steps to access. PTA pt independent with daily care and functional mobility. Need to ensure adequate assist/support for d/c home; may benefit from brief Home PT Services given limited family nearby.   Will monitor pt's progress. Prognosis: Good  Patient Education: Role of PT, POC, Need to call for assist.   REQUIRES PT FOLLOW UP: Yes  Activity Tolerance  Activity Tolerance: Patient Tolerated treatment well     G-Code     OutComes Score                                                  AM-PAC Score  AM-PAC Inpatient Mobility Raw Score : 18  AM-PAC Inpatient T-Scale Score : 43.63  Mobility Inpatient CMS 0-100% Score: 46.58  Mobility Inpatient CMS G-Code Modifier : CK          Goals  Short term goals  Time Frame for Short term goals: Upon d/c acute care setting. Short term goal 1: Bed Mob Independent. Short term goal 2: Transfers with/without assist device Supervision. Short term goal 3: Amb with/without assist device 150' SBA/Supervision. Short term goal 4: Stair Negotiation SBA/CGA. Patient Goals   Patient goals : Be able to walk/take care of self and go back home. Plan    Plan  Times per week: 3-5x week while in acute care setting.    Current Treatment Recommendations: Functional Mobility Training, Transfer Training, Gait Training, Stair training, Safety Education & Training, Patient/Caregiver Education & Training  Safety Devices  Type of devices: Call light within reach, Chair alarm in place, Left in chair, Nurse notified(Pt aware to call for assist.  PT spoke with pt's nurse.  )     Therapy Time   Individual Concurrent Group Co-treatment   Time In 1030         Time Out 1100         Minutes [address], PT

## 2019-03-29 ENCOUNTER — APPOINTMENT (OUTPATIENT)
Dept: GENERAL RADIOLOGY | Age: 64
DRG: 163 | End: 2019-03-29
Attending: THORACIC SURGERY (CARDIOTHORACIC VASCULAR SURGERY)
Payer: COMMERCIAL

## 2019-03-29 PROCEDURE — 94762 N-INVAS EAR/PLS OXIMTRY CONT: CPT

## 2019-03-29 PROCEDURE — 2580000003 HC RX 258: Performed by: THORACIC SURGERY (CARDIOTHORACIC VASCULAR SURGERY)

## 2019-03-29 PROCEDURE — 6370000000 HC RX 637 (ALT 250 FOR IP): Performed by: THORACIC SURGERY (CARDIOTHORACIC VASCULAR SURGERY)

## 2019-03-29 PROCEDURE — 71045 X-RAY EXAM CHEST 1 VIEW: CPT

## 2019-03-29 PROCEDURE — 97530 THERAPEUTIC ACTIVITIES: CPT

## 2019-03-29 PROCEDURE — 6360000002 HC RX W HCPCS: Performed by: NURSE PRACTITIONER

## 2019-03-29 PROCEDURE — 6370000000 HC RX 637 (ALT 250 FOR IP): Performed by: NURSE PRACTITIONER

## 2019-03-29 PROCEDURE — 97116 GAIT TRAINING THERAPY: CPT

## 2019-03-29 PROCEDURE — 2140000000 HC CCU INTERMEDIATE R&B

## 2019-03-29 PROCEDURE — 94761 N-INVAS EAR/PLS OXIMETRY MLT: CPT

## 2019-03-29 PROCEDURE — 2580000003 HC RX 258: Performed by: ANESTHESIOLOGY

## 2019-03-29 RX ORDER — POLYETHYLENE GLYCOL 3350 17 G/17G
17 POWDER, FOR SOLUTION ORAL DAILY
Status: DISCONTINUED | OUTPATIENT
Start: 2019-03-29 | End: 2019-04-02 | Stop reason: HOSPADM

## 2019-03-29 RX ORDER — BISACODYL 10 MG
10 SUPPOSITORY, RECTAL RECTAL DAILY PRN
Status: DISCONTINUED | OUTPATIENT
Start: 2019-03-29 | End: 2019-04-02 | Stop reason: HOSPADM

## 2019-03-29 RX ADMIN — Medication 10 ML: at 08:27

## 2019-03-29 RX ADMIN — FLUTICASONE PROPIONATE 2 SPRAY: 50 SPRAY, METERED NASAL at 08:27

## 2019-03-29 RX ADMIN — DULOXETINE HYDROCHLORIDE 120 MG: 60 CAPSULE, DELAYED RELEASE ORAL at 08:16

## 2019-03-29 RX ADMIN — FAMOTIDINE 20 MG: 20 TABLET ORAL at 21:05

## 2019-03-29 RX ADMIN — OXYCODONE HYDROCHLORIDE 30 MG: 10 TABLET, FILM COATED, EXTENDED RELEASE ORAL at 08:23

## 2019-03-29 RX ADMIN — GABAPENTIN 800 MG: 400 CAPSULE ORAL at 14:53

## 2019-03-29 RX ADMIN — MORPHINE SULFATE 2 MG: 2 INJECTION, SOLUTION INTRAMUSCULAR; INTRAVENOUS at 16:34

## 2019-03-29 RX ADMIN — GABAPENTIN 800 MG: 400 CAPSULE ORAL at 21:05

## 2019-03-29 RX ADMIN — POLYETHYLENE GLYCOL 3350 17 G: 17 POWDER, FOR SOLUTION ORAL at 10:33

## 2019-03-29 RX ADMIN — FENOFIBRATE 160 MG: 160 TABLET ORAL at 08:16

## 2019-03-29 RX ADMIN — BUPROPION HYDROCHLORIDE 450 MG: 150 TABLET, FILM COATED, EXTENDED RELEASE ORAL at 08:16

## 2019-03-29 RX ADMIN — OXYCODONE HYDROCHLORIDE 30 MG: 10 TABLET, FILM COATED, EXTENDED RELEASE ORAL at 21:05

## 2019-03-29 RX ADMIN — GABAPENTIN 800 MG: 400 CAPSULE ORAL at 08:16

## 2019-03-29 RX ADMIN — Medication 10 ML: at 21:06

## 2019-03-29 RX ADMIN — ACETAMINOPHEN 650 MG: 325 TABLET ORAL at 06:05

## 2019-03-29 RX ADMIN — MORPHINE SULFATE 2 MG: 2 INJECTION, SOLUTION INTRAMUSCULAR; INTRAVENOUS at 12:27

## 2019-03-29 RX ADMIN — ACETAMINOPHEN 650 MG: 325 TABLET ORAL at 14:54

## 2019-03-29 RX ADMIN — DOCUSATE SODIUM 100 MG: 100 CAPSULE, LIQUID FILLED ORAL at 08:16

## 2019-03-29 RX ADMIN — FAMOTIDINE 20 MG: 20 TABLET ORAL at 08:16

## 2019-03-29 RX ADMIN — ACETAMINOPHEN 650 MG: 325 TABLET ORAL at 21:05

## 2019-03-29 ASSESSMENT — PAIN DESCRIPTION - LOCATION
LOCATION: CHEST

## 2019-03-29 ASSESSMENT — PAIN DESCRIPTION - DESCRIPTORS
DESCRIPTORS: DISCOMFORT
DESCRIPTORS: CONSTANT
DESCRIPTORS: CONSTANT;STABBING
DESCRIPTORS: CONSTANT
DESCRIPTORS: DISCOMFORT;ACHING
DESCRIPTORS: CONSTANT
DESCRIPTORS: CONSTANT

## 2019-03-29 ASSESSMENT — PAIN DESCRIPTION - ORIENTATION
ORIENTATION: LEFT

## 2019-03-29 ASSESSMENT — PAIN DESCRIPTION - PAIN TYPE
TYPE: SURGICAL PAIN

## 2019-03-29 ASSESSMENT — PAIN SCALES - GENERAL
PAINLEVEL_OUTOF10: 7
PAINLEVEL_OUTOF10: 0
PAINLEVEL_OUTOF10: 4
PAINLEVEL_OUTOF10: 4
PAINLEVEL_OUTOF10: 2
PAINLEVEL_OUTOF10: 0
PAINLEVEL_OUTOF10: 7
PAINLEVEL_OUTOF10: 0
PAINLEVEL_OUTOF10: 6
PAINLEVEL_OUTOF10: 2
PAINLEVEL_OUTOF10: 7
PAINLEVEL_OUTOF10: 4
PAINLEVEL_OUTOF10: 4
PAINLEVEL_OUTOF10: 6
PAINLEVEL_OUTOF10: 7
PAINLEVEL_OUTOF10: 0
PAINLEVEL_OUTOF10: 5
PAINLEVEL_OUTOF10: 4
PAINLEVEL_OUTOF10: 0

## 2019-03-29 ASSESSMENT — PAIN DESCRIPTION - ONSET
ONSET: ON-GOING

## 2019-03-29 ASSESSMENT — PAIN DESCRIPTION - PROGRESSION
CLINICAL_PROGRESSION: GRADUALLY IMPROVING

## 2019-03-29 ASSESSMENT — PAIN DESCRIPTION - FREQUENCY
FREQUENCY: CONTINUOUS

## 2019-03-29 NOTE — PLAN OF CARE
Pt remains free of falls. Fall risk protocol in place. Bed locked in lowest position. Call light in reach. Pt instructed to call for assistance, verbalizes understanding. Will continue to monitor. Continuing to monitor pain and discomfort. Monitoring pain level on scale of 0-10. Non- pharmacological measures encouraged to reduce discomfort/pain. PRN pain meds administeration continues when/if applicable as ordered by physician. Chest tube drainage within normal parameters. Will continue to monitor    Pt was able to ambulate without any s/s of SOB/dizziness. Will continue to monitor    Chest tube site dry and intact with no s/s of infection. Will continue to monitor. Patient denies present calf tenderness. Absence of swelling and/or increased/decreased temperature. Patient demonstrates ability for independent mobility. SCD's on while in bed. Education completed with patient in regards to such prophylactic measures of DVT prevention; patient verbalizes such understandings. Verbalized understanding.

## 2019-03-29 NOTE — PLAN OF CARE
Problem: Falls - Risk of:  Goal: Will remain free from falls  Description  Will remain free from falls  3/29/2019 0849 by Fortino Lewis RN  Outcome: Ongoing  3/29/2019 0116 by Blanca Marcus RN  Outcome: Ongoing  Goal: Absence of physical injury  Description  Absence of physical injury  3/29/2019 0849 by Fortino Lewis RN  Outcome: Ongoing  3/29/2019 0116 by Blanca Marcus RN  Outcome: Ongoing     Problem: Pain:  Description  Pain management should include both nonpharmacologic and pharmacologic interventions.   Goal: Pain level will decrease  Description  Pain level will decrease  3/29/2019 0849 by Fortino Lewis RN  Outcome: Ongoing  3/29/2019 0116 by Blanca Marcus RN  Outcome: Ongoing  Goal: Control of acute pain  Description  Control of acute pain  3/29/2019 0849 by Fortino Lewis RN  Outcome: Ongoing  3/29/2019 0116 by Blanca Marcus RN  Outcome: Ongoing  Goal: Control of chronic pain  Description  Control of chronic pain  Outcome: Ongoing     Problem: Fluid Volume - Imbalance:  Goal: Chest tube drainage is within specified parameters  Description  Chest tube drainage is within specified parameters  3/29/2019 0849 by Fortino Lewis RN  Outcome: Ongoing  3/29/2019 0116 by Blanca Marcus RN  Outcome: Ongoing     Problem: Discharge Planning:  Goal: Ability to perform activities of daily living will improve  Description  Ability to perform activities of daily living will improve  3/29/2019 0849 by Fortino Lewis RN  Outcome: Ongoing  3/29/2019 0116 by Blanca Marcus RN  Outcome: Ongoing     Problem: Gas Exchange - Impaired:  Goal: Ability to maintain normal pulse oximetry readings will improve to within specified parameters  Description  Ability to maintain normal pulse oximetry readings will improve to within specified parameters  Outcome: Ongoing     Problem: Urinary Retention:  Goal: Urinary elimination within specified parameters  Description  Urinary elimination within specified Ongoing  3/29/2019 0116 by Dora Gutierrez RN  Outcome: Ongoing

## 2019-03-29 NOTE — PROGRESS NOTES
3/28/19  1955: Shift report received from [facility] [address]: Initial assessment completed, pt in bed, denies any pain, chest tube site dry and intact, burn area (due to adhesive dressing) without any drainage, appears pink with some blisters, pt helped ambulated in hallway, to room, bathroom and back to bed, tolerated fairly. 3/29/19  0000: Reassessment remained unchanged, pt helped ambulated in room to bathroom, voided, returned to bed. CT output of 30ml  Recorded. Pt helped repositioned for comfort. 0430: Reassessment remained unchanged, chest tube site cleansed per protocol, weighed, deep breathing exercise performed, pt helped ambulated again in room to bathroom and back to bed. CT drainage not significant to measure. 0551: Scheduled tylenol given  0635: Pt asleep with no s/s of pain/discomfort. 8831: Shift report given to Gino Whipple RN.   Electronically signed by Sarah Portillo RN on 3/29/2019 at 7:44 AM

## 2019-03-29 NOTE — PROGRESS NOTES
Response To Previous Treatment: Patient with no complaints from previous session. Family / Caregiver Present: No  Referring Practitioner: Dr. Jb Gutierrez  Subjective  Subjective: Pt agreeable to cont PT Rx. Cont to improve. Pt reports 6/10 L Ribcage region (surg), Nursing informed. Orientation  Orientation  Overall Orientation Status: Within Functional Limits  Cognition      Objective   Bed mobility  Supine to Sit: Minimal assistance(Assist due to chest tubes. )  Transfers  Sit to Stand: Stand by assistance(With Walker.  )  Stand to sit: Stand by assistance(With Walker. )  Ambulation  Ambulation?: Yes  Ambulation 1  Surface: level tile  Quality of Gait: Pt amb 150' x 2 with Rollator Walker, SBA. Gait less guarded with slow, although improving, jenny. Diminished step length/clearance. Comment: Pt amb to/from bathroom with Rolling Walker SBA/CGA. Toilet Transfer SBA/CGA. Assessment   Body structures, Functions, Activity limitations: Decreased functional mobility ; Decreased endurance  Assessment: 69 y/o female admit 3/25/19 with L Lower Lobe Nodule x 2.  3/25/19 S/P L Thoracotomy, Take Down Adhesions,Wedge Excision L LL Nodule #1, Debulking L LL Nodule #2, Resection Mediastinal Lymph Nodule, Intercostal Cryo Nerve Block 3-8, On Q Pain Pump. PMH as noted including Brain Aneurysm/Surg, Neck/Back Surg, COPD, OA, Fibromyalgia, Occipital Neuralgia, Lung Nodule, HSV (Leg). PTA pt living alone in apt with steps to access. PTA pt independent with daily care and functional mobility. Need to ensure adequate assist/support for d/c home (per SW, pt reports that she will be going to 1 of her children's homes upon d/c); may benefit from brief Home PT Services given limited family nearby. Will monitor pt's progress.     Prognosis: Good  Patient Education: Role of PT, POC, Need to call for assist.   REQUIRES PT FOLLOW UP: Yes  Activity Tolerance  Activity Tolerance: Patient Tolerated

## 2019-03-30 ENCOUNTER — APPOINTMENT (OUTPATIENT)
Dept: GENERAL RADIOLOGY | Age: 64
DRG: 163 | End: 2019-03-30
Attending: THORACIC SURGERY (CARDIOTHORACIC VASCULAR SURGERY)
Payer: COMMERCIAL

## 2019-03-30 PROCEDURE — 2580000003 HC RX 258: Performed by: ANESTHESIOLOGY

## 2019-03-30 PROCEDURE — 6370000000 HC RX 637 (ALT 250 FOR IP): Performed by: NURSE PRACTITIONER

## 2019-03-30 PROCEDURE — 2140000000 HC CCU INTERMEDIATE R&B

## 2019-03-30 PROCEDURE — 2580000003 HC RX 258: Performed by: THORACIC SURGERY (CARDIOTHORACIC VASCULAR SURGERY)

## 2019-03-30 PROCEDURE — 6370000000 HC RX 637 (ALT 250 FOR IP): Performed by: THORACIC SURGERY (CARDIOTHORACIC VASCULAR SURGERY)

## 2019-03-30 PROCEDURE — 6360000002 HC RX W HCPCS: Performed by: NURSE PRACTITIONER

## 2019-03-30 PROCEDURE — 94762 N-INVAS EAR/PLS OXIMTRY CONT: CPT

## 2019-03-30 PROCEDURE — 71045 X-RAY EXAM CHEST 1 VIEW: CPT

## 2019-03-30 RX ORDER — VALACYCLOVIR HYDROCHLORIDE 500 MG/1
1000 TABLET, FILM COATED ORAL DAILY
Status: DISCONTINUED | OUTPATIENT
Start: 2019-03-30 | End: 2019-04-02 | Stop reason: HOSPADM

## 2019-03-30 RX ADMIN — Medication 10 ML: at 21:18

## 2019-03-30 RX ADMIN — DOCUSATE SODIUM 100 MG: 100 CAPSULE, LIQUID FILLED ORAL at 08:12

## 2019-03-30 RX ADMIN — BUPROPION HYDROCHLORIDE 450 MG: 150 TABLET, FILM COATED, EXTENDED RELEASE ORAL at 08:12

## 2019-03-30 RX ADMIN — GABAPENTIN 800 MG: 400 CAPSULE ORAL at 21:18

## 2019-03-30 RX ADMIN — ACETAMINOPHEN 650 MG: 325 TABLET ORAL at 21:18

## 2019-03-30 RX ADMIN — MORPHINE SULFATE 2 MG: 2 INJECTION, SOLUTION INTRAMUSCULAR; INTRAVENOUS at 20:38

## 2019-03-30 RX ADMIN — FLUTICASONE PROPIONATE 2 SPRAY: 50 SPRAY, METERED NASAL at 08:12

## 2019-03-30 RX ADMIN — ACETAMINOPHEN 650 MG: 325 TABLET ORAL at 05:36

## 2019-03-30 RX ADMIN — MORPHINE SULFATE 2 MG: 2 INJECTION, SOLUTION INTRAMUSCULAR; INTRAVENOUS at 05:11

## 2019-03-30 RX ADMIN — Medication 10 ML: at 21:17

## 2019-03-30 RX ADMIN — VALACYCLOVIR HYDROCHLORIDE 1000 MG: 500 TABLET, FILM COATED ORAL at 14:51

## 2019-03-30 RX ADMIN — OXYCODONE HYDROCHLORIDE 30 MG: 10 TABLET, FILM COATED, EXTENDED RELEASE ORAL at 08:12

## 2019-03-30 RX ADMIN — Medication 10 ML: at 08:13

## 2019-03-30 RX ADMIN — DULOXETINE HYDROCHLORIDE 120 MG: 60 CAPSULE, DELAYED RELEASE ORAL at 08:12

## 2019-03-30 RX ADMIN — GABAPENTIN 800 MG: 400 CAPSULE ORAL at 14:51

## 2019-03-30 RX ADMIN — GABAPENTIN 800 MG: 400 CAPSULE ORAL at 08:12

## 2019-03-30 RX ADMIN — ACETAMINOPHEN 650 MG: 325 TABLET ORAL at 14:51

## 2019-03-30 RX ADMIN — MORPHINE SULFATE 2 MG: 2 INJECTION, SOLUTION INTRAMUSCULAR; INTRAVENOUS at 11:14

## 2019-03-30 RX ADMIN — MORPHINE SULFATE 2 MG: 2 INJECTION, SOLUTION INTRAMUSCULAR; INTRAVENOUS at 16:24

## 2019-03-30 RX ADMIN — OXYCODONE HYDROCHLORIDE 30 MG: 10 TABLET, FILM COATED, EXTENDED RELEASE ORAL at 19:45

## 2019-03-30 RX ADMIN — FAMOTIDINE 20 MG: 20 TABLET ORAL at 21:18

## 2019-03-30 RX ADMIN — FAMOTIDINE 20 MG: 20 TABLET ORAL at 08:12

## 2019-03-30 RX ADMIN — FENOFIBRATE 160 MG: 160 TABLET ORAL at 08:15

## 2019-03-30 ASSESSMENT — PAIN DESCRIPTION - ONSET
ONSET: ON-GOING
ONSET: ON-GOING

## 2019-03-30 ASSESSMENT — PAIN DESCRIPTION - PAIN TYPE
TYPE: SURGICAL PAIN

## 2019-03-30 ASSESSMENT — PAIN SCALES - GENERAL
PAINLEVEL_OUTOF10: 10
PAINLEVEL_OUTOF10: 0
PAINLEVEL_OUTOF10: 5
PAINLEVEL_OUTOF10: 0
PAINLEVEL_OUTOF10: 7
PAINLEVEL_OUTOF10: 9
PAINLEVEL_OUTOF10: 5
PAINLEVEL_OUTOF10: 6
PAINLEVEL_OUTOF10: 6
PAINLEVEL_OUTOF10: 10
PAINLEVEL_OUTOF10: 6
PAINLEVEL_OUTOF10: 9
PAINLEVEL_OUTOF10: 4
PAINLEVEL_OUTOF10: 10
PAINLEVEL_OUTOF10: 9
PAINLEVEL_OUTOF10: 0
PAINLEVEL_OUTOF10: 9
PAINLEVEL_OUTOF10: 4
PAINLEVEL_OUTOF10: 0
PAINLEVEL_OUTOF10: 10
PAINLEVEL_OUTOF10: 4

## 2019-03-30 ASSESSMENT — PAIN DESCRIPTION - PROGRESSION: CLINICAL_PROGRESSION: RAPIDLY WORSENING

## 2019-03-30 ASSESSMENT — PAIN DESCRIPTION - LOCATION
LOCATION: CHEST

## 2019-03-30 ASSESSMENT — PAIN DESCRIPTION - ORIENTATION
ORIENTATION: LEFT

## 2019-03-30 ASSESSMENT — PAIN DESCRIPTION - FREQUENCY
FREQUENCY: CONTINUOUS

## 2019-03-30 ASSESSMENT — PAIN DESCRIPTION - DESCRIPTORS
DESCRIPTORS: ACHING
DESCRIPTORS: ACHING

## 2019-03-30 NOTE — PROGRESS NOTES
3/29/19  1906: Shift report received from Michelle Melissa 425: Initial assessment completed, pt up in chair, family at bedside, Pt CT tube to -10 suction, helped to bathroom, voided, had a bowel movement,   2100: Pt c/o surgical pain after ambulation, scheduled pain medications given. 3/30/19  0000: Reassessment remain unchanged, pt in bed sleeping with no c/o pain. 0430: Pt helped up to ambulate in room, hallway and back to room, tolerated fairly well. Prn pain medication given. Pt back in bed, weighed,   0600: Scheduled tylenol given  0710: Shift report given to Fátima Acuña RN. Pt currently in bed sleeping with no c/o pain or discomfort.   Electronically signed by Francoise Adler RN on 3/30/2019 at 7:19 AM

## 2019-03-30 NOTE — PLAN OF CARE
Pt remains free of falls. Fall risk protocol in place. Bed locked in lowest position. Call light in reach. Pt instructed to call for assistance, verbalizes understanding. Will continue to monitor. Continuing to monitor pain and discomfort. Monitoring pain level on scale of 0-10. Non- pharmacological measures encouraged to reduce discomfort/pain. PRN pain meds administration continues when/if applicable as ordered by physician. Pt CT drainage within specified parameters. Will continue to monitor    No changes noted in orientation. Patient denies shortness of breath or difficulties with breathing. Patient continues with independent airway. Breathing pattern remains even and symmetrical. Denies difficulty with deep breathing. SpO2 within acceptable range for this patient. Assessment findings remain free of cyanosis; Supplemental O2 to be applied per protocol/physican order, if applicable. Bowel sounds remain active to all quadrants. Patient denies change in bowel pattern/normal routine. Had a bowel movement today, will continue to monitor    CT site remain free from s/s of infection    Patient denies present calf tenderness. Absence of swelling and/or increased/decreased temperature. Patient demonstrates ability for independent mobility. SCD's on while in bed, Education completed with patient in regards to such prophylactic measures of DVT prevention; patient verbalizes such understandings.

## 2019-03-30 NOTE — PROGRESS NOTES
Progress Note    S/P Left thoracotomy, take down of adhesions, wedge  excision of left lower lobe nodule #1, debulking of left lower lobe  nodule #2, resection of mediastinal lymph nodes, intercostal cryo nerve  block levels 3 through 8 (AtriCure), placement of On-Q pain pump 3/25/19    Vital Signs:                                                 BP (!) 172/84   Pulse 78   Temp 98 °F (36.7 °C) (Oral)   Resp 18   Ht 5' 2\" (1.575 m)   Wt 103 lb 6.3 oz (46.9 kg)   LMP 08/11/1993 (Approximate)   SpO2 96%   BMI 18.91 kg/m²  O2 Flow Rate (L/min): 2 L/min   NSR  Admission Weight: 104 lb (47.2 kg)      I/O:      Intake/Output Summary (Last 24 hours) at 3/30/2019 1089  Last data filed at 3/30/2019 0400  Gross per 24 hour   Intake 1040 ml   Output 70 ml   Net 970 ml     Chest Tube: 20, 30, 20 chest tube - 10 cm of suction    General: awake, alert and oriented  CV: reg  Pulm: decreased, wound c/d/i  Abd: soft, + BS  Ext: warm, no edema  Neuro: No focal deficits    Data Review:  CBC:   No results for input(s): WBC, HGB, HCT, MCV, PLT in the last 72 hours. BMP:   No results for input(s): NA, K, CL, CO2, PHOS, BUN, CREATININE, CALCIUM, MG in the last 72 hours. Invalid input(s): KRFLXMG  Cardiac Enzymes: No results for input(s): CKTOTAL, CKMB, CKMBINDEX, TROPONINI in the last 72 hours. PT/INR: No results for input(s): PROTIME, INR in the last 72 hours. APTT: No results for input(s): APTT in the last 72 hours. CXR  3/30/19  The left chest tube is unchanged.  No change in the small bilateral pleural   effusions with atelectasis.  The cardiac silhouette is stable.  There is no   pneumothorax.  No change in the acute left rib fractures. No change in the moderate subcutaneous emphysema throughout the left chest wall.      Assessment/Plan:  CV - stable in SR  pulm - pulm toilet, wean O2              - chest tube - 10 cm of suction  Renal - Cr nl  Heme - H&H stable  pain - cryo nerve block , oxy home regimen, curry Martin, HEATHER - CNP  3/30/2019  7:12 AM

## 2019-03-30 NOTE — PLAN OF CARE
Problem: Pain:  Description  Pain management should include both nonpharmacologic and pharmacologic interventions.   Goal: Pain level will decrease  Description  Pain level will decrease  Outcome: Ongoing  Goal: Control of acute pain  Description  Control of acute pain  Outcome: Ongoing  Goal: Control of chronic pain  Description  Control of chronic pain  Outcome: Ongoing

## 2019-03-30 NOTE — PROGRESS NOTES
Patient VSS throughout the day. Patient ambulated multiple times in hallway, bathroom, received a bath, back and fourth from chair to bed. Chest tube intact, no air leak noted. Medicated with prn pain med multiple times. 1900: Handoff with Zhen Chapa.

## 2019-03-31 ENCOUNTER — APPOINTMENT (OUTPATIENT)
Dept: GENERAL RADIOLOGY | Age: 64
DRG: 163 | End: 2019-03-31
Attending: THORACIC SURGERY (CARDIOTHORACIC VASCULAR SURGERY)
Payer: COMMERCIAL

## 2019-03-31 LAB
ANION GAP SERPL CALCULATED.3IONS-SCNC: 11 MMOL/L (ref 3–16)
BASOPHILS ABSOLUTE: 0 K/UL (ref 0–0.2)
BASOPHILS RELATIVE PERCENT: 0.2 %
BUN BLDV-MCNC: 19 MG/DL (ref 7–20)
CALCIUM SERPL-MCNC: 8.8 MG/DL (ref 8.3–10.6)
CHLORIDE BLD-SCNC: 104 MMOL/L (ref 99–110)
CO2: 27 MMOL/L (ref 21–32)
CREAT SERPL-MCNC: 0.5 MG/DL (ref 0.6–1.2)
EOSINOPHILS ABSOLUTE: 0.2 K/UL (ref 0–0.6)
EOSINOPHILS RELATIVE PERCENT: 3.3 %
GFR AFRICAN AMERICAN: >60
GFR NON-AFRICAN AMERICAN: >60
GLUCOSE BLD-MCNC: 107 MG/DL (ref 70–99)
HCT VFR BLD CALC: 28.8 % (ref 36–48)
HEMOGLOBIN: 10 G/DL (ref 12–16)
LYMPHOCYTES ABSOLUTE: 1.5 K/UL (ref 1–5.1)
LYMPHOCYTES RELATIVE PERCENT: 20.9 %
MCH RBC QN AUTO: 35.2 PG (ref 26–34)
MCHC RBC AUTO-ENTMCNC: 34.8 G/DL (ref 31–36)
MCV RBC AUTO: 101.1 FL (ref 80–100)
MONOCYTES ABSOLUTE: 0.7 K/UL (ref 0–1.3)
MONOCYTES RELATIVE PERCENT: 10.3 %
NEUTROPHILS ABSOLUTE: 4.6 K/UL (ref 1.7–7.7)
NEUTROPHILS RELATIVE PERCENT: 65.3 %
PDW BLD-RTO: 14.1 % (ref 12.4–15.4)
PLATELET # BLD: 444 K/UL (ref 135–450)
PMV BLD AUTO: 7.4 FL (ref 5–10.5)
POTASSIUM SERPL-SCNC: 3.7 MMOL/L (ref 3.5–5.1)
RBC # BLD: 2.85 M/UL (ref 4–5.2)
SODIUM BLD-SCNC: 142 MMOL/L (ref 136–145)
WBC # BLD: 7.1 K/UL (ref 4–11)

## 2019-03-31 PROCEDURE — 2580000003 HC RX 258: Performed by: ANESTHESIOLOGY

## 2019-03-31 PROCEDURE — 85025 COMPLETE CBC W/AUTO DIFF WBC: CPT

## 2019-03-31 PROCEDURE — 94760 N-INVAS EAR/PLS OXIMETRY 1: CPT

## 2019-03-31 PROCEDURE — 6360000002 HC RX W HCPCS: Performed by: NURSE PRACTITIONER

## 2019-03-31 PROCEDURE — 6370000000 HC RX 637 (ALT 250 FOR IP): Performed by: NURSE PRACTITIONER

## 2019-03-31 PROCEDURE — 80048 BASIC METABOLIC PNL TOTAL CA: CPT

## 2019-03-31 PROCEDURE — 6370000000 HC RX 637 (ALT 250 FOR IP): Performed by: THORACIC SURGERY (CARDIOTHORACIC VASCULAR SURGERY)

## 2019-03-31 PROCEDURE — 36415 COLL VENOUS BLD VENIPUNCTURE: CPT

## 2019-03-31 PROCEDURE — 71045 X-RAY EXAM CHEST 1 VIEW: CPT

## 2019-03-31 PROCEDURE — 2580000003 HC RX 258: Performed by: THORACIC SURGERY (CARDIOTHORACIC VASCULAR SURGERY)

## 2019-03-31 PROCEDURE — 2140000000 HC CCU INTERMEDIATE R&B

## 2019-03-31 RX ORDER — TRAMADOL HYDROCHLORIDE 50 MG/1
50 TABLET ORAL EVERY 6 HOURS
Status: DISCONTINUED | OUTPATIENT
Start: 2019-03-31 | End: 2019-04-02 | Stop reason: HOSPADM

## 2019-03-31 RX ADMIN — BUPROPION HYDROCHLORIDE 450 MG: 150 TABLET, FILM COATED, EXTENDED RELEASE ORAL at 09:04

## 2019-03-31 RX ADMIN — FAMOTIDINE 20 MG: 20 TABLET ORAL at 09:03

## 2019-03-31 RX ADMIN — Medication 10 ML: at 09:04

## 2019-03-31 RX ADMIN — DULOXETINE HYDROCHLORIDE 120 MG: 60 CAPSULE, DELAYED RELEASE ORAL at 09:03

## 2019-03-31 RX ADMIN — FLUTICASONE PROPIONATE 2 SPRAY: 50 SPRAY, METERED NASAL at 09:05

## 2019-03-31 RX ADMIN — MORPHINE SULFATE 2 MG: 2 INJECTION, SOLUTION INTRAMUSCULAR; INTRAVENOUS at 21:12

## 2019-03-31 RX ADMIN — MORPHINE SULFATE 2 MG: 2 INJECTION, SOLUTION INTRAMUSCULAR; INTRAVENOUS at 07:17

## 2019-03-31 RX ADMIN — TRAMADOL HYDROCHLORIDE 50 MG: 50 TABLET ORAL at 09:07

## 2019-03-31 RX ADMIN — OXYCODONE HYDROCHLORIDE 30 MG: 10 TABLET, FILM COATED, EXTENDED RELEASE ORAL at 09:03

## 2019-03-31 RX ADMIN — OXYCODONE HYDROCHLORIDE 30 MG: 10 TABLET, FILM COATED, EXTENDED RELEASE ORAL at 20:40

## 2019-03-31 RX ADMIN — FAMOTIDINE 20 MG: 20 TABLET ORAL at 20:40

## 2019-03-31 RX ADMIN — TRAMADOL HYDROCHLORIDE 50 MG: 50 TABLET ORAL at 22:05

## 2019-03-31 RX ADMIN — ACETAMINOPHEN 650 MG: 325 TABLET ORAL at 13:06

## 2019-03-31 RX ADMIN — GABAPENTIN 800 MG: 400 CAPSULE ORAL at 13:06

## 2019-03-31 RX ADMIN — VALACYCLOVIR HYDROCHLORIDE 1000 MG: 500 TABLET, FILM COATED ORAL at 09:04

## 2019-03-31 RX ADMIN — GABAPENTIN 800 MG: 400 CAPSULE ORAL at 09:03

## 2019-03-31 RX ADMIN — FENOFIBRATE 160 MG: 160 TABLET ORAL at 09:04

## 2019-03-31 RX ADMIN — Medication 10 ML: at 21:00

## 2019-03-31 RX ADMIN — TRAMADOL HYDROCHLORIDE 50 MG: 50 TABLET ORAL at 15:03

## 2019-03-31 RX ADMIN — GABAPENTIN 800 MG: 400 CAPSULE ORAL at 20:40

## 2019-03-31 RX ADMIN — ACETAMINOPHEN 650 MG: 325 TABLET ORAL at 22:05

## 2019-03-31 RX ADMIN — ACETAMINOPHEN 650 MG: 325 TABLET ORAL at 06:07

## 2019-03-31 RX ADMIN — MORPHINE SULFATE 2 MG: 2 INJECTION, SOLUTION INTRAMUSCULAR; INTRAVENOUS at 16:50

## 2019-03-31 ASSESSMENT — PAIN SCALES - GENERAL
PAINLEVEL_OUTOF10: 6
PAINLEVEL_OUTOF10: 0
PAINLEVEL_OUTOF10: 5
PAINLEVEL_OUTOF10: 5
PAINLEVEL_OUTOF10: 0
PAINLEVEL_OUTOF10: 7
PAINLEVEL_OUTOF10: 9
PAINLEVEL_OUTOF10: 5
PAINLEVEL_OUTOF10: 6
PAINLEVEL_OUTOF10: 5
PAINLEVEL_OUTOF10: 9
PAINLEVEL_OUTOF10: 0
PAINLEVEL_OUTOF10: 4
PAINLEVEL_OUTOF10: 4
PAINLEVEL_OUTOF10: 0
PAINLEVEL_OUTOF10: 3
PAINLEVEL_OUTOF10: 4

## 2019-03-31 ASSESSMENT — PAIN DESCRIPTION - ORIENTATION
ORIENTATION: LEFT

## 2019-03-31 ASSESSMENT — PAIN - FUNCTIONAL ASSESSMENT
PAIN_FUNCTIONAL_ASSESSMENT: PREVENTS OR INTERFERES SOME ACTIVE ACTIVITIES AND ADLS

## 2019-03-31 ASSESSMENT — PAIN DESCRIPTION - PAIN TYPE
TYPE: SURGICAL PAIN

## 2019-03-31 ASSESSMENT — PAIN DESCRIPTION - ONSET
ONSET: ON-GOING

## 2019-03-31 ASSESSMENT — PAIN DESCRIPTION - FREQUENCY
FREQUENCY: CONTINUOUS

## 2019-03-31 ASSESSMENT — PAIN DESCRIPTION - DIRECTION
RADIATING_TOWARDS: L SHOULDER BLADE
RADIATING_TOWARDS: L SHOULDER BLADE

## 2019-03-31 ASSESSMENT — PAIN DESCRIPTION - DESCRIPTORS
DESCRIPTORS: ACHING;SHARP;SPASM;SORE
DESCRIPTORS: ACHING;SHARP

## 2019-03-31 ASSESSMENT — PAIN DESCRIPTION - LOCATION
LOCATION: INCISION;RIB CAGE
LOCATION: INCISION;RIB CAGE
LOCATION: CHEST
LOCATION: INCISION;RIB CAGE

## 2019-03-31 NOTE — PROGRESS NOTES
1900: report with Phyllis Loving, [facility]: initial assessment completed. VSS, ST on monitor. Pt c/o pain to left chest. Scheduled oxycotin given, see eMAR. Family at bedside, updated. 2038: pt still c/o pain, prn morphine given, see eMAR. 2150 - report with Hernesto Shea RN.    Electronically signed by Marilyn Ulloa RN on 3/30/2019 at 9:58 PM

## 2019-03-31 NOTE — PLAN OF CARE
Problem: Falls - Risk of:  Goal: Will remain free from falls  Description  Will remain free from falls  Outcome: Met This Shift  Note:   Pt remains free of falls. Fall risk protocol in place. Bed locked in lowest position. Call light in reach. Pt instructed to call for assistance, verbalizes understanding. Will continue to monitor. Goal: Absence of physical injury  Description  Absence of physical injury  Outcome: Met This Shift     Problem: Gas Exchange - Impaired:  Goal: Ability to maintain normal pulse oximetry readings will improve to within specified parameters  Description  Ability to maintain normal pulse oximetry readings will improve to within specified parameters  Outcome: Met This Shift  Note:   On room air, O2 sat >90%. Pt denies feeling SOB at rest.  Some dyspnea with exertion d/t increase in pain. Will monitor. Problem: Bowel Function - Altered:  Goal: Bowel function will improve to within specified parameters  Description  Bowel function will improve to within specified parameters  Outcome: Met This Shift  Note:   Active bowel sounds in all 4 quadrants noted. Pt had multiple loose stools today. Will monitor. Goal: Active bowel sounds  Description  Active bowel sounds  Outcome: Met This Shift  Goal: Bowel elimination is within specified parameters  Description  Bowel elimination is within specified parameters  Outcome: Met This Shift     Problem: Pain:  Description  Pain management should include both nonpharmacologic and pharmacologic interventions. Goal: Pain level will decrease  Description  Pain level will decrease  3/30/2019 7624 by Ynes Moon RN  Outcome: Ongoing  Note:   Pt alert and oriented. Pt able to communicate present pain and use the pain scale appropriately. Nonpharmacological pain reducers (repositioning, splinting, emotional support, and relaxation techniques0 and pain medication offered as needed. Will cont to monitor.    3/30/2019 1802 by Mariano Hutton RN  Outcome: Ongoing  Goal: Control of acute pain  Description  Control of acute pain  3/30/2019 2344 by Vangie Mendieta RN  Outcome: Ongoing  3/30/2019 1802 by Nidia Atkinson RN  Outcome: Ongoing  Goal: Control of chronic pain  Description  Control of chronic pain  3/30/2019 2344 by Vangie Mendieta RN  Outcome: Ongoing  3/30/2019 1802 by Nidia Atkinson RN  Outcome: Ongoing     Problem: Fluid Volume - Imbalance:  Goal: Chest tube drainage is within specified parameters  Description  Chest tube drainage is within specified parameters  Outcome: Ongoing  Note:   Chest tube connected to -10 suction. Very minimal serosanguinous output noted. Will monitor. Problem: Discharge Planning:  Goal: Ability to perform activities of daily living will improve  Description  Ability to perform activities of daily living will improve  Outcome: Ongoing     Problem: Fluid Volume - Risk of, Imbalance:  Goal: Absence of imbalanced fluid volume signs and symptoms  Description  Absence of imbalanced fluid volume signs and symptoms  Outcome: Ongoing  Goal: Will show no signs and symptoms of excessive bleeding  Description  Will show no signs and symptoms of excessive bleeding  Outcome: Ongoing  Goal: Ability to maintain a balanced intake and output will improve  Description  Ability to maintain a balanced intake and output will improve  Outcome: Ongoing     Problem: Infection - Risk of, Surgical Site:  Goal: Demonstration of wound healing without infection will improve  Description  Demonstration of wound healing without infection will improve  Outcome: Ongoing     Problem: Venous Thromboembolism - Risk of:  Goal: Will show no signs or symptoms of venous thromboembolism  Description  Will show no signs or symptoms of venous thromboembolism  Outcome: Ongoing  Note:   Ambulation at least TID. SCDs on when in bed or in chair. Pt denies any pain, numbness, or tingling in BLE.   Skin warm and dry with palpable pulses. Will monitor. Problem: Risk for Impaired Skin Integrity  Goal: Tissue integrity - skin and mucous membranes  Description  Structural intactness and normal physiological function of skin and  mucous membranes.   Outcome: Ongoing       Electronically signed by Jonathan Higuera RN on 3/30/2019 at 11:47 PM

## 2019-03-31 NOTE — PROGRESS NOTES
Patient VSS throughout the day. Patient ambulated multiple times in hallway, bathroom, back and fourth from chair to bed. Chest tube intact, to water seal per order, no air leak noted. Medicated with prn pain med only once today since adding another scheduled pain med. 1930: Handoff with Merline Taylor RN.

## 2019-03-31 NOTE — PROGRESS NOTES
3/30/19  2140-Shift report received from Katerine Cadena RN.  [id number] completed, see flow sheet. VSS. Pt alert and oriented x4. C/O pain 5/10, at tolerable level now that back in bed. Pt verbalizes pain is worse with movement and when sitting up in chair. Surgical incision assessed, no S/S of infection or complications. Dry dressing to chest tube site C/D/I. Changing dressings PRN per order d/t pt's irritation to skin from adhesive tape. No crepitus or air leak. Chest tube output noted. SCDs connected. Repositions self in bed. Calls for assistance appropriately. No needs. Call light in reach. Bed alarm active. Will monitor. 3/31/19  0000-Pt in bed with eyes closed. No S/S of distress or pain. Will cont to monitor. 0321-Pt up to bathroom to void and returned to bed. Modified independent. Did not use walker. Tolerated fairly well. Assessment unchanged from previous. VSS. NSR on monitor. On room air. Denies SOB. Rates pain 4/10, declines pain medication at this time. Using lung splinting pillow appropriately. Repositions self frequently for comfort. Chest tube dressing remains C/D/I; pt does not want it to be changed at this point. Intake/Output noted. Daily weight obtained. No needs verbalized. Will cont to monitor. 0500-Pt in bed with eyes closed. No S/S of distress or pain. 0607-Scheduled Tylenol admin. Pt has no complaints. No new chest tube output. Pt denies needing to void at this time. Call light within reach. Will cont to monitor. 0650-Shift report given to oncoming RNCarina Pt left in stable condition.     Electronically signed by Meche Lord RN on 3/31/2019 at 7:06 AM

## 2019-03-31 NOTE — PROGRESS NOTES
Progress Note    S/P Left thoracotomy, take down of adhesions, wedge  excision of left lower lobe nodule #1, debulking of left lower lobe  nodule #2, resection of mediastinal lymph nodes, intercostal cryo nerve  block levels 3 through 8 (AtriCure), placement of On-Q pain pump 3/25/19    Vital Signs:                                                 BP (!) 156/74   Pulse 82   Temp 98.2 °F (36.8 °C) (Oral)   Resp 18   Ht 5' 2\" (1.575 m)   Wt 99 lb 10.4 oz (45.2 kg)   LMP 08/11/1993 (Approximate)   SpO2 94%   BMI 18.23 kg/m²  O2 Flow Rate (L/min): 2 L/min   NSR  Admission Weight: 104 lb (47.2 kg)      I/O:      Intake/Output Summary (Last 24 hours) at 3/31/2019 0729  Last data filed at 3/31/2019 0707  Gross per 24 hour   Intake 1930 ml   Output 970 ml   Net 960 ml     Chest Tube: 40, 30, 0  chest tube - 10 cm of suction    General: awake, alert and oriented  CV: reg  Pulm: decreased, wound c/d/i  Abd: soft, + BS  Ext: warm, no edema  Neuro: No focal deficits    Data Review:  CBC:   Recent Labs     03/31/19  0432   WBC 7.1   HGB 10.0*   HCT 28.8*   .1*        BMP:   Recent Labs     03/31/19  0432      K 3.7      CO2 27   BUN 19   CREATININE 0.5*   CALCIUM 8.8     Cardiac Enzymes: No results for input(s): CKTOTAL, CKMB, CKMBINDEX, TROPONINI in the last 72 hours. PT/INR: No results for input(s): PROTIME, INR in the last 72 hours. APTT: No results for input(s): APTT in the last 72 hours.      CXR  3/31/19  Tiny left-sided pneumothorax measuring 2 mm, subcutaneous emphysema again noted along the left lateral chest wall    Assessment/Plan:  CV - stable in SR  pulm - pulm toilet, wean O2               - chest tube to waterseal, CXR in AM  Renal - Cr nl  Heme - H&H stable  pain - cryo nerve block , oxy home regimen, acet    HEATHER Beasley - CNP  3/31/2019  7:29 AM

## 2019-03-31 NOTE — PLAN OF CARE
Problem: Pain:  Description  Pain management should include both nonpharmacologic and pharmacologic interventions. Goal: Pain level will decrease  Description  Pain level will decrease  3/31/2019 0739 by Nidia Atkinson RN  Outcome: Ongoing  3/30/2019 2344 by Vangie Mendieta RN  Outcome: Ongoing  Note:   Pt alert and oriented. Pt able to communicate present pain and use the pain scale appropriately. Nonpharmacological pain reducers (repositioning, splinting, emotional support, and relaxation techniques0 and pain medication offered as needed. Will cont to monitor.    3/30/2019 1802 by Nidia Atkinson RN  Outcome: Ongoing  Goal: Control of acute pain  Description  Control of acute pain  3/31/2019 0739 by Nidia Atkinson RN  Outcome: Ongoing  3/30/2019 2344 by Vangie Mendieta RN  Outcome: Ongoing  3/30/2019 1802 by Nidia Atkinson RN  Outcome: Ongoing  Goal: Control of chronic pain  Description  Control of chronic pain  3/31/2019 0739 by Nidia Atkinson RN  Outcome: Ongoing  3/30/2019 2344 by Vangie Mendieta RN  Outcome: Ongoing  3/30/2019 1802 by Nidia Atkinson RN  Outcome: Ongoing

## 2019-04-01 ENCOUNTER — APPOINTMENT (OUTPATIENT)
Dept: GENERAL RADIOLOGY | Age: 64
DRG: 163 | End: 2019-04-01
Attending: THORACIC SURGERY (CARDIOTHORACIC VASCULAR SURGERY)
Payer: COMMERCIAL

## 2019-04-01 PROCEDURE — 2140000000 HC CCU INTERMEDIATE R&B

## 2019-04-01 PROCEDURE — 71045 X-RAY EXAM CHEST 1 VIEW: CPT

## 2019-04-01 PROCEDURE — 6370000000 HC RX 637 (ALT 250 FOR IP): Performed by: THORACIC SURGERY (CARDIOTHORACIC VASCULAR SURGERY)

## 2019-04-01 PROCEDURE — 6370000000 HC RX 637 (ALT 250 FOR IP): Performed by: NURSE PRACTITIONER

## 2019-04-01 PROCEDURE — 94761 N-INVAS EAR/PLS OXIMETRY MLT: CPT

## 2019-04-01 PROCEDURE — 6360000002 HC RX W HCPCS: Performed by: NURSE PRACTITIONER

## 2019-04-01 PROCEDURE — 99024 POSTOP FOLLOW-UP VISIT: CPT | Performed by: THORACIC SURGERY (CARDIOTHORACIC VASCULAR SURGERY)

## 2019-04-01 PROCEDURE — 2580000003 HC RX 258: Performed by: THORACIC SURGERY (CARDIOTHORACIC VASCULAR SURGERY)

## 2019-04-01 PROCEDURE — 97116 GAIT TRAINING THERAPY: CPT

## 2019-04-01 PROCEDURE — 97530 THERAPEUTIC ACTIVITIES: CPT

## 2019-04-01 RX ORDER — METHOCARBAMOL 500 MG/1
500 TABLET, FILM COATED ORAL EVERY 6 HOURS PRN
Status: DISCONTINUED | OUTPATIENT
Start: 2019-04-01 | End: 2019-04-02 | Stop reason: HOSPADM

## 2019-04-01 RX ORDER — CYCLOBENZAPRINE HCL 10 MG
10 TABLET ORAL 3 TIMES DAILY PRN
Status: DISCONTINUED | OUTPATIENT
Start: 2019-04-01 | End: 2019-04-01

## 2019-04-01 RX ADMIN — OXYCODONE HYDROCHLORIDE 30 MG: 10 TABLET, FILM COATED, EXTENDED RELEASE ORAL at 21:39

## 2019-04-01 RX ADMIN — TRAMADOL HYDROCHLORIDE 50 MG: 50 TABLET ORAL at 22:59

## 2019-04-01 RX ADMIN — FAMOTIDINE 20 MG: 20 TABLET ORAL at 08:03

## 2019-04-01 RX ADMIN — GABAPENTIN 800 MG: 400 CAPSULE ORAL at 21:40

## 2019-04-01 RX ADMIN — ACETAMINOPHEN 650 MG: 325 TABLET ORAL at 13:25

## 2019-04-01 RX ADMIN — METHOCARBAMOL TABLETS 500 MG: 500 TABLET, COATED ORAL at 10:51

## 2019-04-01 RX ADMIN — OXYCODONE HYDROCHLORIDE 30 MG: 10 TABLET, FILM COATED, EXTENDED RELEASE ORAL at 08:03

## 2019-04-01 RX ADMIN — Medication 10 ML: at 08:11

## 2019-04-01 RX ADMIN — MORPHINE SULFATE 2 MG: 2 INJECTION, SOLUTION INTRAMUSCULAR; INTRAVENOUS at 05:35

## 2019-04-01 RX ADMIN — BUPROPION HYDROCHLORIDE 450 MG: 150 TABLET, FILM COATED, EXTENDED RELEASE ORAL at 08:03

## 2019-04-01 RX ADMIN — Medication 10 ML: at 21:39

## 2019-04-01 RX ADMIN — GABAPENTIN 800 MG: 400 CAPSULE ORAL at 08:03

## 2019-04-01 RX ADMIN — GABAPENTIN 800 MG: 400 CAPSULE ORAL at 13:26

## 2019-04-01 RX ADMIN — ACETAMINOPHEN 650 MG: 325 TABLET ORAL at 22:59

## 2019-04-01 RX ADMIN — VALACYCLOVIR HYDROCHLORIDE 1000 MG: 500 TABLET, FILM COATED ORAL at 08:14

## 2019-04-01 RX ADMIN — FAMOTIDINE 20 MG: 20 TABLET ORAL at 21:40

## 2019-04-01 RX ADMIN — DULOXETINE HYDROCHLORIDE 120 MG: 60 CAPSULE, DELAYED RELEASE ORAL at 08:03

## 2019-04-01 RX ADMIN — TRAMADOL HYDROCHLORIDE 50 MG: 50 TABLET ORAL at 17:35

## 2019-04-01 RX ADMIN — FENOFIBRATE 160 MG: 160 TABLET ORAL at 08:10

## 2019-04-01 RX ADMIN — FLUTICASONE PROPIONATE 2 SPRAY: 50 SPRAY, METERED NASAL at 08:04

## 2019-04-01 RX ADMIN — ACETAMINOPHEN 650 MG: 325 TABLET ORAL at 05:35

## 2019-04-01 RX ADMIN — TRAMADOL HYDROCHLORIDE 50 MG: 50 TABLET ORAL at 04:25

## 2019-04-01 RX ADMIN — TRAMADOL HYDROCHLORIDE 50 MG: 50 TABLET ORAL at 10:48

## 2019-04-01 ASSESSMENT — PAIN - FUNCTIONAL ASSESSMENT
PAIN_FUNCTIONAL_ASSESSMENT: PREVENTS OR INTERFERES SOME ACTIVE ACTIVITIES AND ADLS

## 2019-04-01 ASSESSMENT — PAIN SCALES - GENERAL
PAINLEVEL_OUTOF10: 8
PAINLEVEL_OUTOF10: 5
PAINLEVEL_OUTOF10: 4
PAINLEVEL_OUTOF10: 5
PAINLEVEL_OUTOF10: 5
PAINLEVEL_OUTOF10: 3
PAINLEVEL_OUTOF10: 5
PAINLEVEL_OUTOF10: 6
PAINLEVEL_OUTOF10: 10
PAINLEVEL_OUTOF10: 10
PAINLEVEL_OUTOF10: 5
PAINLEVEL_OUTOF10: 10
PAINLEVEL_OUTOF10: 5
PAINLEVEL_OUTOF10: 8
PAINLEVEL_OUTOF10: 4
PAINLEVEL_OUTOF10: 7
PAINLEVEL_OUTOF10: 4

## 2019-04-01 ASSESSMENT — PAIN DESCRIPTION - ORIENTATION
ORIENTATION: LEFT

## 2019-04-01 ASSESSMENT — PAIN DESCRIPTION - FREQUENCY
FREQUENCY: CONTINUOUS

## 2019-04-01 ASSESSMENT — PAIN DESCRIPTION - LOCATION
LOCATION: OTHER (COMMENT)
LOCATION: INCISION;RIB CAGE
LOCATION: INCISION;RIB CAGE
LOCATION: CHEST
LOCATION: CHEST
LOCATION: INCISION;RIB CAGE
LOCATION: OTHER (COMMENT)
LOCATION: RIB CAGE;INCISION

## 2019-04-01 ASSESSMENT — PAIN DESCRIPTION - PAIN TYPE
TYPE: SURGICAL PAIN

## 2019-04-01 ASSESSMENT — PAIN DESCRIPTION - DESCRIPTORS
DESCRIPTORS: CONSTANT;ACHING;SPASM;SHARP
DESCRIPTORS: ACHING;SHARP;SORE;SPASM

## 2019-04-01 ASSESSMENT — PAIN DESCRIPTION - PROGRESSION: CLINICAL_PROGRESSION: GRADUALLY IMPROVING

## 2019-04-01 ASSESSMENT — PAIN DESCRIPTION - ONSET
ONSET: ON-GOING
ONSET: ON-GOING

## 2019-04-01 NOTE — PROGRESS NOTES
Has been getting up to the bathroom independently. Was up to the bathroom and did have monitor off for short time. Did go into the bathroom to check on her and she was showering her hair and cleaning herself. Did ask her if she needed any help states no. She did all her own care and then I did assist her putting on a gown and then back to the chair and placed new socks on her. She did tolerate fair. Chest tube remains clamped. Did get her some sprite. States still too full to eat lunch yet.

## 2019-04-01 NOTE — DISCHARGE INSTR - COC
Biliary obstruction K83.1    Recurrent herpes simplex B00.9    Acute exacerbation of COPD with asthma (HCC) J44.1, J45.901    Pulmonary nodule seen on imaging study R91.1    Pulmonary nodule R91.1    S/P thoracotomy Z98.890       Isolation/Infection:   Isolation          No Isolation            Nurse Assessment:  Last Vital Signs: BP (!) 152/97   Pulse 122   Temp 98.1 °F (36.7 °C) (Oral)   Resp 16   Ht 5' 2\" (1.575 m)   Wt 99 lb 10.4 oz (45.2 kg)   LMP 08/11/1993 (Approximate)   SpO2 96%   BMI 18.23 kg/m²     Last documented pain score (0-10 scale): Pain Level: 6  Last Weight:   Wt Readings from Last 1 Encounters:   04/01/19 99 lb 10.4 oz (45.2 kg)     Mental Status:  oriented, alert, coherent, logical, thought processes intact and able to concentrate and follow conversation    IV Access:  - None    Nursing Mobility/ADLs:  Walking   Independent  Transfer  Independent  [address] [place] [address]  Med Delivery   whole    Wound Care Documentation and Therapy:  Incision 06/19/18 Foot Right (Active)   Number of days: 286       Wound 03/26/19 Rib Cage Lateral;Left adhesive related burn with blister (Active)   Wound Burn 3/31/2019  8:30 PM   Dressing Status Clean;Dry; Intact 3/31/2019  8:00 AM   Dressing Changed Changed/New 3/31/2019  8:00 AM   Dressing/Treatment Open to air 3/31/2019  8:00 AM   Drainage Amount None 3/31/2019  8:00 AM   Odor None 3/31/2019  8:00 AM   Kylie-wound Assessment Pink 3/31/2019  8:00 AM   Number of days: 6        Elimination:  Continence:   · Bowel: Yes  · Bladder: Yes  Urinary Catheter: None   Colostomy/Ileostomy/Ileal Conduit: No       Date of Last BM: 04/02/2019    Intake/Output Summary (Last 24 hours) at 4/1/2019 1233  Last data filed at 4/1/2019 0944  Gross per 24 hour   Intake 1230 ml   Output 1700 ml   Net -470 ml     I/O last 3 completed shifts:   In: 1020 [P.O.:1020]  Out: 1900 [EIB:6812]    Safety Concerns:     None    Impairments/Disabilities:      None    Nutrition Therapy:  Current Nutrition Therapy:   - Oral Diet:  General    Routes of Feeding: Oral  Liquids: No Restrictions  Daily Fluid Restriction: no  Last Modified Barium Swallow with Video (Video Swallowing Test): not done    Treatments at the Time of Hospital Discharge:   Respiratory Treatments: none    Oxygen Therapy:  is not on home oxygen therapy. Ventilator:    - No ventilator support    Rehab Therapies: none  Weight Bearing Status/Restrictions: No weight bearing restirctions  Other Medical Equipment (for information only, NOT a DME order):  walker  Other Treatments: none    Patient's personal belongings (please select all that are sent with patient):  Glasses    RN SIGNATURE:  Electronically signed by Lucien Fernando on 4/2/19 at 2:38 PM    CASE MANAGEMENT/SOCIAL WORK SECTION    Inpatient Status Date: 3-    Readmission Risk Assessment Score:  Readmission Risk              Risk of Unplanned Readmission:        11           Discharging to Facility/ Agency   · Name:   · Address:  · Phone:  · Fax:      / signature: Chuck Flores [place]     Case Management   [phone]    4/2/2019  12:41 PM      PHYSICIAN SECTION    Prognosis: Good    Condition at Discharge: Stable    Rehab Potential (if transferring to Rehab):     Recommended Labs or Other Treatments After Discharge: home physical therapy    Physician Certification: I certify the above information and transfer of Salomon Easley  is necessary for the continuing treatment of the diagnosis listed and that she requires home physical therapy for less 30 days.      Update Admission H&P: No change in H&P    PHYSICIAN SIGNATURE:  Electronically signed by HEATHER Cedillo CNP on 4/1/19 at 12:33 PM

## 2019-04-01 NOTE — PROGRESS NOTES
Progress Note    S/P Left thoracotomy, take down of adhesions, wedge  excision of left lower lobe nodule #1, debulking of left lower lobe  nodule #2, resection of mediastinal lymph nodes, intercostal cryo nerve  block levels 3 through 8 (AtriCure), placement of On-Q pain pump 3/25/19    Vital Signs:                                                 BP (!) 152/97   Pulse 72   Temp 98.1 °F (36.7 °C) (Oral)   Resp 16   Ht 5' 2\" (1.575 m)   Wt 99 lb 10.4 oz (45.2 kg)   LMP 08/11/1993 (Approximate)   SpO2 96%   BMI 18.23 kg/m²  O2 Flow Rate (L/min): 0 L/min   NSR  Admission Weight: 104 lb (47.2 kg)      I/O:      Intake/Output Summary (Last 24 hours) at 4/1/2019 0844  Last data filed at 4/1/2019 0833  Gross per 24 hour   Intake 1220 ml   Output 1700 ml   Net -480 ml     Chest Tube: 0, 0, 0    CV: reg  Pulm: decreased, wound c/d/i  Abd: soft  Ext: warm, no edema    Data Review:  CBC:   Recent Labs     03/31/19  0432   WBC 7.1   HGB 10.0*   HCT 28.8*   .1*        BMP:   Recent Labs     03/31/19  0432      K 3.7      CO2 27   BUN 19   CREATININE 0.5*   CALCIUM 8.8     Cardiac Enzymes: No results for input(s): CKTOTAL, CKMB, CKMBINDEX, TROPONINI in the last 72 hours. PT/INR: No results for input(s): PROTIME, INR in the last 72 hours. APTT: No results for input(s): APTT in the last 72 hours. Assessment/Plan:  CV - stable in SR  pulm - pulm toilet, wean O2   - apical tube on waterseal, no ptx on today's film. Tube clamped, cxr in am.  Renal - Cr nl  Heme - acute blood loss anemia.  pain - cryo nerve block, oxy home regimen, acet. Resume home flexeril.       Justin Ventura MD  4/1/2019  8:44 AM

## 2019-04-01 NOTE — PLAN OF CARE
Problem: Falls - Risk of:  Goal: Will remain free from falls  Description  Will remain free from falls  Outcome: Ongoing  Goal: Absence of physical injury  Description  Absence of physical injury  Outcome: Ongoing     Problem: Pain:  Goal: Pain level will decrease  Description  Pain level will decrease  Outcome: Ongoing  Goal: Control of acute pain  Description  Control of acute pain  Outcome: Ongoing     Problem: Discharge Planning:  Goal: Ability to perform activities of daily living will improve  Description  Ability to perform activities of daily living will improve  Outcome: Ongoing     Problem: Risk for Impaired Skin Integrity  Goal: Tissue integrity - skin and mucous membranes  Description  Structural intactness and normal physiological function of skin and  mucous membranes. Outcome: Ongoing     Problem: Pain:  Intervention: Assess barriers to pain control  Note:   Pt alert and oriented. Pt able to communicate present pain and use the pain scale appropriately. Nonpharmacological pain reducers and pain medication offered as needed. Will cont to monitor.

## 2019-04-01 NOTE — CARE COORDINATION
[facility] received referral from [facility]. Will need PT notes and MD Orders. Will verify patient's insurance and follow up with patient to deliver the ordered item(s) prior to discharge.     Thank you for the referral.  Electronically signed by Shea Sarah on 4/1/2019 at 11:18 AM  Cell ph# [phone]    '

## 2019-04-01 NOTE — PROGRESS NOTES
Ambulates in room with chest tube. Has refused the laxatives and stools softer. States starting to have softer to looser stools. States pain level of #10 to the left lateral chest incision. Does have scheduled pain medications. States not using the spirometer as much as she should. Have encouraged her to use 10 times per hours if possible. Chest tube x1 to water seal. No air leak noted. Tubing straight with no kinks noted. Does have very small amt of serous drainage noted in tubing.

## 2019-04-01 NOTE — PROGRESS NOTES
3/31/19    1915:  Change of shift report received from day shift RN, Krista Umaña. Bedside handoff completed. 2030:  Initial shift assessment completed. See flow sheets. Pt awake and alert and oriented x 4, resting quietly in bed, watching television, up to bathroom and in room independently. Admits to L rib cage incisional /CT pain. Due for scheduled pain medications - see MAR. Denies sob, n/v or any other needs or c/o. Scheduled colace refused - states she has gone too many times lately. Continuing to monitor. 2112:  C/o L rib cage incisional pain and requesting prn pain medication. 2 mg Morphine given for c/o pain - see MAR. Continuing to monitor. 2325:  Shift reassessment completed. See flow sheets. No changes from previous assessments. Pain controlled at this time. Continuing to monitor. 4/1/19    0425:  Shift reassessment completed. See flow sheets. No changes from previous assessments. Continuing to monitor. 80:  C/o L rib cage incisional pain and requesting prn pain medication. Morphine 2 mg IV given - see MAR. Continuing to monitor. 4545:  Change of shift report given to oncoming day shift RNYessenia. Bedside handoff completed.

## 2019-04-01 NOTE — PROGRESS NOTES
Did walk with her in the hallway. Tolerated well. Has been up to the chair today did have visitor bring her in food. Has been up in the chair today. Chest tube remains clamped.

## 2019-04-01 NOTE — PROGRESS NOTES
Physical Therapy  Facility/Department: [facility] 4 CVICU  Daily Treatment Note  NAME: Marysol Islas  : 1955  MRN: [id number]    Date of Service: 2019    Discharge Recommendations:  Continue to assess pending progress   PT Equipment Recommendations  Equipment Needed: Yes  Mobility Devices: Robert Alcantara: [facility] scored a 18/24 on the AM-PAC short mobility form. Current research shows that an AM-PAC score of 18 or greater is typically associated with a discharge to the patient's home setting. Based on the patients AM-PAC score and their current functional mobility deficits, it is recommended that the patient have 2-3 sessions per week of Physical Therapy at d/c to increase the patients independence. Patient Diagnosis(es): The encounter diagnosis was Pulmonary nodule. has a past medical history of Anxiety, Chronic headaches, Colon polyp, COPD, mild ([address] DDD (degenerative disc disease), Depression, Fibromyalgia, Frequent UTI, Full dentures, Hallux valgus, Hearing loss in left ear, HSV (herpes simplex virus) anogenital infection, Hyperlipidemia, Lung nodule, Menopausal state, Occipital neuralgia, Osteoarthritis, Scoliosis, Smoker, Spinal stenosis, and Unexplained weight loss. has a past surgical history that includes sinus surgery; Cataract removal (); Tubal ligation; cervical laminectomy (); lumbar laminectomy (); Spinal fusion (, ); Cervical discectomy (); bladder suspension (); Dilation and curettage of uterus; Cholecystectomy, laparoscopic (); Brain aneurysm surgery (); Bunionectomy (Left, 2016); cervical laminectomy (N/A, 2016); Colonoscopy; Bunionectomy (Left, ); thoracotomy (Left, 2019); and Thoracoscopy (Left, 3/25/2019).     Restrictions  Restrictions/Precautions  Restrictions/Precautions: Fall Risk  Position Activity Restriction  Other position/activity restrictions: Chest Tube x 1 (Elisabet, Clamped). Subjective   General  Chart Reviewed: Yes  Additional Pertinent Hx: 71 y/o female admit 3/25/19 with L Lower Lobe Nodule x 2.  3/25/19 S/P L Thoracotomy, Take Down Adhesions,Wedge Excision L LL Nodule #1, Debulking L LL Nodule #2, Resection Mediastinal Lymph Nodule, Intercostal Cryo Nerve Block 3-8, On Q Pain Pump. PMH as noted including Brain Aneurysm/Surg, Neck/Back Surg, COPD, OA, Fibromyalgia, Occipital Neuralgia, Lung Nodule, HSV (Leg). Response To Previous Treatment: Patient with no complaints from previous session. Family / Caregiver Present: No  Referring Practitioner: Dr. Delmer Roche  Subjective  Subjective: Pt agreeable to cont PT Rx. Cont to improve. Pt reports 6/10 L Ribcage/Lumbar  region (surg), Nursing informed. Orientation  Orientation  Overall Orientation Status: Within Functional Limits  Cognition      Objective   Bed mobility  Supine to Sit: Contact guard assistance  Transfers  Sit to Stand: Stand by assistance(With Walker.  )  Stand to sit: Stand by assistance(With Yesenia Loser. )  Comment: Toilet Transfer SBA. Independent Pericare. Ambulation  Ambulation?: Yes  Ambulation 1  Surface: level tile  Quality of Gait: Pt amb 150' x 2 with Rollator Walker, SBA. Gait less guarded with slow, although improving, jenny. Diminished step length/clearance. Endurance improving. Comment: Pt amb to/from bathroom with Rolling Walker SBA. Toilet Transfer SBA. Assessment   Body structures, Functions, Activity limitations: Decreased functional mobility ; Decreased endurance  Assessment: 71 y/o female admit 3/25/19 with L Lower Lobe Nodule x 2.  3/25/19 S/P L Thoracotomy, Take Down Adhesions,Wedge Excision L LL Nodule #1, Debulking L LL Nodule #2, Resection Mediastinal Lymph Nodule, Intercostal Cryo Nerve Block 3-8, On Q Pain Pump. PMH as noted including Brain Aneurysm/Surg, Neck/Back Surg, COPD, OA, Fibromyalgia, Occipital Neuralgia, Lung Nodule, HSV (Leg).

## 2019-04-02 ENCOUNTER — APPOINTMENT (OUTPATIENT)
Dept: GENERAL RADIOLOGY | Age: 64
DRG: 163 | End: 2019-04-02
Attending: THORACIC SURGERY (CARDIOTHORACIC VASCULAR SURGERY)
Payer: COMMERCIAL

## 2019-04-02 VITALS
WEIGHT: 98.77 LBS | DIASTOLIC BLOOD PRESSURE: 83 MMHG | SYSTOLIC BLOOD PRESSURE: 131 MMHG | OXYGEN SATURATION: 93 % | HEIGHT: 62 IN | BODY MASS INDEX: 18.18 KG/M2 | RESPIRATION RATE: 16 BRPM | TEMPERATURE: 98.2 F | HEART RATE: 87 BPM

## 2019-04-02 PROCEDURE — 6360000002 HC RX W HCPCS: Performed by: NURSE PRACTITIONER

## 2019-04-02 PROCEDURE — 2580000003 HC RX 258: Performed by: THORACIC SURGERY (CARDIOTHORACIC VASCULAR SURGERY)

## 2019-04-02 PROCEDURE — 97530 THERAPEUTIC ACTIVITIES: CPT

## 2019-04-02 PROCEDURE — 6370000000 HC RX 637 (ALT 250 FOR IP): Performed by: THORACIC SURGERY (CARDIOTHORACIC VASCULAR SURGERY)

## 2019-04-02 PROCEDURE — 71045 X-RAY EXAM CHEST 1 VIEW: CPT

## 2019-04-02 PROCEDURE — 97116 GAIT TRAINING THERAPY: CPT

## 2019-04-02 PROCEDURE — 6370000000 HC RX 637 (ALT 250 FOR IP): Performed by: NURSE PRACTITIONER

## 2019-04-02 PROCEDURE — 99024 POSTOP FOLLOW-UP VISIT: CPT | Performed by: THORACIC SURGERY (CARDIOTHORACIC VASCULAR SURGERY)

## 2019-04-02 RX ORDER — TRAMADOL HYDROCHLORIDE 50 MG/1
50 TABLET ORAL EVERY 6 HOURS PRN
Qty: 28 TABLET | Refills: 0 | Status: SHIPPED | OUTPATIENT
Start: 2019-04-02 | End: 2019-04-09

## 2019-04-02 RX ADMIN — FAMOTIDINE 20 MG: 20 TABLET ORAL at 08:59

## 2019-04-02 RX ADMIN — OXYCODONE HYDROCHLORIDE 30 MG: 10 TABLET, FILM COATED, EXTENDED RELEASE ORAL at 08:59

## 2019-04-02 RX ADMIN — TRAMADOL HYDROCHLORIDE 50 MG: 50 TABLET ORAL at 11:05

## 2019-04-02 RX ADMIN — GABAPENTIN 800 MG: 400 CAPSULE ORAL at 13:21

## 2019-04-02 RX ADMIN — ACETAMINOPHEN 650 MG: 325 TABLET ORAL at 07:04

## 2019-04-02 RX ADMIN — GABAPENTIN 800 MG: 400 CAPSULE ORAL at 08:59

## 2019-04-02 RX ADMIN — BUPROPION HYDROCHLORIDE 450 MG: 150 TABLET, FILM COATED, EXTENDED RELEASE ORAL at 08:59

## 2019-04-02 RX ADMIN — ACETAMINOPHEN 650 MG: 325 TABLET ORAL at 13:21

## 2019-04-02 RX ADMIN — Medication 10 ML: at 09:00

## 2019-04-02 RX ADMIN — FENOFIBRATE 160 MG: 160 TABLET ORAL at 08:59

## 2019-04-02 RX ADMIN — VALACYCLOVIR HYDROCHLORIDE 1000 MG: 500 TABLET, FILM COATED ORAL at 08:59

## 2019-04-02 RX ADMIN — FLUTICASONE PROPIONATE 2 SPRAY: 50 SPRAY, METERED NASAL at 09:00

## 2019-04-02 RX ADMIN — MORPHINE SULFATE 2 MG: 2 INJECTION, SOLUTION INTRAMUSCULAR; INTRAVENOUS at 11:06

## 2019-04-02 RX ADMIN — DULOXETINE HYDROCHLORIDE 120 MG: 60 CAPSULE, DELAYED RELEASE ORAL at 08:59

## 2019-04-02 RX ADMIN — TRAMADOL HYDROCHLORIDE 50 MG: 50 TABLET ORAL at 05:36

## 2019-04-02 RX ADMIN — MORPHINE SULFATE 2 MG: 2 INJECTION, SOLUTION INTRAMUSCULAR; INTRAVENOUS at 00:55

## 2019-04-02 ASSESSMENT — PAIN SCALES - GENERAL
PAINLEVEL_OUTOF10: 0
PAINLEVEL_OUTOF10: 5
PAINLEVEL_OUTOF10: 7
PAINLEVEL_OUTOF10: 5
PAINLEVEL_OUTOF10: 1
PAINLEVEL_OUTOF10: 3
PAINLEVEL_OUTOF10: 1
PAINLEVEL_OUTOF10: 5
PAINLEVEL_OUTOF10: 7
PAINLEVEL_OUTOF10: 4
PAINLEVEL_OUTOF10: 0
PAINLEVEL_OUTOF10: 3
PAINLEVEL_OUTOF10: 1
PAINLEVEL_OUTOF10: 8

## 2019-04-02 ASSESSMENT — PAIN DESCRIPTION - ORIENTATION: ORIENTATION: LEFT

## 2019-04-02 ASSESSMENT — PAIN DESCRIPTION - PROGRESSION
CLINICAL_PROGRESSION: GRADUALLY IMPROVING
CLINICAL_PROGRESSION: RAPIDLY IMPROVING

## 2019-04-02 ASSESSMENT — PAIN DESCRIPTION - PAIN TYPE
TYPE: CHRONIC PAIN
TYPE: CHRONIC PAIN
TYPE: CHRONIC PAIN;SURGICAL PAIN
TYPE: SURGICAL PAIN
TYPE: SURGICAL PAIN;CHRONIC PAIN
TYPE: SURGICAL PAIN

## 2019-04-02 ASSESSMENT — PAIN DESCRIPTION - LOCATION
LOCATION: RIB CAGE;INCISION
LOCATION: OTHER (COMMENT)
LOCATION: HEAD

## 2019-04-02 ASSESSMENT — PAIN - FUNCTIONAL ASSESSMENT
PAIN_FUNCTIONAL_ASSESSMENT: ACTIVITIES ARE NOT PREVENTED
PAIN_FUNCTIONAL_ASSESSMENT: ACTIVITIES ARE NOT PREVENTED

## 2019-04-02 NOTE — DISCHARGE SUMMARY
DISCHARGE SUMMARY    Admission Date:  3/25/2019 10:46 AM  Discharge Date:  4/2/19    PCP:  Pam Chowdhury MD       Pulmonology: Dyane Eisenmenger    Principle Diagnosis: Left lower lobe nodule X 2     Past Medical History:  Past Medical History:   Diagnosis Date    Anxiety     Chronic headaches     CT done 4/2016    Colon polyp     COPD, mild ([address]     PFT 8/08 a smoker no symptoms-no meds    DDD (degenerative disc disease)     Depression     Fibromyalgia     Frequent UTI     Full dentures     Hallux valgus     Hearing loss in left ear 7/29/2011    HSV (herpes simplex virus) anogenital infection     leg    Hyperlipidemia     Lung nodule 2019    2 LLL nodules    Menopausal state 1994    Occipital neuralgia     Osteoarthritis     Scoliosis     neck and back brace intermittently, based on pain    Smoker     Spinal stenosis     cane, walker    Unexplained weight loss     Patient states over 30# weight loss over 6 months and pcp is aware       Past Surgical History:  Past Surgical History:   Procedure Laterality Date    BLADDER SUSPENSION  1988    BRAIN ANEURYSM SURGERY  2015    anterior communicating clipped    BUNIONECTOMY Left 08/23/2016    LEFT ARCHANA BUNIONECTOMY                  BUNIONECTOMY Left 2016    CATARACT REMOVAL  2008    bilat    CERVICAL DISCECTOMY  2010    cervical    CERVICAL LAMINECTOMY  2003    cervical reese ant. c4-7    CERVICAL LAMINECTOMY N/A 11/18/2016    Posterior, C2 - C5    CHOLECYSTECTOMY, LAPAROSCOPIC  2013    COLONOSCOPY      DILATION AND CURETTAGE OF UTERUS      LUMBAR LAMINECTOMY  2009    lumbar reese x2    SINUS SURGERY      x2    SPINAL FUSION  2009, 2012    post T12-L5 fusion and redone    THORACOSCOPY Left 3/25/2019    LEFT VIDEO ASSISTED THORACOSCOPIC SURGERY, THORACOTOMY, WEDGE EXCISION, LEFT LOWER LOBE NODULE X 2, CRYO NERVE BLOCK performed by Chela Posada MD at [facility] Left 03/25/2019    wedge excision LLL nodule#1, debulking LLL nodule #2    TUBAL LIGATION         Procedure:  3/25/19 left thoracotomy, take down of adhesions, wedge excision of left lower lobe nodule #1, debulking of left lower lobe nodule #2, resection of mediastinal lymph nodes, intercostal cryo nerve block levels 3 though 8 (AtriCure), placement of On-Q pain pump. History:    The patient is a 69-year-old female who had presented with  respiratory symptoms and was treated for pneumonia with antibiotics and  steroids. CT of the chest demonstrated left lower lobe nodules x2. Both were PET-avid and not amenable to percutaneous biopsy. The patient  presented for resection for definitive diagnosis. Hospital Course: The patient underwent left thoracotomy, take down of adhesions, wedge excision of left lower lobe nodule #1, debulking of left lower lobe nodule #2, resection of mediastinal lymph nodes, intercostal cryo nerve block levels 3 though 8 (AtriCure), placement of On-Q pain pump on 3/25/19. Assessment/Plan:  CV       - stable in SR  pulm    - pulm toilet, RA  Renal   - Cr nl  Heme   - acute blood loss anemia.  pain     - cryo nerve block, oxy home regimen, acet. Flexeril as at home  dispo    - home with walker     Surgical pathology  3/25/19  FINAL DIAGNOSIS:    A. Lung, left lower lobe, wedge resection:  - Invasive pulmonary squamous carcinoma, poorly differentiated. - Margins free of tumor.  - See cancer protocol for additional information. B. Lymph node, inferior pulmonary ligament:  - 2 anthracotic reactive lymph nodes; negative for metastatic carcinoma. C. Lung, left lower lobe mass, biopsy:  - Poorly differentiated squamous carcinoma. See comment. D. Lung, left lower lobe mass, biopsy:  - Poorly differentiated squamous carcinoma. See comment. Comment: Specimen C and D appear to be metastatic carcinoma in lymph  node/matted lymph nodes since lymphoid tissue is present at the periphery  of some of the fragments.  There is T12A extended release tablet  TK 1 T PO QID PRN P             Probiotic Product (PROBIOTIC DAILY PO)  Take 1 tablet by mouth daily              therapeutic multivitamin-minerals (THERAGRAN-M) tablet  Take 1 tablet by mouth daily. traMADol (ULTRAM) 50 MG tablet  Take 1 tablet by mouth every 6 hours as needed for Pain for up to 7 days. valACYclovir (VALTREX) 1 g tablet  TAKE ONE TABLET BY MOUTH DAILY               Activity:  No heavy lifting (over 5 lbs) or driving until seen in the office  Diet: regular  Wound Care: none    Follow up with Dr. Maya Macias in the office on 4/12/19 at 1:45 pm.  Please call the office at [phone] with any questions or concerns.     HEATHER Nice - CNP  4/2/2019  12:33 PM     Serina Moya MD  4/2/2019  1:19 PM

## 2019-04-02 NOTE — PROGRESS NOTES
Does get up independently in room and mckeon. Chest tube remains clamped per order. Has been voiding in the commode. States having soft stools and has been refusing the laxatives and stool softeners. Will order her breakfast. Does call for any needs.

## 2019-04-02 NOTE — PROGRESS NOTES
Left lateral chest tube removed per order. Did pre-medicate with morphine 2 mg IV. CT removed per sterile procedure. Purse string suture secured. Sterile dressing applied and paper tape to secure. Dated and timed.

## 2019-04-02 NOTE — PLAN OF CARE
Pt remains free of falls. Fall risk protocol in place. Bed locked in lowest position. Call light in reach. Pt instructed to call for assistance, verbalizes understanding. Will continue to monitor. Continuing to monitor pain and discomfort. Monitoring pain level on scale of 0-10. Non- pharmacological measures encouraged to reduce discomfort/pain. PRN pain meds administeration continues when/if applicable as ordered by physician. Pt chest tube drainage within specified parameters, will continue to monitor. Patient denies present calf tenderness. Absence of swelling and/or increased/decreased temperature. Patient demonstrates ability for independent mobility. Education completed with patient in regards to such prophylactic measures of DVT prevention; patient verbalizes such understandings.

## 2019-04-02 NOTE — PROGRESS NOTES
Progress Note    S/P Left thoracotomy, take down of adhesions, wedge  excision of left lower lobe nodule #1, debulking of left lower lobe  nodule #2, resection of mediastinal lymph nodes, intercostal cryo nerve  block levels 3 through 8 (AtriCure), placement of On-Q pain pump 3/25/19    Vital Signs:                                                 BP (!) 158/86   Pulse 69   Temp 98.2 °F (36.8 °C) (Oral)   Resp 18   Ht 5' 2\" (1.575 m)   Wt 98 lb 12.3 oz (44.8 kg)   LMP 08/11/1993 (Approximate)   SpO2 94%   BMI 18.06 kg/m²  O2 Flow Rate (L/min): 0 L/min   NSR  Admission Weight: 104 lb (47.2 kg)      I/O:      Intake/Output Summary (Last 24 hours) at 4/2/2019 0926  Last data filed at 4/2/2019 0738  Gross per 24 hour   Intake 1140 ml   Output 0 ml   Net 1140 ml     Chest Tube: clamped    CV: reg  Pulm: decreased, wound c/d/i  Abd: soft  Ext: warm, no edema    Data Review:  CBC:   Recent Labs     03/31/19  0432   WBC 7.1   HGB 10.0*   HCT 28.8*   .1*        BMP:   Recent Labs     03/31/19  0432      K 3.7      CO2 27   BUN 19   CREATININE 0.5*   CALCIUM 8.8     Cardiac Enzymes: No results for input(s): CKTOTAL, CKMB, CKMBINDEX, TROPONINI in the last 72 hours. PT/INR: No results for input(s): PROTIME, INR in the last 72 hours. APTT: No results for input(s): APTT in the last 72 hours. Assessment/Plan:  CV - stable in SR  pulm - pulm toilet, wean O2   - tube clamped, cxr no ptx. No air leak when tube unclamped. Remove tube today. Renal - Cr nl  Heme - acute blood loss anemia.  pain - cryo nerve block, oxy home regimen, acet.  Flexeril as at home  dispo - likely d/c home later today if remains stable      Alireza San MD  4/2/2019  9:26 AM

## 2019-04-02 NOTE — PROGRESS NOTES
Physical Therapy  Facility/Department: [address] CVICU  Daily Treatment Note/Discharge Summary  NAME: Salomon Easley  : 1955  MRN: [id number]    Date of Service: 2019    Discharge Recommendations:  Continue to assess pending progress   PT Equipment Recommendations  Mobility Devices: Nate Kingston: [id number] scored a  on the AM-PAC short mobility form. At this time, no further PT is recommended upon discharge, pt now reports adequate family assist/support and denies need for Home PT Services. .  Recommend patient returns to prior setting with prior services. Patient Diagnosis(es): The primary encounter diagnosis was S/P thoracotomy. A diagnosis of Pulmonary nodule was also pertinent to this visit. has a past medical history of Anxiety, Chronic headaches, Colon polyp, COPD, mild ([address] DDD (degenerative disc disease), Depression, Fibromyalgia, Frequent UTI, Full dentures, Hallux valgus, Hearing loss in left ear, HSV (herpes simplex virus) anogenital infection, Hyperlipidemia, Lung nodule, Menopausal state, Occipital neuralgia, Osteoarthritis, Scoliosis, Smoker, Spinal stenosis, and Unexplained weight loss. has a past surgical history that includes sinus surgery; Cataract removal (); Tubal ligation; cervical laminectomy (); lumbar laminectomy (); Spinal fusion (, ); Cervical discectomy (); bladder suspension (); Dilation and curettage of uterus; Cholecystectomy, laparoscopic (); Brain aneurysm surgery (); Bunionectomy (Left, 2016); cervical laminectomy (N/A, 2016); Colonoscopy; Bunionectomy (Left, ); thoracotomy (Left, 2019); and Thoracoscopy (Left, 3/25/2019). Restrictions  Restrictions/Precautions  Restrictions/Precautions: Fall Risk  Position Activity Restriction  Other position/activity restrictions: Chest Tube x 1 (Waterseal, Clamped). Subjective   General  Chart Reviewed:  Yes  Additional Pertinent Hx: 63 y/o female admit 3/25/19 with L Lower Lobe Nodule x 2.  3/25/19 S/P L Thoracotomy, Take Down Adhesions,Wedge Excision L LL Nodule #1, Debulking L LL Nodule #2, Resection Mediastinal Lymph Nodule, Intercostal Cryo Nerve Block 3-8, On Q Pain Pump. PMH as noted including Brain Aneurysm/Surg, Neck/Back Surg, COPD, OA, Fibromyalgia, Occipital Neuralgia, Lung Nodule, HSV (Leg). Response To Previous Treatment: Patient with no complaints from previous session. Family / Caregiver Present: No  Referring Practitioner: Dr. Nicole Every  Subjective  Subjective: Pt agreeable to cont PT Rx. Cont to improve. Pt reports 8/10 L Ribcage/Lumbar region (surg), Nursing informed. Pre Treatment Pain Screening  Intervention List: Patient able to continue with treatment    Orientation  Orientation  Overall Orientation Status: Within Functional Limits  Cognition      Objective   Bed mobility  Supine to Sit: Stand by assistance(Close monitor chest tube. )  Transfers  Sit to Stand: Supervision(With Walker.  )  Stand to sit: Supervision(With Walker. )  Ambulation  Ambulation?: Yes  Ambulation 1  Surface: level tile  Quality of Gait: Pt amb 200' x 2 with Rollator Walker, SBA. Gait less guarded with slow, although improving, jenny. Diminished step length/clearance. Endurance improving. Comments: Pt amb short distances within hospital room setting without assist device slight more guarded, tendency reach for furniture at times. Comment: Pt amb to/from bathroom with Rolling Walker SBA. Toilet Transfer SBA. Assessment   Body structures, Functions, Activity limitations: Decreased functional mobility ; Decreased endurance  Assessment: 69 y/o female admit 3/25/19 with L Lower Lobe Nodule x 2.  3/25/19 S/P L Thoracotomy, Take Down Adhesions,Wedge Excision L LL Nodule #1, Debulking L LL Nodule #2, Resection Mediastinal Lymph Nodule, Intercostal Cryo Nerve Block 3-8, On Q Pain Pump.   PMH as noted including Brain Aneurysm/Surg, Neck/Back Surg, COPD, OA, Fibromyalgia, Occipital Neuralgia, Lung Nodule, HSV (Leg). PTA pt living alone in apt with steps to access. PTA pt independent with daily care and functional mobility. Pt reports adequate assist/support for d/c home; all PT goals met. Pt declines need for home PT Services. No further PT indicated at this time. Pt reports need for Walker for use upon d/c home. Prognosis: Good  Patient Education: Role of PT, POC, Need to call for assist.   REQUIRES PT FOLLOW UP: No  Activity Tolerance  Activity Tolerance: Patient Tolerated treatment well     G-Code     OutComes Score                                                  AM-PAC Score  AM-PAC Inpatient Mobility Raw Score : 21  AM-PAC Inpatient T-Scale Score : 50.25  Mobility Inpatient CMS 0-100% Score: 28.97  Mobility Inpatient CMS G-Code Modifier : CJ          Goals  Short term goals  Time Frame for Short term goals: Upon d/c acute care setting. Short term goal 1: Bed Mob Independent. 4/2 Goal near met, assist only monitor  chest tube. Short term goal 2: Transfers with/without assist device Supervision. 4/2 Goal met. Short term goal 3: Amb with/without assist device 150' SBA/Supervision. 4/2 Goal met. Short term goal 4: Stair Negotiation SBA/CGA. 4/2 Goal deferred, pt declined need to address stair negotiation. Patient Goals   Patient goals : Be able to walk/take care of self and go back home. Plan    Plan  Times per week: D/C Acute Care PT Services. Current Treatment Recommendations: Functional Mobility Training, Transfer Training, Gait Training, Stair training, Safety Education & Training, Patient/Caregiver Education & Training  Safety Devices  Type of devices: Call light within reach, Left in chair, Nurse notified(Pt aware to call for assist.  PT spoke with pt's nurse Sujit Thomas). )     Therapy Time   Individual Concurrent Group Co-treatment   Time In 0645         Time Out 0715         Minutes 30 Ifeoma Parisi, PT

## 2019-04-02 NOTE — PROGRESS NOTES
Discharge instructions reviewed with patient and family member. Patient and family verbalized understanding. All home medications have been reviewed, questions answered and patient voiced understanding. All medication side effects reviewed and patient and family verbalized understanding. Patient given prescriptions, discharge instructions, and appointment times. Patient discharged to home with family via private car. Ambulated with this nurse and son to front of hospital and placed in car.

## 2019-04-02 NOTE — PLAN OF CARE
Problem: Falls - Risk of:  Goal: Will remain free from falls  Description  Will remain free from falls  4/2/2019 0747 by Deirdre Marie RN  Outcome: Ongoing  4/2/2019 0251 by Violeta Crystal RN  Outcome: Ongoing  Goal: Absence of physical injury  Description  Absence of physical injury  4/2/2019 0747 by Deirdre Marie RN  Outcome: Ongoing  4/2/2019 0251 by Violeta Crystal RN  Outcome: Ongoing     Problem: Pain:  Goal: Pain level will decrease  Description  Pain level will decrease  4/2/2019 0747 by Deirdre Marie RN  Outcome: Ongoing  4/2/2019 0251 by Violeta Crystal RN  Outcome: Ongoing  Goal: Control of acute pain  Description  Control of acute pain  4/2/2019 0747 by Deirdre Marie RN  Outcome: Ongoing  4/2/2019 0251 by Violeta Crystal RN  Outcome: Ongoing  Goal: Control of chronic pain  Description  Control of chronic pain  Outcome: Ongoing     Problem: Fluid Volume - Imbalance:  Goal: Chest tube drainage is within specified parameters  Description  Chest tube drainage is within specified parameters  4/2/2019 0251 by Violeta Crystal RN  Outcome: Ongoing     Problem: Discharge Planning:  Goal: Ability to perform activities of daily living will improve  Description  Ability to perform activities of daily living will improve  Outcome: Ongoing     Problem: Gas Exchange - Impaired:  Goal: Ability to maintain normal pulse oximetry readings will improve to within specified parameters  Description  Ability to maintain normal pulse oximetry readings will improve to within specified parameters  4/2/2019 0251 by Violeta Crystal RN  Outcome: Ongoing     Problem: Fluid Volume - Risk of, Imbalance:  Goal: Absence of imbalanced fluid volume signs and symptoms  Description  Absence of imbalanced fluid volume signs and symptoms  4/2/2019 0251 by Violeta Crystal RN  Outcome: Ongoing  Goal: Will show no signs and symptoms of excessive bleeding  Description  Will show no signs and symptoms

## 2019-04-03 ENCOUNTER — TELEPHONE (OUTPATIENT)
Dept: FAMILY MEDICINE CLINIC | Age: 64
End: 2019-04-03

## 2019-04-03 NOTE — TELEPHONE ENCOUNTER
Criselda with [facility] called to let dr Manuelito Dorsey know pt was in the hospital from 3.25.19 to 4.2.19 for biopsy and removal of pulmonary nodule.

## 2019-04-04 ENCOUNTER — TELEPHONE (OUTPATIENT)
Dept: FAMILY MEDICINE CLINIC | Age: 64
End: 2019-04-04

## 2019-04-10 RX ORDER — FENOFIBRATE 145 MG/1
TABLET, COATED ORAL
Qty: 30 TABLET | Refills: 0 | Status: SHIPPED | OUTPATIENT
Start: 2019-04-10 | End: 2019-05-09 | Stop reason: SDUPTHER

## 2019-04-12 ENCOUNTER — OFFICE VISIT (OUTPATIENT)
Dept: CARDIOTHORACIC SURGERY | Age: 64
End: 2019-04-12

## 2019-04-12 VITALS
WEIGHT: 102 LBS | SYSTOLIC BLOOD PRESSURE: 140 MMHG | BODY MASS INDEX: 18.77 KG/M2 | HEIGHT: 62 IN | HEART RATE: 109 BPM | TEMPERATURE: 98 F | DIASTOLIC BLOOD PRESSURE: 80 MMHG | OXYGEN SATURATION: 95 %

## 2019-04-12 DIAGNOSIS — Z09 POSTOP CHECK: Primary | ICD-10-CM

## 2019-04-12 PROCEDURE — 99024 POSTOP FOLLOW-UP VISIT: CPT | Performed by: THORACIC SURGERY (CARDIOTHORACIC VASCULAR SURGERY)

## 2019-04-12 RX ORDER — M-VIT,TX,IRON,MINS/CALC/FOLIC 27MG-0.4MG
1 TABLET ORAL DAILY
COMMUNITY

## 2019-04-12 RX ORDER — TRAMADOL HYDROCHLORIDE 50 MG/1
50 TABLET ORAL EVERY 6 HOURS PRN
COMMUNITY
End: 2019-05-06 | Stop reason: ALTCHOICE

## 2019-04-12 NOTE — PROGRESS NOTES
Progress Note    S/P L thoracotomy; take down of adhesions wedge excision LLL nodule #1; debulking LLL nodule #2; resection of mediastinal lymph nodes 3/25/19  Left thoracotomy incision and CT sites are healing without redness or purulence. Sutures removed without difficulty. Has an appointment with Dr. Dandre Grant on 5/6/19. Saw Dr. Mala Reyes yesterday. Sees  4/17/19. Vital Signs:                                                 BP (!) 140/80 (Site: Left Upper Arm, Position: Sitting)   Pulse 109   Temp 98 °F (36.7 °C) (Oral)   Ht 5' 2\" (1.575 m)   Wt 102 lb (46.3 kg)   LMP 08/11/1993 (Approximate)   SpO2 95%   BMI 18.66 kg/m²      Preop weight: 104 lb    CV: reg  Pulm: clear, wounds c/d/i      Medications:  Prior to Admission medications    Medication Sig Start Date End Date Taking? Authorizing Provider   Multiple Vitamins-Minerals (THERAPEUTIC MULTIVITAMIN-MINERALS) tablet Take 1 tablet by mouth daily   Yes Historical Provider, MD   Cranberry 125 MG TABS Take 1 tablet by mouth daily   Yes Historical Provider, MD   traMADol (ULTRAM) 50 MG tablet Take 50 mg by mouth every 6 hours as needed for Pain.    Yes Historical Provider, MD   fenofibrate (TRICOR) 145 MG tablet TAKE ONE TABLET BY MOUTH DAILY 4/10/19  Yes Ana Wolfe MD   Acetaminophen (TYLENOL) 325 MG CAPS Take 650 mg by mouth   Yes Historical Provider, MD   OXYCONTIN 30 MG T12A extended release tablet TK 1 T PO QID PRN P 3/5/19  Yes Historical Provider, MD   OXcarbazepine (TRILEPTAL) 300 MG tablet Take 300 mg by mouth 2 times daily   Yes Historical Provider, MD   valACYclovir (VALTREX) 1 g tablet TAKE ONE TABLET BY MOUTH DAILY 11/29/18  Yes Ana Wolfe MD   fluticasone (FLONASE) 50 MCG/ACT nasal spray SHAKE AND SPRAY TWO SPRAYS IN EACH NOSTRIL ONCE DAILY 11/5/18  Yes Ana Wolfe MD   Probiotic Product (PROBIOTIC DAILY PO) Take 1 tablet by mouth daily    Yes Historical Provider, MD   cetirizine (ZYRTEC) 10 MG tablet Take 10 mg by mouth nightly as needed    Yes Historical Provider, MD   buPROPion (WELLBUTRIN XL) 150 MG XL tablet 450 mg every morning [facility] 2/25/15  Yes Loreto Ellison MD   mirtazapine (REMERON) 30 MG tablet Take 30 mg by mouth nightly [facility] 2/25/15  Yes Loreto Ellison MD   DULoxetine (CYMBALTA) 60 MG capsule Take 120 mg by mouth daily [facility] 2/25/15  Yes Loreto Ellison MD   gabapentin (NEURONTIN) 800 MG tablet TAKE ONE TABLET BY MOUTH FOUR TIMES A DAY  Patient taking differently: TAKE ONE TABLET BY MOUTH THREE TIMES A DAY 12/10/18 3/10/19  Loreto Ellison MD        Data Review:  CBC:   Lab Results   Component Value Date    WBC 7.1 2019    HGB 10.0 2019    HCT 28.8 2019    .1 2019     2019     BMP:   Lab Results   Component Value Date     2019    K 3.7 2019    K 4.1 2019     2019    CO2 27 2019    BUN 19 2019    CREATININE 0.5 2019    CALCIUM 8.8 2019     PT/INR:   Lab Results   Component Value Date    PROTIME 10.6 2016    PROTIME 13.1 03/15/2012    INR 0.93 2016       Assessment/Plan:  - wounds healing well  - increase activity as comfort allows  - path reviewed with pt/son/daughter. A lot of angst/questions which I have answered to the best of my ability. Pt's   of pancreatic Ca, rapid progression in his case has them all worried about whether she is \"terminal\". Pt saw Dr. Epi Gage yesterday but remains unsure about chemo. She is seeing Dr. Mendel App next week. Afraid to wait until surgical site has healed sufficiently, but afraid to have chemo/rad too. Very anxious. I am happy to help in any way.     Ruben Chaidez MD  2019  3:54 PM

## 2019-04-15 ENCOUNTER — TELEPHONE (OUTPATIENT)
Dept: CARDIOTHORACIC SURGERY | Age: 64
End: 2019-04-15

## 2019-04-15 ENCOUNTER — TELEPHONE (OUTPATIENT)
Dept: FAMILY MEDICINE CLINIC | Age: 64
End: 2019-04-15

## 2019-04-15 PROBLEM — C34.32 MALIGNANT NEOPLASM OF LOWER LOBE OF LEFT LUNG (HCC): Status: ACTIVE | Noted: 2019-04-15

## 2019-04-15 PROBLEM — R91.1 PULMONARY NODULE: Status: RESOLVED | Noted: 2019-03-20 | Resolved: 2019-04-15

## 2019-04-15 NOTE — TELEPHONE ENCOUNTER
Please ask pt to see me in next few weeks to go over her new lung cancer diagnosis. I would like to see how I can support her. She has an appt with pulmonary on 5/6 so maybe we could do same day why she is here.

## 2019-05-06 ENCOUNTER — OFFICE VISIT (OUTPATIENT)
Dept: PULMONOLOGY | Age: 64
End: 2019-05-06
Payer: COMMERCIAL

## 2019-05-06 ENCOUNTER — OFFICE VISIT (OUTPATIENT)
Dept: FAMILY MEDICINE CLINIC | Age: 64
End: 2019-05-06
Payer: COMMERCIAL

## 2019-05-06 VITALS
BODY MASS INDEX: 18.4 KG/M2 | RESPIRATION RATE: 16 BRPM | WEIGHT: 100 LBS | HEART RATE: 98 BPM | OXYGEN SATURATION: 98 % | HEIGHT: 62 IN | DIASTOLIC BLOOD PRESSURE: 80 MMHG | SYSTOLIC BLOOD PRESSURE: 124 MMHG | TEMPERATURE: 98.4 F

## 2019-05-06 VITALS
RESPIRATION RATE: 16 BRPM | TEMPERATURE: 98.3 F | BODY MASS INDEX: 18.4 KG/M2 | WEIGHT: 100 LBS | OXYGEN SATURATION: 96 % | HEART RATE: 94 BPM | SYSTOLIC BLOOD PRESSURE: 136 MMHG | DIASTOLIC BLOOD PRESSURE: 80 MMHG | HEIGHT: 62 IN

## 2019-05-06 DIAGNOSIS — M48.062 SPINAL STENOSIS OF LUMBAR REGION WITH NEUROGENIC CLAUDICATION: ICD-10-CM

## 2019-05-06 DIAGNOSIS — R53.1 WEAKNESS: ICD-10-CM

## 2019-05-06 DIAGNOSIS — C34.32 MALIGNANT NEOPLASM OF LOWER LOBE OF LEFT LUNG (HCC): Primary | ICD-10-CM

## 2019-05-06 DIAGNOSIS — M79.7 FIBROMYALGIA: ICD-10-CM

## 2019-05-06 DIAGNOSIS — R26.89 BALANCE DISORDER: ICD-10-CM

## 2019-05-06 DIAGNOSIS — J30.1 ALLERGIC RHINITIS DUE TO POLLEN, UNSPECIFIED SEASONALITY: ICD-10-CM

## 2019-05-06 DIAGNOSIS — M41.35 THORACOGENIC SCOLIOSIS OF THORACOLUMBAR REGION: ICD-10-CM

## 2019-05-06 DIAGNOSIS — J44.9 CHRONIC OBSTRUCTIVE PULMONARY DISEASE, UNSPECIFIED COPD TYPE (HCC): ICD-10-CM

## 2019-05-06 DIAGNOSIS — R29.6 FREQUENT FALLS: ICD-10-CM

## 2019-05-06 DIAGNOSIS — M54.16 LUMBAR RADICULOPATHY: ICD-10-CM

## 2019-05-06 DIAGNOSIS — R06.02 SOB (SHORTNESS OF BREATH): ICD-10-CM

## 2019-05-06 DIAGNOSIS — J43.9 PULMONARY EMPHYSEMA, UNSPECIFIED EMPHYSEMA TYPE (HCC): Primary | ICD-10-CM

## 2019-05-06 DIAGNOSIS — M50.90 DISC DISORDER OF CERVICAL REGION: ICD-10-CM

## 2019-05-06 DIAGNOSIS — Z91.81 AT HIGH RISK FOR FALLS: ICD-10-CM

## 2019-05-06 PROCEDURE — 4040F PNEUMOC VAC/ADMIN/RCVD: CPT | Performed by: INTERNAL MEDICINE

## 2019-05-06 PROCEDURE — 3023F SPIROM DOC REV: CPT | Performed by: FAMILY MEDICINE

## 2019-05-06 PROCEDURE — G8419 CALC BMI OUT NRM PARAM NOF/U: HCPCS | Performed by: FAMILY MEDICINE

## 2019-05-06 PROCEDURE — G8400 PT W/DXA NO RESULTS DOC: HCPCS | Performed by: FAMILY MEDICINE

## 2019-05-06 PROCEDURE — 1090F PRES/ABSN URINE INCON ASSESS: CPT | Performed by: INTERNAL MEDICINE

## 2019-05-06 PROCEDURE — 4040F PNEUMOC VAC/ADMIN/RCVD: CPT | Performed by: FAMILY MEDICINE

## 2019-05-06 PROCEDURE — G8427 DOCREV CUR MEDS BY ELIG CLIN: HCPCS | Performed by: INTERNAL MEDICINE

## 2019-05-06 PROCEDURE — 3017F COLORECTAL CA SCREEN DOC REV: CPT | Performed by: INTERNAL MEDICINE

## 2019-05-06 PROCEDURE — 3017F COLORECTAL CA SCREEN DOC REV: CPT | Performed by: FAMILY MEDICINE

## 2019-05-06 PROCEDURE — 1123F ACP DISCUSS/DSCN MKR DOCD: CPT | Performed by: INTERNAL MEDICINE

## 2019-05-06 PROCEDURE — 1123F ACP DISCUSS/DSCN MKR DOCD: CPT | Performed by: FAMILY MEDICINE

## 2019-05-06 PROCEDURE — G8926 SPIRO NO PERF OR DOC: HCPCS | Performed by: INTERNAL MEDICINE

## 2019-05-06 PROCEDURE — 1090F PRES/ABSN URINE INCON ASSESS: CPT | Performed by: FAMILY MEDICINE

## 2019-05-06 PROCEDURE — 1036F TOBACCO NON-USER: CPT | Performed by: FAMILY MEDICINE

## 2019-05-06 PROCEDURE — 99213 OFFICE O/P EST LOW 20 MIN: CPT | Performed by: FAMILY MEDICINE

## 2019-05-06 PROCEDURE — 3023F SPIROM DOC REV: CPT | Performed by: INTERNAL MEDICINE

## 2019-05-06 PROCEDURE — 1036F TOBACCO NON-USER: CPT | Performed by: INTERNAL MEDICINE

## 2019-05-06 PROCEDURE — G8427 DOCREV CUR MEDS BY ELIG CLIN: HCPCS | Performed by: FAMILY MEDICINE

## 2019-05-06 PROCEDURE — G8419 CALC BMI OUT NRM PARAM NOF/U: HCPCS | Performed by: INTERNAL MEDICINE

## 2019-05-06 PROCEDURE — G8400 PT W/DXA NO RESULTS DOC: HCPCS | Performed by: INTERNAL MEDICINE

## 2019-05-06 PROCEDURE — 99213 OFFICE O/P EST LOW 20 MIN: CPT | Performed by: INTERNAL MEDICINE

## 2019-05-06 PROCEDURE — G8926 SPIRO NO PERF OR DOC: HCPCS | Performed by: FAMILY MEDICINE

## 2019-05-06 RX ORDER — AZELASTINE 1 MG/ML
1 SPRAY, METERED NASAL 2 TIMES DAILY
Qty: 2 BOTTLE | Refills: 1 | Status: SHIPPED | OUTPATIENT
Start: 2019-05-06 | End: 2020-05-28

## 2019-05-06 RX ORDER — CALCIUM CARBONATE 160(400)MG
TABLET,CHEWABLE ORAL
Qty: 1 EACH | Refills: 0 | Status: SHIPPED | OUTPATIENT
Start: 2019-05-06 | End: 2019-05-06 | Stop reason: SDUPTHER

## 2019-05-06 RX ORDER — CALCIUM CARBONATE 160(400)MG
TABLET,CHEWABLE ORAL
Qty: 1 EACH | Refills: 0 | Status: SHIPPED | OUTPATIENT
Start: 2019-05-06 | End: 2022-01-21

## 2019-05-06 NOTE — PROGRESS NOTES
redone    THORACOSCOPY Left 3/25/2019    LEFT VIDEO ASSISTED THORACOSCOPIC SURGERY, THORACOTOMY, WEDGE EXCISION, LEFT LOWER LOBE NODULE X 2, CRYO NERVE BLOCK performed by Marilin Oh MD at [facility] Left 03/25/2019    wedge excision LLL nodule#1, debulking LLL nodule #2    TUBAL LIGATION         FAMILY HISTORY:  family history includes Cancer (age of onset: 43) in her mother; Cancer (age of onset: 77) in her brother; Other in her father. SOCIAL HISTORY:   reports that she quit smoking about 3 months ago. Her smoking use included cigarettes. She has a 12.00 pack-year smoking history. She has never used smokeless tobacco.      ALLERGIES:  Patient is allergic to adhesive tape; ibuprofen; naproxen; and vicodin [hydrocodone-acetaminophen]. REVIEW OF SYSTEMS:  Constitutional: Negative for fever    HENT: Negative for sore throat  Eyes: Negative for redness   Respiratory: + for dyspnea, - cough  Cardiovascular: Negative for chest pain  Gastrointestinal: Negative for vomiting, diarrhea   Genitourinary: Negative for hematuria   Musculoskeletal: Negative for arthralgias   Skin: Negative for rash  Neurological: Negative for syncope  Hematological: Negative for adenopathy  Psychiatric/Behavorial: Negative for anxiety    Objective:   PHYSICAL EXAM:  Blood pressure 136/80, pulse 94, temperature 98.3 °F (36.8 °C), temperature source Oral, resp. rate 16, height 5' 2\" (1.575 m), weight 100 lb (45.4 kg), last menstrual period 08/11/1993, SpO2 96 %, not currently breastfeeding.'  Gen: No distress. ENT:   Resp: No accessory muscle use. No crackles. No wheezes. No rhonchi. CV: Regular rate. Regular rhythm. No murmur or rub. No edema. Skin: Warm, dry, normal texture and turgor. No nodule on exposed extremities. M/S: No cyanosis. No clubbing. No joint deformity. Psych: Oriented x 3. No anxiety. Awake. Alert. Intact judgement and insight. Good Mood / Affect.   Memory appears in tact      Current Outpatient Medications   Medication Sig Dispense Refill    Misc. Devices (ROLLATOR ULTRA-LIGHT) MISC As needed 1 each 0    Umeclidinium Bromide 62.5 MCG/INH AEPB Inhale 1 puff into the lungs daily 1 each 5    Multiple Vitamins-Minerals (THERAPEUTIC MULTIVITAMIN-MINERALS) tablet Take 1 tablet by mouth daily      Cranberry 125 MG TABS Take 1 tablet by mouth daily      fenofibrate (TRICOR) 145 MG tablet TAKE ONE TABLET BY MOUTH DAILY 30 tablet 0    Acetaminophen (TYLENOL) 325 MG CAPS Take 650 mg by mouth      OXYCONTIN 30 MG T12A extended release tablet TK 1 T PO QID PRN P  0    OXcarbazepine (TRILEPTAL) 300 MG tablet Take 300 mg by mouth 2 times daily      valACYclovir (VALTREX) 1 g tablet TAKE ONE TABLET BY MOUTH DAILY 90 tablet 0    fluticasone (FLONASE) 50 MCG/ACT nasal spray SHAKE AND SPRAY TWO SPRAYS IN EACH NOSTRIL ONCE DAILY 1 Bottle 11    Probiotic Product (PROBIOTIC DAILY PO) Take 1 tablet by mouth daily       cetirizine (ZYRTEC) 10 MG tablet Take 10 mg by mouth nightly as needed       buPROPion (WELLBUTRIN XL) 150 MG XL tablet 450 mg every morning [facility] 30 tablet     mirtazapine (REMERON) 30 MG tablet Take 30 mg by mouth nightly [facility] 30 tablet     DULoxetine (CYMBALTA) 60 MG capsule Take 120 mg by mouth daily [facility] 30 capsule     gabapentin (NEURONTIN) 800 MG tablet TAKE ONE TABLET BY MOUTH FOUR TIMES A DAY (Patient taking differently: TAKE ONE TABLET BY MOUTH THREE TIMES A DAY) 360 tablet 0     No current facility-administered medications for this visit.         Data Reviewed:   CBC and Renal reviewed  Last CBC  Lab Results   Component Value Date    WBC 7.1 03/31/2019    RBC 2.85 03/31/2019    HGB 10.0 03/31/2019    .1 03/31/2019     03/31/2019     Last Renal  Lab Results   Component Value Date     03/31/2019    K 3.7 03/31/2019    K 4.1 03/26/2019     03/31/2019    CO2 27 03/31/2019    CO2 21 03/26/2019    CO2 25 03/20/2019    BUN 19 03/31/2019    CREATININE 0.5 03/31/2019    GLUCOSE 107 03/31/2019    GLUCOSE 78 03/15/2012    CALCIUM 8.8 03/31/2019       Last ABG  POC Blood Gas: No results found for: POCPH, POCPCO2, POCPO2, POCHCO3, NBEA, [id number]  No results for input(s): PH, PCO2, PO2, HCO3, BE, O2SAT in the last 72 hours. Radiology Review:  Pertinent images / reports were reviewed as a part of this visit. CT Chest w/ contrast: No results found for this or any previous visit. CT Chest w/o contrast: No results found for this or any previous visit. CTPA:   Results for orders placed during the hospital encounter of 02/06/19   CT Chest Pulmonary Embolism W Contrast    Narrative EXAMINATION:  CTA OF THE CHEST 2/6/2019 6:12 pm    TECHNIQUE:  CTA of the chest was performed after the administration of intravenous  contrast.  Multiplanar reformatted images are provided for review. MIP  images are provided for review. Dose modulation, iterative reconstruction,  and/or weight based adjustment of the mA/kV was utilized to reduce the  radiation dose to as low as reasonably achievable. COMPARISON:  None. HISTORY:  ORDERING SYSTEM PROVIDED HISTORY: r/o PE  TECHNOLOGIST PROVIDED HISTORY:  Ordering Physician Provided Reason for Exam: SOB  Acuity: Acute  Type of Exam: Subsequent/Follow-up  Additional signs and symptoms: Fever, diagnosed with pneumonia on Monday, pt  says she feels like someone is sitting on her chest, began last night  Relevant Medical/Surgical History: H/O multiple spine surgeries, COPD, smoker  1ppd x's 48 yrs    FINDINGS:  Pulmonary Arteries: Pulmonary arteries are adequately opacified for  evaluation. No evidence of intraluminal filling defect to suggest pulmonary  embolism. Main pulmonary artery is normal in caliber. Mediastinum: No evidence of mediastinal lymphadenopathy. The heart and  pericardium demonstrate no acute abnormality. There is no acute abnormality  of the thoracic aorta.     Lungs/pleura: No pneumothorax. Emphysema. Mild bronchiectasis. Scattered  regions of ground-glass attenuation throughout the lungs. 1 cm nodule in the  left lower lobe near the diaphragm which appears spiculated. Oval-shaped 1.8  x 0.7 cm nodule near the right lung apex. Additional 5 mm nodule in the  right upper lobe near the apex. Upper Abdomen: Limited images of the upper abdomen are unremarkable. Soft Tissues/Bones: No acute fracture. Postsurgical changes posterior fusion  which is incompletely evaluated but at least visualized through L1 through  L3. Moderate to severe disc height loss in the lower thoracic spine. Impression No pulmonary embolus. Emphysema. Pulmonary nodules within oval-shaped nodule in the right upper lobe near the  apex measuring 1.8 x 0.7 cm. Appearance suggestive of postinflammatory  scarring. More suspicious 1 cm spiculated nodule in the left lower lobe. Recommend  further evaluation with PET-CT and consider biopsy. Pulmonology consultation  also recommended. CXR PA/LAT:   Results for orders placed during the hospital encounter of 03/20/19   XR CHEST STANDARD (2 VW)    Narrative EXAMINATION:  TWO VIEWS OF THE CHEST    3/20/2019 9:22 am    COMPARISON:  11/11/2014    HISTORY:  ORDERING SYSTEM PROVIDED HISTORY: Pre-op chest exam  TECHNOLOGIST PROVIDED HISTORY:  Ordering Physician Provided Reason for Exam: pre op for VATS  Acuity: Acute  Type of Exam: Initial    FINDINGS:  Frontal and lateral views of the chest are submitted for review. The cardiac  silhouette is normal in size. Lung parenchyma is clear without focal  airspace consolidation, sizeable pleural effusion, or pneumothorax. Trachea  is midline. Status post lower cervical spine fusion. Dextroscoliotic  curvature of the lower thoracic spine. Impression No evidence for acute cardiopulmonary pathology.          CXR portable:   Results for orders placed during the hospital encounter of 03/25/19   XR CHEST PORTABLE    Narrative EXAMINATION:  SINGLE XRAY VIEW OF THE CHEST    4/2/2019 3:02 am    COMPARISON:  Frontal view of the chest 04/01/2019, 03/31/2019. HISTORY:  ORDERING SYSTEM PROVIDED HISTORY: r/o pneumothorax, chest tube clamped  TECHNOLOGIST PROVIDED HISTORY:  Reason for exam:->r/o pneumothorax, chest tube clamped  Ordering Physician Provided Reason for Exam: r/o pneumothorax, chest tube  clamped    FINDINGS:  Support devices:    Left-sided surgical bore chest tube remains in stable position. No left-sided pneumothorax is identified. The cardiopericardial silhouette is stable in size and configuration. There is a stable left basilar opacity. No new focal airspace opacity. Partial imaging of spinal fixation hardware. Subcutaneous emphysema again noted along the left lateral chest wall. Impression 1. No pneumothorax identified. 2. Stable right basilar opacity. Assessment:     ·     · Lung cancer  · CoPD       Plan:      Problem List Items Addressed This Visit     COPD (chronic obstructive pulmonary disease) mild- PFT 2008 - Primary    Relevant Medications    Umeclidinium Bromide 62.5 MCG/INH AEPB    Allergic rhinitis     Using Zyrtec and Flonase   . Proper technique. Will add Astelin.            Relevant Medications    Umeclidinium Bromide 62.5 MCG/INH AEPB             JAQUELINE MAGAÑA [id number] [facility]  [phone]

## 2019-05-06 NOTE — PATIENT INSTRUCTIONS
warning signs of stress include tension in your shoulders and neck, or clenching your hands into fists. The next step is to choose a way to deal with your stress. One way is to avoid the event or thing that leads to your stress--but often this is not possible. A second way is to change how you react to stress. This is often the more practical way. Tips for dealing with stress  Don't worry about things you can't control, such as the weather. Solve the little problems. This can help you gain a feeling of control. Prepare to the best of your ability for events you know may be stressful, such as a job interview. Try to look at change as a positive challenge, not as a threat. Work to resolve conflicts with other people. Talk with a trusted friend, family member or counselor. Set realistic goals at home and at work. Avoid overscheduling. Exercise on a regular basis. Eat regular, well-balanced meals and get enough sleep. Meditate. Participate in something you don't find stressful, such as sports, social events or hobbies. Why is exercise useful? Exercise is a good way to deal with stress because it's a healthy way to relieve your pent-up energy and tension. Exercise is known to release feel-good brain chemicals. It also helps you get in better shape, which makes you feel better overall. Steps to deep breathing  Lie down on a flat surface. Place a hand on your stomach, just above your navel. Place the other hand on your chest.   Breathe in slowly and try to make your stomach rise a little. Hold your breath for a second. Breathe out slowly and let your stomach go back down. What is meditation? Meditation is a form of guided thought. It can take many forms. You can do it with exercise that uses the same motions over and over, like walking or swimming. You can meditate by practicing relaxation training, by stretching or by breathing deeply. Relaxation training is simple.  Start with one muscle. Hold it tight for a few seconds then relax the muscle. Do this with each of your muscles, beginning with the toes and feet and working your way up through the rest of your body, one muscle group at a time. Stretching can also help relieve tension. Roll your head in a gentle [place]. Reach toward the ceiling and bend side to side slowly. Roll your shoulders. Deep, relaxed breathing by itself may help relieve stress (see the box to the right). This helps you get plenty of oxygen and activates the relaxation response, the bodys antidote to stress.

## 2019-05-06 NOTE — PROGRESS NOTES
screen  02/02/2023    DTaP/Tdap/Td vaccine (2 - Td) 08/02/2027    DEXA (modify frequency per FRAX score)  Completed    Cervical cancer screen  Completed    Flu vaccine  Completed    Hepatitis C screen  Completed    HIV screen  Completed     The 10-year ASCVD risk score (Vicky Arredondo, et al., 2013) is: 5.8%    Values used to calculate the score:      Age: 70 years      Sex: Female      Is Non- : No      Diabetic: No      Tobacco smoker: No      Systolic Blood Pressure: 280 mmHg      Is BP treated: No      HDL Cholesterol: 55 mg/dL      Total Cholesterol: 174 mg/dL  Prior to Visit Medications    Medication Sig Taking? Authorizing Provider   Misc.  Devices (ROLLATOR ULTRA-LIGHT) MISC As needed Yes Lauro Alcala MD   Multiple Vitamins-Minerals (THERAPEUTIC MULTIVITAMIN-MINERALS) tablet Take 1 tablet by mouth daily Yes Historical Provider, MD   Cranberry 125 MG TABS Take 1 tablet by mouth daily Yes Historical Provider, MD   fenofibrate (TRICOR) 145 MG tablet TAKE ONE TABLET BY MOUTH DAILY Yes Lauro Alcala MD   Acetaminophen (TYLENOL) 325 MG CAPS Take 650 mg by mouth Yes Historical Provider, MD   OXYCONTIN 30 MG T12A extended release tablet TK 1 T PO QID PRN P Yes Historical Provider, MD   OXcarbazepine (TRILEPTAL) 300 MG tablet Take 300 mg by mouth 2 times daily Yes Historical Provider, MD   valACYclovir (VALTREX) 1 g tablet TAKE ONE TABLET BY MOUTH DAILY Yes Lauro Alcala MD   fluticasone (FLONASE) 50 MCG/ACT nasal spray SHAKE AND SPRAY TWO SPRAYS IN EACH NOSTRIL ONCE DAILY Yes Lauro Alcala MD   Probiotic Product (PROBIOTIC DAILY PO) Take 1 tablet by mouth daily  Yes Historical Provider, MD   cetirizine (ZYRTEC) 10 MG tablet Take 10 mg by mouth nightly as needed  Yes Historical Provider, MD   buPROPion (WELLBUTRIN XL) 150 MG XL tablet 450 mg every morning [facility] Yes Lauro Alcala MD   mirtazapine (REMERON) 30 MG tablet Take 30 mg by mouth nightly [facility] Yes Lauro Alcala MD DULoxetine (CYMBALTA) 60 MG capsule Take 120 mg by mouth daily [facility]ing Yes Gloria Parker MD   Umeclidinium Bromide 62.5 MCG/INH AEPB Inhale 1 puff into the lungs daily  Mercedes Schwartz MD   azelastine (ASTELIN) 0.1 % nasal spray 1 spray by Nasal route 2 times daily 1 Spray in each nostril  Mercedes Schwartz MD   gabapentin (NEURONTIN) 800 MG tablet TAKE ONE TABLET BY MOUTH FOUR TIMES A DAY  Patient taking differently: [employer] TIMES A DAY  Gloria Parker MD      Family History   Problem Relation Age of Onset    Cancer Mother 43        breast    Cancer Brother 77        throat    Other Father         Macular Degeration     Social History     Tobacco Use    Smoking status: Former Smoker     Packs/day: 0.25     Years: 48.00     Pack years: 12.00     Types: Cigarettes     Last attempt to quit: 2019     Years since quittin.2    Smokeless tobacco: Never Used    Tobacco comment: started age 11, down to [address] a day;   Substance Use Topics    Alcohol use: Yes     Comment: rare    Drug use: Yes     Comment: marijuana daily for pain control-last used-3/18/19      LAST LABS  Cholesterol, Total   Date Value Ref Range Status   2018 174 0 - 199 mg/dL Final     LDL Calculated   Date Value Ref Range Status   2018 103 (H) <100 mg/dL Final     HDL   Date Value Ref Range Status   2018 55 40 - 60 mg/dL Final     Triglycerides   Date Value Ref Range Status   2018 78 0 - 150 mg/dL Final     Lab Results   Component Value Date    GLUCOSE 107 (H) 2019     Lab Results   Component Value Date     2019    K 3.7 2019    CREATININE 0.5 (L) 2019     Lab Results   Component Value Date    WBC 7.1 2019    HGB 10.0 (L) 2019    HCT 28.8 (L) 2019    .1 (H) 2019     2019     Lab Results   Component Value Date    ALT 10 2019    AST 20 2019    ALKPHOS 72 2019    BILITOT 0.3 2019 TSH (uIU/mL)   Date Value   12/10/2015 2.36     Lab Results   Component Value Date    LABA1C 5.4 02/08/2019     Objective:   PHYSICAL EXAM  /80 (Site: Right Upper Arm, Position: Sitting, Cuff Size: Medium Adult)   Pulse 98   Temp 98.4 °F (36.9 °C) (Oral)   Resp 16   Ht 5' 2\" (1.575 m)   Wt 100 lb (45.4 kg)   LMP 08/11/1993 (Approximate)   SpO2 98%   BMI 18.29 kg/m²   BP Readings from Last 5 Encounters:   05/06/19 136/80   05/06/19 124/80   04/12/19 (!) 140/80   04/02/19 131/83   03/25/19 (!) 97/51     Wt Readings from Last 5 Encounters:   05/06/19 100 lb (45.4 kg)   05/06/19 100 lb (45.4 kg)   04/12/19 102 lb (46.3 kg)   04/02/19 98 lb 12.3 oz (44.8 kg)   03/15/19 104 lb 9.6 oz (47.4 kg)      GENERAL:   · well-developed, well-nourished, alert, no distress. LUNGS:    · Breathing unlabored  · clear to auscultation bilaterally and good air movement  CARDIOVASC:   Regular rate and rhythmSKIN: warm and dry  PSYCH:    · Alert and oriented  · Normal reasoning, insight good  · Facial expressions full, mood appropriate      Assessment and Plan:      Diagnosis Orders   1. Malignant neoplasm of lower lobe of left lung ([address]- resected 4/2019  Misc. Devices (ROLLATOR ULTRA-LIGHT) MISC    DISCONTINUED: Misc. Devices (ROLLATOR ULTRA-LIGHT) MISC   2. At high risk for falls  Misc. Devices (ROLLATOR ULTRA-LIGHT) MISC    DISCONTINUED: Misc. Devices (ROLLATOR ULTRA-LIGHT) MISC   3. Weakness  Misc. Devices (ROLLATOR ULTRA-LIGHT) MISC    DISCONTINUED: Misc. Devices (ROLLATOR ULTRA-LIGHT) MISC   4. SOB (shortness of breath)  Misc. Devices (ROLLATOR ULTRA-LIGHT) MISC    DISCONTINUED: Misc. Devices (ROLLATOR ULTRA-LIGHT) MISC   5. Balance disorder  Misc. Devices (ROLLATOR ULTRA-LIGHT) MISC   6. Fibromyalgia  Misc. Devices (ROLLATOR ULTRA-LIGHT) MISC   7. Disc disorder of cervical region  Misc. Devices (ROLLATOR ULTRA-LIGHT) MISC   8. Chronic obstructive pulmonary disease, unspecified [facility]  Misc.  Devices (ROLLATOR ULTRA-LIGHT) MISC   9. Frequent falls  Misc. Devices (ROLLATOR ULTRA-LIGHT) MISC   10. Lumbar radiculopathy  Misc. Devices (ROLLATOR ULTRA-LIGHT) MISC   11. Thoracogenic scoliosis of thoracolumbar region  Misc. Devices (ROLLATOR ULTRA-LIGHT) MISC   12. Spinal stenosis of lumbar region with neurogenic claudication  Misc. Devices (ROLLATOR ULTRA-LIGHT) MISC   Stable. Plan as above and below. INSTRUCTIONS  NEXT APPOINTMENT: Please schedule check-up in 6 months. Please see me if needing help with mood.

## 2019-05-09 RX ORDER — FENOFIBRATE 145 MG/1
TABLET, COATED ORAL
Qty: 30 TABLET | Refills: 0 | Status: SHIPPED | OUTPATIENT
Start: 2019-05-09 | End: 2019-06-05 | Stop reason: SDUPTHER

## 2019-06-06 RX ORDER — FENOFIBRATE 145 MG/1
TABLET, COATED ORAL
Qty: 30 TABLET | Refills: 1 | Status: SHIPPED | OUTPATIENT
Start: 2019-06-06 | End: 2019-08-07 | Stop reason: SDUPTHER

## 2019-06-13 RX ORDER — VALACYCLOVIR HYDROCHLORIDE 1 G/1
TABLET, FILM COATED ORAL
Qty: 90 TABLET | Refills: 0 | Status: SHIPPED | OUTPATIENT
Start: 2019-06-13 | End: 2019-08-21 | Stop reason: SDUPTHER

## 2019-07-01 RX ORDER — GABAPENTIN 800 MG/1
TABLET ORAL
Qty: 360 TABLET | Refills: 0 | Status: SHIPPED | OUTPATIENT
Start: 2019-07-01 | End: 2019-08-21 | Stop reason: SDUPTHER

## 2019-08-06 ENCOUNTER — HOSPITAL ENCOUNTER (OUTPATIENT)
Dept: CT IMAGING | Age: 64
Discharge: HOME | End: 2019-08-06
Payer: COMMERCIAL

## 2019-08-06 DIAGNOSIS — C34.32 MALIGNANT NEOPLASM OF LOWER LOBE OF LEFT LUNG (HCC): ICD-10-CM

## 2019-08-06 LAB
GFR AFRICAN AMERICAN: >60
GFR NON-AFRICAN AMERICAN: >60
PERFORMED ON: NORMAL
POC CREATININE: 0.9 MG/DL (ref 0.6–1.2)
POC SAMPLE TYPE: NORMAL

## 2019-08-06 PROCEDURE — 74177 CT ABD & PELVIS W/CONTRAST: CPT

## 2019-08-06 PROCEDURE — 82565 ASSAY OF CREATININE: CPT

## 2019-08-06 PROCEDURE — 6360000004 HC RX CONTRAST MEDICATION: Performed by: FAMILY MEDICINE

## 2019-08-06 RX ADMIN — IOHEXOL 50 ML: 240 INJECTION, SOLUTION INTRATHECAL; INTRAVASCULAR; INTRAVENOUS; ORAL at 13:12

## 2019-08-06 RX ADMIN — IOPAMIDOL 75 ML: 755 INJECTION, SOLUTION INTRAVENOUS at 13:12

## 2019-08-07 ENCOUNTER — OFFICE VISIT (OUTPATIENT)
Dept: PULMONOLOGY | Age: 64
End: 2019-08-07
Payer: COMMERCIAL

## 2019-08-07 VITALS
DIASTOLIC BLOOD PRESSURE: 76 MMHG | TEMPERATURE: 98.4 F | BODY MASS INDEX: 19.69 KG/M2 | WEIGHT: 107 LBS | OXYGEN SATURATION: 94 % | SYSTOLIC BLOOD PRESSURE: 120 MMHG | RESPIRATION RATE: 16 BRPM | HEART RATE: 78 BPM | HEIGHT: 62 IN

## 2019-08-07 DIAGNOSIS — J30.1 ALLERGIC RHINITIS DUE TO POLLEN, UNSPECIFIED SEASONALITY: ICD-10-CM

## 2019-08-07 DIAGNOSIS — J43.9 PULMONARY EMPHYSEMA, UNSPECIFIED EMPHYSEMA TYPE (HCC): Primary | ICD-10-CM

## 2019-08-07 PROCEDURE — 1123F ACP DISCUSS/DSCN MKR DOCD: CPT | Performed by: INTERNAL MEDICINE

## 2019-08-07 PROCEDURE — 1090F PRES/ABSN URINE INCON ASSESS: CPT | Performed by: INTERNAL MEDICINE

## 2019-08-07 PROCEDURE — G8400 PT W/DXA NO RESULTS DOC: HCPCS | Performed by: INTERNAL MEDICINE

## 2019-08-07 PROCEDURE — 3017F COLORECTAL CA SCREEN DOC REV: CPT | Performed by: INTERNAL MEDICINE

## 2019-08-07 PROCEDURE — G8427 DOCREV CUR MEDS BY ELIG CLIN: HCPCS | Performed by: INTERNAL MEDICINE

## 2019-08-07 PROCEDURE — G8926 SPIRO NO PERF OR DOC: HCPCS | Performed by: INTERNAL MEDICINE

## 2019-08-07 PROCEDURE — 99213 OFFICE O/P EST LOW 20 MIN: CPT | Performed by: INTERNAL MEDICINE

## 2019-08-07 PROCEDURE — 3023F SPIROM DOC REV: CPT | Performed by: INTERNAL MEDICINE

## 2019-08-07 PROCEDURE — 1036F TOBACCO NON-USER: CPT | Performed by: INTERNAL MEDICINE

## 2019-08-07 PROCEDURE — G8420 CALC BMI NORM PARAMETERS: HCPCS | Performed by: INTERNAL MEDICINE

## 2019-08-07 PROCEDURE — 4040F PNEUMOC VAC/ADMIN/RCVD: CPT | Performed by: INTERNAL MEDICINE

## 2019-08-07 RX ORDER — PREDNISONE 10 MG/1
TABLET ORAL
Qty: 30 TABLET | Refills: 0 | Status: SHIPPED | OUTPATIENT
Start: 2019-08-07 | End: 2020-10-26 | Stop reason: SDUPTHER

## 2019-08-08 RX ORDER — FENOFIBRATE 145 MG/1
TABLET, COATED ORAL
Qty: 30 TABLET | Refills: 0 | Status: SHIPPED | OUTPATIENT
Start: 2019-08-08 | End: 2019-08-21 | Stop reason: SDUPTHER

## 2019-08-12 ENCOUNTER — TELEPHONE (OUTPATIENT)
Dept: PULMONOLOGY | Age: 64
End: 2019-08-12

## 2019-08-16 ENCOUNTER — TELEPHONE (OUTPATIENT)
Dept: INTERVENTIONAL RADIOLOGY/VASCULAR | Age: 64
End: 2019-08-16

## 2019-08-21 ENCOUNTER — OFFICE VISIT (OUTPATIENT)
Dept: FAMILY MEDICINE CLINIC | Age: 64
End: 2019-08-21
Payer: COMMERCIAL

## 2019-08-21 VITALS
RESPIRATION RATE: 16 BRPM | DIASTOLIC BLOOD PRESSURE: 62 MMHG | SYSTOLIC BLOOD PRESSURE: 120 MMHG | HEART RATE: 98 BPM | BODY MASS INDEX: 19.32 KG/M2 | WEIGHT: 105 LBS | HEIGHT: 62 IN | TEMPERATURE: 92 F

## 2019-08-21 DIAGNOSIS — C34.32 MALIGNANT NEOPLASM OF LOWER LOBE OF LEFT LUNG (HCC): Primary | ICD-10-CM

## 2019-08-21 DIAGNOSIS — Z23 NEED FOR PNEUMOCOCCAL VACCINATION: ICD-10-CM

## 2019-08-21 DIAGNOSIS — M54.81 BILATERAL OCCIPITAL NEURALGIA: ICD-10-CM

## 2019-08-21 DIAGNOSIS — F41.9 ANXIETY: ICD-10-CM

## 2019-08-21 DIAGNOSIS — M79.7 FIBROMYALGIA: ICD-10-CM

## 2019-08-21 DIAGNOSIS — E78.00 HYPERCHOLESTEREMIA: ICD-10-CM

## 2019-08-21 DIAGNOSIS — J43.9 PULMONARY EMPHYSEMA, UNSPECIFIED EMPHYSEMA TYPE (HCC): ICD-10-CM

## 2019-08-21 DIAGNOSIS — B00.9 HERPES SIMPLEX: ICD-10-CM

## 2019-08-21 PROCEDURE — 90732 PPSV23 VACC 2 YRS+ SUBQ/IM: CPT | Performed by: FAMILY MEDICINE

## 2019-08-21 PROCEDURE — 99214 OFFICE O/P EST MOD 30 MIN: CPT | Performed by: FAMILY MEDICINE

## 2019-08-21 PROCEDURE — G0009 ADMIN PNEUMOCOCCAL VACCINE: HCPCS | Performed by: FAMILY MEDICINE

## 2019-08-21 PROCEDURE — 1090F PRES/ABSN URINE INCON ASSESS: CPT | Performed by: FAMILY MEDICINE

## 2019-08-21 PROCEDURE — 1123F ACP DISCUSS/DSCN MKR DOCD: CPT | Performed by: FAMILY MEDICINE

## 2019-08-21 PROCEDURE — 3023F SPIROM DOC REV: CPT | Performed by: FAMILY MEDICINE

## 2019-08-21 PROCEDURE — 3017F COLORECTAL CA SCREEN DOC REV: CPT | Performed by: FAMILY MEDICINE

## 2019-08-21 PROCEDURE — G8926 SPIRO NO PERF OR DOC: HCPCS | Performed by: FAMILY MEDICINE

## 2019-08-21 PROCEDURE — G8400 PT W/DXA NO RESULTS DOC: HCPCS | Performed by: FAMILY MEDICINE

## 2019-08-21 PROCEDURE — G8427 DOCREV CUR MEDS BY ELIG CLIN: HCPCS | Performed by: FAMILY MEDICINE

## 2019-08-21 PROCEDURE — 4040F PNEUMOC VAC/ADMIN/RCVD: CPT | Performed by: FAMILY MEDICINE

## 2019-08-21 PROCEDURE — G8420 CALC BMI NORM PARAMETERS: HCPCS | Performed by: FAMILY MEDICINE

## 2019-08-21 PROCEDURE — 1036F TOBACCO NON-USER: CPT | Performed by: FAMILY MEDICINE

## 2019-08-21 RX ORDER — VALACYCLOVIR HYDROCHLORIDE 1 G/1
TABLET, FILM COATED ORAL
Qty: 90 TABLET | Refills: 0 | Status: SHIPPED | OUTPATIENT
Start: 2019-08-21 | End: 2019-12-17 | Stop reason: SDUPTHER

## 2019-08-21 RX ORDER — FENOFIBRATE 145 MG/1
TABLET, COATED ORAL
Qty: 30 TABLET | Refills: 0 | Status: SHIPPED | OUTPATIENT
Start: 2019-08-21 | End: 2019-10-02 | Stop reason: SDUPTHER

## 2019-08-21 RX ORDER — GABAPENTIN 800 MG/1
TABLET ORAL
Qty: 360 TABLET | Refills: 0 | Status: SHIPPED | OUTPATIENT
Start: 2019-08-21 | End: 2020-03-17

## 2019-08-21 RX ORDER — OXCARBAZEPINE 300 MG/1
300 TABLET, FILM COATED ORAL 3 TIMES DAILY
Status: SHIPPED | COMMUNITY
Start: 2019-08-21

## 2019-08-21 NOTE — PROGRESS NOTES
medicines). · PLEASE TAKE THIS FORM TO CHECK-OUT WINDOW TO SCHEDULE NEXT VISIT. · Please get flu vaccine when available in fall. Can get either at this office or at stores such as [facility] and [facility]. · Medicare part D patients:  Get Shingrix shingles vaccine at pharmacy (such as [facility] or [facility]). Need second dose in 2-6 months. NOTE- vaccine on back order since new. Pharmacy may put you on waiting list. May be months or more until able to get.   · See pain doctor for head injections

## 2019-08-26 ENCOUNTER — HOSPITAL ENCOUNTER (OUTPATIENT)
Dept: INTERVENTIONAL RADIOLOGY/VASCULAR | Age: 64
Discharge: HOME | End: 2019-08-26
Payer: COMMERCIAL

## 2019-08-26 VITALS
SYSTOLIC BLOOD PRESSURE: 147 MMHG | HEIGHT: 62 IN | WEIGHT: 105.16 LBS | HEART RATE: 90 BPM | BODY MASS INDEX: 19.35 KG/M2 | DIASTOLIC BLOOD PRESSURE: 93 MMHG | OXYGEN SATURATION: 93 % | RESPIRATION RATE: 16 BRPM | TEMPERATURE: 97.7 F

## 2019-08-26 DIAGNOSIS — C34.32 MALIGNANT NEOPLASM OF LOWER LOBE OF LEFT LUNG (HCC): ICD-10-CM

## 2019-08-26 LAB
BASOPHILS ABSOLUTE: 0 K/UL (ref 0–0.2)
BASOPHILS RELATIVE PERCENT: 0.7 %
EOSINOPHILS ABSOLUTE: 0.2 K/UL (ref 0–0.6)
EOSINOPHILS RELATIVE PERCENT: 3.2 %
HCT VFR BLD CALC: 36.2 % (ref 36–48)
HEMATOLOGY PATH CONSULT: NO
HEMOGLOBIN: 12.4 G/DL (ref 12–16)
INR BLD: 0.97 (ref 0.86–1.14)
LYMPHOCYTES ABSOLUTE: 0.6 K/UL (ref 1–5.1)
LYMPHOCYTES RELATIVE PERCENT: 9.5 %
MCH RBC QN AUTO: 37.7 PG (ref 26–34)
MCHC RBC AUTO-ENTMCNC: 34.1 G/DL (ref 31–36)
MCV RBC AUTO: 110.4 FL (ref 80–100)
MONOCYTES ABSOLUTE: 0.5 K/UL (ref 0–1.3)
MONOCYTES RELATIVE PERCENT: 9 %
NEUTROPHILS ABSOLUTE: 4.7 K/UL (ref 1.7–7.7)
NEUTROPHILS RELATIVE PERCENT: 77.6 %
PDW BLD-RTO: 12.8 % (ref 12.4–15.4)
PLATELET # BLD: 351 K/UL (ref 135–450)
PMV BLD AUTO: 7 FL (ref 5–10.5)
PROTHROMBIN TIME: 11.1 SEC (ref 9.8–13)
RBC # BLD: 3.28 M/UL (ref 4–5.2)
WBC # BLD: 6.1 K/UL (ref 4–11)

## 2019-08-26 PROCEDURE — 85610 PROTHROMBIN TIME: CPT

## 2019-08-26 PROCEDURE — 6360000002 HC RX W HCPCS: Performed by: RADIOLOGY

## 2019-08-26 PROCEDURE — 36561 INSERT TUNNELED CV CATH: CPT

## 2019-08-26 PROCEDURE — 7100000010 HC PHASE II RECOVERY - FIRST 15 MIN

## 2019-08-26 PROCEDURE — 7100000011 HC PHASE II RECOVERY - ADDTL 15 MIN

## 2019-08-26 PROCEDURE — 77001 FLUOROGUIDE FOR VEIN DEVICE: CPT

## 2019-08-26 PROCEDURE — 99152 MOD SED SAME PHYS/QHP 5/>YRS: CPT

## 2019-08-26 PROCEDURE — 85025 COMPLETE CBC W/AUTO DIFF WBC: CPT

## 2019-08-26 PROCEDURE — 36415 COLL VENOUS BLD VENIPUNCTURE: CPT

## 2019-08-26 PROCEDURE — 76937 US GUIDE VASCULAR ACCESS: CPT

## 2019-08-26 RX ORDER — FENTANYL CITRATE 50 UG/ML
INJECTION, SOLUTION INTRAMUSCULAR; INTRAVENOUS DAILY PRN
Status: COMPLETED | OUTPATIENT
Start: 2019-08-26 | End: 2019-08-26

## 2019-08-26 RX ORDER — MIDAZOLAM HYDROCHLORIDE 1 MG/ML
INJECTION INTRAMUSCULAR; INTRAVENOUS DAILY PRN
Status: COMPLETED | OUTPATIENT
Start: 2019-08-26 | End: 2019-08-26

## 2019-08-26 RX ORDER — SODIUM CHLORIDE 9 MG/ML
INJECTION, SOLUTION INTRAVENOUS CONTINUOUS
Status: DISCONTINUED | OUTPATIENT
Start: 2019-08-26 | End: 2019-08-27 | Stop reason: HOSPADM

## 2019-08-26 RX ADMIN — MIDAZOLAM 1 MG: 1 INJECTION INTRAMUSCULAR; INTRAVENOUS at 12:16

## 2019-08-26 RX ADMIN — FENTANYL CITRATE 50 MCG: 50 INJECTION INTRAMUSCULAR; INTRAVENOUS at 12:26

## 2019-08-26 RX ADMIN — MIDAZOLAM 1 MG: 1 INJECTION INTRAMUSCULAR; INTRAVENOUS at 12:13

## 2019-08-26 RX ADMIN — FENTANYL CITRATE 50 MCG: 50 INJECTION INTRAMUSCULAR; INTRAVENOUS at 12:13

## 2019-08-26 ASSESSMENT — PAIN SCALES - GENERAL
PAINLEVEL_OUTOF10: 0

## 2019-08-26 ASSESSMENT — PAIN - FUNCTIONAL ASSESSMENT: PAIN_FUNCTIONAL_ASSESSMENT: 0-10

## 2019-08-26 NOTE — BRIEF OP NOTE
Brief Postoperative Note  ______________________________________________________________    Patient: Raleigh Dates  YOB: 1955  MRN: [id number]  Date of Procedure: 8/26/2019    Pre-Op Diagnosis: Lung cancer    Post-Op Diagnosis: Same       Right IJ Chest Port Placement     Anesthesia: Moderate sedation and local anesthesia     Delmer Gaixola DO     Estimated Blood Loss (mL): less than 50 mL     Complications: None    Findings:     Right IJ Chest [address] Placement     Grey Carrizales MD  Date: 8/26/2019  Time: 12:45 PM

## 2019-08-26 NOTE — PRE SEDATION
tablet by mouth Take 300 mg by mouth 2 times daily with additional half tab at bedtime 8/21/19  Yes Mario Salguero MD   fenofibrate (TRICOR) 145 MG tablet TAKE ONE TABLET BY MOUTH DAILY 8/21/19  Yes Mario Salguero MD   gabapentin (NEURONTIN) 800 MG tablet TAKE ONE TABLET BY MOUTH THREE TIMES A DAY 8/21/19 11/19/19 Yes Mario Salguero MD   valACYclovir (VALTREX) 1 g tablet TAKE ONE TABLET BY MOUTH DAILY 8/21/19  Yes Mario Salguero MD   Tiotropium Bromide-Olodaterol (STIOLTO RESPIMAT) 2.5-2.5 MCG/ACT AERS Inhale 2 puffs into the lungs daily 8/7/19  Yes Rj Hebert MD   azelastine (ASTELIN) 0.1 % nasal spray 1 spray by Nasal route 2 times daily 1 Spray in each nostril 5/6/19  Yes Rj Hebert MD   Multiple Vitamins-Minerals (THERAPEUTIC MULTIVITAMIN-MINERALS) tablet Take 1 tablet by mouth daily   Yes Historical Provider, MD   Cranberry 125 MG TABS Take 1 tablet by mouth daily   Yes Historical Provider, MD   Acetaminophen (TYLENOL) 325 MG CAPS Take 650 mg by mouth   Yes Historical Provider, MD   fluticasone (FLONASE) 50 MCG/ACT nasal spray SHAKE AND SPRAY TWO SPRAYS IN EACH NOSTRIL ONCE DAILY 11/5/18  Yes Mario Salguero MD   Probiotic Product (PROBIOTIC DAILY PO) Take 1 tablet by mouth daily    Yes Historical Provider, MD   cetirizine (ZYRTEC) 10 MG tablet Take 10 mg by mouth nightly as needed    Yes Historical Provider, MD   buPROPion (WELLBUTRIN XL) 150 MG XL tablet 450 mg every morning [facility] 2/25/15  Yes Mario Salguero MD   mirtazapine (REMERON) 30 MG tablet Take 30 mg by mouth nightly [facility] [facility] 2/25/15  Yes Mario Salguero MD   DULoxetine (CYMBALTA) 60 MG capsule Take 120 mg by mouth daily [facility] [facility] 2/25/15  Yes Mario Salguero MD   Misc.  Devices (ROLLATOR ULTRA-LIGHT) MISC As needed 5/6/19   Mario Salguero MD   OXYCONTIN 30 MG T12A extended release tablet TK 1 T PO QID PRN P 3/5/19   Historical Provider, MD         Pre-Sedation Documentation and Exam:   I have reviewed the patient's history and

## 2019-09-11 ENCOUNTER — HOSPITAL ENCOUNTER (OUTPATIENT)
Age: 64
Discharge: HOME | End: 2019-09-11
Payer: COMMERCIAL

## 2019-09-11 ENCOUNTER — HOSPITAL ENCOUNTER (OUTPATIENT)
Dept: GENERAL RADIOLOGY | Age: 64
Discharge: HOME | End: 2019-09-11
Payer: COMMERCIAL

## 2019-09-11 DIAGNOSIS — R06.02 EXERTIONAL SHORTNESS OF BREATH: ICD-10-CM

## 2019-09-11 PROCEDURE — 71046 X-RAY EXAM CHEST 2 VIEWS: CPT

## 2019-10-02 DIAGNOSIS — E78.00 HYPERCHOLESTEREMIA: ICD-10-CM

## 2019-10-03 RX ORDER — FENOFIBRATE 145 MG/1
TABLET, COATED ORAL
Qty: 30 TABLET | Refills: 2 | Status: SHIPPED | OUTPATIENT
Start: 2019-10-03 | End: 2020-01-03

## 2019-10-08 ENCOUNTER — HOSPITAL ENCOUNTER (OUTPATIENT)
Dept: PET IMAGING | Age: 64
Discharge: HOME | End: 2019-10-08
Payer: COMMERCIAL

## 2019-10-08 VITALS — BODY MASS INDEX: 19.51 KG/M2 | WEIGHT: 106 LBS | HEIGHT: 62 IN

## 2019-10-08 DIAGNOSIS — C34.32 CANCER OF BRONCHUS OF LEFT LOWER LOBE (HCC): ICD-10-CM

## 2019-10-08 PROCEDURE — 78815 PET IMAGE W/CT SKULL-THIGH: CPT

## 2019-10-08 PROCEDURE — 3430000000 HC RX DIAGNOSTIC RADIOPHARMACEUTICAL: Performed by: INTERNAL MEDICINE

## 2019-10-08 PROCEDURE — A9552 F18 FDG: HCPCS | Performed by: INTERNAL MEDICINE

## 2019-10-08 RX ORDER — FLUDEOXYGLUCOSE F 18 200 MCI/ML
13.72 INJECTION, SOLUTION INTRAVENOUS
Status: COMPLETED | OUTPATIENT
Start: 2019-10-08 | End: 2019-10-08

## 2019-10-08 RX ADMIN — FLUDEOXYGLUCOSE F 18 13.72 MILLICURIE: 200 INJECTION, SOLUTION INTRAVENOUS at 08:05

## 2019-11-19 ENCOUNTER — TELEPHONE (OUTPATIENT)
Dept: FAMILY MEDICINE CLINIC | Age: 64
End: 2019-11-19

## 2019-11-26 ENCOUNTER — OFFICE VISIT (OUTPATIENT)
Dept: ENT CLINIC | Age: 64
End: 2019-11-26
Payer: COMMERCIAL

## 2019-11-26 ENCOUNTER — OFFICE VISIT (OUTPATIENT)
Dept: FAMILY MEDICINE CLINIC | Age: 64
End: 2019-11-26
Payer: COMMERCIAL

## 2019-11-26 VITALS
RESPIRATION RATE: 16 BRPM | WEIGHT: 108 LBS | HEIGHT: 62 IN | BODY MASS INDEX: 19.88 KG/M2 | HEART RATE: 79 BPM | DIASTOLIC BLOOD PRESSURE: 70 MMHG | SYSTOLIC BLOOD PRESSURE: 142 MMHG

## 2019-11-26 VITALS
HEIGHT: 62 IN | WEIGHT: 108 LBS | DIASTOLIC BLOOD PRESSURE: 80 MMHG | TEMPERATURE: 97.9 F | BODY MASS INDEX: 19.88 KG/M2 | HEART RATE: 85 BPM | SYSTOLIC BLOOD PRESSURE: 140 MMHG

## 2019-11-26 DIAGNOSIS — M54.14 THORACIC RADICULOPATHY: Primary | ICD-10-CM

## 2019-11-26 DIAGNOSIS — Z72.0 TOBACCO ABUSE: ICD-10-CM

## 2019-11-26 DIAGNOSIS — K13.70 MOUTH LESION: Primary | ICD-10-CM

## 2019-11-26 PROCEDURE — 96372 THER/PROPH/DIAG INJ SC/IM: CPT | Performed by: FAMILY MEDICINE

## 2019-11-26 PROCEDURE — 99203 OFFICE O/P NEW LOW 30 MIN: CPT | Performed by: OTOLARYNGOLOGY

## 2019-11-26 PROCEDURE — 99213 OFFICE O/P EST LOW 20 MIN: CPT | Performed by: FAMILY MEDICINE

## 2019-11-26 PROCEDURE — 3017F COLORECTAL CA SCREEN DOC REV: CPT | Performed by: FAMILY MEDICINE

## 2019-11-26 PROCEDURE — 1036F TOBACCO NON-USER: CPT | Performed by: FAMILY MEDICINE

## 2019-11-26 PROCEDURE — 4040F PNEUMOC VAC/ADMIN/RCVD: CPT | Performed by: FAMILY MEDICINE

## 2019-11-26 PROCEDURE — G8420 CALC BMI NORM PARAMETERS: HCPCS | Performed by: FAMILY MEDICINE

## 2019-11-26 PROCEDURE — G8400 PT W/DXA NO RESULTS DOC: HCPCS | Performed by: FAMILY MEDICINE

## 2019-11-26 PROCEDURE — G8427 DOCREV CUR MEDS BY ELIG CLIN: HCPCS | Performed by: FAMILY MEDICINE

## 2019-11-26 PROCEDURE — 1090F PRES/ABSN URINE INCON ASSESS: CPT | Performed by: FAMILY MEDICINE

## 2019-11-26 PROCEDURE — 1123F ACP DISCUSS/DSCN MKR DOCD: CPT | Performed by: FAMILY MEDICINE

## 2019-11-26 PROCEDURE — G8484 FLU IMMUNIZE NO ADMIN: HCPCS | Performed by: FAMILY MEDICINE

## 2019-11-26 RX ORDER — KETOROLAC TROMETHAMINE 30 MG/ML
30 INJECTION, SOLUTION INTRAMUSCULAR; INTRAVENOUS ONCE
Status: COMPLETED | OUTPATIENT
Start: 2019-11-26 | End: 2019-11-26

## 2019-11-26 RX ORDER — VARENICLINE TARTRATE 25 MG
KIT ORAL
Qty: 42 TABLET | Refills: 0 | Status: ON HOLD | OUTPATIENT
Start: 2019-11-26 | End: 2021-09-27 | Stop reason: ALTCHOICE

## 2019-11-26 RX ADMIN — KETOROLAC TROMETHAMINE 30 MG: 30 INJECTION, SOLUTION INTRAMUSCULAR; INTRAVENOUS at 12:10

## 2019-12-17 DIAGNOSIS — B00.9 HERPES SIMPLEX: ICD-10-CM

## 2019-12-18 ENCOUNTER — TELEPHONE (OUTPATIENT)
Dept: FAMILY MEDICINE CLINIC | Age: 64
End: 2019-12-18

## 2019-12-18 RX ORDER — VALACYCLOVIR HYDROCHLORIDE 1 G/1
TABLET, FILM COATED ORAL
Qty: 90 TABLET | Refills: 0 | Status: SHIPPED | OUTPATIENT
Start: 2019-12-18 | End: 2020-03-30 | Stop reason: SDUPTHER

## 2019-12-18 RX ORDER — FLUTICASONE PROPIONATE 50 MCG
SPRAY, SUSPENSION (ML) NASAL
Qty: 1 BOTTLE | Refills: 11 | Status: SHIPPED | OUTPATIENT
Start: 2019-12-18 | End: 2021-08-29 | Stop reason: SDUPTHER

## 2019-12-26 ENCOUNTER — OFFICE VISIT (OUTPATIENT)
Dept: PULMONOLOGY | Age: 64
End: 2019-12-26
Payer: COMMERCIAL

## 2019-12-26 VITALS
WEIGHT: 104 LBS | RESPIRATION RATE: 14 BRPM | TEMPERATURE: 98.2 F | OXYGEN SATURATION: 94 % | SYSTOLIC BLOOD PRESSURE: 132 MMHG | DIASTOLIC BLOOD PRESSURE: 87 MMHG | HEART RATE: 100 BPM | HEIGHT: 62 IN | BODY MASS INDEX: 19.14 KG/M2

## 2019-12-26 DIAGNOSIS — J30.1 ALLERGIC RHINITIS DUE TO POLLEN, UNSPECIFIED SEASONALITY: ICD-10-CM

## 2019-12-26 DIAGNOSIS — J43.9 PULMONARY EMPHYSEMA, UNSPECIFIED EMPHYSEMA TYPE (HCC): ICD-10-CM

## 2019-12-26 PROCEDURE — 1090F PRES/ABSN URINE INCON ASSESS: CPT | Performed by: INTERNAL MEDICINE

## 2019-12-26 PROCEDURE — 1123F ACP DISCUSS/DSCN MKR DOCD: CPT | Performed by: INTERNAL MEDICINE

## 2019-12-26 PROCEDURE — 99213 OFFICE O/P EST LOW 20 MIN: CPT | Performed by: INTERNAL MEDICINE

## 2019-12-26 PROCEDURE — G8400 PT W/DXA NO RESULTS DOC: HCPCS | Performed by: INTERNAL MEDICINE

## 2019-12-26 PROCEDURE — 3023F SPIROM DOC REV: CPT | Performed by: INTERNAL MEDICINE

## 2019-12-26 PROCEDURE — 3017F COLORECTAL CA SCREEN DOC REV: CPT | Performed by: INTERNAL MEDICINE

## 2019-12-26 PROCEDURE — 4040F PNEUMOC VAC/ADMIN/RCVD: CPT | Performed by: INTERNAL MEDICINE

## 2019-12-26 PROCEDURE — 1036F TOBACCO NON-USER: CPT | Performed by: INTERNAL MEDICINE

## 2019-12-26 PROCEDURE — G8926 SPIRO NO PERF OR DOC: HCPCS | Performed by: INTERNAL MEDICINE

## 2019-12-26 PROCEDURE — G8484 FLU IMMUNIZE NO ADMIN: HCPCS | Performed by: INTERNAL MEDICINE

## 2019-12-26 PROCEDURE — G8420 CALC BMI NORM PARAMETERS: HCPCS | Performed by: INTERNAL MEDICINE

## 2019-12-26 PROCEDURE — G8427 DOCREV CUR MEDS BY ELIG CLIN: HCPCS | Performed by: INTERNAL MEDICINE

## 2019-12-31 ENCOUNTER — HOSPITAL ENCOUNTER (OUTPATIENT)
Dept: NEUROLOGY | Age: 64
Discharge: HOME | End: 2019-12-31
Payer: COMMERCIAL

## 2019-12-31 PROCEDURE — 95908 NRV CNDJ TST 3-4 STUDIES: CPT

## 2019-12-31 PROCEDURE — 95860 NEEDLE EMG 1 EXTREMITY: CPT

## 2019-12-31 NOTE — PROCEDURES
Test Date:  2019    Patient: Mily Heading : 1955 Physician: Allie Michelle DO   Sex: Female ID#:  Ref Phys: Karime Danielle MD     Patient Complaints:  Patient is a 75year-old female who presents with pain in the right upper extremity radiating to the arm. Onset  extremity6-7 weeks ago     Patient History / Exam:  PMH: + thyroid disease. + cervical fusion x3. last in 2016. Reflexes absent, + right shoulder abduction weakness right hand intrinsic weakness, right thumb opposition weakness    NCV & EMG Findings:  Evaluation of the right median (APB) motor nerve showed prolonged distal onset latency (6.5 ms) and reduced amplitude (1.27 mV). The right ulnar (ADM) motor nerve showed reduced amplitude (2.8 mV). The right median sensory and the right ulnar sensory nerves showed prolonged distal peak latency (R4.9, R4.5 ms) and decreased conduction velocity (R29, R31 m/s). Needle evaluation of the right triceps brachii and the right extensor indicis proprius muscles showed increased motor unit amplitude, diminished recruitment, and decreased interference pattern. The right abductor pollicis brevis muscle showed increased motor unit amplitude, early recruitment, and decreased interference pattern. The right first dorsal interosseous muscle showed increased motor unit amplitude and decreased interference pattern. All remaining muscles (as indicated in the following table) showed no evidence of electrical instability. Impression:  Study is consistent with right carpal tunnel syndrome, moderate severity with underlying chronic, old right C 7,8 radiculopathy  Thank you.          Allie Michelle DO        Nerve Conduction Studies  Motor Nerve Results      Latency Amplitude F-Lat Segment Distance CV Comment   Site (ms) Norm (mV) Norm (ms)  (cm) (m/s) Norm    Right Median (APB) Motor   Wrist 6.5  < 4.2 1.27  > 5.0         Elbow 10.5 - 2.1 -  Elbow-Wrist 20 50  > 50    Right Ulnar (ADM) Motor Wrist 4.2  < 4.2 2.8  > 3.0         Bel Elbow 7.6 - 2.8 -  Bel Elbow-Wrist 21 62  > 50    Abv Elbow 8.7 - 2.9 -  Abv Elbow-Bel Elbow 6 55  > 48      Sensory Nerve Results      Latency (Peak) Amplitude (P-P) Segment Distance CV Comment   Site (ms) Norm (µV) Norm  (cm) (m/s) Norm    Right Median Sensory   Wrist-Dig II 4.9  < 3.6 30  > 10 Wrist-Dig II 14 29  > 39    Right Ulnar Sensory   Wrist-Dig V 4.5  < 3.7 48  > 15 Wrist-Dig V 14 31  > 38        Electromyography     Side Muscle Nerve Root Ins Act Fibs Psw Amp Dur Poly Recrt Int Jacquiline Lipoma Comment   Right Deltoid Axillary C5-C6 Nml Nml Nml Nml Nml 0 Nml Nml    Right Biceps Musculocut C5-C6 Nml Nml Nml Nml Nml 0 Nml Nml    Right Triceps Radial C6-C8 Nml Nml Nml Incr Nml 0 Reduced 50%    Right Brachiorad Radial C5-C6 Nml Nml Nml Nml Nml 0 Nml Nml    Right Pronator Teres Median C6-C7 Nml Nml Nml Nml Nml 0 Nml Nml    Right EIP Post Interosseous,  R... C7-C8 Nml Nml Nml Incr Nml 0 Reduced 50%    Right APB Median C8-T1 Nml Nml Nml Incr Nml 0 Rapid 50%    Right FDI Ulnar C8-T1 Nml Nml Nml Incr Nml 0 Nml 50%    Right Cervical Paraspinal (Uppe. .. Rami C1-C3 Nml Nml Nml         Right Cervical Paraspinal (Mid) Rami C4-C6 Nml Nml Nml      diff relax   Right Cervical Paraspinal (Lowe. ..  Rami C7-C8 Nml Nml Nml               Electronically signed by Alka Peoples DO on 12/31/2019 at 10:23 AM

## 2020-01-02 ENCOUNTER — TELEPHONE (OUTPATIENT)
Dept: FAMILY MEDICINE CLINIC | Age: 65
End: 2020-01-02

## 2020-01-02 DIAGNOSIS — G56.01 RIGHT CARPAL TUNNEL SYNDROME: Primary | ICD-10-CM

## 2020-01-06 ENCOUNTER — TELEPHONE (OUTPATIENT)
Dept: FAMILY MEDICINE CLINIC | Age: 65
End: 2020-01-06

## 2020-01-08 RX ORDER — VARENICLINE TARTRATE 1 MG/1
1 TABLET, FILM COATED ORAL 2 TIMES DAILY
Qty: 60 TABLET | Refills: 2 | Status: ON HOLD | OUTPATIENT
Start: 2020-01-08 | End: 2021-09-27 | Stop reason: ALTCHOICE

## 2020-01-08 RX ORDER — VARENICLINE TARTRATE 25 MG
KIT ORAL
Qty: 42 TABLET | Refills: 0 | Status: CANCELLED | OUTPATIENT
Start: 2020-01-08 | End: 2020-02-07

## 2020-02-20 ENCOUNTER — HOSPITAL ENCOUNTER (OUTPATIENT)
Dept: WOMENS IMAGING | Age: 65
Discharge: HOME | End: 2020-02-20
Payer: COMMERCIAL

## 2020-02-20 PROCEDURE — 77063 BREAST TOMOSYNTHESIS BI: CPT

## 2020-03-17 RX ORDER — GABAPENTIN 800 MG/1
TABLET ORAL
Qty: 270 TABLET | Refills: 0 | Status: SHIPPED | OUTPATIENT
Start: 2020-03-17 | End: 2020-09-11

## 2020-03-30 ENCOUNTER — HOSPITAL ENCOUNTER (OUTPATIENT)
Dept: CT IMAGING | Age: 65
Discharge: HOME | End: 2020-03-30
Payer: COMMERCIAL

## 2020-03-30 PROCEDURE — 74160 CT ABDOMEN W/CONTRAST: CPT

## 2020-03-30 PROCEDURE — 71260 CT THORAX DX C+: CPT

## 2020-03-30 PROCEDURE — 6360000004 HC RX CONTRAST MEDICATION

## 2020-03-30 RX ADMIN — IOPAMIDOL 75 ML: 755 INJECTION, SOLUTION INTRAVENOUS at 12:41

## 2020-03-30 RX ADMIN — IOHEXOL 50 ML: 240 INJECTION, SOLUTION INTRATHECAL; INTRAVASCULAR; INTRAVENOUS; ORAL at 12:41

## 2020-04-01 RX ORDER — VALACYCLOVIR HYDROCHLORIDE 1 G/1
TABLET, FILM COATED ORAL
Qty: 90 TABLET | Refills: 0 | Status: SHIPPED | OUTPATIENT
Start: 2020-04-01 | End: 2020-07-16

## 2020-04-06 RX ORDER — FENOFIBRATE 145 MG/1
TABLET, COATED ORAL
Qty: 90 TABLET | Refills: 0 | Status: SHIPPED | OUTPATIENT
Start: 2020-04-06 | End: 2020-07-02

## 2020-04-22 RX ORDER — TIOTROPIUM BROMIDE AND OLODATEROL 3.124; 2.736 UG/1; UG/1
2 SPRAY, METERED RESPIRATORY (INHALATION) DAILY
Qty: 1 INHALER | Refills: 5 | Status: SHIPPED | OUTPATIENT
Start: 2020-04-22 | End: 2020-10-22

## 2020-05-28 RX ORDER — AZELASTINE 1 MG/ML
SPRAY, METERED NASAL
Qty: 1 BOTTLE | Refills: 0 | Status: SHIPPED | OUTPATIENT
Start: 2020-05-28 | End: 2020-10-09

## 2020-06-25 ENCOUNTER — OFFICE VISIT (OUTPATIENT)
Dept: PULMONOLOGY | Age: 65
End: 2020-06-25
Payer: COMMERCIAL

## 2020-06-25 VITALS
HEART RATE: 100 BPM | WEIGHT: 96 LBS | HEIGHT: 62 IN | TEMPERATURE: 97.4 F | OXYGEN SATURATION: 96 % | RESPIRATION RATE: 16 BRPM | BODY MASS INDEX: 17.66 KG/M2 | SYSTOLIC BLOOD PRESSURE: 128 MMHG | DIASTOLIC BLOOD PRESSURE: 72 MMHG

## 2020-06-25 PROCEDURE — 4040F PNEUMOC VAC/ADMIN/RCVD: CPT | Performed by: INTERNAL MEDICINE

## 2020-06-25 PROCEDURE — 1123F ACP DISCUSS/DSCN MKR DOCD: CPT | Performed by: INTERNAL MEDICINE

## 2020-06-25 PROCEDURE — G8926 SPIRO NO PERF OR DOC: HCPCS | Performed by: INTERNAL MEDICINE

## 2020-06-25 PROCEDURE — G8427 DOCREV CUR MEDS BY ELIG CLIN: HCPCS | Performed by: INTERNAL MEDICINE

## 2020-06-25 PROCEDURE — 3023F SPIROM DOC REV: CPT | Performed by: INTERNAL MEDICINE

## 2020-06-25 PROCEDURE — G8400 PT W/DXA NO RESULTS DOC: HCPCS | Performed by: INTERNAL MEDICINE

## 2020-06-25 PROCEDURE — 1036F TOBACCO NON-USER: CPT | Performed by: INTERNAL MEDICINE

## 2020-06-25 PROCEDURE — G8419 CALC BMI OUT NRM PARAM NOF/U: HCPCS | Performed by: INTERNAL MEDICINE

## 2020-06-25 PROCEDURE — 3017F COLORECTAL CA SCREEN DOC REV: CPT | Performed by: INTERNAL MEDICINE

## 2020-06-25 PROCEDURE — 99213 OFFICE O/P EST LOW 20 MIN: CPT | Performed by: INTERNAL MEDICINE

## 2020-06-25 PROCEDURE — 1090F PRES/ABSN URINE INCON ASSESS: CPT | Performed by: INTERNAL MEDICINE

## 2020-06-25 RX ORDER — LEVOTHYROXINE SODIUM 0.05 MG/1
50 TABLET ORAL DAILY
COMMUNITY

## 2020-06-25 RX ORDER — ALBUTEROL SULFATE 90 UG/1
2 AEROSOL, METERED RESPIRATORY (INHALATION) EVERY 6 HOURS PRN
Qty: 1 INHALER | Refills: 5 | Status: SHIPPED | OUTPATIENT
Start: 2020-06-25 | End: 2021-08-25

## 2020-06-25 NOTE — PROGRESS NOTES
REASON FOR CONSULTATION/CC:    Chief Complaint   Patient presents with    COPD     f/u COPD        Consult at request of  Eva Godwin MD     PCP: Eva Godwin MD    HISTORY OF PRESENT ILLNESS: Imelda Rudd is a 75y.o. year old female with a history of copd who presents :         COPD  Mostly has symptoms with walking up stairs. Using [employer] daily. allergic rhinitis  Controlled with Zyrtec. Using Flonase  As needed. PAST MEDICAL HISTORY:  Past Medical History:   Diagnosis Date    Anxiety     Chronic headaches     CT done 4/2016    Colon polyp     COPD, mild ([address]     PFT 8/08 a smoker no symptoms-no meds    DDD (degenerative disc disease)     Depression     Fibromyalgia     Frequent UTI     Full dentures     Hallux valgus     Hearing loss in left ear 7/29/2011    HSV (herpes simplex virus) anogenital infection     leg    Hyperlipidemia     Lung nodule 2019    2 LLL nodules    Menopausal state 1994    Occipital neuralgia     Osteoarthritis     Scoliosis     neck and back brace intermittently, based on pain    Smoker     Spinal stenosis     cane, walker    Unexplained weight loss     Patient states over 30# weight loss over 6 months and pcp is aware       PAST SURGICAL HISTORY:  Past Surgical History:   Procedure Laterality Date    BLADDER SUSPENSION  1988    BRAIN ANEURYSM SURGERY  2015    anterior communicating clipped    BUNIONECTOMY Left 08/23/2016    Ty    CATARACT REMOVAL Bilateral 2008    CERVICAL DISCECTOMY  2010    CERVICAL LAMINECTOMY  2003    Anterior approach, C4-7    CERVICAL LAMINECTOMY N/A 11/18/2016    Posterior, C2 - C5    CHOLECYSTECTOMY, LAPAROSCOPIC  2013    COLONOSCOPY      DILATION AND CURETTAGE OF UTERUS      LUMBAR LAMINECTOMY  2009    x2    SINUS SURGERY      x2    SPINAL FUSION  2009, 2012    post T12-L5 fusion and redone    THORACOSCOPY Left 3/25/2019    Dr. Stan Madrid. Alexander - thoracotomy w/takedown of adhesions, wedge excision LLL nodule#1, debulking LLL nodule #2, MSLND    TUBAL LIGATION      TUNNELED VENOUS PORT PLACEMENT Right 08/26/2019    Dr. Mary Damian  Power port       FAMILY HISTORY:  family history includes Cancer (age of onset: 43) in her mother; Cancer (age of onset: 77) in her brother; Other in her father. SOCIAL HISTORY:   reports that she quit smoking about 17 months ago. Her smoking use included cigarettes. She has a 12.00 pack-year smoking history. She has never used smokeless tobacco.      ALLERGIES:  Patient is allergic to adhesive tape; ibuprofen; naproxen; and vicodin [hydrocodone-acetaminophen]. REVIEW OF SYSTEMS:  Constitutional: Negative for fever   HENT: Negative for sore throat  Respiratory: Negative for dyspnea, cough  Cardiovascular: Negative for chest pain  Gastrointestinal: Negative for vomiting, diarrhea   Skin: Negative for rash  Psychiatric/Behavorial: Negative for anxiety     Objective:   PHYSICAL EXAM:  Blood pressure 128/72, pulse 100, temperature 97.4 °F (36.3 °C), temperature source Oral, resp. rate 16, height 5' 2\" (1.575 m), weight 96 lb (43.5 kg), last menstrual period 08/11/1993, SpO2 96 %, not currently breastfeeding.'  Gen: No distress. ENT:   Resp: No accessory muscle use. No crackles. No wheezes. No rhonchi. CV: Regular rate. Regular rhythm. No murmur or rub. No edema. Skin: Warm, dry, normal texture and turgor. No nodule on exposed extremities. M/S: No cyanosis. No clubbing. No joint deformity. Psych: Oriented x 3. No anxiety. Awake. Alert. Intact judgement and insight. Good Mood / Affect.   Memory appears in tact     Current Outpatient Medications   Medication Sig Dispense Refill    levothyroxine (SYNTHROID) 50 MCG tablet Take 50 mcg by mouth Daily      albuterol sulfate HFA (PROAIR HFA) 108 (90 Base) MCG/ACT inhaler Inhale 2 puffs into the lungs every 6 hours as needed for Wheezing or Shortness of Breath 1 Inhaler 5    azelastine (ASTELIN) 0.1 % nasal

## 2020-07-02 RX ORDER — FENOFIBRATE 145 MG/1
TABLET, COATED ORAL
Qty: 90 TABLET | Refills: 0 | Status: SHIPPED | OUTPATIENT
Start: 2020-07-02 | End: 2020-10-05

## 2020-07-15 ENCOUNTER — TELEPHONE (OUTPATIENT)
Dept: FAMILY MEDICINE CLINIC | Age: 65
End: 2020-07-15

## 2020-07-16 RX ORDER — VALACYCLOVIR HYDROCHLORIDE 1 G/1
TABLET, FILM COATED ORAL
Qty: 90 TABLET | Refills: 0 | Status: SHIPPED | OUTPATIENT
Start: 2020-07-16 | End: 2020-11-02

## 2020-07-27 ENCOUNTER — TELEPHONE (OUTPATIENT)
Dept: FAMILY MEDICINE CLINIC | Age: 65
End: 2020-07-27

## 2020-07-27 NOTE — TELEPHONE ENCOUNTER
Called HH to get more info  She does not have fever  All other vitals stable. Does have cough w green sputum, nausea, vomiting. Has not been able to eat for 3 days. Incision looks fine  Surgeon not notified.   Please advise

## 2020-07-27 NOTE — TELEPHONE ENCOUNTER
Spoke with Yuriy Collins and she states pts vitals were all good and she thinks pt should be seen.  She will call pt and discuss going to the er

## 2020-07-27 NOTE — TELEPHONE ENCOUNTER
Pt has some productive drainage that is a green color for about a day pt was vomiting for 3 days has no appetite   Pt had surgery a while back on her neck so just being careful     Bacilio Bedolla called from [facility] it is ok to call the pt

## 2020-08-26 PROBLEM — C34.90 NON-SMALL CELL LUNG CANCER (HCC): Status: ACTIVE | Noted: 2020-08-26

## 2020-08-26 PROBLEM — T66.XXXA RADIATION-INDUCED ESOPHAGITIS: Status: ACTIVE | Noted: 2020-08-26

## 2020-08-26 PROBLEM — Z79.899 OTHER LONG TERM (CURRENT) DRUG THERAPY: Status: ACTIVE | Noted: 2020-08-26

## 2020-08-26 PROBLEM — K83.8 COMMON BILE DUCT DILATION: Status: ACTIVE | Noted: 2020-08-26

## 2020-08-26 PROBLEM — R63.6 UNDERWEIGHT: Status: ACTIVE | Noted: 2017-07-05

## 2020-08-26 PROBLEM — K20.80 RADIATION-INDUCED ESOPHAGITIS: Status: ACTIVE | Noted: 2020-08-26

## 2020-08-26 PROBLEM — K83.1 BILIARY OBSTRUCTION: Status: RESOLVED | Noted: 2018-06-08 | Resolved: 2020-08-26

## 2020-08-26 PROBLEM — M15.9 GENERALIZED OSTEOARTHRITIS OF MULTIPLE SITES: Status: ACTIVE | Noted: 2020-01-23

## 2020-08-26 PROBLEM — E03.9 HYPOTHYROIDISM: Status: ACTIVE | Noted: 2020-01-23

## 2020-08-26 PROBLEM — Z51.12 ENCOUNTER FOR ANTINEOPLASTIC CHEMOTHERAPY AND IMMUNOTHERAPY: Status: ACTIVE | Noted: 2020-08-26

## 2020-08-26 PROBLEM — F17.210 CIGARETTE NICOTINE DEPENDENCE WITHOUT COMPLICATION: Status: ACTIVE | Noted: 2020-01-23

## 2020-08-26 PROBLEM — Z79.891 CHRONIC PRESCRIPTION OPIATE USE: Status: ACTIVE | Noted: 2020-01-23

## 2020-08-26 PROBLEM — Z51.11 ENCOUNTER FOR ANTINEOPLASTIC CHEMOTHERAPY AND IMMUNOTHERAPY: Status: ACTIVE | Noted: 2020-08-26

## 2020-09-09 ENCOUNTER — HOSPITAL ENCOUNTER (OUTPATIENT)
Dept: CT IMAGING | Age: 65
Discharge: HOME | End: 2020-09-09
Payer: COMMERCIAL

## 2020-09-09 PROCEDURE — 71260 CT THORAX DX C+: CPT

## 2020-09-09 PROCEDURE — 6360000004 HC RX CONTRAST MEDICATION: Performed by: INTERNAL MEDICINE

## 2020-09-09 PROCEDURE — 74160 CT ABDOMEN W/CONTRAST: CPT

## 2020-09-09 RX ADMIN — IOPAMIDOL 75 ML: 755 INJECTION, SOLUTION INTRAVENOUS at 17:21

## 2020-09-09 RX ADMIN — IOHEXOL 50 ML: 240 INJECTION, SOLUTION INTRATHECAL; INTRAVASCULAR; INTRAVENOUS; ORAL at 17:17

## 2020-09-11 RX ORDER — GABAPENTIN 800 MG/1
TABLET ORAL
Qty: 270 TABLET | Refills: 0 | Status: SHIPPED | OUTPATIENT
Start: 2020-09-11 | End: 2021-02-03

## 2020-10-12 RX ORDER — AZELASTINE 1 MG/ML
1 SPRAY, METERED NASAL 2 TIMES DAILY
Qty: 1 BOTTLE | Refills: 3 | Status: SHIPPED | OUTPATIENT
Start: 2020-10-12 | End: 2021-09-01 | Stop reason: SDUPTHER

## 2020-10-16 ENCOUNTER — HOSPITAL ENCOUNTER (OUTPATIENT)
Dept: PET IMAGING | Age: 65
Discharge: HOME | End: 2020-10-16
Payer: COMMERCIAL

## 2020-10-16 VITALS — WEIGHT: 96 LBS | HEIGHT: 62 IN | BODY MASS INDEX: 17.66 KG/M2

## 2020-10-16 PROCEDURE — A9552 F18 FDG: HCPCS | Performed by: INTERNAL MEDICINE

## 2020-10-16 PROCEDURE — 78815 PET IMAGE W/CT SKULL-THIGH: CPT

## 2020-10-16 PROCEDURE — 3430000000 HC RX DIAGNOSTIC RADIOPHARMACEUTICAL: Performed by: INTERNAL MEDICINE

## 2020-10-16 RX ORDER — FLUDEOXYGLUCOSE F 18 200 MCI/ML
13.53 INJECTION, SOLUTION INTRAVENOUS
Status: COMPLETED | OUTPATIENT
Start: 2020-10-16 | End: 2020-10-16

## 2020-10-16 RX ADMIN — FLUDEOXYGLUCOSE F 18 13.53 MILLICURIE: 200 INJECTION, SOLUTION INTRAVENOUS at 08:05

## 2020-10-22 ENCOUNTER — TELEPHONE (OUTPATIENT)
Dept: PULMONOLOGY | Age: 65
End: 2020-10-22

## 2020-10-22 NOTE — TELEPHONE ENCOUNTER
Stiolto Respimat is no longer covered under patient's plan. Anoro and Bevespi are now on formulary. Please prescribe.      Thanks-Divya

## 2020-10-26 ENCOUNTER — VIRTUAL VISIT (OUTPATIENT)
Dept: FAMILY MEDICINE CLINIC | Age: 65
End: 2020-10-26
Payer: COMMERCIAL

## 2020-10-26 ENCOUNTER — TELEPHONE (OUTPATIENT)
Dept: FAMILY MEDICINE CLINIC | Age: 65
End: 2020-10-26

## 2020-10-26 PROCEDURE — 99442 PR PHYS/QHP TELEPHONE EVALUATION 11-20 MIN: CPT | Performed by: NURSE PRACTITIONER

## 2020-10-26 RX ORDER — DOXYCYCLINE HYCLATE 100 MG
100 TABLET ORAL 2 TIMES DAILY
Qty: 20 TABLET | Refills: 0 | Status: SHIPPED | OUTPATIENT
Start: 2020-10-26 | End: 2020-11-05

## 2020-10-26 RX ORDER — PREDNISONE 10 MG/1
TABLET ORAL
Qty: 30 TABLET | Refills: 0 | Status: SHIPPED | OUTPATIENT
Start: 2020-10-26 | End: 2020-11-05

## 2020-10-26 NOTE — PROGRESS NOTES
Renzo Lemus is a 77 y.o. female evaluated via telephone on 10/26/2020. She was not able to get video to work. Consent:  She and/or health care decision maker is aware that that she may receive a bill for this telephone service, depending on her insurance coverage, and has provided verbal consent to proceed: Yes      Documentation:  I communicated with the patient and/or health care decision maker about:  Patient reports that she is having a productive cough for the past 8-9 days. Seen doctors at [facility] and was given zpak last week. Completed this today. Positive for PND. Sputum production is yellow. Denies fever, rhinorrhea, shortness of breath. Positive for sinus headache. Reports that her chest sounded good when she was at her oncologist.  Home nurse reports normal vitals including pulse ox. Has history of COPD and takes bevespi BID. Reports that she has only needed to use her albuterol inhaler once. Also has history of lung cancer but reports that this is in remission. Details of this discussion including any medical advice provided: Suspect that she has a COPD exacerbation. Will treat with doxycycline and prednisone. Continue bevespi BID and albuterol inhaler PRN. Patient is to call or go to the ER if symptoms worsen or fail to improve within a couple of days. I affirm this is a Patient Initiated Episode with a Patient who has not had a related appointment within my department in the past 7 days or scheduled within the next 24 hours.     Patient identification was verified at the start of the visit: Yes    Total Time: minutes: 11-20 minutes  11 minutes   Note: not billable if this call serves to triage the patient into an appointment for the relevant concern      Meliza Zuniga

## 2020-11-02 RX ORDER — FENOFIBRATE 145 MG/1
TABLET, COATED ORAL
Qty: 90 TABLET | Refills: 0 | Status: SHIPPED | OUTPATIENT
Start: 2020-11-02 | End: 2021-03-02

## 2020-11-02 RX ORDER — VALACYCLOVIR HYDROCHLORIDE 1 G/1
TABLET, FILM COATED ORAL
Qty: 90 TABLET | Refills: 0 | Status: SHIPPED | OUTPATIENT
Start: 2020-11-02 | End: 2021-02-08 | Stop reason: SDUPTHER

## 2020-11-18 ENCOUNTER — OFFICE VISIT (OUTPATIENT)
Dept: FAMILY MEDICINE CLINIC | Age: 65
End: 2020-11-18
Payer: COMMERCIAL

## 2020-11-18 VITALS
HEART RATE: 95 BPM | TEMPERATURE: 97.3 F | DIASTOLIC BLOOD PRESSURE: 64 MMHG | BODY MASS INDEX: 18.77 KG/M2 | WEIGHT: 102 LBS | SYSTOLIC BLOOD PRESSURE: 116 MMHG | RESPIRATION RATE: 16 BRPM | HEIGHT: 62 IN | OXYGEN SATURATION: 98 %

## 2020-11-18 PROCEDURE — 1036F TOBACCO NON-USER: CPT | Performed by: FAMILY MEDICINE

## 2020-11-18 PROCEDURE — G8400 PT W/DXA NO RESULTS DOC: HCPCS | Performed by: FAMILY MEDICINE

## 2020-11-18 PROCEDURE — 1090F PRES/ABSN URINE INCON ASSESS: CPT | Performed by: FAMILY MEDICINE

## 2020-11-18 PROCEDURE — G8420 CALC BMI NORM PARAMETERS: HCPCS | Performed by: FAMILY MEDICINE

## 2020-11-18 PROCEDURE — 99214 OFFICE O/P EST MOD 30 MIN: CPT | Performed by: FAMILY MEDICINE

## 2020-11-18 PROCEDURE — G8926 SPIRO NO PERF OR DOC: HCPCS | Performed by: FAMILY MEDICINE

## 2020-11-18 PROCEDURE — 4040F PNEUMOC VAC/ADMIN/RCVD: CPT | Performed by: FAMILY MEDICINE

## 2020-11-18 PROCEDURE — 3023F SPIROM DOC REV: CPT | Performed by: FAMILY MEDICINE

## 2020-11-18 PROCEDURE — 3017F COLORECTAL CA SCREEN DOC REV: CPT | Performed by: FAMILY MEDICINE

## 2020-11-18 PROCEDURE — 1123F ACP DISCUSS/DSCN MKR DOCD: CPT | Performed by: FAMILY MEDICINE

## 2020-11-18 PROCEDURE — G8427 DOCREV CUR MEDS BY ELIG CLIN: HCPCS | Performed by: FAMILY MEDICINE

## 2020-11-18 PROCEDURE — G8484 FLU IMMUNIZE NO ADMIN: HCPCS | Performed by: FAMILY MEDICINE

## 2020-11-18 NOTE — PROGRESS NOTES
02/02/2018 103 (H) <100 mg/dL Final     HDL   Date Value Ref Range Status   02/02/2018 55 40 - 60 mg/dL Final     Triglycerides   Date Value Ref Range Status   02/02/2018 78 0 - 150 mg/dL Final     Lab Results   Component Value Date    GLUCOSE 107 (H) 03/31/2019     Lab Results   Component Value Date     03/31/2019    K 3.7 03/31/2019    CREATININE 0.9 08/06/2019     Lab Results   Component Value Date    WBC 6.1 08/26/2019    HGB 12.4 08/26/2019    HCT 36.2 08/26/2019    .4 (H) 08/26/2019     08/26/2019     Lab Results   Component Value Date    ALT 10 02/06/2019    AST 20 02/06/2019    ALKPHOS 72 02/06/2019    BILITOT 0.3 02/06/2019     TSH (uIU/mL)   Date Value   12/10/2015 2.36     Lab Results   Component Value Date    LABA1C 5.4 02/08/2019     Objective:   PHYSICAL EXAM  /64 (Site: Right Upper Arm, Position: Sitting, Cuff Size: Medium Adult)   Pulse 95   Temp 97.3 °F (36.3 °C) (Temporal)   Resp 16   Ht 5' 2\" (1.575 m)   Wt 102 lb (46.3 kg)   LMP 08/11/1993 (Approximate)   SpO2 98%   BMI 18.66 kg/m²   BP Readings from Last 5 Encounters:   11/18/20 116/64   06/25/20 128/72   12/26/19 132/87   11/26/19 (!) 142/70   11/26/19 (!) 140/80     Wt Readings from Last 5 Encounters:   11/18/20 102 lb (46.3 kg)   10/16/20 96 lb (43.5 kg)   06/25/20 96 lb (43.5 kg)   12/26/19 104 lb (47.2 kg)   11/26/19 108 lb (49 kg)      GENERAL:   · well-developed, well-nourished, alert, no distress. EYES:   · External findings: lids and lashes normal and conjunctivae and sclerae normal  · Eyes: no periorbital cellulitis.    ENT:   · External nose and ears appear normal  LUNGS:    · Breathing unlabored  · clear to auscultation bilaterally and diminished breath sounds bilaterally  CARDIOVASC:   · Regular rate and rhythm, S1, S2 normal. No murmur, click, rub or gallop  · LEGS:  Lower extremity edema: none    SKIN: warm and dry  PSYCH:    · Alert and oriented  · Normal reasoning, insight good  · Facial expressions full, mood appropriate      Assessment and Plan:      Diagnosis Orders   1. Acquired hypothyroidism     2. Fibromyalgia     3. Malignant neoplasm of lower lobe of left lung ([facility] [address] [address]- incomplete resection due to vascular proximity 4/2019; XRT, chemo, immunotherapy     4. Pulmonary emphysema, unspecified emphysema type ([facility] [address]     5. Anxiety     6. Recurrent major depressive disorder, in full remission ([facility] [address]     Stable. Plan as above and below. INSTRUCTIONS  NEXT APPOINTMENT: Please schedule annual complete physical (30 minutes) in 6 months. · PLEASE TAKE THIS FORM TO CHECK-OUT WINDOW TO SCHEDULE NEXT VISIT.

## 2020-11-18 NOTE — PATIENT INSTRUCTIONS
INSTRUCTIONS  NEXT APPOINTMENT: Please schedule annual complete physical (30 minutes) in 6 months. · PLEASE TAKE THIS FORM TO CHECK-OUT WINDOW TO SCHEDULE NEXT VISIT.

## 2020-12-11 ENCOUNTER — OFFICE VISIT (OUTPATIENT)
Dept: PULMONOLOGY | Age: 65
End: 2020-12-11
Payer: COMMERCIAL

## 2020-12-11 VITALS
TEMPERATURE: 97.5 F | DIASTOLIC BLOOD PRESSURE: 78 MMHG | HEIGHT: 62 IN | OXYGEN SATURATION: 95 % | WEIGHT: 102 LBS | HEART RATE: 96 BPM | SYSTOLIC BLOOD PRESSURE: 118 MMHG | BODY MASS INDEX: 18.77 KG/M2 | RESPIRATION RATE: 12 BRPM

## 2020-12-11 PROCEDURE — 3017F COLORECTAL CA SCREEN DOC REV: CPT | Performed by: INTERNAL MEDICINE

## 2020-12-11 PROCEDURE — G8400 PT W/DXA NO RESULTS DOC: HCPCS | Performed by: INTERNAL MEDICINE

## 2020-12-11 PROCEDURE — G8926 SPIRO NO PERF OR DOC: HCPCS | Performed by: INTERNAL MEDICINE

## 2020-12-11 PROCEDURE — 1123F ACP DISCUSS/DSCN MKR DOCD: CPT | Performed by: INTERNAL MEDICINE

## 2020-12-11 PROCEDURE — 1090F PRES/ABSN URINE INCON ASSESS: CPT | Performed by: INTERNAL MEDICINE

## 2020-12-11 PROCEDURE — 99213 OFFICE O/P EST LOW 20 MIN: CPT | Performed by: INTERNAL MEDICINE

## 2020-12-11 PROCEDURE — 4040F PNEUMOC VAC/ADMIN/RCVD: CPT | Performed by: INTERNAL MEDICINE

## 2020-12-11 PROCEDURE — 3023F SPIROM DOC REV: CPT | Performed by: INTERNAL MEDICINE

## 2020-12-11 PROCEDURE — G8484 FLU IMMUNIZE NO ADMIN: HCPCS | Performed by: INTERNAL MEDICINE

## 2020-12-11 PROCEDURE — G8420 CALC BMI NORM PARAMETERS: HCPCS | Performed by: INTERNAL MEDICINE

## 2020-12-11 PROCEDURE — 1036F TOBACCO NON-USER: CPT | Performed by: INTERNAL MEDICINE

## 2020-12-11 PROCEDURE — G8427 DOCREV CUR MEDS BY ELIG CLIN: HCPCS | Performed by: INTERNAL MEDICINE

## 2020-12-11 RX ORDER — ARIPIPRAZOLE 15 MG/1
7.5 TABLET ORAL DAILY
COMMUNITY

## 2020-12-11 NOTE — PROGRESS NOTES
REASON FOR CONSULTATION/CC:    Chief Complaint   Patient presents with    Follow-up     emphysema        Consult at request of  Bernadine Ojeda MD     PCP: Bernadine Ojeda MD    HISTORY OF PRESENT ILLNESS: Jose Constantino is a 77y.o. year old female with a history of copd who presents :          Carin [place] and controlled. Has albuterol but has not needed to using it much. She will be shortness of breath with walking but stairs. allergic rhinitis  Flonase daily and Zyrtec  as needed. Controlled.                       PAST MEDICAL HISTORY:  Past Medical History:   Diagnosis Date    Anxiety     Chronic headaches     CT done 4/2016    Colon polyp     COPD, mild ([address]     PFT 8/08 a smoker no symptoms-no meds    DDD (degenerative disc disease)     Depression     Fibromyalgia     Frequent UTI     Full dentures     Hallux valgus     Hearing loss in left ear 7/29/2011    HSV (herpes simplex virus) anogenital infection     leg    Hyperlipidemia     Lung nodule 2019    2 LLL nodules    Menopausal state 1994    Occipital neuralgia     Osteoarthritis     Scoliosis     neck and back brace intermittently, based on pain    Smoker     Spinal stenosis     cane, walker    Unexplained weight loss     Patient states over 30# weight loss over 6 months and pcp is aware       PAST SURGICAL HISTORY:  Past Surgical History:   Procedure Laterality Date    BLADDER SUSPENSION  1988    BRAIN ANEURYSM SURGERY  2015    anterior communicating clipped    BUNIONECTOMY Left 08/23/2016    Ty    CATARACT REMOVAL Bilateral 2008    CERVICAL DISCECTOMY  2010    CERVICAL LAMINECTOMY  2003    Anterior approach, C4-7    CERVICAL LAMINECTOMY N/A 11/18/2016    Posterior, C2 - C5    CHOLECYSTECTOMY, LAPAROSCOPIC  2013    COLONOSCOPY      DILATION AND CURETTAGE OF UTERUS      LUMBAR LAMINECTOMY  2009    x2    SINUS SURGERY      x2    SPINAL FUSION  2009, 2012    post T12-L5 fusion and redone    THORACOSCOPY Left 3/25/2019    Dr. Gottlieb Asa. Alexander - thoracotomy w/takedown of adhesions, wedge excision LLL nodule#1, debulking LLL nodule #2, MSLND    TUBAL LIGATION      TUNNELED VENOUS PORT PLACEMENT Right 08/26/2019    Dr. Felix Loya  Power port       FAMILY HISTORY:  family history includes Cancer (age of onset: 43) in her mother; Cancer (age of onset: 77) in her brother; Other in her father. SOCIAL HISTORY:   reports that she quit smoking about 22 months ago. Her smoking use included cigarettes. She has a 12.00 pack-year smoking history. She has never used smokeless tobacco.      ALLERGIES:  Patient is allergic to adhesive tape; ibuprofen; naproxen; nsaids; and vicodin [hydrocodone-acetaminophen]. REVIEW OF SYSTEMS:  Constitutional: Negative for fever   HENT: Negative for sore throat  Respiratory: Negative for dyspnea, cough  Cardiovascular: Negative for chest pain  Gastrointestinal: Negative for vomiting, diarrhea   Skin: Negative for rash  Psychiatric/Behavorial: Negative for anxiety     Objective:   PHYSICAL EXAM:  Blood pressure 118/78, pulse 96, temperature 97.5 °F (36.4 °C), temperature source Temporal, resp. rate 12, height 5' 2\" (1.575 m), weight 102 lb (46.3 kg), last menstrual period 08/11/1993, SpO2 95 %, not currently breastfeeding.'  Gen: No distress. ENT:   Resp: No accessory muscle use. No crackles. No wheezes. No rhonchi. CV: Regular rate. Regular rhythm. No murmur or rub. No edema. Skin: Warm, dry, normal texture and turgor. No nodule on exposed extremities. M/S: No cyanosis. No clubbing. No joint deformity. Psych: Oriented x 3. No anxiety. Awake. Alert. Intact judgement and insight. Good Mood / Affect.   Memory appears in tact     Current Outpatient Medications   Medication Sig Dispense Refill    ARIPiprazole (ABILIFY) 5 MG tablet Take 5 mg by mouth daily      fenofibrate (TRICOR) 145 MG tablet TAKE ONE TABLET BY MOUTH DAILY 90 tablet 0    valACYclovir (VALTREX) 1 g tablet TAKE ONE TABLET BY MOUTH DAILY 90 tablet 0    glycopyrrolate-formoterol (BEVESPI AEROSPHERE) 9-4.8 MCG/ACT AERO Inhale 2 puffs into the lungs 2 times daily 1 Inhaler 5    azelastine (ASTELIN) 0.1 % nasal spray 1 spray by Nasal route 2 times daily 1 Bottle 3    gabapentin (NEURONTIN) 800 MG tablet TAKE ONE TABLET BY MOUTH THREE TIMES A  tablet 0    levothyroxine (SYNTHROID) 50 MCG tablet Take 50 mcg by mouth Daily      albuterol sulfate HFA (PROAIR HFA) 108 (90 Base) MCG/ACT inhaler Inhale 2 puffs into the lungs every 6 hours as needed for Wheezing or Shortness of Breath 1 Inhaler 5    fluticasone (FLONASE) 50 MCG/ACT nasal spray SHAKE AND SPRAY TWO SPRAYS IN EACH NOSTRIL ONCE DAILY 1 Bottle 11    OXcarbazepine (TRILEPTAL) 300 MG tablet Take 1 tablet by mouth Take 300 mg by mouth 2 times daily with additional half tab at bedtime      Misc. Devices (ROLLATOR ULTRA-LIGHT) MISC As needed 1 each 0    Multiple Vitamins-Minerals (THERAPEUTIC MULTIVITAMIN-MINERALS) tablet Take 1 tablet by mouth daily      Cranberry 125 MG TABS Take 1 tablet by mouth daily      Acetaminophen (TYLENOL) 325 MG CAPS Take 650 mg by mouth      OXYCONTIN 30 MG T12A extended release tablet TK 1 T PO QID PRN P  0    Probiotic Product (PROBIOTIC DAILY PO) Take 1 tablet by mouth daily       cetirizine (ZYRTEC) 10 MG tablet Take 10 mg by mouth nightly as needed       buPROPion (WELLBUTRIN XL) 150 MG XL tablet 450 mg every morning [facility] 30 tablet     mirtazapine (REMERON) 30 MG tablet Take 30 mg by mouth nightly [facility] 30 tablet     DULoxetine (CYMBALTA) 60 MG capsule Take 120 mg by mouth daily [facility] 30 capsule     varenicline (CHANTIX CONTINUING MONTH GIANFRANCO) 1 MG tablet Take 1 tablet by mouth 2 times daily 60 tablet 2    varenicline (CHANTIX STARTING MONTH GIANFRANCO) 0.5 MG X 11 & 1 MG X 42 tablet Take by mouth. 42 tablet 0     No current facility-administered medications for this visit.         Data Reviewed:   CBC and Renal

## 2020-12-11 NOTE — LETTER
[facility]   [address] [facility] [facility]  Phone: [phone]  Fax: [phone]     12/11/2020    Dr. Kurt Peacock MD    I have seen your patient, Cynthia Minor on follow up. Here is the assessment and plan for your patient. Any question, please do not hesitate to call. Problem List Items Addressed This Visit     COPD (chronic obstructive pulmonary disease) mild- PFT 2008     Continues to be controlled with Stiolto. Rare use of albuterol          Allergic rhinitis     Daily Flonase  With Zyrtec as needed.                  Sincerely,    [facility]  [phone]

## 2020-12-16 ENCOUNTER — TELEPHONE (OUTPATIENT)
Dept: FAMILY MEDICINE CLINIC | Age: 65
End: 2020-12-16

## 2020-12-16 DIAGNOSIS — M50.90 DISC DISORDER OF CERVICAL REGION: Primary | ICD-10-CM

## 2020-12-16 NOTE — TELEPHONE ENCOUNTER
Pt. Called in asking for a referral for OT for her hands after she had her spine surgery on June 30. She doesn't have full function in her hands because of her surgery.     Please Advise

## 2020-12-21 ENCOUNTER — HOSPITAL ENCOUNTER (OUTPATIENT)
Dept: OCCUPATIONAL THERAPY | Age: 65
Setting detail: THERAPIES SERIES
Discharge: HOME | End: 2020-12-21
Payer: COMMERCIAL

## 2020-12-21 PROCEDURE — 97165 OT EVAL LOW COMPLEX 30 MIN: CPT

## 2020-12-21 PROCEDURE — 97110 THERAPEUTIC EXERCISES: CPT

## 2020-12-21 ASSESSMENT — 9 HOLE PEG TEST
TESTTIME_SECONDS: 33.22
TEST_RESULT: IMPAIRED
TEST_RESULT: IMPAIRED
TESTTIME_SECONDS: 57.84

## 2020-12-21 NOTE — PROGRESS NOTES
Occupational Therapy       Outpatient Occupational Therapy  [] [facility] [address] KEL FITZGERALD   Phone: [phone]   Fax: [phone]   [x] [facility]  Phone: [phone]   Fax: [phone]  [] [facility]   Phone: [phone]   Fax: [phone]      To: Referring Practitioner: Lincoln Moctezuma      Patient: Tabby Sena  : 1955  MRN: [id number]  Evaluation Date: 2020      Diagnosis Information:  Diagnosis: M50.90 Disc disorder of cervical region   OT Insurance Information: Virginie Tillman  Dear Dr. Temi Hamilton,   The following patient has been evaluated for occupational therapy services and for therapy to continue, Medicare requires monthly physician review of the treatment plan. Please review the attached evaluation and/or summary of the patient's plan of care, and verify that you agree therapy should continue by signing the attached document and sending it back to our office. Plan of Care/Treatment to date:  [x] Therapeutic Exercise   [] Modalities:  [x] Therapeutic Activity    [] Ultrasound  [] Electrical Stimulation   [x] Activities of Daily Living    [] Fluidotherapy [] Kinesiotaping  [] Neuromuscular Re-education   [] Iontophoresis [] Coldpack/hotpack   [x] Instruction in HEP     [] Contrast Bath  [] Manual Therapy     Other:  [] Aquatic Therapy      [] ? Frequency/Duration:  # Days per week: [x] 1 day # Weeks: [] 1 week [] 5 weeks      [x] 2 days? [] 2 weeks [] 6 weeks     [] 3 days   [] 3 weeks [] 7 weeks     [] 4 days   [x] 4 weeks [] 8 weeks    Rehab Potential: [] excellent [x] good [] fair  [] poor     Goals  Short term goals  Time Frame for Short term goals: 4-6 weeks  Short term goal 1: Decrease QuickDASH score by 5 points for increased ability to complete ADLs/IADL  Short term goal 2:  Increase B  strength by 5# for increased ability to complete household chores Short term goal 3: Increase B 9 hole peg test by 5 seconds for increased ability to complete crafting activities  Short term goal 4: Increase all L hand  strength by 2# for increased ability to complete ADLs  Short term goal 5: Pt will be Ind with HEP needing no cues  Patient Goals   Patient goals : increase strength and use of hands/UE      Electronically signed by:  ALONDRA Mares/DAVION      If you have any questions or concerns, please don't hesitate to call.   Thank you for your referral.      Physician Signature:________________________________Date:__________________  By signing above, therapists plan is approved by physician

## 2020-12-21 NOTE — PROGRESS NOTES
Occupational Therapy      Occupational Therapy Daily Treatment Note    Date:  2020    Patient Name:  Cynthia Minor     :  1955  MRN: [id number]  Restrictions/Precautions:    Medical/Treatment Diagnosis Information:  · Diagnosis: M50.90 Disc disorder of cervical region  ·    Insurance/Certification information:  OT Insurance Information: Evelin Martini  Physician Information:  Referring Practitioner: Dread Head  Plan of care signed (Y/N):  N  Visit# / total visits:     Pain level: 0/10     G-Code (if applicable):      Date / Visit # G-Code Applied:  /    QuickDASH Total Score: 31 (20 1404)       QuickDASH Disability/Symptom Score : 45.45 % (20 1404)    Progress Note: [x]  Yes  []  No  Next due by: Visit #10      Subjective: Pt presents to OT evaluation with complaints of B UE weakness. Pt reports chronic pain in neck. Objective Measures:  Eval   ADL  Feeding: Independent  Grooming: Independent  UE Bathing: Independent  LE Bathing: Independent  UE Dressing: Independent  LE Dressing: Independent  Toileting: Independent  Additional Comments: difficulty with buttons, zippers, bra straps, etc  Tone RUE  RUE Tone: Normotonic  Tone LUE  LUE Tone: Normotonic  Coordination  Movements Are Fluid And Coordinated: No  Coordination and Movement description: Right UE;Left UE;Fine motor impairments  Quality of Movement Other  Comment: primarily due to decreased strength  Balance  Sitting Balance: Independent  Standing Balance: Independent  Functional Mobility  Functional - Mobility Device: No device  Assist Level:  Independent  Cognition  Overall Cognitive Status: WFL  Sensation  Overall Sensation Status: Impaired  Light Touch: Partial deficits in the RUE;Partial deficits in the LUE(pt reports minimal noted tingling in L finger tips intermittently)  LUE AROM (degrees)  LUE AROM : WFL  RUE AROM (degrees)  RUE AROM : WFL  LUE Strength  Gross LUE Strength: Exceptions to [facility] L Elbow Flex: 4/5  L Elbow Ext: 4-/5  L Wrist Flex: 4-/5  L Wrist Ext: 4-/5  L Hand General: 3+/5  RUE Strength  Gross RUE Strength: Exceptions to [facility]  R Elbow Flex: 4/5  R Elbow Ext: 4-/5  R Wrist Flex: 4-/5  R Wrist Ext: 4-/5  R Hand General: 4-/5  Hand Dominance  Hand Dominance: Left  Left Hand Strength -  (lbs)  Handle Setting 2: 7, 6, 5  Left Hand Strength - Pinch (lbs)  Lateral: 1, 1, 1  Tip: 1, 1, 1  Palmar 3 point: 1, 1, 1  Left 9-Hole Peg Test  Left 9-Hole Peg Test: Impaired  Right Hand Strength -  (lbs)  Handle Setting 2: 20, 18, 17  Right Hand Strength - Pinch (lbs)  Lateral: 4, 4, 3  Tip: 2, 2, 3  Palmar 3 point: 4, 3, 3  Right 9-Hole Peg Test  Right 9-Hole Peg Test: Impaired  Fine Motor Skills  Left 9-Hole Peg Test: Impaired  Left 9 Hole Peg Test Time (secs): 57.84  Right 9-Hole Peg Test: Impaired  Right 9 Hole Peg Test Time (secs): 33.22                              Therapeutic Activities:      Therapeutic Exercise:   Exercise/Equipment  Resistance/Repetitions   Other comments   Theraputty  Yellow; x10 reps    Grasp/release    Extension    Tip tip pinch (each finger)    Adduction    Lateral Pinch                   Home Exercise Program:    · Theraputty (yellow) w/ handout    Manual Treatments:  N/A    Modalities:  N/A    Timed Code Treatment Minutes:  45 minutes    Total Treatment Minutes:  (15 minute eval) 30 minutes    Treatment/Activity Tolerance:     [x]  Patient tolerated treatment well []  Patient limited by fatigue    []  Patient limited by pain []  Patient limited by other medical complications   []  Other:

## 2020-12-21 NOTE — PROGRESS NOTES
Occupational Therapy  Occupational Therapy Initial Assessment  Date:  2020    Patient Name: Zulema Lesch  MRN: [id number]     :  1955          Restrictions  Position Activity Restriction  Other position/activity restrictions: No lifting over 5#  Subjective   General  Chart Reviewed: Yes  Patient assessed for rehabilitation services?: Yes  Additional Pertinent Hx: Pt is a 77 y.o. female who is s/p neck surgery 2020. Pt reports increased weakness in B UE and hands with L worse than R. Family / Caregiver Present: No  Referring Practitioner: Cheri Puentes  Diagnosis: M50.90 Disc disorder of cervical region  OT Visit Information  Onset Date: 20  OT Insurance Information: [facility]  Subjective  Subjective: Pt presents to OT evaluation with complaints of B UE weakness. Pt reports chronic pain in neck. Home Living  Social/Functional History  Lives With: Alone  Type of Home: Apartment  Home Layout: One level(second floor)  Home Access: Stairs to enter with rails  Entrance Stairs - Rails: Both  Bathroom Shower/Tub: Tub/Shower unit  Bathroom Toilet: Standard  ADL Assistance: Independent  Homemaking Assistance: Independent  Homemaking Responsibilities: Yes(aide for light housekeeping 1x a week)  Ambulation Assistance: Independent  Transfer Assistance: Independent  Active :  Yes  Additional Comments: pt reports recent falls ~1x every other week     Objective      ADL  Feeding: Independent  Grooming: Independent  UE Bathing: Independent  LE Bathing: Independent  UE Dressing: Independent  LE Dressing: Independent  Toileting: Independent  Additional Comments: difficulty with buttons, zippers, bra straps, etc  Tone RUE  RUE Tone: Normotonic  Tone LUE  LUE Tone: Normotonic  Coordination  Movements Are Fluid And Coordinated: No  Coordination and Movement description: Right UE;Left UE;Fine motor impairments  Quality of Movement Other  Comment: primarily due to decreased strength Balance  Sitting Balance: Independent  Standing Balance: Independent  Functional Mobility  Functional - Mobility Device: No device  Assist Level: Independent           Cognition  Overall Cognitive Status: WFL     Sensation  Overall Sensation Status: Impaired  Light Touch: Partial deficits in the RUE;Partial deficits in the LUE(pt reports minimal noted tingling in L finger tips intermittently)           LUE AROM (degrees)  LUE AROM : WFL  RUE AROM (degrees)  RUE AROM : WFL  LUE Strength  Gross LUE Strength: Exceptions to [facility]  L Elbow Flex: 4/5  L Elbow Ext: 4-/5  L Wrist Flex: 4-/5  L Wrist Ext: 4-/5  L Hand General: 3+/5  RUE Strength  Gross RUE Strength: [facility] to [facility]  R Elbow Flex: 4/5  R Elbow Ext: 4-/5  R Wrist Flex: 4-/5  R Wrist Ext: 4-/5  R Hand General: 4-/5     Hand Dominance  Hand Dominance: Left  Left Hand Strength -  (lbs)  Handle Setting 2: 7, 6, 5  Left Hand Strength - Pinch (lbs)  Lateral: 1, 1, 1  Tip: 1, 1, 1  Palmar 3 point: 1, 1, 1  Left 9-Hole Peg Test  Left 9-Hole Peg Test: Impaired  Right Hand Strength -  (lbs)  Handle Setting 2: 20, 18, 17  Right Hand Strength - Pinch (lbs)  Lateral: 4, 4, 3  Tip: 2, 2, 3  Palmar 3 point: 4, 3, 3  Right 9-Hole Peg Test  Right 9-Hole Peg Test: Impaired  Fine Motor Skills  Left 9-Hole Peg Test: Impaired  Left 9 Hole Peg Test Time (secs): 57.84  Right 9-Hole Peg Test: Impaired  Right 9 Hole Peg Test Time (secs): 33.22          Assessment   Assessment  Performance deficits / Impairments: Decreased coordination;Decreased ADL status; Decreased sensation;Decreased high-level IADLs;Decreased fine motor control Assessment: Pt is a 77 y.o. female who is s/p neck surgery June 30th 2020. Pt reports increased weakness in B UE and hands with L worse than R. Pt presents to OT evaluation with significant B UE weakness, coordination, and sensation deficits. Despite B UE deficits pt reports being Ind with ADLs/IADLs although with difficulty and adaptations. Pt demonstrates deficits greater in L UE vs R UE. Pt is L handed. Pt will benefit from skilled OT services at this time address noted deficits. Prognosis: Good  Decision Making: Low Complexity  Exam: ROM, strength, coordination, sensation, ADLs/IADLs  REQUIRES OT FOLLOW UP: Yes       Plan   Plan  Times per week: 1-2x  Current Treatment Recommendations: Strengthening, Patient/Caregiver Education & Training, Self-Care / ADL    G-Code     OutComes Score                 QuickDASH Total Score: 31 (12/21/20 1404)       QuickDASH Disability/Symptom Score : 45.45 % (12/21/20 1404)                        Goals  Short term goals  Time Frame for Short term goals: 4-6 weeks  Short term goal 1: Decrease QuickDASH score by 5 points for increased ability to complete ADLs/IADL  Short term goal 2: Increase B  strength by 5# for increased ability to complete household chores  Short term goal 3: Increase B 9 hole peg test by 5 seconds for increased ability to complete crafting activities  Short term goal 4:  Increase all L hand  strength by 2# for increased ability to complete ADLs  Short term goal 5: Pt will be Ind with HEP needing no cues  Patient Goals   Patient goals : increase strength and use of hands/UE       Therapy Time   Individual Concurrent Group Co-treatment   Time In 1345         Time Out 1430         Minutes 45         Timed Code Treatment Minutes: 30 Minutes(15 minute eval)       Bhaskar Naqvi OTR/L

## 2020-12-23 ENCOUNTER — HOSPITAL ENCOUNTER (OUTPATIENT)
Dept: OCCUPATIONAL THERAPY | Age: 65
Setting detail: THERAPIES SERIES
Discharge: HOME | End: 2020-12-23
Payer: COMMERCIAL

## 2020-12-23 PROCEDURE — 97110 THERAPEUTIC EXERCISES: CPT

## 2020-12-23 NOTE — PROGRESS NOTES
Occupational Therapy      Occupational Therapy Daily Treatment Note    Date:  2020    Patient Name:  Renzo Lemus     :  1955  MRN: [id number]    Restrictions/Precautions:    Medical/Treatment Diagnosis Information:  · Diagnosis: M50.90 Disc disorder of cervical region  ·   Insurance/Certification information:  OT Insurance Information: [employer]  Physician Information:  Referring Practitioner: Erin Lowry  Plan of care signed (Y/N):  N  Visit# / total visits:  2/8   Pain level: 0/10     G-Code (if applicable):      Date / Visit # G-Code Applied:  /    QuickDASH Total Score: 31 (20 1404)       QuickDASH Disability/Symptom Score : 45.45 % (20 1404)    Progress Note: []  Yes  [x]  No  Next due by: Visit #10      Subjective: Pt presents to OT evaluation with complaints of B UE weakness. Pt reports chronic pain in neck. Objective Measures:  Eval   ADL  Feeding: Independent  Grooming: Independent  UE Bathing: Independent  LE Bathing: Independent  UE Dressing: Independent  LE Dressing: Independent  Toileting: Independent  Additional Comments: difficulty with buttons, zippers, bra straps, etc  Tone RUE  RUE Tone: Normotonic  Tone LUE  LUE Tone: Normotonic  Coordination  Movements Are Fluid And Coordinated: No  Coordination and Movement description: Right UE;Left UE;Fine motor impairments  Quality of Movement Other  Comment: primarily due to decreased strength  Balance  Sitting Balance: Independent  Standing Balance: Independent  Functional Mobility  Functional - Mobility Device: No device  Assist Level:  Independent  Cognition  Overall Cognitive Status: WFL  Sensation  Overall Sensation Status: Impaired  Light Touch: Partial deficits in the RUE;Partial deficits in the LUE(pt reports minimal noted tingling in L finger tips intermittently)  LUE AROM (degrees)  LUE AROM : WFL  RUE AROM (degrees)  RUE AROM : WFL  LUE Strength  Gross LUE Strength: Exceptions to [facility] L Elbow Flex: 4/5  L Elbow Ext: 4-/5  L Wrist Flex: 4-/5  L Wrist Ext: 4-/5  L Hand General: 3+/5  RUE Strength  Gross RUE Strength: Exceptions to [facility]  R Elbow Flex: 4/5  R Elbow Ext: 4-/5  R Wrist Flex: 4-/5  R Wrist Ext: 4-/5  R Hand General: 4-/5  Hand Dominance  Hand Dominance: Left  Left Hand Strength -  (lbs)  Handle Setting 2: 7, 6, 5  Left Hand Strength - Pinch (lbs)  Lateral: 1, 1, 1  Tip: 1, 1, 1  Palmar 3 point: 1, 1, 1  Left 9-Hole Peg Test  Left 9-Hole Peg Test: Impaired  Right Hand Strength -  (lbs)  Handle Setting 2: 20, 18, 17  Right Hand Strength - Pinch (lbs)  Lateral: 4, 4, 3  Tip: 2, 2, 3  Palmar 3 point: 4, 3, 3  Right 9-Hole Peg Test  Right 9-Hole Peg Test: Impaired  Fine Motor Skills  Left 9-Hole Peg Test: Impaired  Left 9 Hole Peg Test Time (secs): 57.84  Right 9-Hole Peg Test: Impaired  Right 9 Hole Peg Test Time (secs): 33.22                              Therapeutic Activities:      Therapeutic Exercise:   Exercise/Equipment  Resistance/Repetitions   Other comments   Theraputty  Yellow; x10 reps    Grasp/release    Extension    Tip tip pinch (each finger)    Adduction    Lateral Pinch    Mod cues to complete   Exercises   Gripper; x20 reps; x2 sets  R: x2 green x4 red  L: x1 green    Therabar; red             Home Exercise Program:    · Theraputty (yellow) w/ handout    Manual Treatments:  N/A    Modalities:  N/A    Timed Code Treatment Minutes:  25 minutes    Total Treatment Minutes:  25 minutes    Charges:   x2 Ther Ex      Treatment/Activity Tolerance:     [x]  Patient tolerated treatment well []  Patient limited by fatigue    []  Patient limited by pain []  Patient limited by other medical complications   []  Other: Assessment: Pt is a 77 y.o. female who is s/p neck surgery June 30th 2020. Pt reports increased weakness in B UE and hands with L worse than R. Pt presents to OT with significant B UE weakness, coordination, and sensation deficits. Despite B UE deficits pt reports being Ind with ADLs/IADLs although with difficulty and adaptations. Pt demonstrates deficits greater in L UE vs R UE. Pt is L handed. Pt tolerates B hand exercises fair needing Mod cues to complete despite handout. Pt with increased difficulty completing with L hand due to increased weakness. Pt reports completing HEP daily. Continue with POC. Prognosis: [x]  Good [x]  Fair  []  Poor    Patient Requires Follow-up:  [x]  Yes  []  No      Goals  Short term goals  Time Frame for Short term goals: 4-6 weeks  Short term goal 1: Decrease QuickDASH score by 5 points for increased ability to complete ADLs/IADL  Short term goal 2: Increase B  strength by 5# for increased ability to complete household chores  Short term goal 3: Increase B 9 hole peg test by 5 seconds for increased ability to complete crafting activities  Short term goal 4:  Increase all L hand  strength by 2# for increased ability to complete ADLs  Short term goal 5: Pt will be Ind with HEP needing no cues  Patient Goals   Patient goals : increase strength and use of hands/UE       Plan: [x]  Continue per plan of care []  Alter current plan (see comments)   []  Plan of care initiated []  Hold pending MD visit []  Discharge    Plan for Next Session:      Electronically signed by:  ALONDRA Rosas/DAVION

## 2020-12-28 ENCOUNTER — HOSPITAL ENCOUNTER (OUTPATIENT)
Dept: OCCUPATIONAL THERAPY | Age: 65
Setting detail: THERAPIES SERIES
Discharge: HOME | End: 2020-12-28
Payer: COMMERCIAL

## 2020-12-28 NOTE — PROGRESS NOTES
Occupational Therapy      Occupational Therapy  Cancellation/No-show Note  Patient Name:  Gaudencio Ambriz   :  1955   Date:  2020  Cancelled visits to date: 1  No-shows to date: 0    For today's appointment patient:  [x]    Cancelled  []    Rescheduled appointment  []    No-show     Reason given by patient:  []    Patient ill  []    Conflicting appointment  []    No transportation    []    Conflict with work  []    No reason given  [x]    Other:     Comments:  Pt reports she will not be able to attend this week's OT sessions due to reported back pain.      Electronically signed by:  Alireza Woodward OTR/DAVION

## 2020-12-30 ENCOUNTER — APPOINTMENT (OUTPATIENT)
Dept: OCCUPATIONAL THERAPY | Age: 65
End: 2020-12-30
Payer: COMMERCIAL

## 2021-01-04 ENCOUNTER — HOSPITAL ENCOUNTER (OUTPATIENT)
Dept: OCCUPATIONAL THERAPY | Age: 66
Setting detail: THERAPIES SERIES
Discharge: HOME | End: 2021-01-04
Payer: COMMERCIAL

## 2021-01-04 PROCEDURE — 97110 THERAPEUTIC EXERCISES: CPT

## 2021-01-04 NOTE — PROGRESS NOTES
Occupational Therapy      Occupational Therapy Daily Treatment Note    Date:  2021    Patient Name:  Kindra Garber     :  1955  MRN: [id number]    Restrictions/Precautions:    Medical/Treatment Diagnosis Information:  · Diagnosis: M50.90 Disc disorder of cervical region  ·   Insurance/Certification information:  OT Insurance Information: [facility]  Physician Information:  Referring Practitioner: Julisa Lyman  Plan of care signed (Y/N):  N  Visit# / total visits:  3/8   Pain level: 8/10 (chronic back pain)      G-Code (if applicable):      Date / Visit # G-Code Applied:  /    QuickDASH Total Score: 31 (20 1404)       QuickDASH Disability/Symptom Score : 45.45 % (20 1404)    Progress Note: []  Yes  [x]  No  Next due by: Visit #10      Subjective: Pt presents to OT with complaints of B UE weakness. Pt reports chronic pain in neck and back. Objective Measures:  Eval   ADL  Feeding: Independent  Grooming: Independent  UE Bathing: Independent  LE Bathing: Independent  UE Dressing: Independent  LE Dressing: Independent  Toileting: Independent  Additional Comments: difficulty with buttons, zippers, bra straps, etc  Tone RUE  RUE Tone: Normotonic  Tone LUE  LUE Tone: Normotonic  Coordination  Movements Are Fluid And Coordinated: No  Coordination and Movement description: Right UE;Left UE;Fine motor impairments  Quality of Movement Other  Comment: primarily due to decreased strength  Balance  Sitting Balance: Independent  Standing Balance: Independent  Functional Mobility  Functional - Mobility Device: No device  Assist Level:  Independent  Cognition  Overall Cognitive Status: WFL  Sensation  Overall Sensation Status: Impaired  Light Touch: Partial deficits in the RUE;Partial deficits in the LUE(pt reports minimal noted tingling in L finger tips intermittently)  LUE AROM (degrees)  LUE AROM : WFL  RUE AROM (degrees)  RUE AROM : WFL  LUE Strength  Gross LUE Strength: Exceptions to [facility]

## 2021-01-06 ENCOUNTER — HOSPITAL ENCOUNTER (OUTPATIENT)
Dept: OCCUPATIONAL THERAPY | Age: 66
Setting detail: THERAPIES SERIES
Discharge: HOME | End: 2021-01-06
Payer: COMMERCIAL

## 2021-01-06 ENCOUNTER — TELEPHONE (OUTPATIENT)
Dept: FAMILY MEDICINE CLINIC | Age: 66
End: 2021-01-06

## 2021-01-06 DIAGNOSIS — R29.6 FREQUENT FALLS: ICD-10-CM

## 2021-01-06 DIAGNOSIS — R26.89 BALANCE DISORDER: Primary | ICD-10-CM

## 2021-01-06 PROCEDURE — 97110 THERAPEUTIC EXERCISES: CPT

## 2021-01-06 NOTE — PROGRESS NOTES
Occupational Therapy      Occupational Therapy Daily Treatment Note    Date:  2021    Patient Name:  Sara Navarrete     :  1955  MRN: [id number]    Restrictions/Precautions:    Medical/Treatment Diagnosis Information:  · Diagnosis: M50.90 Disc disorder of cervical region  ·   Insurance/Certification information:  OT Insurance Information: [facility]  Physician Information:  Referring Practitioner: Yolette Bishop  Plan of care signed (Y/N):  N  Visit# / total visits:     Pain level: 8/10 (chronic back pain)      G-Code (if applicable):      Date / Visit # G-Code Applied:  /    QuickDASH Total Score: 31 (20 1404)       QuickDASH Disability/Symptom Score : 45.45 % (20 1404)    Progress Note: []  Yes  [x]  No  Next due by: Visit #10      Subjective: Pt presents to OT with complaints of B UE weakness. Pt reports chronic pain in neck and back. Pt reports recent fall carrying groceries inside. Objective Measures:  Eval   ADL  Feeding: Independent  Grooming: Independent  UE Bathing: Independent  LE Bathing: Independent  UE Dressing: Independent  LE Dressing: Independent  Toileting: Independent  Additional Comments: difficulty with buttons, zippers, bra straps, etc  Tone RUE  RUE Tone: Normotonic  Tone LUE  LUE Tone: Normotonic  Coordination  Movements Are Fluid And Coordinated: No  Coordination and Movement description: Right UE;Left UE;Fine motor impairments  Quality of Movement Other  Comment: primarily due to decreased strength  Balance  Sitting Balance: Independent  Standing Balance: Independent  Functional Mobility  Functional - Mobility Device: No device  Assist Level:  Independent  Cognition  Overall Cognitive Status: WFL  Sensation  Overall Sensation Status: Impaired  Light Touch: Partial deficits in the RUE;Partial deficits in the LUE(pt reports minimal noted tingling in L finger tips intermittently)  LUE AROM (degrees)  LUE AROM : WF  RUE AROM (degrees)  RUE AROM : [facility] LUE Strength  Gross LUE Strength: Exceptions to [facility]  L Elbow Flex: 4/5  L Elbow Ext: 4-/5  L Wrist Flex: 4-/5  L Wrist Ext: 4-/5  L Hand General: 3+/5  RUE Strength  Gross RUE Strength: Exceptions to [facility]  R Elbow Flex: 4/5  R Elbow Ext: 4-/5  R Wrist Flex: 4-/5  R Wrist Ext: 4-/5  R Hand General: 4-/5  Hand Dominance  Hand Dominance: Left  Left Hand Strength -  (lbs)  Handle Setting 2: 7, 6, 5  Left Hand Strength - Pinch (lbs)  Lateral: 1, 1, 1  Tip: 1, 1, 1  Palmar 3 point: 1, 1, 1  Left 9-Hole Peg Test  Left 9-Hole Peg Test: Impaired  Right Hand Strength -  (lbs)  Handle Setting 2: 20, 18, 17  Right Hand Strength - Pinch (lbs)  Lateral: 4, 4, 3  Tip: 2, 2, 3  Palmar 3 point: 4, 3, 3  Right 9-Hole Peg Test  Right 9-Hole Peg Test: Impaired  Fine Motor Skills  Left 9-Hole Peg Test: Impaired  Left 9 Hole Peg Test Time (secs): 57.84  Right 9-Hole Peg Test: Impaired  Right 9 Hole Peg Test Time (secs): 33.22                              Therapeutic Activities:      Therapeutic Exercise:   Exercise/Equipment  Resistance/Repetitions   Other comments   Theraputty  Yellow; x10 reps    Grasp/release    Extension    Tip tip pinch (each finger)    Adduction    Lateral Pinch    Mod cues to complete   Exercises   Gripper; x20 reps; x2 sets  R: x2 green x4 red  L: x1 green    Therabar; red; x15 reps  Smiles  Frowns  Twists    Digiflex; 3# 1.5#  x10 reps          AROM   x15 reps    Grasp/release    Finger AB/AD    Finger extension    Finger opposition        Home Exercise Program:    · Theraputty (yellow) w/ handout    Manual Treatments:  N/A    Modalities:  N/A    Timed Code Treatment Minutes:  45 minutes    Total Treatment Minutes:  45 minutes    Charges:   x3 Ther Ex      Treatment/Activity Tolerance:     [x]  Patient tolerated treatment well []  Patient limited by fatigue    []  Patient limited by pain []  Patient limited by other medical complications   []  Other: Assessment: Pt is a 77 y.o. female who is s/p neck surgery June 30th 2020. Pt reports increased weakness in B UE and hands with L worse than R. Pt presents to OT with significant B UE weakness, coordination, and sensation deficits. Despite B UE deficits pt reports being Ind with ADLs/IADLs although with difficulty and adaptations. Pt demonstrates deficits greater in L UE vs R UE. Pt is L handed. Pt tolerates B hand exercises fair needing Mod cues to complete despite handout. Pt with increased difficulty completing with L hand due to increased weakness. Pt continues to need cues for theraputty exercises primarily with setup. Pt continues to express frustration with weakness in L hand. Reviewed additional exercises equipment online although majority of equipment is too much resistance for pt. Discussed possible use as strength progresses. Discussed possible need for PT due to recent and frequent falls. Pt in agreement at this time. Pt reports completing HEP 1-4x a day. Continue with POC. Prognosis: [x]  Good [x]  Fair  []  Poor    Patient Requires Follow-up:  [x]  Yes  []  No      Goals  Short term goals  Time Frame for Short term goals: 4-6 weeks  Short term goal 1: Decrease QuickDASH score by 5 points for increased ability to complete ADLs/IADL  Short term goal 2: Increase B  strength by 5# for increased ability to complete household chores  Short term goal 3: Increase B 9 hole peg test by 5 seconds for increased ability to complete crafting activities  Short term goal 4:  Increase all L hand  strength by 2# for increased ability to complete ADLs  Short term goal 5: Pt will be Ind with HEP needing no cues  Patient Goals   Patient goals : increase strength and use of hands/UE       Plan: [x]  Continue per plan of care []  Alter current plan (see comments)   []  Plan of care initiated []  Hold pending MD visit []  Discharge    Plan for Next Session:      Electronically signed by:  ALONDRA Sorensen/DAVION

## 2021-01-06 NOTE — TELEPHONE ENCOUNTER
Order in EMR. For PT. Would see them once and ask about type of cane she should get. I can then order and they can show her how to use.

## 2021-01-06 NOTE — TELEPHONE ENCOUNTER
Called pt and she needs a quad cane with measurements of 27-31 inches please put an order in for this to [place].   Thank you

## 2021-01-11 ENCOUNTER — HOSPITAL ENCOUNTER (OUTPATIENT)
Dept: OCCUPATIONAL THERAPY | Age: 66
Setting detail: THERAPIES SERIES
Discharge: HOME | End: 2021-01-11
Payer: COMMERCIAL

## 2021-01-11 PROCEDURE — 97110 THERAPEUTIC EXERCISES: CPT

## 2021-01-11 NOTE — PROGRESS NOTES
Occupational Therapy      Occupational Therapy Daily Treatment Note    Date:  2021    Patient Name:  Conrado Parada     :  1955  MRN: [id number]    Restrictions/Precautions:    Medical/Treatment Diagnosis Information:  · Diagnosis: M50.90 Disc disorder of cervical region  ·   Insurance/Certification information:  OT Insurance Information: [facility]  Physician Information:  Referring Practitioner: Dione Newman  Plan of care signed (Y/N):  N  Visit# / total visits:     Pain level: 8/10 (chronic back pain and neck 9/10 pain)      G-Code (if applicable):      Date / Visit # G-Code Applied:  /    QuickDASH Total Score: 31 (20 1404)       QuickDASH Disability/Symptom Score : 45.45 % (20 1404)    Progress Note: []  Yes  [x]  No  Next due by: Visit #10      Subjective: Pt presents to OT with complaints of B UE weakness. Pt reports chronic pain in neck and back. Objective Measures:  Eval   ADL  Feeding: Independent  Grooming: Independent  UE Bathing: Independent  LE Bathing: Independent  UE Dressing: Independent  LE Dressing: Independent  Toileting: Independent  Additional Comments: difficulty with buttons, zippers, bra straps, etc  Tone RUE  RUE Tone: Normotonic  Tone LUE  LUE Tone: Normotonic  Coordination  Movements Are Fluid And Coordinated: No  Coordination and Movement description: Right UE;Left UE;Fine motor impairments  Quality of Movement Other  Comment: primarily due to decreased strength  Balance  Sitting Balance: Independent  Standing Balance: Independent  Functional Mobility  Functional - Mobility Device: No device  Assist Level:  Independent  Cognition  Overall Cognitive Status: WFL  Sensation  Overall Sensation Status: Impaired  Light Touch: Partial deficits in the RUE;Partial deficits in the LUE(pt reports minimal noted tingling in L finger tips intermittently)  LUE AROM (degrees)  LUE AROM : WFL  RUE AROM (degrees)  RUE AROM : WFL  LUE Strength  Gross LUE Strength: Exceptions to [facility]  L Elbow Flex: 4/5  L Elbow Ext: 4-/5  L Wrist Flex: 4-/5  L Wrist Ext: 4-/5  L Hand General: 3+/5  RUE Strength  Gross RUE Strength: Exceptions to [facility]  R Elbow Flex: 4/5  R Elbow Ext: 4-/5  R Wrist Flex: 4-/5  R Wrist Ext: 4-/5  R Hand General: 4-/5  Hand Dominance  Hand Dominance: Left  Left Hand Strength -  (lbs)  Handle Setting 2: 7, 6, 5  Left Hand Strength - Pinch (lbs)  Lateral: 1, 1, 1  Tip: 1, 1, 1  Palmar 3 point: 1, 1, 1  Left 9-Hole Peg Test  Left 9-Hole Peg Test: Impaired  Right Hand Strength -  (lbs)  Handle Setting 2: 20, 18, 17  Right Hand Strength - Pinch (lbs)  Lateral: 4, 4, 3  Tip: 2, 2, 3  Palmar 3 point: 4, 3, 3  Right 9-Hole Peg Test  Right 9-Hole Peg Test: Impaired  Fine Motor Skills  Left 9-Hole Peg Test: Impaired  Left 9 Hole Peg Test Time (secs): 57.84  Right 9-Hole Peg Test: Impaired  Right 9 Hole Peg Test Time (secs): 33.22                              Therapeutic Activities:      Therapeutic Exercise:   Exercise/Equipment  Resistance/Repetitions   Other comments   Theraputty  Yellow; x10 reps    Grasp/release    Extension    Tip tip pinch (each finger)    Adduction    Lateral Pinch    Mod cues to complete   Exercises   Gripper; x20 reps; x2 sets  R: x2 green x4 red  L: x1 green    Therabar; red; x15 reps  Smiles  Frowns  Twists    Digiflex; 3# 1.5#  x10 reps    Wrist: 1#, 2#; x10 reps each  Flex/ext  Pronation/supination            AROM   x15 reps    Grasp/release    Finger AB/AD    Finger extension    Finger opposition        Home Exercise Program:    · Theraputty (yellow) w/ handout    Manual Treatments:  N/A    Modalities:  N/A    Timed Code Treatment Minutes:  45 minutes    Total Treatment Minutes:  45 minutes    Charges:   x3 Ther Ex      Treatment/Activity Tolerance:     [x]  Patient tolerated treatment well []  Patient limited by fatigue    []  Patient limited by pain []  Patient limited by other medical complications   []  Other: Assessment: Pt is a 77 y.o. female who is s/p neck surgery June 30th 2020. Pt reports increased weakness in B UE and hands with L worse than R. Pt presents to OT with significant B UE weakness, coordination, and sensation deficits. Despite B UE deficits pt reports being Ind with ADLs/IADLs although with difficulty and adaptations. Pt demonstrates deficits greater in L UE vs R UE. Pt is L handed. Pt tolerates B hand exercises fair needing Min cues to complete. Pt with increased difficulty completing with L hand due to increased weakness. Pt tolerating increased exercises, resistance, and reps this date. Pt continues to need cues for theraputty exercises primarily with setup. Pt continues to express frustration with weakness in L hand. Pt reports completing HEP 1-4x a day. Continue with POC. Prognosis: [x]  Good [x]  Fair  []  Poor    Patient Requires Follow-up:  [x]  Yes  []  No      Goals  Short term goals  Time Frame for Short term goals: 4-6 weeks  Short term goal 1: Decrease QuickDASH score by 5 points for increased ability to complete ADLs/IADL  Short term goal 2: Increase B  strength by 5# for increased ability to complete household chores  Short term goal 3: Increase B 9 hole peg test by 5 seconds for increased ability to complete crafting activities  Short term goal 4:  Increase all L hand  strength by 2# for increased ability to complete ADLs  Short term goal 5: Pt will be Ind with HEP needing no cues  Patient Goals   Patient goals : increase strength and use of hands/UE       Plan: [x]  Continue per plan of care []  Alter current plan (see comments)   []  Plan of care initiated []  Hold pending MD visit []  Discharge    Plan for Next Session:      Electronically signed by:  ALONDRA Hoover/DAVION

## 2021-01-13 ENCOUNTER — HOSPITAL ENCOUNTER (OUTPATIENT)
Dept: OCCUPATIONAL THERAPY | Age: 66
Setting detail: THERAPIES SERIES
Discharge: HOME | End: 2021-01-13
Payer: COMMERCIAL

## 2021-01-13 PROCEDURE — 97530 THERAPEUTIC ACTIVITIES: CPT

## 2021-01-13 PROCEDURE — 97110 THERAPEUTIC EXERCISES: CPT

## 2021-01-13 NOTE — PROGRESS NOTES
Occupational Therapy      Occupational Therapy Daily Treatment Note    Date:  2021    Patient Name:  Sosa Campuzano     :  1955  MRN: [id number]    Restrictions/Precautions:    Medical/Treatment Diagnosis Information:  · Diagnosis: M50.90 Disc disorder of cervical region  ·   Insurance/Certification information:  OT Insurance Information: [facility]  Physician Information:  Referring Practitioner: Joan Joiner  Plan of care signed (Y/N):  N  Visit# / total visits:     Pain level: 7/10 (chronic back pain and neck 7/10 pain)      G-Code (if applicable):      Date / Visit # G-Code Applied:  /    QuickDASH Total Score: 31 (20 1404)       QuickDASH Disability/Symptom Score : 45.45 % (20 1404)    Progress Note: []  Yes  [x]  No  Next due by: Visit #10      Subjective: Pt presents to OT with complaints of B UE weakness. Pt reports chronic pain in neck and back. Pt reports continued difficulty completing crafts at home including manipulating metal wire. Objective Measures:  Eval   ADL  Feeding: Independent  Grooming: Independent  UE Bathing: Independent  LE Bathing: Independent  UE Dressing: Independent  LE Dressing: Independent  Toileting: Independent  Additional Comments: difficulty with buttons, zippers, bra straps, etc  Tone RUE  RUE Tone: Normotonic  Tone LUE  LUE Tone: Normotonic  Coordination  Movements Are Fluid And Coordinated: No  Coordination and Movement description: Right UE;Left UE;Fine motor impairments  Quality of Movement Other  Comment: primarily due to decreased strength  Balance  Sitting Balance: Independent  Standing Balance: Independent  Functional Mobility  Functional - Mobility Device: No device  Assist Level:  Independent  Cognition  Overall Cognitive Status: WFL  Sensation  Overall Sensation Status: Impaired  Light Touch: Partial deficits in the RUE;Partial deficits in the LUE(pt reports minimal noted tingling in L finger tips intermittently) LUE AROM (degrees)  LUE AROM : WFL  RUE AROM (degrees)  RUE AROM : WFL  LUE Strength  Gross LUE Strength: Exceptions to [facility]  L Elbow Flex: 4/5  L Elbow Ext: 4-/5  L Wrist Flex: 4-/5  L Wrist Ext: 4-/5  L Hand General: 3+/5  RUE Strength  Gross RUE Strength: Exceptions to [facility]  R Elbow Flex: 4/5  R Elbow Ext: 4-/5  R Wrist Flex: 4-/5  R Wrist Ext: 4-/5  R Hand General: 4-/5  Hand Dominance  Hand Dominance: Left  Left Hand Strength -  (lbs)  Handle Setting 2: 7, 6, 5  Left Hand Strength - Pinch (lbs)  Lateral: 1, 1, 1  Tip: 1, 1, 1  Palmar 3 point: 1, 1, 1  Left 9-Hole Peg Test  Left 9-Hole Peg Test: Impaired  Right Hand Strength -  (lbs)  Handle Setting 2: 20, 18, 17  Right Hand Strength - Pinch (lbs)  Lateral: 4, 4, 3  Tip: 2, 2, 3  Palmar 3 point: 4, 3, 3  Right 9-Hole Peg Test  Right 9-Hole Peg Test: Impaired  Fine Motor Skills  Left 9-Hole Peg Test: Impaired  Left 9 Hole Peg Test Time (secs): 57.84  Right 9-Hole Peg Test: Impaired  Right 9 Hole Peg Test Time (secs): 33.22                              Therapeutic Activities:      Therapeutic Exercise:   Exercise/Equipment  Resistance/Repetitions   Other comments   Theraputty  Yellow/red theraputty; x10 reps    Grasp/release    Extension    Tip tip pinch (each finger)    Adduction    Lateral Pinch    Mod cues to complete   Exercises   Gripper; x20 reps; x2 sets  R: x2 green x4 red  L: x1 green x1 red    Therabar;  green; x15 reps  Smiles  Frowns  Twists    Digiflex; 3# 1.5#  x10 reps    Wrist:, 2#; x10 reps x2 sets each  Flex/ext  Pronation/supination     strength:   R- 44  L- 12          AROM   x15 reps    Grasp/release    Finger AB/AD    Finger extension    Finger opposition      Activity   9 hole peg test: x2 sets each  R-25 sec  L-44 sec     Buttoning board; good ability to complete x2 sets      Home Exercise Program:    · Theraputty (yellow and red) w/ handout  · Hand exercises  · Buttoning shirts    Manual Treatments:  N/A    Modalities:  N/A Timed Code Treatment Minutes:  40 minutes    Total Treatment Minutes:  40 minutes    Charges:   x2 Ther Ex  x1 Ther Act      Treatment/Activity Tolerance:     [x]  Patient tolerated treatment well []  Patient limited by fatigue    []  Patient limited by pain []  Patient limited by other medical complications   []  Other:     Assessment: Pt is a 77 y.o. female who is s/p neck surgery June 30th 2020. Pt reports increased weakness in B UE and hands with L worse than R. Pt presents to OT with significant B UE weakness, coordination, and sensation deficits. Despite B UE deficits pt reports being Ind with ADLs/IADLs although with difficulty and adaptations. Pt demonstrates deficits greater in L UE vs R UE. Pt is L handed. Pt tolerates B hand exercises fair needing Min cues to complete. Pt with increased difficulty completing with L hand due to increased weakness. Pt tolerating increased exercises, resistance, and reps this date. Pt able to complete exercises with few to no cues. Pt able to complete fine motor activities with increased ability including 9 hole peg test and dressing board. Pt continues to express frustration with weakness in L hand. Pt reports completing HEP 1-4x a day. Continue with POC. Prognosis: [x]  Good [x]  Fair  []  Poor    Patient Requires Follow-up:  [x]  Yes  []  No      Goals  Short term goals  Time Frame for Short term goals: 4-6 weeks  Short term goal 1: Decrease QuickDASH score by 5 points for increased ability to complete ADLs/IADL  Short term goal 2: Increase B  strength by 5# for increased ability to complete household chores  Short term goal 3: Increase B 9 hole peg test by 5 seconds for increased ability to complete crafting activities  Short term goal 4:  Increase all L hand  strength by 2# for increased ability to complete ADLs  Short term goal 5: Pt will be Ind with HEP needing no cues  Patient Goals   Patient goals : increase strength and use of hands/UE    Plan: [x]  Continue per plan of care []  Alter current plan (see comments)   []  Plan of care initiated []  Hold pending MD visit []  Discharge    Plan for Next Session:      Electronically signed by:  ALONDRA Veloz/DAVION

## 2021-01-14 ENCOUNTER — HOSPITAL ENCOUNTER (OUTPATIENT)
Dept: CT IMAGING | Age: 66
Discharge: HOME | End: 2021-01-14
Payer: COMMERCIAL

## 2021-01-14 DIAGNOSIS — C34.32 MALIGNANT NEOPLASM OF LOWER LOBE OF LEFT LUNG (HCC): ICD-10-CM

## 2021-01-14 LAB
GFR AFRICAN AMERICAN: >60
GFR NON-AFRICAN AMERICAN: >60
PERFORMED ON: NORMAL
POC CREATININE: 0.8 MG/DL (ref 0.6–1.2)
POC SAMPLE TYPE: NORMAL

## 2021-01-14 PROCEDURE — 6360000004 HC RX CONTRAST MEDICATION: Performed by: INTERNAL MEDICINE

## 2021-01-14 PROCEDURE — 71260 CT THORAX DX C+: CPT

## 2021-01-14 PROCEDURE — 82565 ASSAY OF CREATININE: CPT

## 2021-01-14 RX ADMIN — IOPAMIDOL 75 ML: 755 INJECTION, SOLUTION INTRAVENOUS at 12:41

## 2021-01-18 ENCOUNTER — HOSPITAL ENCOUNTER (OUTPATIENT)
Dept: OCCUPATIONAL THERAPY | Age: 66
Setting detail: THERAPIES SERIES
Discharge: HOME | End: 2021-01-18
Payer: COMMERCIAL

## 2021-01-18 PROCEDURE — 97530 THERAPEUTIC ACTIVITIES: CPT

## 2021-01-18 PROCEDURE — 97110 THERAPEUTIC EXERCISES: CPT

## 2021-01-18 NOTE — PROGRESS NOTES
Occupational Therapy      Occupational Therapy Daily Treatment Note    Date:  2021    Patient Name:  Zahra Carr     :  1955  MRN: [id number]    Restrictions/Precautions:    Medical/Treatment Diagnosis Information:  · Diagnosis: M50.90 Disc disorder of cervical region  ·   Insurance/Certification information:  OT Insurance Information:  [employer]  Physician Information:  Referring Practitioner: Maya Panchal  Plan of care signed (Y/N):  N  Visit# / total visits:     Pain level: 7/10 (chronic back pain and neck 7/10 pain)      G-Code (if applicable):      Date / Visit # G-Code Applied:  /    QuickDASH Total Score: 31 (20 1404)       QuickDASH Disability/Symptom Score : 45.45 % (20 1404)    Progress Note: []  Yes  [x]  No  Next due by: Visit #10      Subjective: Pt presents to OT with complaints of B UE weakness. Pt reports chronic pain in neck and back. Pt reports continued difficulty completing crafts at home including manipulating metal wire. Objective Measures:  Eval   ADL  Feeding: Independent  Grooming: Independent  UE Bathing: Independent  LE Bathing: Independent  UE Dressing: Independent  LE Dressing: Independent  Toileting: Independent  Additional Comments: difficulty with buttons, zippers, bra straps, etc  Tone RUE  RUE Tone: Normotonic  Tone LUE  LUE Tone: Normotonic  Coordination  Movements Are Fluid And Coordinated: No  Coordination and Movement description: Right UE;Left UE;Fine motor impairments  Quality of Movement Other  Comment: primarily due to decreased strength  Balance  Sitting Balance: Independent  Standing Balance: Independent  Functional Mobility  Functional - Mobility Device: No device  Assist Level:  Independent  Cognition  Overall Cognitive Status: WFL  Sensation  Overall Sensation Status: Impaired  Light Touch: Partial deficits in the RUE;Partial deficits in the LUE(pt reports minimal noted tingling in L finger tips intermittently) LUE AROM (degrees)  LUE AROM : WFL  RUE AROM (degrees)  RUE AROM : WFL  LUE Strength  Gross LUE Strength: Exceptions to [facility]  L Elbow Flex: 4/5  L Elbow Ext: 4-/5  L Wrist Flex: 4-/5  L Wrist Ext: 4-/5  L Hand General: 3+/5  RUE Strength  Gross RUE Strength: Exceptions to [facility]  R Elbow Flex: 4/5  R Elbow Ext: 4-/5  R Wrist Flex: 4-/5  R Wrist Ext: 4-/5  R Hand General: 4-/5  Hand Dominance  Hand Dominance: Left  Left Hand Strength -  (lbs)  Handle Setting 2: 7, 6, 5  Left Hand Strength - Pinch (lbs)  Lateral: 1, 1, 1  Tip: 1, 1, 1  Palmar 3 point: 1, 1, 1  Left 9-Hole Peg Test  Left 9-Hole Peg Test: Impaired  Right Hand Strength -  (lbs)  Handle Setting 2: 20, 18, 17  Right Hand Strength - Pinch (lbs)  Lateral: 4, 4, 3  Tip: 2, 2, 3  Palmar 3 point: 4, 3, 3  Right 9-Hole Peg Test  Right 9-Hole Peg Test: Impaired  Fine Motor Skills  Left 9-Hole Peg Test: Impaired  Left 9 Hole Peg Test Time (secs): 57.84  Right 9-Hole Peg Test: Impaired  Right 9 Hole Peg Test Time (secs): 33.22                              Therapeutic Activities:      Therapeutic Exercise:   Exercise/Equipment  Resistance/Repetitions   Other comments   Theraputty  Yellow/red theraputty; x10 reps    Grasp/release    Extension    Tip tip pinch (each finger)    Adduction    Lateral Pinch    Mod cues to complete   Exercises   Gripper; x20 reps; x2 sets  R: x2 green x4 red  L: x1 green x1 red    Therabar;  green; x15 reps  Smiles  Frowns  Twists    Digiflex; 3# 1.5#  x10 reps    Wrist:, 2#; x15 reps x2 sets each  Flex/ext  Pronation/supination     strength:   R-  , 50  L- , 12          AROM   x15 reps    Grasp/release    Finger AB/AD    Finger extension    Finger opposition      Activity       Buttoning board; good ability to complete x2 sets      Home Exercise Program:    · Theraputty (yellow and red) w/ handout  · Hand exercises  · Buttoning shirts    Manual Treatments:  N/A    Modalities:  N/A    Timed Code Treatment Minutes:  40 minutes Total Treatment Minutes:  40 minutes    Charges:   x2 Ther Ex   x1 Ther Act      Treatment/Activity Tolerance:     [x]  Patient tolerated treatment well []  Patient limited by fatigue    []  Patient limited by pain []  Patient limited by other medical complications   []  Other:     Assessment: Pt is a 77 y.o. female who is s/p neck surgery June 30th 2020. Pt reports increased weakness in B UE and hands with L worse than R. Pt presents to OT with significant B UE weakness, coordination, and sensation deficits. Despite B UE deficits pt reports being Ind with ADLs/IADLs although with difficulty and adaptations. Pt demonstrates deficits greater in L UE vs R UE. Pt is L handed. Pt tolerates B hand exercises fair needing no cues to complete. Pt with increased difficulty completing with L hand due to increased weakness. Pt tolerating increased exercises, resistance, and reps this date. Pt able to complete fine motor activities with good/moderate ability. Pt continues to express frustration with weakness in L hand. Pt reports sewing and threading needles continue to be difficult. Discussed with pt possible adaptations for crafts. Pt reports completing HEP 1-4x a day. Continue with POC. Prognosis: [x]  Good [x]  Fair  []  Poor    Patient Requires Follow-up:  [x]  Yes  []  No      Goals  Short term goals  Time Frame for Short term goals: 4-6 weeks  Short term goal 1: Decrease QuickDASH score by 5 points for increased ability to complete ADLs/IADL  Short term goal 2: Increase B  strength by 5# for increased ability to complete household chores  Short term goal 3: Increase B 9 hole peg test by 5 seconds for increased ability to complete crafting activities  Short term goal 4:  Increase all L hand  strength by 2# for increased ability to complete ADLs  Short term goal 5: Pt will be Ind with HEP needing no cues  Patient Goals   Patient goals : increase strength and use of hands/UE    Plan: [x]  Continue per plan of care []  Alter current plan (see comments)   []  Plan of care initiated []  Hold pending MD visit []  Discharge    Plan for Next Session:      Electronically signed by:  Vic MathisOTR/L

## 2021-01-20 ENCOUNTER — HOSPITAL ENCOUNTER (OUTPATIENT)
Dept: OCCUPATIONAL THERAPY | Age: 66
Setting detail: THERAPIES SERIES
Discharge: HOME | End: 2021-01-20
Payer: COMMERCIAL

## 2021-01-20 ENCOUNTER — APPOINTMENT (OUTPATIENT)
Dept: OCCUPATIONAL THERAPY | Age: 66
End: 2021-01-20
Payer: COMMERCIAL

## 2021-01-20 PROCEDURE — 97110 THERAPEUTIC EXERCISES: CPT

## 2021-01-20 NOTE — PROGRESS NOTES
Occupational Therapy        Outpatient Occupational Therapy  [] [facility]   Phone: [phone]   Fax: [phone]   [x] [facility]           Phone: [phone]   Fax: [phone]  [] [facility]   Phone: [phone]   Fax: [phone]     Occupational Therapy Discharge Note  Date: 2021        Patient Name:  Tika Cruz    :  1955  MRN: [id number]    Restrictions/Precautions:    Medical/Treatment Diagnosis Information:  · Diagnosis: M50.90 Disc disorder of cervical region  ·    Insurance/Certification information:  OT Insurance Information:  [employer]  Physician Information:  Referring Practitioner: Mandy Jaramillo  Plan of care signed (Y/N):  N  Visit# / total visits:     Pain level:      7/10 (chronic back pain and neck 7/10 pain)                  G-Code (if applicable):                                             Date / Visit # G-Code Applied:  /    QuickDASH Total Score: 28 (21)       QuickDASH Disability/Symptom Score : 38.64 % (21)     Time Period for Report:  20-21  Cancels/No-shows to date:  1 cancel    Plan of Care/Treatment to date: D/C from OT  [] Therapeutic Exercise    [] Modalities:  [] Therapeutic Activity     [] Ultrasound  [] Electrical Stimulation  [] Total Motion Release     [] Fluidotherapy [] Rocha Crutch  [] Neuromuscular Re-education  [] Cold/hotpack [] Iontophoresis  [] Instruction in HEP     Other:  [] Manual Therapy      []            [] Aquatic Therapy      []                   ? Significant Findings At Last Visit/Comments:    Subjective:     Pt presents to OT with complaints of B UE weakness. Pt reports chronic pain in neck and back. Pt reports completing HEP up to 5x a day.       Objective:  Eval   ADL  Feeding: Independent  Grooming: Independent  UE Bathing: Independent  LE Bathing: Independent  UE Dressing: Independent  LE Dressing: Independent  Toileting: Independent Additional Comments: difficulty with buttons, zippers, bra straps, etc  Tone RUE  RUE Tone: Normotonic  Tone LUE  LUE Tone: Normotonic  Coordination  Movements Are Fluid And Coordinated: No  Coordination and Movement description: Right UE;Left UE;Fine motor impairments  Quality of Movement Other  Comment: primarily due to decreased strength  Balance  Sitting Balance: Independent  Standing Balance: Independent  Functional Mobility  Functional - Mobility Device: No device  Assist Level:  Independent  Cognition  Overall Cognitive Status: WFL  Sensation  Overall Sensation Status: Impaired  Light Touch: Partial deficits in the RUE;Partial deficits in the LUE(pt reports minimal noted tingling in L finger tips intermittently)  LUE AROM (degrees)  LUE AROM : WFL  RUE AROM (degrees)  RUE AROM : WFL  LUE Strength  Gross LUE Strength: Exceptions to [facility]  L Elbow Flex: 4/5  L Elbow Ext: 4-/5  L Wrist Flex: 4-/5  L Wrist Ext: 4-/5  L Hand General: 3+/5  RUE Strength  Gross RUE Strength: Exceptions to [facility]  R Elbow Flex: 4/5  R Elbow Ext: 4-/5  R Wrist Flex: 4-/5  R Wrist Ext: 4-/5  R Hand General: 4-/5  Hand Dominance  Hand Dominance: Left  Left Hand Strength -  (lbs)  Handle Setting 2: 7, 6, 5  Left Hand Strength - Pinch (lbs)  Lateral: 1, 1, 1  Tip: 1, 1, 1  Palmar 3 point: 1, 1, 1  Left 9-Hole Peg Test  Left 9-Hole Peg Test: Impaired  Right Hand Strength -  (lbs)  Handle Setting 2: 20, 18, 17  Right Hand Strength - Pinch (lbs)  Lateral: 4, 4, 3  Tip: 2, 2, 3  Palmar 3 point: 4, 3, 3  Right 9-Hole Peg Test  Right 9-Hole Peg Test: Impaired  Fine Motor Skills  Left 9-Hole Peg Test: Impaired  Left 9 Hole Peg Test Time (secs): 57.84  Right 9-Hole Peg Test: Impaired  Right 9 Hole Peg Test Time (secs): 33.22 1/20  ADL  Feeding: Independent  Grooming: Independent  UE Bathing: Independent  LE Bathing: Independent  UE Dressing: Independent  LE Dressing: Independent  Toileting: Independent Additional Comments: difficulty with buttons, zippers, bra straps, etc  Tone RUE  RUE Tone: Normotonic  Tone LUE  LUE Tone: Normotonic  Coordination  Movements Are Fluid And Coordinated: No  Coordination and Movement description: Right UE;Left UE;Fine motor impairments  Quality of Movement Other  Comment: primarily due to decreased strength  Balance  Sitting Balance: Independent  Standing Balance: Independent  Functional Mobility  Functional - Mobility Device: No device  Assist Level:  Independent  Cognition  Overall Cognitive Status: WFL  Sensation  Overall Sensation Status: Impaired  Light Touch: Partial deficits in the RUE;Partial deficits in the LUE(pt reports minimal noted tingling in L finger tips intermittently)  LUE AROM (degrees)  LUE AROM : WFL  RUE AROM (degrees)  RUE AROM : WFL  LUE Strength  Gross LUE Strength: [facility] to [facility]  L Elbow Flex: 4/5  L Elbow Ext: 4-/5  L Wrist Flex: 4-/5  L Wrist Ext: 4-/5  L Hand General: 3+/5  RUE Strength  Gross RUE Strength: Exceptions to [facility]  R Elbow Flex: 4/5  R Elbow Ext: 4-/5  R Wrist Flex: 4-/5  R Wrist Ext: 4-/5  R Hand General: 4-/5  Hand Dominance  Hand Dominance: Left  Left Hand Strength -  (lbs)  Handle Setting 2: 12, 12, 12  Left Hand Strength - Pinch (lbs)  Lateral: 1, 1, 1  Tip: 1, 2, 1  Palmar 3 point: 2, 1, 2  Left 9-Hole Peg Test  Left 9-Hole Peg Test: Impaired  Right Hand Strength -  (lbs)  Handle Setting 2: 50, 48, 45  Right Hand Strength - Pinch (lbs)  Lateral: 8, 7, 7  Tip: 4, 4, 5  Palmar 3 point: 6, 6, 6  Right 9-Hole Peg Test  Right 9-Hole Peg Test: Impaired  Fine Motor Skills  Left 9-Hole Peg Test: Impaired  Left 9 Hole Peg Test Time (secs): 42.44  Right 9-Hole Peg Test: Impaired  Right 9 Hole Peg Test Time (secs): 24.09        Assessment: Pt is a 77 y.o. female who is s/p neck surgery June 30th 2020. Pt reports increased weakness in B UE and hands with L worse than R. Pt presents to OT with significant B UE weakness, coordination, and sensation deficits. Despite B UE deficits pt reports being Ind with ADLs/IADLs although with difficulty and adaptations. Pt has made moderate progress towards set goals at this time meeting 3/5 goals. Pt continues to be limited due to weakness in B hands with L weaker than R. Pt has improved B  strength and minimal/moderate improvement with pinch strength in B hands. Pt has demonstrated ability to complete HEP multiple times daily and independently. Discussed with pt need for increased time to regain B hand strength with pt verbalizing understanding. Discussed with pt plan to continue HEP multiple times daily over next few months although with completing fine motor activities and monitor progress. Plan to D/C from OT at this time and return as needed pending progress over next few months. Pt now discharged from OT.        Progress towards goals:    Goals  Short term goals  Time Frame for Short term goals: 4-6 weeks  Short term goal 1: Decrease QuickDASH score by 5 points for increased ability to complete ADLs/IADL- goal not met 1/20  Short term goal 2: Increase B  strength by 5# for increased ability to complete household chores- goal met 1/20  Short term goal 3: Increase B 9 hole peg test by 5 seconds for increased ability to complete crafting activities- goal met 1/20  Short term goal 4: Increase all L hand  strength by 2# for increased ability to complete ADLs- goal not met 1/20   Short term goal 5: Pt will be Ind with HEP needing no cues- goal met 1/20  Patient Goals   Patient goals : increase strength and use of hands/UE       Current Frequency/Duration: D/C from OT  # Days per week: [] 1 day # Weeks: [] 1 week [] 4 weeks      [] 2 days?    [] 2 weeks [] 5 weeks      [] 3 days   [] 3 weeks [] 6 weeks Rehab Potential: [] Excellent [x] Good [x] Fair  [] Poor     Goal Status:  [] Achieved [x] Partially Achieved  [x] Not Achieved     Patient Status: [] Continue per initial plan of Care     [x] Patient now discharged     [] Additional visits requested, Please re-certify for additional visits:      Requested frequency/duration:  X/week for weeks    Electronically signed by:  ALONDRA Beavers/DAVION    If you have any questions or concerns, please don't hesitate to call.   Thank you for your referral.    Physician Signature:________________________________Date:__________________  By signing above, therapists plan is approved by physician

## 2021-01-20 NOTE — PROGRESS NOTES
Occupational Therapy      Occupational Therapy Daily Treatment Note    Date:  2021    Patient Name:  Ramon Doty     :  1955  MRN: [id number]    Restrictions/Precautions:    Medical/Treatment Diagnosis Information:  · Diagnosis: M50.90 Disc disorder of cervical region  ·   Insurance/Certification information:  OT Insurance Information:  [employer]  Physician Information:  Referring Practitioner: Partha Quiros  Plan of care signed (Y/N):  N  Visit# / total visits:     Pain level: 7/10 (chronic back pain and neck 7/10 pain)      G-Code (if applicable):      Date / Visit # G-Code Applied:  /    QuickDASH Total Score: 28 (21)       QuickDASH Disability/Symptom Score : 38.64 % (21)    Progress Note: [x]  Yes  []  No  Next due by: Visit #10      Subjective: Pt presents to OT with complaints of B UE weakness. Pt reports chronic pain in neck and back. Pt reports completing HEP up to 5x a day. Objective Measures:  Eval   ADL  Feeding: Independent  Grooming: Independent  UE Bathing: Independent  LE Bathing: Independent  UE Dressing: Independent  LE Dressing: Independent  Toileting: Independent  Additional Comments: difficulty with buttons, zippers, bra straps, etc  Tone RUE  RUE Tone: Normotonic  Tone LUE  LUE Tone: Normotonic  Coordination  Movements Are Fluid And Coordinated: No  Coordination and Movement description: Right UE;Left UE;Fine motor impairments  Quality of Movement Other  Comment: primarily due to decreased strength  Balance  Sitting Balance: Independent  Standing Balance: Independent  Functional Mobility  Functional - Mobility Device: No device  Assist Level:  Independent  Cognition  Overall Cognitive Status: WFL  Sensation  Overall Sensation Status: Impaired  Light Touch: Partial deficits in the RUE;Partial deficits in the LUE(pt reports minimal noted tingling in L finger tips intermittently)  LUE AROM (degrees)  LUE AROM : WF  RUE AROM (degrees)  RUE AROM : [facility] LUE Strength  Gross LUE Strength: Exceptions to [facility]  L Elbow Flex: 4/5  L Elbow Ext: 4-/5  L Wrist Flex: 4-/5  L Wrist Ext: 4-/5  L Hand General: 3+/5  RUE Strength  Gross RUE Strength: Exceptions to [facility]  R Elbow Flex: 4/5  R Elbow Ext: 4-/5  R Wrist Flex: 4-/5  R Wrist Ext: 4-/5  R Hand General: 4-/5  Hand Dominance  Hand Dominance: Left  Left Hand Strength -  (lbs)  Handle Setting 2: 7, 6, 5  Left Hand Strength - Pinch (lbs)  Lateral: 1, 1, 1  Tip: 1, 1, 1  Palmar 3 point: 1, 1, 1  Left 9-Hole Peg Test  Left 9-Hole Peg Test: Impaired  Right Hand Strength -  (lbs)  Handle Setting 2: 20, 18, 17  Right Hand Strength - Pinch (lbs)  Lateral: 4, 4, 3  Tip: 2, 2, 3  Palmar 3 point: 4, 3, 3  Right 9-Hole Peg Test  Right 9-Hole Peg Test: Impaired  Fine Motor Skills  Left 9-Hole Peg Test: Impaired  Left 9 Hole Peg Test Time (secs): 57.84  Right 9-Hole Peg Test: Impaired  Right 9 Hole Peg Test Time (secs): 33.22 1/20  ADL  Feeding: Independent  Grooming: Independent  UE Bathing: Independent  LE Bathing: Independent  UE Dressing: Independent  LE Dressing: Independent  Toileting: Independent  Additional Comments: difficulty with buttons, zippers, bra straps, etc  Tone RUE  RUE Tone: Normotonic  Tone LUE  LUE Tone: Normotonic  Coordination  Movements Are Fluid And Coordinated: No  Coordination and Movement description: Right UE;Left UE;Fine motor impairments  Quality of Movement Other  Comment: primarily due to decreased strength  Balance  Sitting Balance: Independent  Standing Balance: Independent  Functional Mobility  Functional - Mobility Device: No device  Assist Level:  Independent  Cognition  Overall Cognitive Status: WFL  Sensation  Overall Sensation Status: Impaired  Light Touch: Partial deficits in the RUE;Partial deficits in the LUE(pt reports minimal noted tingling in L finger tips intermittently)  LUE AROM (degrees)  LUE AROM : WFL  RUE AROM (degrees)  RUE AROM : WFL  LUE Strength Gross LUE Strength: Exceptions to [facility]  L Elbow Flex: 4/5  L Elbow Ext: 4-/5  L Wrist Flex: 4-/5  L Wrist Ext: 4-/5  L Hand General: 3+/5  RUE Strength  Gross RUE Strength: Exceptions to [facility]  R Elbow Flex: 4/5  R Elbow Ext: 4-/5  R Wrist Flex: 4-/5  R Wrist Ext: 4-/5  R Hand General: 4-/5  Hand Dominance  Hand Dominance: Left  Left Hand Strength -  (lbs)  Handle Setting 2: 12, 12, 12  Left Hand Strength - Pinch (lbs)  Lateral: 1, 1, 1  Tip: 1, 2, 1  Palmar 3 point: 2, 1, 2  Left 9-Hole Peg Test  Left 9-Hole Peg Test: Impaired  Right Hand Strength -  (lbs)  Handle Setting 2: 50, 48, 45  Right Hand Strength - Pinch (lbs)  Lateral: 8, 7, 7  Tip: 4, 4, 5  Palmar 3 point: 6, 6, 6  Right 9-Hole Peg Test  Right 9-Hole Peg Test: Impaired  Fine Motor Skills  Left 9-Hole Peg Test: Impaired  Left 9 Hole Peg Test Time (secs): 42.44  Right 9-Hole Peg Test: Impaired  Right 9 Hole Peg Test Time (secs): 24.09                             Therapeutic Activities:      Therapeutic Exercise:   Exercise/Equipment  Resistance/Repetitions   Other comments   Theraputty  Yellow/red theraputty; x10 reps    Grasp/release    Extension    Tip tip pinch (each finger)    Adduction    Lateral Pinch    Mod cues to complete   Exercises   Gripper; x20 reps; x2 sets  R: x2 green x4 red  L: x1 green x1 red    Therabar;  green; x15 reps  Smiles  Frowns  Twists    Digiflex; 3# 1.5#  x10 reps    Wrist:, 2#; x15 reps x2 sets each  Flex/ext  Pronation/supination              AROM   x15 reps    Grasp/release    Finger AB/AD    Finger extension    Finger opposition      Activity             Home Exercise Program:    · Theraputty (yellow and red) w/ handout  · Hand exercises  · Buttoning shirts    Manual Treatments:  N/A    Modalities:  N/A    Timed Code Treatment Minutes:  40 minutes    Total Treatment Minutes:  40 minutes    Charges:   x3 Ther Ex         Treatment/Activity Tolerance: Patient Goals   Patient goals : increase strength and use of hands/UE       Plan: []  Continue per plan of care []  Alter current plan (see comments)   []  Plan of care initiated []  Hold pending MD visit [x]  Discharge    Plan for Next Session:      Electronically signed by:  Jamey Mathis OTR/DAVION

## 2021-01-25 ENCOUNTER — HOSPITAL ENCOUNTER (OUTPATIENT)
Dept: PHYSICAL THERAPY | Age: 66
Setting detail: THERAPIES SERIES
Discharge: HOME | End: 2021-01-25
Payer: COMMERCIAL

## 2021-01-25 ENCOUNTER — HOSPITAL ENCOUNTER (OUTPATIENT)
Dept: OCCUPATIONAL THERAPY | Age: 66
Setting detail: THERAPIES SERIES
End: 2021-01-25
Payer: COMMERCIAL

## 2021-01-27 ENCOUNTER — APPOINTMENT (OUTPATIENT)
Dept: PHYSICAL THERAPY | Age: 66
End: 2021-01-27
Payer: COMMERCIAL

## 2021-01-27 ENCOUNTER — APPOINTMENT (OUTPATIENT)
Dept: OCCUPATIONAL THERAPY | Age: 66
End: 2021-01-27
Payer: COMMERCIAL

## 2021-02-01 ENCOUNTER — APPOINTMENT (OUTPATIENT)
Dept: OCCUPATIONAL THERAPY | Age: 66
End: 2021-02-01
Payer: COMMERCIAL

## 2021-02-01 ENCOUNTER — APPOINTMENT (OUTPATIENT)
Dept: PHYSICAL THERAPY | Age: 66
End: 2021-02-01
Payer: COMMERCIAL

## 2021-02-02 ENCOUNTER — HOSPITAL ENCOUNTER (OUTPATIENT)
Dept: PHYSICAL THERAPY | Age: 66
Setting detail: THERAPIES SERIES
Discharge: HOME | End: 2021-02-02
Payer: COMMERCIAL

## 2021-02-02 PROCEDURE — 97112 NEUROMUSCULAR REEDUCATION: CPT

## 2021-02-02 PROCEDURE — 97161 PT EVAL LOW COMPLEX 20 MIN: CPT

## 2021-02-02 PROCEDURE — 97110 THERAPEUTIC EXERCISES: CPT

## 2021-02-02 NOTE — PROGRESS NOTES
Agarwal Balance Scale    SITTING TO STANDING    INSTRUCTIONS: Please stand up. Try not to use your hand for support. (x) 4 able to stand without using hands and stabilize independently  ( ) 3 able to stand independently using hands  ( ) 2 able to stand using hands after several tries  ( ) 1 needs minimal aid to stand or stabilize  ( ) 0 needs moderate or maximal assist to stand    STANDING UNSUPPORTED    INSTRUCTIONS: Please stand for two minutes without holding on. (x) 4 able to stand safely for 2 minutes  ( ) 3 able to stand 2 minutes with supervision  ( ) 2 able to stand 30 seconds unsupported  ( ) 1 needs several tries to stand 30 seconds unsupported  ( ) 0 unable to stand 30 seconds unsupported    If a subject is able to stand 2 minutes unsupported, score full points for sitting unsupported. Proceed to item #4. SITTING WITH BACK UNSUPPORTED BUT FEET SUPPORTED ON FLOOR OR ON A STOOL    INSTRUCTIONS: Please sit with arms folded for 2 minutes. (x) 4 able to sit safely and securely for 2 minutes  ( ) 3 able to sit 2 minutes under supervision  ( ) 2 able to able to sit 30 seconds  ( ) 1 able to sit 10 seconds  ( ) 0 unable to sit without support 10 seconds    STANDING TO SITTING    INSTRUCTIONS: Please sit down. (x) 4 sits safely with minimal use of hands  ( ) 3 controls descent by using hands  ( ) 2 uses back of legs against chair to control descent  ( ) 1 sits independently but has uncontrolled descent  ( ) 0 needs assist to sit    TRANSFERS    INSTRUCTIONS: Arrange chair(s) for pivot transfer. Ask subject to transfer one way   toward a seat with armrests and one way  toward a seat without armrests. You may use two chairs (one with and one without armrests) or a bed and a chair.     (x) 4 able to transfer safely with minor use of hands  ( ) 3 able to transfer safely definite need of hands  ( ) 2 able to transfer with verbal cuing and/or supervision  ( ) 1 needs one person to assist  ( ) 0 needs two people to assist or supervise to be safe    STANDING UNSUPPORTED WITH EYES CLOSED    INSTRUCTIONS: Please close your eyes and stand still for 10 seconds. (x) 4 able to stand 10 seconds safely  ( ) 3 able to stand 10 seconds with supervision  ( ) 2 able to stand 3 seconds  ( ) 1 unable to keep eyes closed 3 seconds but stays safely  ( ) 0 needs help to keep from falling    STANDING UNSUPPORTED WITH FEET TOGETHER    INSTRUCTIONS: Place your feet together and stand without holding on. ( ) 4 able to place feet together independently and stand 1 minute safely  (x) 3 able to place feet together independently and stand 1 minute with supervision  ( ) 2 able to place feet together independently but unable to hold for 30 seconds  ( ) 1 needs help to attain position but able to stand 15 seconds feet together  ( ) 0 needs help to attain position and unable to hold for 15 seconds    REACHING FORWARD WITH OUTSTRETCHED ARM WHILE STANDING    INSTRUCTIONS: Lift arm to 90 degrees. Stretch out your fingers and reach forward as far as you can. (Examiner places a ruler at  the end of fingertips when arm is at 90 degrees. Fingers should not touch the ruler while reaching forward. The recorded measure is  the distance forward that the fingers reach while the subject is in the most forward lean position. When possible, ask subject to use  both arms when reaching to avoid rotation of the trunk.)    ( ) 4 can reach forward confidently 25 cm (10 inches)  (x) 3 can reach forward 12 cm (5 inches)  ( ) 2 can reach forward 5 cm (2 inches)  ( ) 1 reaches forward but needs supervision  ( ) 0 loses balance while trying/requires external support     OBJECT FROM THE FLOOR FROM A STANDING POSITION    INSTRUCTIONS:  the shoe/slipper, which is in front of your feet.     ( ) 4 able to  slipper safely and easily  (x) 3 able to  slipper but needs supervision  ( ) 2 unable to  but reaches 2-5 cm(1-2 inches) from slipper and keeps balance independently  ( ) 1 unable to  and needs supervision while trying  ( ) 0 unable to try/needs assist to keep from losing balance or falling    TURNING TO LOOK BEHIND OVER LEFT AND RIGHT SHOULDERS WHILE STANDING    INSTRUCTIONS: Turn to look directly behind you over toward the left shoulder. Repeat to the right. (Examiner may pick an object  to look at directly behind the subject to encourage a better twist turn.)    (x) 4 looks behind from both sides and weight shifts well  ( ) 3 looks behind one side only other side shows less weight shift  ( ) 2 turns sideways only but maintains balance  ( ) 1 needs supervision when turning  ( ) 0 needs assist to keep from losing balance or falling    TURN [address]: Turn completely around in a full [place]. Pause. Then turn a full [place] in the other direction. (x) 4 able to turn 360 degrees safely in 4 seconds or less  ( ) 3 able to turn 360 degrees safely one side only 4 seconds or less  ( ) 2 able to turn 360 degrees safely but slowly  ( ) 1 needs close supervision or verbal cuing  ( ) 0 needs assistance while turning    PLACE ALTERNATE FOOT ON STEP OR STOOL WHILE STANDING UNSUPPORTED    INSTRUCTIONS: Place each foot alternately on the step/stool. Continue until each foot has touched the step/stool four times. (x) 4 able to stand independently and safely and complete 8 steps in 20 seconds  ( ) 3 able to stand independently and complete 8 steps in > 20 seconds  ( ) 2 able to complete 4 steps without aid with supervision  ( ) 1 able to complete > 2 steps needs minimal assist  ( ) 0 needs assistance to keep from falling/unable to try    STANDING UNSUPPORTED ONE FOOT IN FRONT    INSTRUCTIONS: (DEMONSTRATE TO SUBJECT) Place one foot directly in front of the other.  If you feel that you cannot place  your foot directly in front, try to step far enough ahead that the heel of your forward foot is ahead of the toes of the other foot. (To  score 3 points, the length of the step should exceed the length of the other foot and the width of the stance should approximate the  subjects normal stride width.)  (x) 4 able to place foot tandem independently and hold 30 seconds  ( ) 3 able to place foot ahead independently and hold 30 seconds  ( ) 2 able to take small step independently and hold 30 seconds  ( ) 1 needs help to step but can hold 15 seconds  ( ) 0 loses balance while stepping or standing    STANDING ON ONE LEG    INSTRUCTIONS: Stand on one leg as long as you can without holding on. (x) 4 able to lift leg independently and hold > 10 seconds  ( ) 3 able to lift leg independently and hold 5-10 seconds  ( ) 2 able to lift leg independently and hold L 3 seconds  ( ) 1 tries to lift leg unable to hold 3 seconds but remains standing independently. ( ) 0 unable to try of needs assist to prevent fall    (53) TOTAL SCORE (Maximum = 56)    Interpretation    41-56 = low fall risk  21-40 = medium fall risk  0 -20 = high fall risk    A change of 8 points is required to reveal a genuine change in function between 2 assessments.     Electronically signed by Landy Delgadillo, PT [id number] on 2/2/2021 at 12:37 PM

## 2021-02-02 NOTE — PROGRESS NOTES
Physical Therapy  Initial Assessment/Treatment Session  Date: 2021  Patient Name: Rebecca Douglass  MRN: [id number]  : 1955       Restrictions  Position Activity Restriction  Other position/activity restrictions: No lifting over 5# ; Hx of multiple spinal surgeries    Subjective   General  Additional Pertinent Hx: Pt is a 77 y.o. female who is s/p neck surgery 2020. Pt reports increased weakness in B UE and hands with L worse than R. Pt presents with significant B UE weakness, coordination, and sensation deficits. Pt also c/o decreased balance, and has extensive neck/back surgical hx, and aneurysm repair. Family / Caregiver Present: No  Referring Practitioner: Dr. Bonita Hallman  Diagnosis: Balance Disorder; Frequent Falls  Follows Commands: Within Functional Limits  PT Visit Information  Onset Date: 21  PT Insurance Information: [employer]  Total # of Visits Approved: 30  Total # of Visits to Date: 0  Subjective  Subjective: Pt denies pain. Reports hx of intermittent falls at home. Wants to improve her balance. Vision/Hearing  Vision  Vision: Within Functional Limits  Hearing  Hearing: Within functional limits    Orientation  Orientation  Overall Orientation Status: Within Normal Limits    Social/Functional History  Social/Functional History  Lives With: Alone  Type of Home: Apartment  Home Layout: One level(second floor)  Home Access: Stairs to enter with rails  Entrance Stairs - Rails: Both  Bathroom Shower/Tub: Tub/Shower unit  Bathroom Toilet: Standard  ADL Assistance: Independent  Homemaking Assistance: Independent  Homemaking Responsibilities: Yes(aide for light housekeeping 1x a week)  Ambulation Assistance: Independent  Transfer Assistance: Independent  Active :  Yes  Additional Comments: pt reports recent falls ~1x every other week    Objective     Observation/Palpation  Observation: Pt demonstrated difficulty with visual scanning R/L, up/down at slow speed with Mobilization, Manual Therapy - Joint Manipulation  Safety Devices  Type of devices: All fall risk precautions in place, Gait belt  Restraints  Initially in place: No    OutComes Score  Agarwal Balance Score: 53 (02/02/21 1142)    Goals  Short term goals  Time Frame for Short term goals: In 2-4 weeks pt will  Short term goal 1: Increase R knee Flex AROM to 90, and L knee Flex AROM to 120 to facilitate improved gait mechanics  Short term goal 2: Increase (B) knee Flex strength to 3/5 to facilitate improved balance for community activity  Short term goal 3: Demonstrate independence with HEP  Long term goals  Time Frame for Long term goals : In 6-8 weeks pt will perform  Long term goal 1: Improve Agarwal balance Score to 56/56 to facilitate improved stability completing household and community mobility tasks  Long term goal 2: Improve R hip/knee/ankle strength to grossly 4/5 to facilitate improved stability ambulating  Long term goal 3: Ascend/Descend 12 steps without UE support, reciprocal pattern, supervision, to demonstrate improved independence with community mobility  Patient Goals   Patient goals :  To improve her balance       Therapy Time   Individual Concurrent Group Co-treatment   Time In 1115         Time Out 1200         Minutes 45         Timed Code Treatment Minutes: 25 Minutes   Low complexity Eval x 1 charge  Therapeutic Exercise x 1 charge  Neuro facilitation x 1 charge    Lyudmila Mahmood, PT  Electronically signed by Lyudmila Mahmood, PT [id number] on 2/2/2021 at 12:35 PM

## 2021-02-03 ENCOUNTER — APPOINTMENT (OUTPATIENT)
Dept: PHYSICAL THERAPY | Age: 66
End: 2021-02-03
Payer: COMMERCIAL

## 2021-02-03 ENCOUNTER — APPOINTMENT (OUTPATIENT)
Dept: OCCUPATIONAL THERAPY | Age: 66
End: 2021-02-03
Payer: COMMERCIAL

## 2021-02-03 DIAGNOSIS — M79.7 FIBROMYALGIA: ICD-10-CM

## 2021-02-03 RX ORDER — GABAPENTIN 800 MG/1
TABLET ORAL
Qty: 270 TABLET | Refills: 0 | Status: SHIPPED | OUTPATIENT
Start: 2021-02-03 | End: 2021-05-03

## 2021-02-04 ENCOUNTER — HOSPITAL ENCOUNTER (OUTPATIENT)
Dept: PHYSICAL THERAPY | Age: 66
Setting detail: THERAPIES SERIES
Discharge: HOME | End: 2021-02-04
Payer: COMMERCIAL

## 2021-02-04 ENCOUNTER — APPOINTMENT (OUTPATIENT)
Dept: PHYSICAL THERAPY | Age: 66
End: 2021-02-04
Payer: COMMERCIAL

## 2021-02-04 PROCEDURE — 97110 THERAPEUTIC EXERCISES: CPT

## 2021-02-04 PROCEDURE — 97112 NEUROMUSCULAR REEDUCATION: CPT

## 2021-02-04 NOTE — PROGRESS NOTES
Physical Therapy  Daily Treatment Note  Date: 2021  Patient Name: Star Homans  MRN: [id number]     :   1955    Subjective:   General  Chart Reviewed: Yes  Additional Pertinent Hx: Pt is a 77 y.o. female who is s/p neck surgery 2020. Pt reports increased weakness in B UE and hands with L worse than R. Pt presents with significant B UE weakness, coordination, and sensation deficits. Pt also c/o decreased balance, and has extensive neck/back surgical hx, and aneurysm repair. Response To Previous Treatment: Patient with no complaints from previous session. Family / Caregiver Present: No  Referring Practitioner: Dr. Haley Álvarez  PT Visit Information  Onset Date: 21  PT Insurance Information: [employer]  Total # of Visits to Date: 1  Subjective  Subjective: Pt denies pain today. Does report persistent instability. Treatment Activities:      Manual therapy  PROM: (B) gastroc stretch on wedge x 2 min. L Rectus stretch x 3 min. R rectus stretch x 1 min. R/L HS stretch x 1 min. each. AROM RLE (degrees)  R Knee Flexion 0-145: 115  AROM LLE (degrees)  LLE General AROM: Hip Flex, ABD, Knee Ext, and Ankle PF/DF, INV/BRIAN WNL. Knee Flex AROM = 110. L Knee Flexion 0-145: 117     Exercises  Exercise 1: Prone: RLE/LLE knee Flex AROM 2 x 20, cues to maximize motion. Exercise 2: Prone: (B) knee Flex isometric (grey t-band resistance) with knees flexed to 90. Exercise 3: Supine: RLE/LLE Heel slide 2 x 10. Exercise 4: Sidelying: L hip ABD AROM 2 x 10. Exercise 5: Romberg stance: head turn R/L 3 x 10, cues to slow speed and focus on smooth visual scanning, 1 mild LOB. Exercise 6: Romberg stance: head nod up/down 3 x 10, cues to slow speed and focus on smooth visual scanning, 1 mild LOB. Exercise 7: PT provided written HEP for prone knee flex, heel slides, HS stretch, romberg stance with visual scanning/head turn/nod.      Assessment:   Conditions Requiring Skilled Therapeutic Intervention  Body structures, Functions, Activity limitations: Decreased balance;Decreased strength;Decreased ROM; Decreased posture  Assessment: Pt demonstrated improved (B) knee Flex motion with stretching and exercises, and PT provided pt with knee ROM and balance exercises for home. She would benefit from continued therapy to improve her strength, balance, and independence ambulating over uneven surfaces. Prognosis: Good  Decision Making: Low Complexity  Barriers to Learning: Pain  REQUIRES PT FOLLOW UP: Yes  Discharge Recommendations: Patient would benefit from continued therapy after discharge;2-3 sessions per week  Safety Devices  Type of devices: All fall risk precautions in place;Gait belt     Goals:  Short term goals  Time Frame for Short term goals: In 2-4 weeks pt will  Short term goal 1: Increase R knee Flex AROM to 90, and L knee Flex AROM to 120 to facilitate improved gait mechanics  Short term goal 2: Increase (B) knee Flex strength to 3/5 to facilitate improved balance for community activity  Short term goal 3: Demonstrate independence with HEP  Long term goals  Time Frame for Long term goals : In 6-8 weeks pt will perform  Long term goal 1: Improve Agarwal balance Score to 56/56 to facilitate improved stability completing household and community mobility tasks  Long term goal 2: Improve R hip/knee/ankle strength to grossly 4/5 to facilitate improved stability ambulating  Long term goal 3: Ascend/Descend 12 steps without UE support, reciprocal pattern, supervision, to demonstrate improved independence with community mobility  Patient Goals   Patient goals :  To improve her balance    Plan:    Plan  Times per week: 2x  Specific instructions for Next Treatment: improve strength, balance, ROM, independence ambulating  Current Treatment Recommendations: Strengthening, ROM, Balance Training, Endurance Training, Functional Mobility Training, Transfer Training, Gait Training, Stair training, Positioning, Pain Management, Equipment Evaluation, Education, & procurement, Patient/Caregiver Education & Training, Neuromuscular Re-education, Home Exercise Program, Safety Education & Training, Manual Therapy - Soft Tissue Mobilization, Manual Therapy - Joint Manipulation  Safety Devices  Type of devices:  All fall risk precautions in place, Gait belt  Restraints  Initially in place: No  Timed Code Treatment Minutes: 42 Minutes     Therapy Time   Individual Concurrent Group Co-treatment   Time In 1110         Time Out 1200         Minutes 50         Timed Code Treatment Minutes: 42 Minutes   Therapeutic Exercise x 2 charges  Neuro facilitation x 1 charge    Mario Huggins, PT    Electronically signed by Mario Huggins, PT [id number] on 2/4/2021 at 12:12 PM

## 2021-02-08 ENCOUNTER — APPOINTMENT (OUTPATIENT)
Dept: OCCUPATIONAL THERAPY | Age: 66
End: 2021-02-08
Payer: COMMERCIAL

## 2021-02-08 ENCOUNTER — APPOINTMENT (OUTPATIENT)
Dept: PHYSICAL THERAPY | Age: 66
End: 2021-02-08
Payer: COMMERCIAL

## 2021-02-08 DIAGNOSIS — B00.9 HERPES SIMPLEX: ICD-10-CM

## 2021-02-08 RX ORDER — VALACYCLOVIR HYDROCHLORIDE 1 G/1
TABLET, FILM COATED ORAL
Qty: 90 TABLET | Refills: 0 | Status: SHIPPED | OUTPATIENT
Start: 2021-02-08 | End: 2021-05-06

## 2021-02-09 ENCOUNTER — HOSPITAL ENCOUNTER (OUTPATIENT)
Dept: PHYSICAL THERAPY | Age: 66
Setting detail: THERAPIES SERIES
Discharge: HOME | End: 2021-02-09
Payer: COMMERCIAL

## 2021-02-09 ENCOUNTER — APPOINTMENT (OUTPATIENT)
Dept: PHYSICAL THERAPY | Age: 66
End: 2021-02-09
Payer: COMMERCIAL

## 2021-02-09 NOTE — PROGRESS NOTES
Physical Therapy  Cancellation/No-show Note  Patient Name:  Beverly Skinner  :  1955   Date:  2021  Cancelled visits to date: 0  No-shows to date: 0    For today's appointment patient:  [x]  Cancelled  []  Rescheduled appointment  []  No-show     Reason given by patient:  []  Patient ill  []  Conflicting appointment  []  No transportation    []  Conflict with work  []  No reason given  [x]  Other:     Comments:  Inclement weather.     Electronically signed by:  Saintclair Brock, PT   Electronically signed by Saintclair Brock, PT [id number] on 2021 at 12:17 PM

## 2021-02-10 ENCOUNTER — APPOINTMENT (OUTPATIENT)
Dept: PHYSICAL THERAPY | Age: 66
End: 2021-02-10
Payer: COMMERCIAL

## 2021-02-10 ENCOUNTER — APPOINTMENT (OUTPATIENT)
Dept: OCCUPATIONAL THERAPY | Age: 66
End: 2021-02-10
Payer: COMMERCIAL

## 2021-02-11 ENCOUNTER — APPOINTMENT (OUTPATIENT)
Dept: PHYSICAL THERAPY | Age: 66
End: 2021-02-11
Payer: COMMERCIAL

## 2021-02-11 ENCOUNTER — HOSPITAL ENCOUNTER (OUTPATIENT)
Dept: PHYSICAL THERAPY | Age: 66
Setting detail: THERAPIES SERIES
Discharge: HOME | End: 2021-02-11
Payer: COMMERCIAL

## 2021-02-11 PROCEDURE — 97112 NEUROMUSCULAR REEDUCATION: CPT

## 2021-02-11 PROCEDURE — 97110 THERAPEUTIC EXERCISES: CPT

## 2021-02-11 PROCEDURE — 97140 MANUAL THERAPY 1/> REGIONS: CPT

## 2021-02-11 NOTE — PROGRESS NOTES
Physical Therapy  Daily Treatment Note  Date: 2021  Patient Name: Bisi Cadet  MRN: [id number]     :   1955    Subjective:   General  Additional Pertinent Hx: Pt is a 77 y.o. female who is s/p neck surgery 2020. Pt reports increased weakness in B UE and hands with L worse than R. Pt presents with significant B UE weakness, coordination, and sensation deficits. Pt also c/o decreased balance, and has extensive neck/back surgical hx, and aneurysm repair. Response To Previous Treatment: Patient with no complaints from previous session. Referring Practitioner: Dr. Khari Rogers  PT Visit Information  Onset Date: 21  PT Insurance Information: [employer] [facility]  Total # of Visits Approved: 30  Total # of Visits to Date: 2  Subjective  Subjective: Pt reports soreness with hip exercises. Agreeable to activity. Treatment Activities:      Manual therapy  PROM: RLE rectus stretch x 2 min. , piriformis stretch x 2 min. LLE rectus stretch x 2 min., Piriformis stretch x 2 min. L lateral line stretch x 2 min. (sidelying). L QL stretch x 2 min. Exercises  Exercise 1: Supine: (B) knee to chest stretch x 3  min. Exercise 2: Tandem stance RLE leading 3 x 30 s., LLE leading 3 x 30 s.,  Exercise 3: Semi-tandem stance, EO: R/L head turn x 10, up/down head nod x 10, no LOB. Exercise 4: Rhomberg stance: EC, head nod x 10, R/L head turn x 10, SBA. Exercise 5: Semi-tandem stance: Head turn R/L x 10 with contralateral visual scanning, CGA-SBA, no LOB. Exercise 6: Forward ambulation 30' x 2 while completing visual scanning L/R, 30' x 2 with visual scanning up/down, and 30' while sustaining gaze to L, 30' while sustaining gaze to R, no LOB throughout. Assessment:   Conditions Requiring Skilled Therapeutic Intervention  Body structures, Functions, Activity limitations: Decreased balance;Decreased strength;Decreased ROM; Decreased posture  Assessment: Pt c/o soreness in (B) hips, and low back after exercises. PT discontinued sidelying hip ABD for the present. Pt demonstrated improved stability completing tandem stance. Will focus on SLS next session. History: See below  Exam: Strength; ROM; Balance; Ambulation  Discharge Recommendations: Patient would benefit from continued therapy after discharge;2-3 sessions per week  Safety Devices  Type of devices: All fall risk precautions in place;Gait belt      Goals:  Short term goals  Time Frame for Short term goals: In 2-4 weeks pt will  Short term goal 1: Increase R knee Flex AROM to 90, and L knee Flex AROM to 120 to facilitate improved gait mechanics  Short term goal 2: Increase (B) knee Flex strength to 3/5 to facilitate improved balance for community activity  Short term goal 3: Demonstrate independence with HEP  Long term goals  Time Frame for Long term goals : In 6-8 weeks pt will perform  Long term goal 1: Improve Agarwal balance Score to 56/56 to facilitate improved stability completing household and community mobility tasks  Long term goal 2: Improve R hip/knee/ankle strength to grossly 4/5 to facilitate improved stability ambulating  Long term goal 3: Ascend/Descend 12 steps without UE support, reciprocal pattern, supervision, to demonstrate improved independence with community mobility  Patient Goals   Patient goals :  To improve her balance    Plan:    Plan  Times per week: 2x  Specific instructions for Next Treatment: improve strength, balance, ROM, independence ambulating  Current Treatment Recommendations: Strengthening, ROM, Balance Training, Endurance Training, Functional Mobility Training, Transfer Training, Gait Training, Stair training, Positioning, Pain Management, Equipment Evaluation, Education, & procurement, Patient/Caregiver Education & Training, Neuromuscular Re-education, Home Exercise Program, Safety Education & Training, Manual Therapy - Soft Tissue Mobilization, Manual Therapy - Joint Manipulation  Safety Devices  Type of

## 2021-02-15 ENCOUNTER — APPOINTMENT (OUTPATIENT)
Dept: PHYSICAL THERAPY | Age: 66
End: 2021-02-15
Payer: COMMERCIAL

## 2021-02-15 ENCOUNTER — APPOINTMENT (OUTPATIENT)
Dept: OCCUPATIONAL THERAPY | Age: 66
End: 2021-02-15
Payer: COMMERCIAL

## 2021-02-16 ENCOUNTER — APPOINTMENT (OUTPATIENT)
Dept: PHYSICAL THERAPY | Age: 66
End: 2021-02-16
Payer: COMMERCIAL

## 2021-02-17 ENCOUNTER — APPOINTMENT (OUTPATIENT)
Dept: PHYSICAL THERAPY | Age: 66
End: 2021-02-17
Payer: COMMERCIAL

## 2021-02-17 ENCOUNTER — APPOINTMENT (OUTPATIENT)
Dept: OCCUPATIONAL THERAPY | Age: 66
End: 2021-02-17
Payer: COMMERCIAL

## 2021-02-18 ENCOUNTER — HOSPITAL ENCOUNTER (OUTPATIENT)
Dept: PHYSICAL THERAPY | Age: 66
Setting detail: THERAPIES SERIES
Discharge: HOME | End: 2021-02-18
Payer: COMMERCIAL

## 2021-02-18 ENCOUNTER — APPOINTMENT (OUTPATIENT)
Dept: PHYSICAL THERAPY | Age: 66
End: 2021-02-18
Payer: COMMERCIAL

## 2021-02-18 NOTE — PROGRESS NOTES
Physical Therapy  Cancellation/No-show Note  Patient Name:  Adriana Aguilar  :  1955   Date:  2021  Cancelled visits to date: 1  No-shows to date: 0    For today's appointment patient:  [x]  Cancelled  []  Rescheduled appointment  []  No-show     Reason given by patient:  []  Patient ill  []  Conflicting appointment  []  No transportation    []  Conflict with work  []  No reason given  [x]  Other:     Comments:   Weather.     Electronically signed by:  Jocelyn Oscar PT  Electronically signed by Jocelyn Oscar, PT [id number] on 2021 at 9:00 AM

## 2021-02-22 ENCOUNTER — APPOINTMENT (OUTPATIENT)
Dept: PHYSICAL THERAPY | Age: 66
End: 2021-02-22
Payer: COMMERCIAL

## 2021-02-22 ENCOUNTER — APPOINTMENT (OUTPATIENT)
Dept: OCCUPATIONAL THERAPY | Age: 66
End: 2021-02-22
Payer: COMMERCIAL

## 2021-02-23 ENCOUNTER — APPOINTMENT (OUTPATIENT)
Dept: PHYSICAL THERAPY | Age: 66
End: 2021-02-23
Payer: COMMERCIAL

## 2021-02-23 ENCOUNTER — HOSPITAL ENCOUNTER (OUTPATIENT)
Dept: PHYSICAL THERAPY | Age: 66
Setting detail: THERAPIES SERIES
Discharge: HOME | End: 2021-02-23
Payer: COMMERCIAL

## 2021-02-23 NOTE — PROGRESS NOTES
Physical Therapy  Cancellation/No-show Note  Patient Name:  Calista Martin  :  1955   Date:  2021  Cancelled visits to date: 2  No-shows to date: 0    For today's appointment patient:  [x]  Cancelled  []  Rescheduled appointment  []  No-show     Reason given by patient:  []  Patient ill  []  Conflicting appointment  []  No transportation    []  Conflict with work  []  No reason given  [x]  Other:     Comments:  Pt wants to D/C. Will call.     Electronically signed by:  Ciara Mejias PT  Electronically signed by Ciara Mejias PT [id number] on 2021 at 4:26 PM

## 2021-02-24 ENCOUNTER — APPOINTMENT (OUTPATIENT)
Dept: OCCUPATIONAL THERAPY | Age: 66
End: 2021-02-24
Payer: COMMERCIAL

## 2021-02-24 ENCOUNTER — APPOINTMENT (OUTPATIENT)
Dept: PHYSICAL THERAPY | Age: 66
End: 2021-02-24
Payer: COMMERCIAL

## 2021-02-25 ENCOUNTER — APPOINTMENT (OUTPATIENT)
Dept: PHYSICAL THERAPY | Age: 66
End: 2021-02-25
Payer: COMMERCIAL

## 2021-02-26 ENCOUNTER — TELEPHONE (OUTPATIENT)
Dept: FAMILY MEDICINE CLINIC | Age: 66
End: 2021-02-26

## 2021-03-02 DIAGNOSIS — E78.00 HYPERCHOLESTEREMIA: ICD-10-CM

## 2021-03-02 RX ORDER — FENOFIBRATE 145 MG/1
TABLET, COATED ORAL
Qty: 90 TABLET | Refills: 0 | Status: SHIPPED | OUTPATIENT
Start: 2021-03-02 | End: 2021-05-06

## 2021-03-10 ENCOUNTER — HOSPITAL ENCOUNTER (OUTPATIENT)
Dept: WOMENS IMAGING | Age: 66
Discharge: HOME | End: 2021-03-10
Payer: COMMERCIAL

## 2021-03-10 DIAGNOSIS — Z12.31 VISIT FOR SCREENING MAMMOGRAM: ICD-10-CM

## 2021-03-10 PROCEDURE — 77063 BREAST TOMOSYNTHESIS BI: CPT

## 2021-05-06 DIAGNOSIS — M79.7 FIBROMYALGIA: ICD-10-CM

## 2021-05-06 DIAGNOSIS — B00.9 HERPES SIMPLEX: ICD-10-CM

## 2021-05-06 DIAGNOSIS — E78.00 HYPERCHOLESTEREMIA: ICD-10-CM

## 2021-05-06 RX ORDER — FENOFIBRATE 145 MG/1
TABLET, COATED ORAL
Qty: 90 TABLET | Refills: 0 | Status: SHIPPED | OUTPATIENT
Start: 2021-05-06 | End: 2021-07-02 | Stop reason: SDUPTHER

## 2021-05-06 RX ORDER — VALACYCLOVIR HYDROCHLORIDE 1 G/1
TABLET, FILM COATED ORAL
Qty: 90 TABLET | Refills: 0 | Status: SHIPPED | OUTPATIENT
Start: 2021-05-06 | End: 2021-07-02 | Stop reason: SDUPTHER

## 2021-05-06 RX ORDER — GABAPENTIN 800 MG/1
TABLET ORAL
Qty: 270 TABLET | Refills: 0 | OUTPATIENT
Start: 2021-05-06

## 2021-05-07 ENCOUNTER — HOSPITAL ENCOUNTER (OUTPATIENT)
Dept: VASCULAR LAB | Age: 66
Discharge: HOME | End: 2021-05-07
Payer: COMMERCIAL

## 2021-05-07 DIAGNOSIS — R22.42 LOCALIZED SWELLING, MASS AND LUMP, LOWER LIMB, LEFT: ICD-10-CM

## 2021-05-07 PROCEDURE — 93971 EXTREMITY STUDY: CPT

## 2021-05-25 ENCOUNTER — TELEPHONE (OUTPATIENT)
Dept: FAMILY MEDICINE CLINIC | Age: 66
End: 2021-05-25

## 2021-05-25 NOTE — TELEPHONE ENCOUNTER
Pt walked in today thought she had a appointment I advised she is scheduled tomorrow for her awv at noon on 5/26/21 the pt got upset when I advised her Dr is not in on tuesdays I offered to reschedule the pt got upset and stated she is having leg issues I explained she would need a office visit and we can schedule then she got upset and walked out [facility]

## 2021-05-26 ENCOUNTER — NURSE TRIAGE (OUTPATIENT)
Dept: OTHER | Facility: CLINIC | Age: 66
End: 2021-05-26

## 2021-05-26 NOTE — TELEPHONE ENCOUNTER
Called pt and left a message to call and ask for me I will see what I can do about getting her seen
Pt called and spoke with the call center about her leg being swollen. They transferred her to me because they couldn't find anything available soon and before I could get her scheduled she canceled her AWV in July and said she was switching providers then proceeded to hang up.     GENESIS
menstrual period? \"        no    Protocols used: LEG SWELLING AND EDEMA-ADULT-[place]    Patient called [facility] with red flag complaint to establish care with Dr Kim Dang. Brief description of triage: lower left leg edema    Triage indicates for patient to be seen in ED/[facility]/PCP office. Pt instructed to go to ED or [facility] if no available appointments. Pt scheduled for July appointment. Care advice provided, patient verbalizes understanding; denies any other questions or concerns; instructed to call back for any new or worsening symptoms. Writer provided warm transfer to Sohail Mackey at [facility] for appointment scheduling. Attention Provider: Thank you for allowing me to participate in the care of your patient. The patient was connected to triage in response to information provided to the [facility]. Please do not respond through this encounter as the response is not directed to a shared pool.

## 2021-06-05 DIAGNOSIS — E78.00 HYPERCHOLESTEREMIA: ICD-10-CM

## 2021-06-07 RX ORDER — FENOFIBRATE 145 MG/1
TABLET, COATED ORAL
Qty: 90 TABLET | Refills: 0 | OUTPATIENT
Start: 2021-06-07

## 2021-06-18 DIAGNOSIS — B00.9 HERPES SIMPLEX: ICD-10-CM

## 2021-06-18 RX ORDER — VALACYCLOVIR HYDROCHLORIDE 1 G/1
TABLET, FILM COATED ORAL
Qty: 90 TABLET | Refills: 0 | OUTPATIENT
Start: 2021-06-18

## 2021-06-20 ENCOUNTER — TELEPHONE (OUTPATIENT)
Dept: FAMILY MEDICINE CLINIC | Age: 66
End: 2021-06-20

## 2021-06-20 DIAGNOSIS — B00.9 HERPES SIMPLEX: ICD-10-CM

## 2021-06-22 RX ORDER — VALACYCLOVIR HYDROCHLORIDE 1 G/1
TABLET, FILM COATED ORAL
Qty: 90 TABLET | Refills: 0 | OUTPATIENT
Start: 2021-06-22

## 2021-07-08 ENCOUNTER — HOSPITAL ENCOUNTER (OUTPATIENT)
Dept: CT IMAGING | Age: 66
Discharge: HOME | End: 2021-07-08
Payer: COMMERCIAL

## 2021-07-08 DIAGNOSIS — C34.32 MALIGNANT NEOPLASM OF LOWER LOBE OF LEFT LUNG (HCC): ICD-10-CM

## 2021-07-08 PROCEDURE — 71250 CT THORAX DX C-: CPT

## 2021-07-29 DIAGNOSIS — E78.00 HYPERCHOLESTEREMIA: ICD-10-CM

## 2021-07-29 DIAGNOSIS — M79.7 FIBROMYALGIA: ICD-10-CM

## 2021-07-29 RX ORDER — FENOFIBRATE 145 MG/1
TABLET, COATED ORAL
Qty: 90 TABLET | Refills: 0 | Status: SHIPPED | OUTPATIENT
Start: 2021-07-29 | End: 2022-01-17 | Stop reason: SDUPTHER

## 2021-07-29 RX ORDER — GABAPENTIN 800 MG/1
TABLET ORAL
Qty: 270 TABLET | Refills: 0 | Status: SHIPPED | OUTPATIENT
Start: 2021-07-29 | End: 2021-10-25

## 2021-08-03 ENCOUNTER — HOSPITAL ENCOUNTER (OUTPATIENT)
Dept: WOMENS IMAGING | Age: 66
Discharge: HOME | End: 2021-08-03
Payer: COMMERCIAL

## 2021-08-03 DIAGNOSIS — Z78.0 POST-MENOPAUSAL: ICD-10-CM

## 2021-08-03 PROCEDURE — 77080 DXA BONE DENSITY AXIAL: CPT

## 2021-08-25 DIAGNOSIS — J43.9 PULMONARY EMPHYSEMA, UNSPECIFIED EMPHYSEMA TYPE (HCC): ICD-10-CM

## 2021-08-25 RX ORDER — ALBUTEROL SULFATE 90 UG/1
AEROSOL, METERED RESPIRATORY (INHALATION)
Qty: 1 INHALER | Refills: 5 | Status: SHIPPED | OUTPATIENT
Start: 2021-08-25

## 2021-09-01 DIAGNOSIS — J30.1 ALLERGIC RHINITIS DUE TO POLLEN, UNSPECIFIED SEASONALITY: ICD-10-CM

## 2021-09-01 RX ORDER — AZELASTINE 1 MG/ML
1 SPRAY, METERED NASAL 2 TIMES DAILY
Qty: 1 EACH | Refills: 11 | Status: SHIPPED | OUTPATIENT
Start: 2021-09-01 | End: 2022-10-28 | Stop reason: SDUPTHER

## 2021-09-26 ENCOUNTER — HOSPITAL ENCOUNTER (INPATIENT)
Age: 66
LOS: 3 days | Discharge: HOME | DRG: 193 | End: 2021-09-29
Attending: STUDENT IN AN ORGANIZED HEALTH CARE EDUCATION/TRAINING PROGRAM | Admitting: INTERNAL MEDICINE
Payer: COMMERCIAL

## 2021-09-26 ENCOUNTER — APPOINTMENT (OUTPATIENT)
Dept: GENERAL RADIOLOGY | Age: 66
DRG: 193 | End: 2021-09-26
Payer: COMMERCIAL

## 2021-09-26 ENCOUNTER — APPOINTMENT (OUTPATIENT)
Dept: CT IMAGING | Age: 66
DRG: 193 | End: 2021-09-26
Payer: COMMERCIAL

## 2021-09-26 DIAGNOSIS — I26.99 OTHER ACUTE PULMONARY EMBOLISM WITHOUT ACUTE COR PULMONALE (HCC): ICD-10-CM

## 2021-09-26 DIAGNOSIS — J96.21 ACUTE ON CHRONIC RESPIRATORY FAILURE WITH HYPOXIA (HCC): Primary | ICD-10-CM

## 2021-09-26 DIAGNOSIS — I21.4 NSTEMI (NON-ST ELEVATED MYOCARDIAL INFARCTION) (HCC): ICD-10-CM

## 2021-09-26 DIAGNOSIS — I50.23 ACUTE ON CHRONIC SYSTOLIC HEART FAILURE (HCC): ICD-10-CM

## 2021-09-26 PROBLEM — R09.02 HYPOXIA: Status: ACTIVE | Noted: 2021-09-26

## 2021-09-26 LAB
A/G RATIO: 1.6 (ref 1.1–2.2)
ALBUMIN SERPL-MCNC: 3.8 G/DL (ref 3.4–5)
ALP BLD-CCNC: 100 U/L (ref 40–129)
ALT SERPL-CCNC: 13 U/L (ref 10–40)
ANION GAP SERPL CALCULATED.3IONS-SCNC: 13 MMOL/L (ref 3–16)
APTT: 127.4 SEC (ref 26.2–38.6)
APTT: 27.5 SEC (ref 26.2–38.6)
AST SERPL-CCNC: 47 U/L (ref 15–37)
BACTERIA: ABNORMAL /HPF
BASE EXCESS VENOUS: -2 MMOL/L
BASOPHILS ABSOLUTE: 0 K/UL (ref 0–0.2)
BASOPHILS RELATIVE PERCENT: 0.2 %
BILIRUB SERPL-MCNC: <0.2 MG/DL (ref 0–1)
BILIRUBIN URINE: NEGATIVE
BLOOD, URINE: NEGATIVE
BUN BLDV-MCNC: 24 MG/DL (ref 7–20)
CALCIUM SERPL-MCNC: 9 MG/DL (ref 8.3–10.6)
CARBOXYHEMOGLOBIN: 1.7 %
CHLORIDE BLD-SCNC: 105 MMOL/L (ref 99–110)
CLARITY: CLEAR
CO2: 22 MMOL/L (ref 21–32)
COLOR: YELLOW
COMMENT UA: ABNORMAL
CREAT SERPL-MCNC: 0.7 MG/DL (ref 0.6–1.2)
D DIMER: 653 NG/ML DDU (ref 0–229)
EOSINOPHILS ABSOLUTE: 0.1 K/UL (ref 0–0.6)
EOSINOPHILS RELATIVE PERCENT: 0.8 %
GFR AFRICAN AMERICAN: >60
GFR NON-AFRICAN AMERICAN: >60
GLOBULIN: 2.4 G/DL
GLUCOSE BLD-MCNC: 142 MG/DL (ref 70–99)
GLUCOSE URINE: NEGATIVE MG/DL
HCO3 VENOUS: 25 MMOL/L (ref 23–29)
HCT VFR BLD CALC: 34.4 % (ref 36–48)
HEMOGLOBIN: 11.3 G/DL (ref 12–16)
KETONES, URINE: NEGATIVE MG/DL
LEUKOCYTE ESTERASE, URINE: NEGATIVE
LYMPHOCYTES ABSOLUTE: 0.3 K/UL (ref 1–5.1)
LYMPHOCYTES RELATIVE PERCENT: 2.7 %
MCH RBC QN AUTO: 33.6 PG (ref 26–34)
MCHC RBC AUTO-ENTMCNC: 33 G/DL (ref 31–36)
MCV RBC AUTO: 101.8 FL (ref 80–100)
METHEMOGLOBIN VENOUS: 0.9 %
MICROSCOPIC EXAMINATION: YES
MONOCYTES ABSOLUTE: 0.6 K/UL (ref 0–1.3)
MONOCYTES RELATIVE PERCENT: 4.9 %
MUCUS: ABNORMAL /LPF
NEUTROPHILS ABSOLUTE: 11.7 K/UL (ref 1.7–7.7)
NEUTROPHILS RELATIVE PERCENT: 91.4 %
NITRITE, URINE: NEGATIVE
O2 SAT, VEN: 78 %
O2 THERAPY: ABNORMAL
OCCULT BLOOD DIAGNOSTIC: NORMAL
PCO2, VEN: 52.8 MMHG (ref 40–50)
PDW BLD-RTO: 14.6 % (ref 12.4–15.4)
PH UA: 5.5 (ref 5–8)
PH VENOUS: 7.29 (ref 7.35–7.45)
PLATELET # BLD: 343 K/UL (ref 135–450)
PMV BLD AUTO: 6.8 FL (ref 5–10.5)
PO2, VEN: 48 MMHG
POTASSIUM REFLEX MAGNESIUM: 4.1 MMOL/L (ref 3.5–5.1)
PRO-BNP: 7582 PG/ML (ref 0–124)
PROCALCITONIN: 0.06 NG/ML (ref 0–0.15)
PROTEIN UA: 100 MG/DL
RBC # BLD: 3.38 M/UL (ref 4–5.2)
RBC UA: ABNORMAL /HPF (ref 0–4)
SARS-COV-2, NAAT: NOT DETECTED
SARS-COV-2, NAAT: NOT DETECTED
SODIUM BLD-SCNC: 140 MMOL/L (ref 136–145)
SPECIFIC GRAVITY UA: >1.03 (ref 1–1.03)
TCO2 CALC VENOUS: 27 MMOL/L
TOTAL PROTEIN: 6.2 G/DL (ref 6.4–8.2)
TROPONIN: 0.04 NG/ML
TROPONIN: 0.04 NG/ML
TROPONIN: 0.06 NG/ML
URINE REFLEX TO CULTURE: ABNORMAL
URINE TYPE: ABNORMAL
UROBILINOGEN, URINE: 0.2 E.U./DL
WBC # BLD: 12.8 K/UL (ref 4–11)
WBC UA: ABNORMAL /HPF (ref 0–5)

## 2021-09-26 PROCEDURE — 2060000000 HC ICU INTERMEDIATE R&B

## 2021-09-26 PROCEDURE — 2500000003 HC RX 250 WO HCPCS: Performed by: STUDENT IN AN ORGANIZED HEALTH CARE EDUCATION/TRAINING PROGRAM

## 2021-09-26 PROCEDURE — 2700000000 HC OXYGEN THERAPY PER DAY

## 2021-09-26 PROCEDURE — 71260 CT THORAX DX C+: CPT

## 2021-09-26 PROCEDURE — 82803 BLOOD GASES ANY COMBINATION: CPT

## 2021-09-26 PROCEDURE — 96374 THER/PROPH/DIAG INJ IV PUSH: CPT

## 2021-09-26 PROCEDURE — 2580000003 HC RX 258: Performed by: INTERNAL MEDICINE

## 2021-09-26 PROCEDURE — 82270 OCCULT BLOOD FECES: CPT

## 2021-09-26 PROCEDURE — 94760 N-INVAS EAR/PLS OXIMETRY 1: CPT

## 2021-09-26 PROCEDURE — 87641 MR-STAPH DNA AMP PROBE: CPT

## 2021-09-26 PROCEDURE — 2500000003 HC RX 250 WO HCPCS: Performed by: INTERNAL MEDICINE

## 2021-09-26 PROCEDURE — 6360000004 HC RX CONTRAST MEDICATION: Performed by: STUDENT IN AN ORGANIZED HEALTH CARE EDUCATION/TRAINING PROGRAM

## 2021-09-26 PROCEDURE — 83880 ASSAY OF NATRIURETIC PEPTIDE: CPT

## 2021-09-26 PROCEDURE — 71045 X-RAY EXAM CHEST 1 VIEW: CPT

## 2021-09-26 PROCEDURE — 6370000000 HC RX 637 (ALT 250 FOR IP): Performed by: STUDENT IN AN ORGANIZED HEALTH CARE EDUCATION/TRAINING PROGRAM

## 2021-09-26 PROCEDURE — 6360000002 HC RX W HCPCS: Performed by: INTERNAL MEDICINE

## 2021-09-26 PROCEDURE — 80053 COMPREHEN METABOLIC PANEL: CPT

## 2021-09-26 PROCEDURE — 85025 COMPLETE CBC W/AUTO DIFF WBC: CPT

## 2021-09-26 PROCEDURE — 6370000000 HC RX 637 (ALT 250 FOR IP): Performed by: INTERNAL MEDICINE

## 2021-09-26 PROCEDURE — 84484 ASSAY OF TROPONIN QUANT: CPT

## 2021-09-26 PROCEDURE — 85379 FIBRIN DEGRADATION QUANT: CPT

## 2021-09-26 PROCEDURE — 87040 BLOOD CULTURE FOR BACTERIA: CPT

## 2021-09-26 PROCEDURE — 2580000003 HC RX 258: Performed by: STUDENT IN AN ORGANIZED HEALTH CARE EDUCATION/TRAINING PROGRAM

## 2021-09-26 PROCEDURE — 6360000002 HC RX W HCPCS: Performed by: STUDENT IN AN ORGANIZED HEALTH CARE EDUCATION/TRAINING PROGRAM

## 2021-09-26 PROCEDURE — 96375 TX/PRO/DX INJ NEW DRUG ADDON: CPT

## 2021-09-26 PROCEDURE — 99284 EMERGENCY DEPT VISIT MOD MDM: CPT

## 2021-09-26 PROCEDURE — 94640 AIRWAY INHALATION TREATMENT: CPT

## 2021-09-26 PROCEDURE — 85730 THROMBOPLASTIN TIME PARTIAL: CPT

## 2021-09-26 PROCEDURE — 36591 DRAW BLOOD OFF VENOUS DEVICE: CPT

## 2021-09-26 PROCEDURE — 81001 URINALYSIS AUTO W/SCOPE: CPT

## 2021-09-26 PROCEDURE — 84145 PROCALCITONIN (PCT): CPT

## 2021-09-26 PROCEDURE — 93005 ELECTROCARDIOGRAM TRACING: CPT | Performed by: STUDENT IN AN ORGANIZED HEALTH CARE EDUCATION/TRAINING PROGRAM

## 2021-09-26 PROCEDURE — 94761 N-INVAS EAR/PLS OXIMETRY MLT: CPT

## 2021-09-26 PROCEDURE — 87635 SARS-COV-2 COVID-19 AMP PRB: CPT

## 2021-09-26 PROCEDURE — 36415 COLL VENOUS BLD VENIPUNCTURE: CPT

## 2021-09-26 RX ORDER — HEPARIN SODIUM 1000 [USP'U]/ML
40 INJECTION, SOLUTION INTRAVENOUS; SUBCUTANEOUS PRN
Status: DISCONTINUED | OUTPATIENT
Start: 2021-09-26 | End: 2021-09-26 | Stop reason: SDUPTHER

## 2021-09-26 RX ORDER — BUPROPION HYDROCHLORIDE 150 MG/1
150 TABLET ORAL DAILY
Status: DISCONTINUED | OUTPATIENT
Start: 2021-09-26 | End: 2021-09-29 | Stop reason: HOSPADM

## 2021-09-26 RX ORDER — METHYLPREDNISOLONE SODIUM SUCCINATE 40 MG/ML
40 INJECTION, POWDER, LYOPHILIZED, FOR SOLUTION INTRAMUSCULAR; INTRAVENOUS EVERY 12 HOURS
Status: DISCONTINUED | OUTPATIENT
Start: 2021-09-26 | End: 2021-09-28

## 2021-09-26 RX ORDER — HEPARIN SODIUM 10000 [USP'U]/100ML
0-3000 INJECTION, SOLUTION INTRAVENOUS CONTINUOUS
Status: DISCONTINUED | OUTPATIENT
Start: 2021-09-26 | End: 2021-09-27

## 2021-09-26 RX ORDER — MIRTAZAPINE 30 MG/1
30 TABLET, FILM COATED ORAL NIGHTLY
Status: DISCONTINUED | OUTPATIENT
Start: 2021-09-26 | End: 2021-09-29 | Stop reason: HOSPADM

## 2021-09-26 RX ORDER — FENOFIBRATE 160 MG/1
160 TABLET ORAL DAILY
Status: DISCONTINUED | OUTPATIENT
Start: 2021-09-26 | End: 2021-09-28

## 2021-09-26 RX ORDER — POLYETHYLENE GLYCOL 3350 17 G/17G
17 POWDER, FOR SOLUTION ORAL DAILY PRN
Status: DISCONTINUED | OUTPATIENT
Start: 2021-09-26 | End: 2021-09-29 | Stop reason: HOSPADM

## 2021-09-26 RX ORDER — OXYCODONE HYDROCHLORIDE 30 MG/1
20 TABLET, FILM COATED, EXTENDED RELEASE ORAL 2 TIMES DAILY PRN
Status: DISCONTINUED | OUTPATIENT
Start: 2021-09-26 | End: 2021-09-27

## 2021-09-26 RX ORDER — SODIUM CHLORIDE 9 MG/ML
25 INJECTION, SOLUTION INTRAVENOUS PRN
Status: DISCONTINUED | OUTPATIENT
Start: 2021-09-26 | End: 2021-09-29 | Stop reason: HOSPADM

## 2021-09-26 RX ORDER — ACETAMINOPHEN 650 MG/1
650 SUPPOSITORY RECTAL EVERY 6 HOURS PRN
Status: DISCONTINUED | OUTPATIENT
Start: 2021-09-26 | End: 2021-09-29 | Stop reason: HOSPADM

## 2021-09-26 RX ORDER — OXCARBAZEPINE 300 MG/1
300 TABLET, FILM COATED ORAL 2 TIMES DAILY
Status: DISCONTINUED | OUTPATIENT
Start: 2021-09-26 | End: 2021-09-29 | Stop reason: HOSPADM

## 2021-09-26 RX ORDER — HEPARIN SODIUM 1000 [USP'U]/ML
3600 INJECTION, SOLUTION INTRAVENOUS; SUBCUTANEOUS ONCE
Status: COMPLETED | OUTPATIENT
Start: 2021-09-26 | End: 2021-09-26

## 2021-09-26 RX ORDER — LEVOTHYROXINE SODIUM 0.05 MG/1
50 TABLET ORAL DAILY
Status: DISCONTINUED | OUTPATIENT
Start: 2021-09-26 | End: 2021-09-29 | Stop reason: HOSPADM

## 2021-09-26 RX ORDER — LACTOBACILLUS RHAMNOSUS GG 10B CELL
1 CAPSULE ORAL
Status: DISCONTINUED | OUTPATIENT
Start: 2021-09-27 | End: 2021-09-29 | Stop reason: HOSPADM

## 2021-09-26 RX ORDER — ONDANSETRON 2 MG/ML
4 INJECTION INTRAMUSCULAR; INTRAVENOUS EVERY 6 HOURS PRN
Status: DISCONTINUED | OUTPATIENT
Start: 2021-09-26 | End: 2021-09-29 | Stop reason: HOSPADM

## 2021-09-26 RX ORDER — IPRATROPIUM BROMIDE AND ALBUTEROL SULFATE 2.5; .5 MG/3ML; MG/3ML
3 SOLUTION RESPIRATORY (INHALATION) ONCE
Status: COMPLETED | OUTPATIENT
Start: 2021-09-26 | End: 2021-09-26

## 2021-09-26 RX ORDER — ZINC OXIDE 13 %
1 CREAM (GRAM) TOPICAL DAILY
Status: DISCONTINUED | OUTPATIENT
Start: 2021-09-26 | End: 2021-09-26 | Stop reason: CLARIF

## 2021-09-26 RX ORDER — GABAPENTIN 400 MG/1
800 CAPSULE ORAL 3 TIMES DAILY
Status: DISCONTINUED | OUTPATIENT
Start: 2021-09-26 | End: 2021-09-29 | Stop reason: HOSPADM

## 2021-09-26 RX ORDER — SODIUM CHLORIDE 0.9 % (FLUSH) 0.9 %
5-40 SYRINGE (ML) INJECTION EVERY 12 HOURS SCHEDULED
Status: DISCONTINUED | OUTPATIENT
Start: 2021-09-26 | End: 2021-09-29 | Stop reason: HOSPADM

## 2021-09-26 RX ORDER — ACYCLOVIR 200 MG/1
400 CAPSULE ORAL 3 TIMES DAILY
Status: DISCONTINUED | OUTPATIENT
Start: 2021-09-26 | End: 2021-09-29 | Stop reason: HOSPADM

## 2021-09-26 RX ORDER — ACETAMINOPHEN 325 MG/1
650 TABLET ORAL EVERY 6 HOURS PRN
Status: DISCONTINUED | OUTPATIENT
Start: 2021-09-26 | End: 2021-09-29 | Stop reason: HOSPADM

## 2021-09-26 RX ORDER — ONDANSETRON 4 MG/1
4 TABLET, ORALLY DISINTEGRATING ORAL EVERY 8 HOURS PRN
Status: DISCONTINUED | OUTPATIENT
Start: 2021-09-26 | End: 2021-09-29 | Stop reason: HOSPADM

## 2021-09-26 RX ORDER — DULOXETIN HYDROCHLORIDE 60 MG/1
120 CAPSULE, DELAYED RELEASE ORAL DAILY
Status: DISCONTINUED | OUTPATIENT
Start: 2021-09-26 | End: 2021-09-29 | Stop reason: HOSPADM

## 2021-09-26 RX ORDER — DEXAMETHASONE SODIUM PHOSPHATE 4 MG/ML
10 INJECTION, SOLUTION INTRA-ARTICULAR; INTRALESIONAL; INTRAMUSCULAR; INTRAVENOUS; SOFT TISSUE ONCE
Status: COMPLETED | OUTPATIENT
Start: 2021-09-26 | End: 2021-09-26

## 2021-09-26 RX ORDER — SODIUM CHLORIDE 0.9 % (FLUSH) 0.9 %
5-40 SYRINGE (ML) INJECTION PRN
Status: DISCONTINUED | OUTPATIENT
Start: 2021-09-26 | End: 2021-09-29 | Stop reason: HOSPADM

## 2021-09-26 RX ORDER — HEPARIN SODIUM 1000 [USP'U]/ML
80 INJECTION, SOLUTION INTRAVENOUS; SUBCUTANEOUS PRN
Status: DISCONTINUED | OUTPATIENT
Start: 2021-09-26 | End: 2021-09-26 | Stop reason: SDUPTHER

## 2021-09-26 RX ORDER — ARIPIPRAZOLE 5 MG/1
5 TABLET ORAL DAILY
Status: DISCONTINUED | OUTPATIENT
Start: 2021-09-26 | End: 2021-09-29 | Stop reason: HOSPADM

## 2021-09-26 RX ORDER — OXYCODONE HYDROCHLORIDE 30 MG/1
30 TABLET ORAL 3 TIMES DAILY PRN
COMMUNITY

## 2021-09-26 RX ORDER — ALBUTEROL SULFATE 90 UG/1
2 AEROSOL, METERED RESPIRATORY (INHALATION) 4 TIMES DAILY
Status: DISCONTINUED | OUTPATIENT
Start: 2021-09-26 | End: 2021-09-28

## 2021-09-26 RX ADMIN — ARIPIPRAZOLE 5 MG: 5 TABLET ORAL at 18:18

## 2021-09-26 RX ADMIN — HEPARIN SODIUM 685 UNITS/HR: 10000 INJECTION, SOLUTION INTRAVENOUS at 19:05

## 2021-09-26 RX ADMIN — Medication 2 PUFF: at 19:57

## 2021-09-26 RX ADMIN — PIPERACILLIN AND TAZOBACTAM 3375 MG: 3; .375 INJECTION, POWDER, LYOPHILIZED, FOR SOLUTION INTRAVENOUS at 17:14

## 2021-09-26 RX ADMIN — DULOXETINE HYDROCHLORIDE 120 MG: 60 CAPSULE, DELAYED RELEASE ORAL at 17:35

## 2021-09-26 RX ADMIN — GABAPENTIN 800 MG: 400 CAPSULE ORAL at 21:38

## 2021-09-26 RX ADMIN — CEFTRIAXONE 1000 MG: 1 INJECTION, POWDER, FOR SOLUTION INTRAMUSCULAR; INTRAVENOUS at 12:45

## 2021-09-26 RX ADMIN — Medication 2 PUFF: at 17:26

## 2021-09-26 RX ADMIN — IPRATROPIUM BROMIDE AND ALBUTEROL SULFATE 3 AMPULE: 2.5; .5 SOLUTION RESPIRATORY (INHALATION) at 09:22

## 2021-09-26 RX ADMIN — TIOTROPIUM BROMIDE AND OLODATEROL 2 PUFF: 3.124; 2.736 SPRAY, METERED RESPIRATORY (INHALATION) at 17:26

## 2021-09-26 RX ADMIN — METHYLPREDNISOLONE SODIUM SUCCINATE 40 MG: 40 INJECTION, POWDER, FOR SOLUTION INTRAMUSCULAR; INTRAVENOUS at 21:37

## 2021-09-26 RX ADMIN — HEPARIN SODIUM 3600 UNITS: 1000 INJECTION INTRAVENOUS; SUBCUTANEOUS at 12:39

## 2021-09-26 RX ADMIN — LEVOTHYROXINE SODIUM 50 MCG: 0.05 TABLET ORAL at 17:35

## 2021-09-26 RX ADMIN — ACYCLOVIR 400 MG: 200 CAPSULE ORAL at 21:38

## 2021-09-26 RX ADMIN — DEXAMETHASONE SODIUM PHOSPHATE 10 MG: 4 INJECTION, SOLUTION INTRAMUSCULAR; INTRAVENOUS at 09:40

## 2021-09-26 RX ADMIN — AZITHROMYCIN MONOHYDRATE 500 MG: 500 INJECTION, POWDER, LYOPHILIZED, FOR SOLUTION INTRAVENOUS at 13:20

## 2021-09-26 RX ADMIN — ACYCLOVIR 400 MG: 200 CAPSULE ORAL at 17:35

## 2021-09-26 RX ADMIN — MIRTAZAPINE 30 MG: 30 TABLET, FILM COATED ORAL at 21:38

## 2021-09-26 RX ADMIN — Medication 10 ML: at 21:38

## 2021-09-26 RX ADMIN — IOPAMIDOL 75 ML: 755 INJECTION, SOLUTION INTRAVENOUS at 10:46

## 2021-09-26 RX ADMIN — PIPERACILLIN AND TAZOBACTAM 3375 MG: 3; .375 INJECTION, POWDER, LYOPHILIZED, FOR SOLUTION INTRAVENOUS at 22:54

## 2021-09-26 RX ADMIN — OXCARBAZEPINE 300 MG: 300 TABLET, FILM COATED ORAL at 21:55

## 2021-09-26 RX ADMIN — HEPARIN SODIUM 820 UNITS/HR: 10000 INJECTION, SOLUTION INTRAVENOUS at 12:40

## 2021-09-26 RX ADMIN — GABAPENTIN 800 MG: 400 CAPSULE ORAL at 17:35

## 2021-09-26 RX ADMIN — BUPROPION HYDROCHLORIDE 150 MG: 150 TABLET, EXTENDED RELEASE ORAL at 17:35

## 2021-09-26 ASSESSMENT — PAIN SCALES - GENERAL
PAINLEVEL_OUTOF10: 4
PAINLEVEL_OUTOF10: 0
PAINLEVEL_OUTOF10: 0

## 2021-09-26 NOTE — ED NOTES
Bed: A15  Expected date: 9/26/21  Expected time:   Means of arrival: [facility]  Comments:  65yo F, AMEYA Alvarez RN  09/26/21 [id number]

## 2021-09-26 NOTE — H&P
Hospital Medicine History & Physical      PCP: Camila Voss DO    Date of Admission: 9/26/2021    Date of Service: Pt seen/examined on 9/26/2021 and Admitted to Inpatient       Chief Complaint: SOB      History Of Present Illness: The patient is a 74 y.o. female w hx of Lung Ca first diagnosed about 2 years ago, s/p LLL lobectomy, chemotherapy, radiation therapy (30 sessions) and immunotherapy, presently in remission, Hx of COPD still smokes, Hx of multiple prior L spine and C spine surgical interventions Hx of spinal stenosis, chronic pain on multiple medications for that. She reports she had Flu vaccine yesterday. Went to bed feeling fine. THis morning woke up with sensation of dyspnea which swiftly progressed to severe SOB triggering ED visit. She reports associated chest heaviness, no pain per say. She denies any new cough, no fever. No pleurisy. She is normally on RA at baseline. She was noted to require up to 5l/min of O2 on presentation to maintain oxygenation. At the time of my exam she is down to 3l/min. Feels much better. O2 sats 97%.        Past Medical History:        Diagnosis Date    Anxiety     Chronic headaches     CT done 4/2016    Colon polyp     COPD, mild ([address]     PFT 8/08 a smoker no symptoms-no meds    DDD (degenerative disc disease)     Depression     Fibromyalgia     Frequent UTI     Full dentures     Hallux valgus     Hearing loss in left ear 07/29/2011    HSV (herpes simplex virus) anogenital infection     leg    Hyperlipidemia     Lung nodule 2019    2 LLL nodules    Malignant neoplasm of left lung (HCC)     Menopausal state 1994    Occipital neuralgia     Osteoarthritis     Scoliosis     neck and back brace intermittently, based on pain    Smoker     Spinal stenosis     cane, walker    Unexplained weight loss     Patient states over 30# weight loss over 6 months and pcp is aware       Past Surgical History:        Procedure Laterality Date    BLADDER SUSPENSION  1988    BRAIN ANEURYSM SURGERY  2015    anterior communicating clipped    BUNIONECTOMY Left 08/23/2016    [place]    CATARACT REMOVAL Bilateral 2008    CERVICAL DISCECTOMY  2010    CERVICAL LAMINECTOMY  2003    Anterior approach, C4-7    CERVICAL LAMINECTOMY N/A 11/18/2016    Posterior, C2 - C5    CHOLECYSTECTOMY, LAPAROSCOPIC  2013    COLONOSCOPY      DILATION AND CURETTAGE OF UTERUS      LUMBAR LAMINECTOMY  2009    x2    SINUS SURGERY      x2    SPINAL FUSION  2009, 2012    post T12-L5 fusion and redone    THORACOSCOPY Left 3/25/2019    Dr. Ana Painting. Alexander - thoracotomy w/takedown of adhesions, wedge excision LLL nodule#1, debulking LLL nodule #2, MSLND    TUBAL LIGATION      TUNNELED VENOUS PORT PLACEMENT Right 08/26/2019    Dr. Valentino Anand port       Medications Prior to Admission:    Prior to Admission medications    Medication Sig Start Date End Date Taking? Authorizing Provider   azelastine (ASTELIN) 0.1 % nasal spray 1 spray by Nasal route 2 times daily 9/1/21   Juan Pablo Vargas MD   fluticasone [facility] [place] [place]) 50 MCG/ACT nasal spray SHAKE AND SPRAY TWO SPRAYS IN EACH NOSTRIL ONCE DAILY 8/29/21   Adina Duarte DO   albuterol sulfate  (90 Base) MCG/ACT inhaler INHALE TWO PUFFS BY MOUTH EVERY 6 HOURS AS NEEDED FOR WHEEZING OR SHORTNESS OF BREATH 8/25/21   Juan Pablo Vargas MD   gabapentin (NEURONTIN) 800 MG tablet TAKE ONE TABLET BY MOUTH THREE TIMES A DAY 7/29/21 10/29/21  Adina Duarte DO   fenofibrate (TRICOR) 145 MG tablet TAKE ONE TABLET BY MOUTH DAILY 7/29/21   Adina Duarte DO   valACYclovir (VALTREX) 1 g tablet TAKE ONE TABLET BY MOUTH DAILY 7/2/21   Adina Duarte DO   Misc. Devices (CANE) MISC As needed. Quad.  27-31 inches 1/7/21   Irene Parks MD   ARIPiprazole (ABILIFY) 5 MG tablet Take 5 mg by mouth daily    Historical Provider, MD   glycopyrrolate-formoterol ([place]) 9-4.8 MCG/ACT AERO Inhale 2 puffs into the lungs 2 times daily 10/22/20   Gadiel Vance MD   levothyroxine (SYNTHROID) 50 MCG tablet Take 50 mcg by mouth Daily    Historical Provider, MD   varenicline (CHANTIX CONTINUING MONTH PAK) 1 MG tablet Take 1 tablet by mouth 2 times daily 1/8/20 4/7/20  Mary Joseph MD   varenicline (CHANTIX STARTING MONTH PAK) 0.5 MG X 11 & 1 MG X 42 tablet Take by mouth. 11/26/19 12/26/19  Mary Joseph MD   OXcarbazepine (TRILEPTAL) 300 MG tablet Take 1 tablet by mouth Take 300 mg by mouth 2 times daily with additional half tab at bedtime 8/21/19   Mary Joseph MD   Misc. Devices (ROLLATOR ULTRA-LIGHT) MISC As needed 5/6/19   Mary Joseph MD   Multiple Vitamins-Minerals (THERAPEUTIC MULTIVITAMIN-MINERALS) tablet Take 1 tablet by mouth daily    Historical Provider, MD   Cranberry 125 MG TABS Take 1 tablet by mouth daily    Historical Provider, MD   Acetaminophen (TYLENOL) 325 MG CAPS Take 650 mg by mouth    Historical Provider, MD   OXYCONTIN 30 MG T12A extended release tablet TK 1 T PO QID PRN P 3/5/19   Historical Provider, MD   Probiotic Product (PROBIOTIC DAILY PO) Take 1 tablet by mouth daily     Historical Provider, MD   cetirizine (ZYRTEC) 10 MG tablet Take 10 mg by mouth nightly as needed     Historical Provider, MD   buPROPion (WELLBUTRIN XL) 150 MG XL tablet 450 mg every morning Tyler Schaefer 2/25/15   Mary Joseph MD   mirtazapine (REMERON) 30 MG tablet Take 30 mg by mouth nightly Tyler Schaefer 2/25/15   Mary Joseph MD   DULoxetine (CYMBALTA) 60 MG capsule Take 120 mg by mouth daily Tyler Schaefer 2/25/15   Mary Joseph MD       Allergies:  Adhesive tape, Ibuprofen, Naproxen, Nsaids, and Vicodin [hydrocodone-acetaminophen]    Social History:  The patient currently lives at home. TOBACCO:   reports that she has been smoking cigarettes.  She has a 12.00 pack-year smoking history. She has never used smokeless tobacco.  ETOH:   reports current alcohol use. Family History:  Reviewed in detail and negative for DM, Early CAD, Cancer, CVA. Positive as follows:        Problem Relation Age of Onset    Cancer Mother 43        breast    Cancer Brother 77        throat    Other Father         Macular Degeration       REVIEW OF SYSTEMS:   As noted in the HPI. All other systems reviewed and negative. PHYSICAL EXAM:    BP (!) 142/67   Pulse 93   Temp 98 °F (36.7 °C) (Temporal)   Resp 20   Ht 5' 2\" (1.575 m)   Wt 100 lb (45.4 kg)   LMP 08/11/1993 (Approximate)   SpO2 97%   BMI 18.29 kg/m²     General appearance: No apparent distress appears stated age and cooperative. HEENT Normal cephalic, atraumatic without obvious deformity. Pupils equal, round, and reactive to light. Extra ocular muscles intact. Conjunctivae/corneas clear. Neck: Supple, No jugular venous distention/bruits. Trachea midline without thyromegaly or adenopathy with full range of motion. Lungs: Clear to auscultation, bilaterally without Rales/Wheezes/Rhonchi with good respiratory effort. Heart: Regular rate and rhythm with Normal S1/S2 without murmurs, rubs or gallops, point of maximum impulse non-displaced  Abdomen: Soft, non-tender or non-distended without rigidity or guarding and positive bowel sounds all four quadrants. Extremities: No clubbing, cyanosis, or edema bilaterally. Full range of motion without deformity and normal gait intact. Skin: Skin color, texture, turgor normal.  No rashes or lesions. Neurologic: Alert and oriented X 3, neurovascularly intact with sensory/motor intact upper extremities/lower extremities, bilaterally. Cranial nerves: II-XII intact, grossly non-focal.  Mental status: Alert, oriented, thought content appropriate.   Capillary Refill: Acceptable  < 3 seconds  Peripheral Pulses: +3 Easily felt, not easily obliterated with pressure        CBC   Recent Labs 09/26/21  0938   WBC 12.8*   HGB 11.3*   HCT 34.4*         RENAL  Recent Labs     09/26/21 0938      K 4.1      CO2 22   BUN 24*   CREATININE 0.7     LFT'S  Recent Labs     09/26/21  0938   AST 47*   ALT 13   BILITOT <0.2   ALKPHOS 100     COAG  No results for input(s): INR in the last 72 hours. CARDIAC ENZYMES  Recent Labs     09/26/21 0938   TROPONINI 0.06*       U/A:    Lab Results   Component Value Date    NITRITE neg 03/04/2014    COLORU DK YELLOW 03/20/2019    WBCUA 10 03/20/2019    RBCUA 5 03/20/2019    CLARITYU Clear 03/20/2019    SPECGRAV >1.030 03/20/2019    LEUKOCYTESUR SMALL 03/20/2019    BLOODU Negative 03/20/2019    GLUCOSEU Negative 03/20/2019    GLUCOSEU NEGATIVE 04/26/2012    AMORPHOUS 2+ 04/26/2012       ABG    Lab Results   Component Value Date    OHS9FEI 23.8 03/20/2019    BEART 0.2 03/20/2019    O5GCBMEF 97.0 03/20/2019    PHART 7.428 03/20/2019    SHY7AFK 36.6 03/20/2019    PO2ART 73.9 03/20/2019    VMW4TVA 24.9 03/20/2019           Active Hospital Problems    Diagnosis Date Noted    Hypoxia [R09.02] 09/26/2021         PHYSICIANS CERTIFICATION:    I certify that Felton Quiroz is expected to be hospitalized for more than 2 midnights based on the following assessment and plan:      ASSESSMENT/PLAN:      Acute Hypox Resp Failure. COPD with Acute Exacerbation. Presently with RLL PNA with loculated though small effusion. Hx of Lung Ca - Left LL lobectomy, chemo and radiation and immunotherapy in remission. Hx of COPD. Ongoing tobacco use. Plan:    - IV zosyn. - swab for MRSA.    - COVID rule out - suspicion low. - pulm consult. - solumedrol.    - resume regimen of inhalers. Elevated Troponin. Chest pressure when SOB - now resolved. Cycle trop. Check ECHO. Cardiology eval.   On heparin gtt for      Acute PE. Small RLL PE. Started on heparin gtt. ECHO pending.          Chronic Pain   S/p multiple L spine and c spine surgical interventions

## 2021-09-26 NOTE — ED NOTES
ED SBAR report provider to [facility]. Patient to be transported to Room 5257 via stretcher by ED tech. Patient transported with bedside cardiac monitor and with IV medications infusing. IV site clean, dry, and intact. MEWS score and pain assessed as 0 and documented. Updated patient on plan of care.        Gordon Worley RN  09/26/21 4747

## 2021-09-26 NOTE — ED PROVIDER NOTES
Primary Care Physician: Camila Voss DO   Attending Physician: Efraín Mendoza MD     History   Chief Complaint   Patient presents with    Shortness of Breath     hx of COPD no home 02. noticed to be SOB this AM, brought in via squad from home, EMS found pt to be in the 50's on RA. Placed on NRB. 100% upon arrival into ED. NO PIV, steroids or breathing TX en route. HPI   Malini Townsend is a 74 y.o. female of anxiety, COPD currently smokes but no oxygen at home, fibromyalgia, lung cancer who presents this morning brought by EMS complaining of shortness of breath that started yesterday and is persisted forcing her to seek care. She stated that she did get a flu shot yesterday. She denies chest pain but currently on 5 L of oxygen which is not her baseline. She was brought by EMS on nonrebreather. She stated she has been vaccinated for COVID-19 x2. Per EMS patient's oxygen was in the 50s on room air and she was placed on a nonrebreather and brought to the emergency room. Does complain of some chest pain but chest pain is now resolved.     Past Medical History:   Diagnosis Date    Anxiety     Chronic headaches     CT done 4/2016    Colon polyp     COPD, mild ([address]     PFT 8/08 a smoker no symptoms-no meds    DDD (degenerative disc disease)     Depression     Fibromyalgia     Frequent UTI     Full dentures     Hallux valgus     Hearing loss in left ear 07/29/2011    HSV (herpes simplex virus) anogenital infection     leg    Hyperlipidemia     Lung nodule 2019    2 LLL nodules    Malignant neoplasm of left lung (HCC)     Menopausal state 1994    Occipital neuralgia     Osteoarthritis     Scoliosis     neck and back brace intermittently, based on pain    Smoker     Spinal stenosis     cane, walker    Unexplained weight loss     Patient states over 30# weight loss over 6 months and pcp is aware        Past Surgical History:   Procedure Laterality Date   [address]  BRAIN ANEURYSM SURGERY  2015    anterior communicating clipped    BUNIONECTOMY Left 2016    Henrietta Muñoz CATARACT REMOVAL Bilateral 2008    CERVICAL DISCECTOMY  2010    CERVICAL LAMINECTOMY  2003    Anterior approach, C4-7    CERVICAL LAMINECTOMY N/A 2016    Posterior, C2 - C5    CHOLECYSTECTOMY, LAPAROSCOPIC  2013    COLONOSCOPY      DILATION AND CURETTAGE OF UTERUS      LUMBAR LAMINECTOMY  2009    x2    SINUS SURGERY      x2    SPINAL FUSION  , 2012    post T12-L5 fusion and redone    THORACOSCOPY Left 3/25/2019    Dr. Bhakti Pugh. Alexander - thoracotomy w/takedown of adhesions, wedge excision LLL nodule#1, debulking LLL nodule #2, MSLND    TUBAL LIGATION      TUNNELED VENOUS PORT PLACEMENT Right 2019    Dr. Alejo Lehman port        Family History   Problem Relation Age of Onset    Cancer Mother 43        breast    Cancer Brother 77        throat    Other Father         Macular Degeration        Social History     Socioeconomic History    Marital status: Single     Spouse name: None    Number of children: 3    Years of education: None    Highest education level: None   Occupational History    Occupation: disabled   Tobacco Use    Smoking status: Current Every Day Smoker     Packs/day: 0.25     Years: 48.00     Pack years: 12.00     Types: Cigarettes     Last attempt to quit: 2019     Years since quittin.6    Smokeless tobacco: Never Used    Tobacco comment: started age 11, [facility] [address] [facility] [facility] [address] [facility];   Vaping Use    Vaping Use: Former    Substances: Always   Substance and Sexual Activity    Alcohol use: Yes     Comment: rare    Drug use: Yes     Types: Marijuana     Comment: occ    Sexual activity: Never   Other Topics Concern    None   Social History Narrative    Exercise:  never. Living will:  no,   additional information provided. Lives alone. Seatbelt use: Always.         Social Determinants of Health     Financial Resource Strain: Low Risk     Difficulty of Paying Living Expenses: Not hard at all   Food Insecurity: No Food Insecurity    Worried About [address] in the Last Year: Never true    Ran Out of Food in the Last Year: Never true   Transportation Needs:     Lack of Transportation (Medical):  Lack of Transportation (Non-Medical):    Physical Activity:     Days of Exercise per Week:     Minutes of Exercise per Session:    Stress:     Feeling of Stress :    Social Connections:     Frequency of Communication with Friends and Family:     Frequency of Social Gatherings with Friends and Family:     Attends [employer] Services:     Active Member of Clubs or Organizations:     Attends Club or Organization Meetings:     Marital Status:    Intimate Partner Violence:     Fear of Current or Ex-Partner:     Emotionally Abused:     Physically Abused:     Sexually Abused:         Review of Systems   10 total systems reviewed and found to be negative unless otherwise noted in HPI     Physical Exam   /64   Pulse 101   Temp 98 °F (36.7 °C) (Temporal)   Resp 15   Ht 5' 2\" (1.575 m)   Wt 100 lb (45.4 kg)   LMP 08/11/1993 (Approximate)   SpO2 97%   BMI 18.29 kg/m²      CONSTITUTIONAL: ill appearing, in no acute distress   HEAD: atraumatic, normocephalic   EYES: PERRL, No injection, discharge or scleral icterus. ENT: Moist mucous membranes. NECK: Normal ROM, NO LAD   CARDIOVASCULAR: Regular rate and rhythm. No murmurs or gallop. PULMONARY/CHEST: Wheezing bilaterally in respiratory distress  ABDOMEN: Soft, Non-distended and non-tender, without guarding or rebound. SKIN: Acyanotic, warm, dry   MUSCULOSKELETAL: No swelling, tenderness or deformity   NEUROLOGICAL: Awake and oriented x 3. Pulses intact. Grossly nonfocal   Nursing note and vitals reviewed.      ED Course & Medical Decision Making   Medications   heparin 25,000 unit in sodium chloride 0.45% 250 mL (premix) infusion (820 Units/hr IntraVENous New Bag 9/26/21 1240) [id number]   Phone [phone]   D-DIMER, QUANTITATIVE - Abnormal; Notable for the following components:    D-Dimer, Quant 653 (*)     All other components within normal limits    Narrative:     Performed at:  [facility]  [address] [facility] [place]   Phone [phone]   BLOOD GAS, VENOUS - Abnormal; Notable for the following components:    pH, Fer 7.286 (*)     pCO2, Fer 52.8 (*)     All other components within normal limits    Narrative:     Performed at:  [facility]  [address] [facility] [address]   Phone [phone]   URINE RT REFLEX TO CULTURE - Abnormal; Notable for the following components:    Protein,  (*)     All other components within normal limits    Narrative:     Performed at:  [facility]  [address] [facility] [address]   Phone [phone] [place]    Narrative:     Performed at:  [facility]  [address] [facility] [address] [address]   Phone [phone]   CULTURE, BLOOD 1   CULTURE, BLOOD 2   BLOOD OCCULT STOOL DIAGNOSTIC    Narrative:     ORDER#: [id number]                          ORDERED BY: Nagi PHAN  SOURCE: Stool                              COLLECTED:  09/26/21 09:38  ANTIBIOTICS AT MARGOT.:                      RECEIVED :  09/26/21 09:54  Performed at:  [facility]  [address] [facility] [place] [address]   Phone [phone]   PROCALCITONIN    Narrative:     Performed at:  [facility]  [address] [facility] [place] [address]   Phone [phone]   APTT    Narrative:     Performed at:  [facility]  [address] [place] [facility] [place] [address]   Phone [phone]   MICROSCOPIC URINALYSIS   APTT      CT CHEST PULMONARY EMBOLISM W CONTRAST   Final Result   New tiny embolus peripherally right middle lobe pulmonary artery. Patchy right lower lobe airspace disease, suspicious for pneumonia. .  There   is underlying emphysema      Increased likely sided pleural effusion which appears non dependent,   particularly superiorly and medially on the left, suggesting loculation. Postsurgical changes seen on the left      Results discussed with JUD PHAN by Marcia Mackay. Keila Puente MD at 11:14 am   on 9/26/2021         XR CHEST PORTABLE   Final Result   Vascular congestion. Small effusion with interstitial opacities, most likely pulmonary edema. No results found. EKG INTERPRETATION:  EKG by my preliminary interpretation shows sinus tach with a rate of 107, normal axis, normal intervals, with no ST changes indicative of ischemia at this time. PROCEDURES:   Procedures    ASSESSMENT AND PLAN:  Livan Randle is a 74 y.o. female  of anxiety, COPD currently smokes but no oxygen at home, fibromyalgia, lung cancer who presents this morning brought by EMS complaining of shortness of breath that started yesterday and is persisted forcing her to seek care. She stated that she did get a flu shot yesterday. She denies chest pain but currently on 5 L of oxygen which is not her baseline. She is now down to 3 L. I did obtain labs which showed elevated D-dimer which was followed by a CT PE. Results of the CT PE showed a tiny PE which radiology does not think that is causing her symptoms. There was also possibility of pneumonia well as increase effusion on the left with concern for loculation. She also had elevated troponin without ST elevations but changes in the lateral leads compared to her old EKG. Given these findings with her presentation I decided to treat her for possible PE, pneumonia and ACS. She has been started on high-dose heparin drip, antibiotics cefepime and azithromycin and hospitalist been consulted for admission. ClINICAL IMPRESSION:  1.  Acute on chronic respiratory failure with hypoxia ([address]    2. NSTEMI (non-ST elevated myocardial infarction) ([address]    3. Other acute pulmonary embolism without acute cor pulmonale ([address]          DISPOSITION    Hospitalist admit  -Findings and recommendations explained to patient. She expressed understanding and agreed with the plan. Pushpa Winters MD (electronically signed)  9/26/2021  _________________________________________________________________________________________  Amount and/or Complexity of Data Reviewed:  Clinical lab tests: ordered and reviewed   Tests in the radiology section of CPT®: ordered and reviewed   Tests in the medicine section of CPT®: ordered and reviewed   Decide to obtain previous medical records or to obtain history from someone other than the patient  Obtain history from someone other than the patient no  Review and summarize past medical records:yes  I looked up the patient in our electronic medical record:yes  Discuss the patient with other providers:yes  Independent visualization of images, tracings, or specimens:yes  Risk of Complications, Morbidity, and/or Mortality:Moderate  Presenting problems: moderate Diagnostic procedures: moderate yes Management options: moderate     Critical Care Attestation   Total critical care time: 35 minutes minimum   Critical care time does not include separately billable procedures and treating other patients. Critical care was necessary to treat or prevent imminent or life-threatening deterioration of the following conditions: Pneumonia with hypoxemia. Patient provided with supplemental oxygen and treated urgently in the ED with antibiotics. Case discussed with consultants.     _________________________________________________________________________________________  This record is transcribed utilizing voice recognition technology. There are inherent limitations in this technology. In addition, there may be limitations in editing of this report.  If there are any discrepancies, please contact me directly.         Robert Curran MD  09/26/21 7265

## 2021-09-26 NOTE — PROGRESS NOTES
Clinical Pharmacy Note  Heparin Dosing Consult    Sherrie Rodriguez is a 74 y.o. female ordered heparin per high dose nomogram by Dr. Jasmin Harrington. Lab Results   Component Value Date    APTT 31.5 11/14/2016     Lab Results   Component Value Date    HGB 11.3 09/26/2021    HCT 34.4 09/26/2021     09/26/2021    INR 0.97 08/26/2019       Ht Readings from Last 1 Encounters:   09/26/21 5' 2\" (1.575 m)        Wt Readings from Last 1 Encounters:   09/26/21 100 lb (45.4 kg)        Assessment/Plan:  Initial bolus: 3600 units  Initial infusion rate: 820 units/hr  Next aPTT: 6 hours after start of infusion    Pharmacy will continue to monitor adjust heparin based on aPTT results using nomogram below:     HIGH DOSE HEPARIN PROTOCOL (DVT/PE)     Initial Bolus: 80 units/kg Max Bolus: 10,000 units       Initial Rate: 18 units/kg/hr Max Initial Rate: 2,750 units/hr     aPTT < 45   Heparin 80 units/kg bolus Increase infusion by 4 units/kg/hr       (maximum 10,000 units)   aPTT 45-59.9   Heparin 40 units/kg bolus Increase infusion by 2 units/kg/hr       (maximum 5,000 units)   aPTT 60-90   No bolus   No change   aPTT 90.1-97.5 No bolus   Decrease infusion by 1 units/kg/hr   aPTT 97.6-105  No bolus   Decrease infusion by 2 units/kg/hr   aPTT > 105    Hold heparin for 1 hour Decrease infusion by 3 units/kg/hr    Obtain aPTT 6 hours after initial bolus and 6 hours after any dose change until two consecutive therapeutic aPTTs are achieved - then daily.   Daniel Vega, [facility], [address] [place] [address] 9/26/2021 11:46 AM

## 2021-09-26 NOTE — PROGRESS NOTES
Patient admitted to room [id number], oriented to room and call light. Stand by assists from ED stretcher to bed in room. On 3L of O2. Will continue to assess and finish admission questions with patient.

## 2021-09-26 NOTE — PROGRESS NOTES
Clinical Pharmacy Note  Heparin Dosing       Lab Results   Component Value Date    APTT 127.4 09/26/2021     Lab Results   Component Value Date    HGB 11.3 09/26/2021    HCT 34.4 09/26/2021     09/26/2021    INR 0.97 08/26/2019       Current Infusion Rate: 820  units/hr    Plan:  Hold for 1 hour  Restart 1900 @ 685 u/h  Bolus: 0000 units  Rate: 685 units/hr  Next aPTT: 0100    Pharmacy will continue to monitor and adjust based on aPTT results.

## 2021-09-27 PROBLEM — F17.200 NICOTINE DEPENDENCE: Status: ACTIVE | Noted: 2020-01-23

## 2021-09-27 LAB
ALBUMIN SERPL-MCNC: 3.7 G/DL (ref 3.4–5)
ANION GAP SERPL CALCULATED.3IONS-SCNC: 11 MMOL/L (ref 3–16)
APTT: 43.9 SEC (ref 26.2–38.6)
APTT: 66.2 SEC (ref 26.2–38.6)
BASOPHILS ABSOLUTE: 0.1 K/UL (ref 0–0.2)
BASOPHILS RELATIVE PERCENT: 0.8 %
BUN BLDV-MCNC: 20 MG/DL (ref 7–20)
CALCIUM SERPL-MCNC: 8.9 MG/DL (ref 8.3–10.6)
CHLORIDE BLD-SCNC: 108 MMOL/L (ref 99–110)
CO2: 23 MMOL/L (ref 21–32)
CREAT SERPL-MCNC: 0.8 MG/DL (ref 0.6–1.2)
EKG ATRIAL RATE: 107 BPM
EKG DIAGNOSIS: NORMAL
EKG P AXIS: 75 DEGREES
EKG P-R INTERVAL: 134 MS
EKG Q-T INTERVAL: 362 MS
EKG QRS DURATION: 108 MS
EKG QTC CALCULATION (BAZETT): 483 MS
EKG R AXIS: -44 DEGREES
EKG T AXIS: 100 DEGREES
EKG VENTRICULAR RATE: 107 BPM
EOSINOPHILS ABSOLUTE: 0 K/UL (ref 0–0.6)
EOSINOPHILS RELATIVE PERCENT: 0.2 %
GFR AFRICAN AMERICAN: >60
GFR NON-AFRICAN AMERICAN: >60
GLUCOSE BLD-MCNC: 94 MG/DL (ref 70–99)
HCT VFR BLD CALC: 31.4 % (ref 36–48)
HEMOGLOBIN: 10.5 G/DL (ref 12–16)
LV EF: 43 %
LVEF MODALITY: NORMAL
LYMPHOCYTES ABSOLUTE: 0.8 K/UL (ref 1–5.1)
LYMPHOCYTES RELATIVE PERCENT: 9.5 %
MCH RBC QN AUTO: 33.6 PG (ref 26–34)
MCHC RBC AUTO-ENTMCNC: 33.5 G/DL (ref 31–36)
MCV RBC AUTO: 100.3 FL (ref 80–100)
MONOCYTES ABSOLUTE: 0.7 K/UL (ref 0–1.3)
MONOCYTES RELATIVE PERCENT: 8.4 %
MRSA SCREEN RT-PCR: NORMAL
NEUTROPHILS ABSOLUTE: 7.1 K/UL (ref 1.7–7.7)
NEUTROPHILS RELATIVE PERCENT: 81.1 %
PDW BLD-RTO: 15 % (ref 12.4–15.4)
PHOSPHORUS: 3.1 MG/DL (ref 2.5–4.9)
PLATELET # BLD: 319 K/UL (ref 135–450)
PMV BLD AUTO: 7 FL (ref 5–10.5)
POTASSIUM SERPL-SCNC: 4 MMOL/L (ref 3.5–5.1)
RBC # BLD: 3.13 M/UL (ref 4–5.2)
SODIUM BLD-SCNC: 142 MMOL/L (ref 136–145)
WBC # BLD: 8.7 K/UL (ref 4–11)

## 2021-09-27 PROCEDURE — 93971 EXTREMITY STUDY: CPT

## 2021-09-27 PROCEDURE — 94761 N-INVAS EAR/PLS OXIMETRY MLT: CPT

## 2021-09-27 PROCEDURE — 94640 AIRWAY INHALATION TREATMENT: CPT

## 2021-09-27 PROCEDURE — 93356 MYOCRD STRAIN IMG SPCKL TRCK: CPT

## 2021-09-27 PROCEDURE — 99223 1ST HOSP IP/OBS HIGH 75: CPT | Performed by: INTERNAL MEDICINE

## 2021-09-27 PROCEDURE — 6370000000 HC RX 637 (ALT 250 FOR IP): Performed by: INTERNAL MEDICINE

## 2021-09-27 PROCEDURE — 93010 ELECTROCARDIOGRAM REPORT: CPT | Performed by: INTERNAL MEDICINE

## 2021-09-27 PROCEDURE — 6360000002 HC RX W HCPCS: Performed by: INTERNAL MEDICINE

## 2021-09-27 PROCEDURE — 2580000003 HC RX 258: Performed by: INTERNAL MEDICINE

## 2021-09-27 PROCEDURE — 85730 THROMBOPLASTIN TIME PARTIAL: CPT

## 2021-09-27 PROCEDURE — 93306 TTE W/DOPPLER COMPLETE: CPT

## 2021-09-27 PROCEDURE — 2060000000 HC ICU INTERMEDIATE R&B

## 2021-09-27 PROCEDURE — 85025 COMPLETE CBC W/AUTO DIFF WBC: CPT

## 2021-09-27 PROCEDURE — 80069 RENAL FUNCTION PANEL: CPT

## 2021-09-27 PROCEDURE — 2500000003 HC RX 250 WO HCPCS: Performed by: INTERNAL MEDICINE

## 2021-09-27 PROCEDURE — 2700000000 HC OXYGEN THERAPY PER DAY

## 2021-09-27 RX ORDER — FUROSEMIDE 10 MG/ML
20 INJECTION INTRAMUSCULAR; INTRAVENOUS ONCE
Status: COMPLETED | OUTPATIENT
Start: 2021-09-27 | End: 2021-09-27

## 2021-09-27 RX ORDER — HEPARIN SODIUM 1000 [USP'U]/ML
1800 INJECTION, SOLUTION INTRAVENOUS; SUBCUTANEOUS ONCE
Status: COMPLETED | OUTPATIENT
Start: 2021-09-27 | End: 2021-09-27

## 2021-09-27 RX ORDER — OXYCODONE HYDROCHLORIDE 10 MG/1
30 TABLET ORAL 3 TIMES DAILY PRN
Status: DISCONTINUED | OUTPATIENT
Start: 2021-09-27 | End: 2021-09-29 | Stop reason: HOSPADM

## 2021-09-27 RX ORDER — ATORVASTATIN CALCIUM 10 MG/1
10 TABLET, FILM COATED ORAL DAILY
Status: DISCONTINUED | OUTPATIENT
Start: 2021-09-27 | End: 2021-09-29 | Stop reason: HOSPADM

## 2021-09-27 RX ORDER — LISINOPRIL 5 MG/1
2.5 TABLET ORAL DAILY
Status: DISCONTINUED | OUTPATIENT
Start: 2021-09-27 | End: 2021-09-29 | Stop reason: HOSPADM

## 2021-09-27 RX ORDER — TIZANIDINE 4 MG/1
4 TABLET ORAL DAILY PRN
COMMUNITY
End: 2021-11-22 | Stop reason: ALTCHOICE

## 2021-09-27 RX ADMIN — GABAPENTIN 800 MG: 400 CAPSULE ORAL at 13:24

## 2021-09-27 RX ADMIN — Medication 2 PUFF: at 19:29

## 2021-09-27 RX ADMIN — ACYCLOVIR 400 MG: 200 CAPSULE ORAL at 13:24

## 2021-09-27 RX ADMIN — DULOXETINE HYDROCHLORIDE 120 MG: 60 CAPSULE, DELAYED RELEASE ORAL at 08:16

## 2021-09-27 RX ADMIN — Medication 2 PUFF: at 12:08

## 2021-09-27 RX ADMIN — HEPARIN SODIUM 1800 UNITS: 1000 INJECTION INTRAVENOUS; SUBCUTANEOUS at 03:06

## 2021-09-27 RX ADMIN — PIPERACILLIN AND TAZOBACTAM 3375 MG: 3; .375 INJECTION, POWDER, LYOPHILIZED, FOR SOLUTION INTRAVENOUS at 16:42

## 2021-09-27 RX ADMIN — FUROSEMIDE 20 MG: 10 INJECTION, SOLUTION INTRAMUSCULAR; INTRAVENOUS at 13:25

## 2021-09-27 RX ADMIN — ARIPIPRAZOLE 5 MG: 5 TABLET ORAL at 08:18

## 2021-09-27 RX ADMIN — ACYCLOVIR 400 MG: 200 CAPSULE ORAL at 21:37

## 2021-09-27 RX ADMIN — GABAPENTIN 800 MG: 400 CAPSULE ORAL at 08:18

## 2021-09-27 RX ADMIN — MIRTAZAPINE 30 MG: 30 TABLET, FILM COATED ORAL at 21:37

## 2021-09-27 RX ADMIN — Medication 2 PUFF: at 17:37

## 2021-09-27 RX ADMIN — APIXABAN 10 MG: 5 TABLET, FILM COATED ORAL at 13:25

## 2021-09-27 RX ADMIN — BUPROPION HYDROCHLORIDE 150 MG: 150 TABLET, EXTENDED RELEASE ORAL at 08:18

## 2021-09-27 RX ADMIN — PIPERACILLIN AND TAZOBACTAM 3375 MG: 3; .375 INJECTION, POWDER, LYOPHILIZED, FOR SOLUTION INTRAVENOUS at 06:22

## 2021-09-27 RX ADMIN — METHYLPREDNISOLONE SODIUM SUCCINATE 40 MG: 40 INJECTION, POWDER, FOR SOLUTION INTRAMUSCULAR; INTRAVENOUS at 09:52

## 2021-09-27 RX ADMIN — LEVOTHYROXINE SODIUM 50 MCG: 0.05 TABLET ORAL at 06:20

## 2021-09-27 RX ADMIN — Medication 1 CAPSULE: at 08:16

## 2021-09-27 RX ADMIN — ACYCLOVIR 400 MG: 200 CAPSULE ORAL at 08:17

## 2021-09-27 RX ADMIN — Medication 10 ML: at 21:37

## 2021-09-27 RX ADMIN — TIOTROPIUM BROMIDE AND OLODATEROL 2 PUFF: 3.124; 2.736 SPRAY, METERED RESPIRATORY (INHALATION) at 08:04

## 2021-09-27 RX ADMIN — APIXABAN 10 MG: 5 TABLET, FILM COATED ORAL at 21:37

## 2021-09-27 RX ADMIN — LISINOPRIL 2.5 MG: 5 TABLET ORAL at 16:42

## 2021-09-27 RX ADMIN — Medication 2 PUFF: at 08:04

## 2021-09-27 RX ADMIN — OXCARBAZEPINE 300 MG: 300 TABLET, FILM COATED ORAL at 08:16

## 2021-09-27 RX ADMIN — GABAPENTIN 800 MG: 400 CAPSULE ORAL at 21:37

## 2021-09-27 RX ADMIN — HEPARIN SODIUM 770 UNITS/HR: 10000 INJECTION, SOLUTION INTRAVENOUS at 03:08

## 2021-09-27 RX ADMIN — PIPERACILLIN AND TAZOBACTAM 3375 MG: 3; .375 INJECTION, POWDER, LYOPHILIZED, FOR SOLUTION INTRAVENOUS at 22:22

## 2021-09-27 RX ADMIN — METHYLPREDNISOLONE SODIUM SUCCINATE 40 MG: 40 INJECTION, POWDER, FOR SOLUTION INTRAMUSCULAR; INTRAVENOUS at 21:37

## 2021-09-27 RX ADMIN — OXCARBAZEPINE 300 MG: 300 TABLET, FILM COATED ORAL at 21:37

## 2021-09-27 RX ADMIN — FENOFIBRATE 160 MG: 160 TABLET ORAL at 08:16

## 2021-09-27 RX ADMIN — ATORVASTATIN CALCIUM 10 MG: 10 TABLET, FILM COATED ORAL at 13:25

## 2021-09-27 NOTE — CARE COORDINATION
INITIAL CASE MANAGEMENT ASSESSMENT    Reviewed chart, met with patient to assess possible discharge needs. Explained Case Management role/services. Living Situation: Confirmed address, lives alone in 1st floor apartment, 14 steps with railing to get to apartment from garage parking    ADLs: Independent, has a HHA twice a week for cleaning     DME: Wheeled walker, Rollator, cane, shower chair, handles bars that fit over toilet, lift alert    PT/OT Recs: Not ordered     Active Services: [facility]HA through [facility] 2 hours twice a week, life alert     Transportation: Active , son will transport home     Medications: Uses [employer] on [address] -- no issues    PCP: Sarahi Dillon, DO      HD/PD: n/a    PLAN/COMMENTS: Patient will return home at discharge. Denies additional needs. Wants to resume HHA thru [facility]. Tried to call [facility] to confirm services but line just kept cycling through same recorded message, will try again discharge. Called [facility] [place], [facility]. to notify patient in hospital.    CM provided contact information for patient or family to call with any questions. CM will follow and assist as needed.   Electronically signed by Zuleika Jimenez RN Case Management [phone] on 9/27/2021 at 12:22 PM

## 2021-09-27 NOTE — PROGRESS NOTES
Clinical Pharmacy Note  Heparin Dosing       Lab Results   Component Value Date    APTT 66.2 09/27/2021     Lab Results   Component Value Date    HGB 10.5 09/27/2021    HCT 31.4 09/27/2021     09/27/2021    INR 0.97 08/26/2019       Current Infusion Rate: 770 units/hr    Plan:  Bolus:   Rate: 770 units/hr  Next aPTT: 1600 9-27    Pharmacy will continue to monitor and adjust based on aPTT results.   Herb Gayle [facility], [address] 9/27/2021 10:54 AM

## 2021-09-27 NOTE — PROGRESS NOTES
Hospitalist Progress Note      PCP: Stacy Summers DO    Date of Admission: 9/26/2021    Chief Complaint: severe acute onset dyspnea. 74 y.o. female w hx of Lung Ca first diagnosed about 2 years ago, s/p LLL lobectomy, chemotherapy, radiation therapy (30 sessions) and immunotherapy, presently in remission, Hx of COPD still smokes, Hx of multiple prior L spine and C spine surgical interventions Hx of spinal stenosis, chronic pain on multiple medications for that. Had flu vaccine on 9/25. Presented following morning with severe dyspnea. Subjective:     Feels much better. O2 requirement down to 2l/min. COVID negative.        Medications:  Reviewed    Infusion Medications    heparin (PORCINE) Infusion 770 Units/hr (09/27/21 1049)    sodium chloride       Scheduled Medications    atorvastatin  10 mg Oral Daily    albuterol sulfate HFA  2 puff Inhalation 4x daily    ARIPiprazole  5 mg Oral Daily    buPROPion  150 mg Oral Daily    DULoxetine  120 mg Oral Daily    fenofibrate  160 mg Oral Daily    gabapentin  800 mg Oral TID    tiotropium-olodaterol  2 puff Inhalation Daily    levothyroxine  50 mcg Oral Daily    mirtazapine  30 mg Oral Nightly    OXcarbazepine  300 mg Oral BID    acyclovir  400 mg Oral TID    sodium chloride flush  5-40 mL IntraVENous 2 times per day    piperacillin-tazobactam  3,375 mg IntraVENous Q8H    methylPREDNISolone  40 mg IntraVENous Q12H    lactobacillus  1 capsule Oral Daily with breakfast     PRN Meds: oxyCODONE, sodium chloride flush, sodium chloride, ondansetron **OR** ondansetron, polyethylene glycol, acetaminophen **OR** acetaminophen      Intake/Output Summary (Last 24 hours) at 9/27/2021 1136  Last data filed at 9/27/2021 1049  Gross per 24 hour   Intake 837.44 ml   Output --   Net 837.44 ml       Physical Exam Performed:    /68   Pulse 88   Temp 98.3 °F (36.8 °C) (Oral)   Resp 16   Ht 5' 2\" (1.575 m)   Wt 104 lb 8 oz (47.4 kg)   LMP 08/11/1993 (Approximate)   SpO2 95%   BMI 19.11 kg/m²     General appearance: No apparent distress, appears stated age and cooperative. HEENT: Pupils equal, round, and reactive to light. Conjunctivae/corneas clear. Neck: Supple, with full range of motion. No jugular venous distention. Trachea midline. Respiratory:  Normal respiratory effort. Clear to auscultation, bilaterally without Rales/Wheezes/Rhonchi. Cardiovascular: Regular rate and rhythm with normal S1/S2 without murmurs, rubs or gallops. Abdomen: Soft, non-tender, non-distended with normal bowel sounds. Musculoskeletal: No clubbing, cyanosis or edema bilaterally. Full range of motion without deformity. Skin: Skin color, texture, turgor normal.  No rashes or lesions. Neurologic:  Neurovascularly intact without any focal sensory/motor deficits. Cranial nerves: II-XII intact, grossly non-focal.  Psychiatric: Alert and oriented, thought content appropriate, normal insight  Capillary Refill: Brisk,3 seconds, normal   Peripheral Pulses: +2 palpable, equal bilaterally       Labs:   Recent Labs     09/26/21  0938 09/27/21  0950   WBC 12.8* 8.7   HGB 11.3* 10.5*   HCT 34.4* 31.4*    319     Recent Labs     09/26/21  0938 09/27/21  0950    142   K 4.1 4.0    108   CO2 22 23   BUN 24* 20   CREATININE 0.7 0.8   CALCIUM 9.0 8.9   PHOS  --  3.1     Recent Labs     09/26/21  0938   AST 47*   ALT 13   BILITOT <0.2   ALKPHOS 100     No results for input(s): INR in the last 72 hours.   Recent Labs     09/26/21  0938 09/26/21  1650 09/26/21  2152   TROPONINI 0.06* 0.04* 0.04*       Urinalysis:      Lab Results   Component Value Date    NITRU Negative 09/26/2021    WBCUA 0-2 09/26/2021    BACTERIA 4+ 09/26/2021    RBCUA 0-2 09/26/2021    BLOODU Negative 09/26/2021    SPECGRAV >1.030 09/26/2021    GLUCOSEU Negative 09/26/2021    GLUCOSEU NEGATIVE 04/26/2012       Radiology:  VL Extremity Venous Left         CT CHEST PULMONARY EMBOLISM W CONTRAST Final Result   New tiny embolus peripherally right middle lobe pulmonary artery. Patchy right lower lobe airspace disease, suspicious for pneumonia. .  There   is underlying emphysema      Increased likely sided pleural effusion which appears non dependent,   particularly superiorly and medially on the left, suggesting loculation. Postsurgical changes seen on the left      Results discussed with JUD PHAN by Malik Montgomery. Gunner Jensen MD at 11:14 am   on 9/26/2021         XR CHEST PORTABLE   Final Result   Vascular congestion. Small effusion with interstitial opacities, most likely pulmonary edema. XR CHEST (2 VW)    (Results Pending)           Assessment/Plan:    Active Hospital Problems    Diagnosis     COPD exacerbation ([address] [J44.1]      Priority: High    Demand ischemia ([address] [I24.8]     Pulmonary embolus ([address] [I26.99]     Coronary artery disease involving native coronary artery of native heart without angina pectoris [I25.10]     Hypoxia [R09.02]     Nicotine dependence [F17.200]          Acute Hypox Resp Failure. COPD with Acute Exacerbation. Presently with RLL PNA with loculated (though too small to drain) pleural effusion. Hx of Lung Ca - Left LL lobectomy, chemo and radiation and immunotherapy in remission. Hx of COPD. Ongoing tobacco use. Plan:               - IV zosyn. - swab for MRSA.               - COVID rule out - negative. - pulm consult - plan to repeat CXR 9/28 to reassess effusion.              - solumedrol.               - resume regimen of inhalers.        Elevated Troponin. Chest pressure when SOB - now resolved. Cycle trop - downtrending - ACS ruled out. Check ECHO - done and pending. Cardiology eval - will likely need OP stress test.    On heparin gtt for PE - see below.         Acute PE. Small RLL PE. Started on heparin gtt - transition to PO eliquis.     ECHO pending.            Chronic Pain   S/p multiple L spine and c spine surgical interventions with Hx of spinal stenosis. Cont PTA regimen.         Tobacco Use - .         Hypothyroidism on synthroid.               DVT Prophylaxis: heparin gtt to PO eliquis today. Diet: gen diet.    Code Status: Prior     Dispo - PCU.          Jaki Almaguer MD

## 2021-09-27 NOTE — CONSULTS
Referring Physician: Dr. Lloyd Aquino  Reason for Consultation: \"Acute small PE, elevated troponin, SOB with new O2 requirement. \"  Chief Complaint: Short of breath    Subjective:   History of Present Illness:  Elly Vazquez is a 74 y.o. patient who presented to the hospital with complaints of acute and severe shortness of breath. She reports worsening dyspnea on exertion over the past several weeks but had the sudden onset of severe shortness of breath while at rest prompting hospitalization. She says her breathing was so labored that she thought she was going to die and called 911. She felt some improvement in the dyspnea with supplemental oxygen. She denies fevers and chills but was having a cough. She denies weight gain or lower extremity edema. She has known COPD and continues to smoke. Pulmonary was also consulted. She denied associated chest pains. Past Medical History:   has a past medical history of Anxiety, Chronic headaches, Colon polyp, COPD, mild ([address] DDD (degenerative disc disease), Depression, Fibromyalgia, Frequent UTI, Full dentures, Hallux valgus, Hearing loss in left ear, HSV (herpes simplex virus) anogenital infection, Hyperlipidemia, Lung nodule, Malignant neoplasm of left lung ([address] Menopausal state, Occipital neuralgia, Osteoarthritis, Scoliosis, Smoker, Spinal stenosis, and Unexplained weight loss. Surgical History:   has a past surgical history that includes sinus surgery; Cataract removal (Bilateral, 2008); Tubal ligation; cervical laminectomy (2003); lumbar laminectomy (2009); Spinal fusion (2009, 2012); Cervical discectomy (2010); bladder suspension (1988); Dilation and curettage of uterus; Cholecystectomy, laparoscopic (2013); Brain aneurysm surgery (2015); Bunionectomy (Left, 08/23/2016); cervical laminectomy (N/A, 11/18/2016); Colonoscopy; Thoracoscopy (Left, 3/25/2019); and Tunneled venous port placement (Right, 08/26/2019).      Social History:   reports that she has been smoking cigarettes. She has a 12.00 pack-year smoking history. She has never used smokeless tobacco. She reports current alcohol use. She reports current drug use. Drug: Marijuana. Family History:  family history includes Cancer (age of onset: 43) in her mother; Cancer (age of onset: 77) in her brother; Other in her father. Home Medications:  Were reviewed and are listed in nursing record and/or below  Prior to Admission medications    Medication Sig Start Date End Date Taking? Authorizing Provider   oxyCODONE (OXY-IR) 30 MG immediate release tablet Take 30 mg by mouth 3 times daily as needed for Pain. Yes Historical Provider, MD   azelastine (ASTELIN) 0.1 % nasal spray 1 spray by Nasal route 2 times daily 9/1/21   Nisha Robles MD   fluticasone [facility]) 50 MCG/ACT nasal spray SHAKE AND SPRAY TWO SPRAYS IN EACH NOSTRIL ONCE DAILY 8/29/21   Adina Duarte DO   albuterol sulfate  (90 Base) MCG/ACT inhaler INHALE TWO PUFFS BY MOUTH EVERY 6 HOURS AS NEEDED FOR WHEEZING OR SHORTNESS OF BREATH 8/25/21   Nisha Robles MD   gabapentin (NEURONTIN) 800 MG tablet TAKE ONE TABLET BY MOUTH THREE TIMES A DAY 7/29/21 10/29/21  Adina Duarte DO   fenofibrate (TRICOR) 145 MG tablet TAKE ONE TABLET BY MOUTH DAILY 7/29/21   Adina Duarte DO   valACYclovir (VALTREX) 1 g tablet TAKE ONE TABLET BY MOUTH DAILY 7/2/21   Adina Duarte DO   Misc. Devices (CANE) MISC As needed. Quad.  27-31 inches 1/7/21   Teddy Arnett MD   ARIPiprazole (ABILIFY) 5 MG tablet Take 5 mg by mouth daily    Historical Provider, MD   glycopyrrolate-formoterol (BEVESPI AEROSPHERE) 9-4.8 MCG/ACT AERO Inhale 2 puffs into the lungs 2 times daily 10/22/20   Nisha Robles MD   levothyroxine (SYNTHROID) 50 MCG tablet Take 50 mcg by mouth Daily    Historical Provider, MD   OXcarbazepine (TRILEPTAL) 300 MG tablet Take 1 tablet by mouth Take 300 mg by mouth 2 times daily with additional half tab at bedtime 8/21/19 Rafita Ramos MD   Misc.  Devices (ROLLATOR ULTRA-LIGHT) MISC As needed 5/6/19   Rafita Ramos MD   Multiple Vitamins-Minerals (THERAPEUTIC MULTIVITAMIN-MINERALS) tablet Take 1 tablet by mouth daily    Historical Provider, MD   Cranberry 125 MG TABS Take 1 tablet by mouth daily    Historical Provider, MD   Acetaminophen (TYLENOL) 325 MG CAPS Take 650 mg by mouth    Historical Provider, MD   Probiotic Product (PROBIOTIC DAILY PO) Take 1 tablet by mouth daily     Historical Provider, MD   cetirizine (ZYRTEC) 10 MG tablet Take 10 mg by mouth nightly as needed     Historical Provider, MD   buPROPion (WELLBUTRIN XL) 150 MG XL tablet 450 mg every [facility] 2/25/15   Rafita Ramos MD   mirtazapine (REMERON) 30 MG tablet Take 30 mg by mouth nightly [facility] 2/25/15   Rafita Ramos MD   DULoxetine (CYMBALTA) 60 MG capsule Take 120 mg by mouth daily [facility] 2/25/15   Rafita Ramos MD        CURRENT Medications:  oxyCODONE HCl (OXY-IR) immediate release tablet 30 mg, TID PRN  heparin 25,000 unit in sodium chloride 0.45% 250 mL (premix) infusion, Continuous  albuterol sulfate  (90 Base) MCG/ACT inhaler 2 puff, 4x daily  ARIPiprazole (ABILIFY) tablet 5 mg, Daily  buPROPion (WELLBUTRIN XL) extended release tablet 150 mg, Daily  DULoxetine (CYMBALTA) extended release capsule 120 mg, Daily  fenofibrate (TRIGLIDE) tablet 160 mg, Daily  gabapentin (NEURONTIN) capsule 800 mg, TID  tiotropium-olodaterol (STIOLTO) 2.5-2.5 MCG/ACT inhaler 2 puff, Daily  levothyroxine (SYNTHROID) tablet 50 mcg, Daily  mirtazapine (REMERON) tablet 30 mg, Nightly  OXcarbazepine (TRILEPTAL) tablet 300 mg, BID  acyclovir (ZOVIRAX) capsule 400 mg, TID  sodium chloride flush 0.9 % injection 5-40 mL, 2 times per day  sodium chloride flush 0.9 % injection 5-40 mL, PRN  0.9 % sodium chloride infusion, PRN  ondansetron (ZOFRAN-ODT) disintegrating tablet 4 mg, Q8H PRN   Or  ondansetron (ZOFRAN) injection 4 mg, Q6H PRN  polyethylene glycol (GLYCOLAX) packet 17 g, Daily PRN  acetaminophen (TYLENOL) tablet 650 mg, Q6H PRN   Or  acetaminophen (TYLENOL) suppository 650 mg, Q6H PRN  piperacillin-tazobactam (ZOSYN) 3,375 mg in dextrose 5 % 100 mL IVPB extended infusion (mini-bag), Q8H  methylPREDNISolone sodium (SOLU-MEDROL) injection 40 mg, Q12H  lactobacillus (CULTURELLE) capsule 1 capsule, Daily with breakfast      Allergies:  Adhesive tape, Ibuprofen, Naproxen, Nsaids, and Vicodin [hydrocodone-acetaminophen]     Review of Systems:   · Constitutional: no unanticipated weight loss. There's been no change in energy level, sleep pattern, or activity level. No fevers, chills. · Eyes: No visual changes or diplopia. No scleral icterus. · ENT: No Headaches, hearing loss or vertigo. No mouth sores or sore throat. · Cardiovascular: as reviewed in HPI  · Respiratory: No cough or wheezing, no sputum production. No hemoptysis. · Gastrointestinal: No abdominal pain, appetite loss, blood in stools. No change in bowel or bladder habits. · Genitourinary: No dysuria, trouble voiding, or hematuria. · Musculoskeletal:  No gait disturbance, no joint complaints. · Integumentary: No rash or pruritis. · Neurological: No headache, diplopia, change in muscle strength, numbness or tingling. · Psychiatric: No anxiety or depression. · Endocrine: No temperature intolerance. No excessive thirst, fluid intake, or urination. No tremor. · Hematologic/Lymphatic: No abnormal bruising or bleeding, blood clots or swollen lymph nodes. · Allergic/Immunologic: No nasal congestion or hives. Objective:   PHYSICAL EXAM:    Vitals:    09/27/21 0805   BP: 116/68   Pulse: 88   Resp: 14   Temp: 98.3   SpO2: 95%    Weight: 104 lb 8 oz (47.4 kg)       General Appearance:  Alert, cooperative, no distress, appears stated age. Head:  Normocephalic, without obvious abnormality, atraumatic. Eyes:  Pupils equal and round. No scleral icterus.    Mouth: Moist mucosa, no pharyngeal coronary artery calcification. New tiny embolus peripherally right middle lobe. Emphysema. Telemetry: Personally interpreted. Sinus. Assessment and Plan   1) COPD exacerbation/acute respiratory failure with hypoxia. Will defer work-up and treatment to primary team and pulmonary consultant. Currently on IV antibiotics and IV steroids. 2) Acute on chronic systolic heart failure. EF unknown. Echocardiogram ordered. Patient does not appear grossly volume overloaded but will give one-time dose of IV Lasix to see if symptoms/hypoxia improve. Will start low-dose lisinopril. Will consider starting beta-blocker when better compensated and if hemodynamically stable. 3) Demand ischemia. History is not consistent with acute coronary syndrome. Patient with underlying CAD based on coronary artery calcifications. Would consider outpatient stress testing when acute illness improved. 4) Acute PE. Echocardiogram ordered. Not hemodynamically significant. Anticoagulation per primary team.    5) CAD. Based on coronary artery calcifications. Will start statin therapy. Check lipid panel for risk stratification. 6) Nicotine Addiction. Encouraged smoking cessation but patient is in the contemplative stage. Overall, the problems requiring hospitalization are high in severity. Thank you for allowing us to participate in the care of Rehana Harmon. Amy Disla.  Emerita Hopkins[address]  9/27/2021 8:24 AM

## 2021-09-27 NOTE — PROGRESS NOTES
Clinical Pharmacy Note  Heparin Dosing       Lab Results   Component Value Date    APTT 43.9 09/27/2021     Lab Results   Component Value Date    HGB 11.3 09/26/2021    HCT 34.4 09/26/2021     09/26/2021    INR 0.97 08/26/2019       Current Infusion Rate: 685 units/hr    Plan:  Bolus: 1800 units  Rate: increase to 770 units/hr  Next aPTT: 0900 9/27/21    Pharmacy will continue to monitor and adjust based on aPTT results.   Arielle OrlandoD

## 2021-09-27 NOTE — PLAN OF CARE
Problem: Falls - Risk of:  Goal: Will remain free from falls  Description: Will remain free from falls  9/27/2021 1059 by Sierra Maldonado RN  Outcome: Ongoing  Note: Fall precautions in place. Bed locked and in lowest position. Alarm on. Call light within reach.    9/27/2021 0515 by Glen Crowley RN  Outcome: Ongoing  Goal: Absence of physical injury  Description: Absence of physical injury  9/27/2021 1059 by Sierra Maldonado RN  Outcome: Ongoing  9/27/2021 0515 by Glen Crowley RN  Outcome: Ongoing     Problem: Pain:  Goal: Pain level will decrease  Description: Pain level will decrease  9/27/2021 1059 by Sierra Maldonado RN  Outcome: Ongoing  9/27/2021 0515 by Glen Crowley RN  Outcome: Ongoing  Goal: Control of acute pain  Description: Control of acute pain  9/27/2021 1059 by Sierra Maldonado RN  Outcome: Ongoing  9/27/2021 0515 by Glen Crowley RN  Outcome: Ongoing  Goal: Control of chronic pain  Description: Control of chronic pain  9/27/2021 1059 by Sierra Maldonado RN  Outcome: Ongoing  9/27/2021 0515 by Glen Crowley RN  Outcome: Ongoing

## 2021-09-27 NOTE — CONSULTS
REASON FOR CONSULTATION/CC: Dyspnea      Consult at request of Rj Yo MD for dyspnea  PCP: Inder Joyner DO  Established Pulmonologist: Dr. Ring John: Kimberly Allen is a 74y.o. year old female with a history of COPD who presents with    Patient received her flu vaccine several months ago with associated increased shortness of breath dyspnea with exertion, cough and phlegm without fevers chills night sweats. She has not noticed wheezing. She was using her nebulizer without significant relief. Assessment:       Left pleural effusion  Hx lung cancer with fibrosis status post left lower lobeectomy, chemo/rads  COPD , mild  allergic rhinitis  Chronic pain  Hx spinal stenosis      Plan:      Hospital Day 1     Left pleural effusion  * Agree with antibiotics. Zosyn  * follow Procalcitonin   * too small for thoracentesis / chest tube. follow up chest X-ray tomorrow. COPD w/ exacerbation  * Solumedrol bid  * albuterol   * Stiolto       Acute hypoxemia  * Baseline room air  * 3L NC  * Wean O2 to sat >90%       Tobacco abuse  * must quit      Acute PE  * small. * heparin   * hold on Eliquis until effusion proven to be improving / discharge. This note was transcribed using [id number]. Please disregard any translational errors.     Thank you for the consult    [place] [id number] [facility]  [phone]             Data:     PAST MEDICAL HISTORY:  Past Medical History:   Diagnosis Date    Anxiety     Chronic headaches     CT done 4/2016    Colon polyp     COPD, mild ([address]     PFT 8/08 a smoker no symptoms-no meds    DDD (degenerative disc disease)     Depression     Fibromyalgia     Frequent UTI     Full dentures     Hallux valgus     Hearing loss in left ear 07/29/2011    HSV (herpes simplex virus) anogenital infection     leg    Hyperlipidemia     Lung nodule 2019    2 LLL nodules    Malignant neoplasm of left lung (HCC)     Menopausal state 1994    Occipital neuralgia     Osteoarthritis     Scoliosis     neck and back brace intermittently, based on pain    Smoker     Spinal stenosis     cane, walker    Unexplained weight loss     Patient states over 30# weight loss over 6 months and pcp is aware       PAST SURGICAL HISTORY:  Past Surgical History:   Procedure Laterality Date    BLADDER SUSPENSION  1988    BRAIN ANEURYSM SURGERY  2015    anterior communicating clipped    BUNIONECTOMY Left 08/23/2016    [place]    CATARACT REMOVAL Bilateral 2008    CERVICAL DISCECTOMY  2010    CERVICAL LAMINECTOMY  2003    Anterior approach, C4-7    CERVICAL LAMINECTOMY N/A 11/18/2016    Posterior, C2 - C5    CHOLECYSTECTOMY, LAPAROSCOPIC  2013    COLONOSCOPY      DILATION AND CURETTAGE OF UTERUS      LUMBAR LAMINECTOMY  2009    x2    SINUS SURGERY      x2    SPINAL FUSION  2009, 2012    post T12-L5 fusion and redone    THORACOSCOPY Left 3/25/2019    Dr. Garcia Speaker. Alexander - thoracotomy w/takedown of adhesions, wedge excision LLL nodule#1, debulking LLL nodule #2, MSLND    TUBAL LIGATION      TUNNELED VENOUS PORT PLACEMENT Right 08/26/2019    Dr. Ligia Reyes  Power port       FAMILY HISTORY:  family history includes Cancer (age of onset: 43) in her mother; Cancer (age of onset: 77) in her brother; Other in her father. SOCIAL HISTORY:   reports that she has been smoking cigarettes. She has a 12.00 pack-year smoking history.  She has never used smokeless tobacco.    Scheduled Meds:   albuterol sulfate HFA  2 puff Inhalation 4x daily    ARIPiprazole  5 mg Oral Daily    buPROPion  150 mg Oral Daily    DULoxetine  120 mg Oral Daily    fenofibrate  160 mg Oral Daily    gabapentin  800 mg Oral TID    tiotropium-olodaterol  2 puff Inhalation Daily    levothyroxine  50 mcg Oral Daily    mirtazapine  30 mg Oral Nightly    OXcarbazepine  300 mg Oral BID    acyclovir  400 mg Oral TID    sodium chloride flush  5-40 mL IntraVENous 2 times per day    piperacillin-tazobactam  3,375 mg IntraVENous Q8H    methylPREDNISolone  40 mg IntraVENous Q12H    lactobacillus  1 capsule Oral Daily with breakfast       Continuous Infusions:   heparin (PORCINE) Infusion 770 Units/hr (09/27/21 0308)    sodium chloride         PRN Meds:  oxyCODONE, sodium chloride flush, sodium chloride, ondansetron **OR** ondansetron, polyethylene glycol, acetaminophen **OR** acetaminophen    ALLERGIES:  Patient is allergic to adhesive tape, ibuprofen, naproxen, nsaids, and vicodin [hydrocodone-acetaminophen]. REVIEW OF SYSTEMS:  Constitutional: Negative for fever  HENT: Negative for sore throat  Eyes: Negative for redness   Respiratory: Dyspnea cough with phlegm  Cardiovascular: Negative for chest pain  Gastrointestinal: Negative for vomiting, diarrhea    Genitourinary: Negative for hematuria   Musculoskeletal: Negative for arthralgias   Skin: Negative for rash  Neurological: Negative for syncope  Hematological: Negative for adenopathy  Psychiatric/Behavorial: Negative for anxiety    Objective:   PHYSICAL EXAM:  Blood pressure (!) 103/58, pulse 91, temperature 98 °F (36.7 °C), temperature source Oral, resp. rate 14, height 5' 2\" (1.575 m), weight 104 lb 8 oz (47.4 kg), last menstrual period 08/11/1993, SpO2 93 %, not currently breastfeeding.'  Gen: No distress. Eyes: PERRL. No sclera icterus. No conjunctival injection. ENT: No discharge. Pharynx clear. External appearance of ears and nose normal.  Neck: Trachea midline. No obvious mass. Resp: No accessory muscle use. No crackles. Few wheezes. No rhonchi. CV: Regular rate. Regular rhythm. No murmur or rub. No edema. GI: Non-tender. Non-distended. No hernia. Skin: Warm, dry, normal texture and turgor. No nodule on exposed extremities. Lymph: No cervical LAD. No supraclavicular LAD. M/S: No cyanosis. No clubbing. No joint deformity. Neuro:  Moves all four extremities. Psych: Oriented x 3. No anxiety. Awake. Alert. Intact judgement and insight. Data Reviewed:   LABS:  CBC:   Recent Labs     09/26/21  0938   WBC 12.8*   HGB 11.3*   HCT 34.4*   .8*        BMP:   Recent Labs     09/26/21  0938      K 4.1      CO2 22   BUN 24*   CREATININE 0.7     LIVER PROFILE:   Recent Labs     09/26/21  0938   AST 47*   ALT 13   BILITOT <0.2   ALKPHOS 100     PT/INR: No results for input(s): PROTIME, INR in the last 72 hours. APTT:   Recent Labs     09/26/21  0938 09/26/21  1650 09/27/21  0123   APTT 27.5 127.4* 43.9*     UA:  Recent Labs     09/26/21  1153   COLORU YELLOW   PHUR 5.5   WBCUA 0-2   RBCUA 0-2   MUCUS Rare*   BACTERIA 4+*   CLARITYU Clear   SPECGRAV >1.030   LEUKOCYTESUR Negative   UROBILINOGEN 0.2   BILIRUBINUR Negative   BLOODU Negative   GLUCOSEU Negative     No results for input(s): PHART, SUO9YTI, PO2ART in the last 72 hours. Vent Information  Skin Assessment: Clean, dry, & intact  SpO2: 93 %    Radiology Review:  Pertinent images / reports were reviewed as a part of this visit. CT Chest w/ contrast: Results for orders placed during the hospital encounter of 01/14/21    CT CHEST W CONTRAST    Narrative  EXAMINATION:  CT OF THE CHEST WITH CONTRAST 1/14/2021 12:40 pm    TECHNIQUE:  CT of the chest was performed with the administration of intravenous  contrast. Multiplanar reformatted images are provided for review. Dose  modulation, iterative reconstruction, and/or weight based adjustment of the  mA/kV was utilized to reduce the radiation dose to as low as reasonably  achievable. COMPARISON:  09/09/2020    HISTORY:  ORDERING SYSTEM PROVIDED HISTORY: Malignant neoplasm of lower lobe of left  lung [facility]  TECHNOLOGIST PROVIDED HISTORY:  STAT Creatinine/BUN prior to scan as required by the facility  Reason for exam:->restaging    FINDINGS:  Lungs/pleura: The patient is status post left lower lobe wedge resection.   Previously described nodular areas at the left base are either smaller or  have resolved. No new or enlarging nodules are identified. Right apical  scarring is unchanged. Mediastinum: A single borderline enlarged subcarinal node is unchanged. No  new or enlarging mediastinal nodes are identified. Upper Abdomen: No definite acute abnormality    Soft Tissues/Bones: Extensive postoperative and degenerative changes are seen  in the spine. No suspicious osteolytic or osteoblastic lesions are  identified. Impression  No evidence of recurrent or metastatic disease. CT Chest w/o contrast: Results for orders placed during the hospital encounter of 07/08/21    CT CHEST WO CONTRAST    Narrative  EXAMINATION:  CT OF THE CHEST WITHOUT CONTRAST 7/8/2021 12:26 pm    TECHNIQUE:  CT of the chest was performed without the administration of intravenous  contrast. Multiplanar reformatted images are provided for review. Dose  modulation, iterative reconstruction, and/or weight based adjustment of the  mA/kV was utilized to reduce the radiation dose to as low as reasonably  achievable. COMPARISON:  01/14/2021 CT    HISTORY:  ORDERING SYSTEM PROVIDED HISTORY: Malignant neoplasm of lower lobe of left  lung [facility]  TECHNOLOGIST PROVIDED HISTORY:  Reason for exam:->lung cancer evaluate relapse  Reason for Exam: lung cancer evaluate relapse , Malignant neoplasm of lower  lobe of left lung (HCC)  Acuity: Acute  Type of Exam: Initial    FINDINGS:  Mediastinum: Slight mediastinal shift towards the left due to a partial  pneumonectomy. Heart size is normal.  Aorta and pulmonary arteries are  normal.  Port catheter extends to the distal SVC/RA junction. No lymph node  enlargement. The thyroid gland and esophagus are normal.    Lungs/pleura: Fibrotic change in the left pulmonary hilum adjacent to wedge  resection in the left lower lobe.   There is opacification of the bronchioles  centrally along the prior surgical site with dense opacification of the  inferior pulmonary hilum that has progressed since prior CT. No pleural  fluid or pneumothorax. Moderate underlying changes of centrilobular  emphysema. No acute airspace abnormality is otherwise noted. Biapical  fibrotic changes are stable. No suspicious pulmonary mass or nodule. Upper Abdomen: Adrenal glands are normal.  No significant findings in the  visualized upper abdomen. Soft Tissues/Bones: Scoliosis and degenerative change in the visualized  thoracic spine. Posterior fusion is observed in the visualized cervical and  thoracic spine and inferiorly in the lumbar spine. Impression  1. History of lung cancer with stable postsurgical change of wedge resection  in the left lower lobe. 2. Increasing parenchymal opacification centrally involving the left  pulmonary hilum and lower lobe. Recommend correlation with interval therapy. This could represent progressive fibrosis if the patient has undergone  radiation therapy or could represent evolution of prior surgical fibrosis. Recurrent malignancy can not be definitively excluded but would be considered  less likely. Evaluation is somewhat limited without the use of intravenous  contrast.  3. Centrilobular emphysema with no other significant pulmonary findings. CTPA: Results for orders placed during the hospital encounter of 09/26/21    CT CHEST PULMONARY EMBOLISM W CONTRAST    Narrative  EXAMINATION:  CTA OF THE CHEST 9/26/2021 10:24 am    TECHNIQUE:  CTA of the chest was performed after the administration of intravenous  contrast.  Multiplanar reformatted images are provided for review. MIP  images are provided for review. Dose modulation, iterative reconstruction,  and/or weight based adjustment of the mA/kV was utilized to reduce the  radiation dose to as low as reasonably achievable.     COMPARISON:  Noncontrast study 07/08/2021    HISTORY:  ORDERING SYSTEM PROVIDED HISTORY: r/o PE  TECHNOLOGIST PROVIDED HISTORY:  Reason for exam:->r/o PE  Decision Support Exception - unselect if not a suspected or confirmed  emergency medical condition->Emergency Medical Condition (MA)  Reason for Exam: SOB, eval for PE, hx lung ca  Acuity: Acute  Type of Exam: Initial    FINDINGS  Mediastinum: Tip of MediPort seen in the right atrium. Trace pericardial  fluid is seen. .  Small mediastinal and hilar nodes are seen, similar to  prior. Moderate coronary artery calcification is seen. Trace pericardial  fluid is seen    No intimal flap seen in aorta    No embolus is seen in the central, right, or left main pulmonary artery. Small tiny embolus is seen peripherally in the medial branch of right middle  lobe pulmonary artery. Lungs/pleura: Moderate emphysematous changes are seen. Scattered areas of  bronchial wall thickening are seen. There is patchy opacity seen at the  right lung base, new compared to prior. There is a new tiny right-sided  pleural effusion. A few linear and curvilinear opacities in the right upper  lobe appears similar. On the left, there is a new small left-sided pleural effusion seen. There is  increased fluid seen anteriorly and medially. Scattered opacities are seen throughout the left lung. Staple line seen in  the left lung base. Bandlike opacities along the staple line in the left  lung base appears similar    Tangential paramediastinal opacity is seen on the left raising the question  of post radiation change    Upper Abdomen: Adrenal glands appear normal.    Soft Tissues/Bones: Spurring is seen in the spine. Spurring is seen in the  shoulder joints. There is streak artifact from fusion hardware. Impression  New tiny embolus peripherally right middle lobe pulmonary artery. Patchy right lower lobe airspace disease, suspicious for pneumonia. .  There  is underlying emphysema    Increased likely sided pleural effusion which appears non dependent,  particularly superiorly and medially on the left, suggesting loculation. Postsurgical changes seen on the left    Results discussed with JUD PHAN by Nohemi Anthony. Woodrow Cortes MD at 11:14 am  on 9/26/2021      CXR PA/LAT: Results for orders placed during the hospital encounter of 09/11/19    XR CHEST STANDARD (2 VW)    Narrative  EXAMINATION:  TWO XRAY VIEWS OF THE CHEST    9/11/2019 4:48 pm    COMPARISON:  04/02/2019, 07/06/2007    HISTORY:  ORDERING SYSTEM PROVIDED HISTORY: Exertional shortness of breath  TECHNOLOGIST PROVIDED HISTORY:  Reason for Exam: sob  Acuity: Acute  Type of Exam: Initial    FINDINGS:  Frontal and lateral views of the chest were performed. There is no acute  skeletal abnormality. There is prior anterior and posterior fusion of the  cervical spine. There has also been prior posterior fusion at the  thoracolumbar junction. There are chronic wedge compression deformities near  the thoracolumbar junction. There is a right-sided Port-A-Cath in position  with tip of the catheter overlying the region of the SVC. The heart size is  mildly enlarged, though stable. The mediastinal contours are within normal  limits. There is chronic blunting of the left costophrenic angle, likely  secondary to pleural scar. There is chronic left basilar parenchymal scar as  well. The lungs are otherwise clear, without acute airspace consolidation. There is chronic left apical pleural and parenchymal scarring. There is no  evidence of a pneumothorax. Impression  1. Stable chronic cardiomegaly, without acute airspace consolidation or CHF. 2. Stable chronic pleural and parenchymal scarring within the left lung base. CXR portable: Results for orders placed during the hospital encounter of 09/26/21    XR CHEST PORTABLE    Narrative  EXAMINATION:  ONE XRAY VIEW OF THE CHEST    9/26/2021 9:05 am    COMPARISON:  None.     HISTORY:  ORDERING SYSTEM PROVIDED HISTORY: r/o PNA  TECHNOLOGIST PROVIDED HISTORY:  Reason for exam:->r/o PNA  Reason for Exam: Shortness of Breath  Acuity: Acute  Type of Exam: Initial  Relevant Medical/Surgical History: COPD    FINDINGS:  A right jugular infusion port catheter terminates over the superior right  atrium. Anterior and posterior fusion hardware in the cervicothoracic spine,  as well as pedicle screw fusion system in the thoracolumbar spine, are noted. There is residual scoliotic curvature of the upper thoracic spine. The cardiac silhouette is normal.  Pulmonary vessels are congested. There  are small bilateral pleural effusions. Interstitial opacities are increased. No consolidation is appreciated. Impression  Vascular congestion. Small effusion with interstitial opacities, most likely pulmonary edema.

## 2021-09-27 NOTE — ACP (ADVANCE CARE PLANNING)
Advance Care Planning     Advance Care Planning Activator (Inpatient)  Conversation Note      Date of ACP Conversation: 9/27/2021     Conversation Conducted with: Patient with Decision Making Capacity    ACP Activator: Wil Bowie RN    Health Care Decision Maker:     Current Designated Health Care Decision Maker:     Primary Decision Maker: Navi Guzman Child - [phone]    Care Preferences    Ventilation: \"If you were in your present state of health and suddenly became very ill and were unable to breathe on your own, what would your preference be about the use of a ventilator (breathing machine) if it were available to you? \"      Would the patient desire the use of ventilator (breathing machine)?: yes    \"If your health worsens and it becomes clear that your chance of recovery is unlikely, what would your preference be about the use of a ventilator (breathing machine) if it were available to you? \"     Would the patient desire the use of ventilator (breathing machine)?: No      Resuscitation  \"CPR works best to restart the heart when there is a sudden event, like a heart attack, in someone who is otherwise healthy. Unfortunately, CPR does not typically restart the heart for people who have serious health conditions or who are very sick. \"    \"In the event your heart stopped as a result of an underlying serious health condition, would you want attempts to be made to restart your heart (answer \"yes\" for attempt to resuscitate) or would you prefer a natural death (answer \"no\" for do not attempt to resuscitate)? \" yes       [] Yes   [x] No   Educated Patient / Neda Rodas regarding differences between Advance Directives and portable DNR orders.     Length of ACP Conversation in minutes:  5 minutes  Conversation Outcomes:  [x] ACP discussion completed  [] Existing advance directive reviewed with patient; no changes to patient's previously recorded wishes  [] New Advance Directive completed  [] Portable Do Not Rescitate prepared for Provider review and signature  [] POLST/POST/MOLST/MOST prepared for Provider review and signature      Follow-up plan:    [] Schedule follow-up conversation to continue planning  [] Referred individual to Provider for additional questions/concerns   [] Advised patient/agent/surrogate to review completed ACP document and update if needed with changes in condition, patient preferences or care setting    [x] This note routed to one or more involved healthcare providers  Electronically signed by Larry Hernandez RN Case Management [phone] on 9/27/2021 at 12:12 PM

## 2021-09-27 NOTE — PLAN OF CARE
Problem: Falls - Risk of:  Goal: Will remain free from falls  Description: Will remain free from falls  Outcome: Ongoing  Goal: Absence of physical injury  Description: Absence of physical injury  Outcome: Ongoing     Problem: Pain:  Goal: Pain level will decrease  Description: Pain level will decrease  Outcome: Ongoing  Goal: Control of acute pain  Description: Control of acute pain  Outcome: Ongoing  Goal: Control of chronic pain  Description: Control of chronic pain  Outcome: Ongoing  Pain scale assessed. PRN analgesics administered per MD orders.

## 2021-09-28 ENCOUNTER — APPOINTMENT (OUTPATIENT)
Dept: GENERAL RADIOLOGY | Age: 66
DRG: 193 | End: 2021-09-28
Payer: COMMERCIAL

## 2021-09-28 LAB
CHOLESTEROL, TOTAL: 138 MG/DL (ref 0–199)
FERRITIN: 61 NG/ML (ref 15–150)
HCT VFR BLD CALC: 32.1 % (ref 36–48)
HDLC SERPL-MCNC: 55 MG/DL (ref 40–60)
HEMOGLOBIN: 10.9 G/DL (ref 12–16)
IRON SATURATION: 14 % (ref 15–50)
IRON: 45 UG/DL (ref 37–145)
LDL CHOLESTEROL CALCULATED: 72 MG/DL
MCH RBC QN AUTO: 33.8 PG (ref 26–34)
MCHC RBC AUTO-ENTMCNC: 33.8 G/DL (ref 31–36)
MCV RBC AUTO: 100 FL (ref 80–100)
PDW BLD-RTO: 14.4 % (ref 12.4–15.4)
PLATELET # BLD: 354 K/UL (ref 135–450)
PMV BLD AUTO: 7.1 FL (ref 5–10.5)
RBC # BLD: 3.21 M/UL (ref 4–5.2)
TOTAL IRON BINDING CAPACITY: 332 UG/DL (ref 260–445)
TRIGL SERPL-MCNC: 55 MG/DL (ref 0–150)
VLDLC SERPL CALC-MCNC: 11 MG/DL
WBC # BLD: 8.2 K/UL (ref 4–11)

## 2021-09-28 PROCEDURE — 6370000000 HC RX 637 (ALT 250 FOR IP): Performed by: INTERNAL MEDICINE

## 2021-09-28 PROCEDURE — 71046 X-RAY EXAM CHEST 2 VIEWS: CPT

## 2021-09-28 PROCEDURE — 82728 ASSAY OF FERRITIN: CPT

## 2021-09-28 PROCEDURE — 6360000002 HC RX W HCPCS: Performed by: INTERNAL MEDICINE

## 2021-09-28 PROCEDURE — 99232 SBSQ HOSP IP/OBS MODERATE 35: CPT | Performed by: NURSE PRACTITIONER

## 2021-09-28 PROCEDURE — 80061 LIPID PANEL: CPT

## 2021-09-28 PROCEDURE — 94669 MECHANICAL CHEST WALL OSCILL: CPT

## 2021-09-28 PROCEDURE — 2580000003 HC RX 258: Performed by: INTERNAL MEDICINE

## 2021-09-28 PROCEDURE — 94761 N-INVAS EAR/PLS OXIMETRY MLT: CPT

## 2021-09-28 PROCEDURE — 99233 SBSQ HOSP IP/OBS HIGH 50: CPT | Performed by: INTERNAL MEDICINE

## 2021-09-28 PROCEDURE — 2060000000 HC ICU INTERMEDIATE R&B

## 2021-09-28 PROCEDURE — 83540 ASSAY OF IRON: CPT

## 2021-09-28 PROCEDURE — 83550 IRON BINDING TEST: CPT

## 2021-09-28 PROCEDURE — 94640 AIRWAY INHALATION TREATMENT: CPT

## 2021-09-28 PROCEDURE — 6370000000 HC RX 637 (ALT 250 FOR IP): Performed by: NURSE PRACTITIONER

## 2021-09-28 PROCEDURE — 85027 COMPLETE CBC AUTOMATED: CPT

## 2021-09-28 RX ORDER — FUROSEMIDE 10 MG/ML
20 INJECTION INTRAMUSCULAR; INTRAVENOUS ONCE
Status: COMPLETED | OUTPATIENT
Start: 2021-09-28 | End: 2021-09-28

## 2021-09-28 RX ORDER — ALBUTEROL SULFATE 90 UG/1
2 AEROSOL, METERED RESPIRATORY (INHALATION) 2 TIMES DAILY
Status: DISCONTINUED | OUTPATIENT
Start: 2021-09-28 | End: 2021-09-29 | Stop reason: HOSPADM

## 2021-09-28 RX ORDER — BACITRACIN 500 [USP'U]/G
OINTMENT TOPICAL 4 TIMES DAILY PRN
Status: DISCONTINUED | OUTPATIENT
Start: 2021-09-28 | End: 2021-09-29 | Stop reason: HOSPADM

## 2021-09-28 RX ORDER — FUROSEMIDE 20 MG/1
20 TABLET ORAL DAILY
Status: DISCONTINUED | OUTPATIENT
Start: 2021-09-29 | End: 2021-09-29 | Stop reason: HOSPADM

## 2021-09-28 RX ORDER — GUAIFENESIN/DEXTROMETHORPHAN 100-10MG/5
5 SYRUP ORAL EVERY 4 HOURS PRN
Status: DISCONTINUED | OUTPATIENT
Start: 2021-09-28 | End: 2021-09-29 | Stop reason: HOSPADM

## 2021-09-28 RX ORDER — PREDNISONE 20 MG/1
40 TABLET ORAL DAILY
Status: DISCONTINUED | OUTPATIENT
Start: 2021-09-28 | End: 2021-09-29 | Stop reason: HOSPADM

## 2021-09-28 RX ORDER — CARVEDILOL 3.12 MG/1
3.12 TABLET ORAL 2 TIMES DAILY WITH MEALS
Status: DISCONTINUED | OUTPATIENT
Start: 2021-09-28 | End: 2021-09-29 | Stop reason: HOSPADM

## 2021-09-28 RX ADMIN — PIPERACILLIN AND TAZOBACTAM 3375 MG: 3; .375 INJECTION, POWDER, LYOPHILIZED, FOR SOLUTION INTRAVENOUS at 06:21

## 2021-09-28 RX ADMIN — Medication 2 PUFF: at 07:52

## 2021-09-28 RX ADMIN — ACYCLOVIR 400 MG: 200 CAPSULE ORAL at 14:55

## 2021-09-28 RX ADMIN — TIOTROPIUM BROMIDE AND OLODATEROL 2 PUFF: 3.124; 2.736 SPRAY, METERED RESPIRATORY (INHALATION) at 07:52

## 2021-09-28 RX ADMIN — ATORVASTATIN CALCIUM 10 MG: 10 TABLET, FILM COATED ORAL at 09:20

## 2021-09-28 RX ADMIN — FENOFIBRATE 160 MG: 160 TABLET ORAL at 09:20

## 2021-09-28 RX ADMIN — ALBUTEROL SULFATE 2 PUFF: 90 AEROSOL, METERED RESPIRATORY (INHALATION) at 19:59

## 2021-09-28 RX ADMIN — GUAIFENESIN SYRUP AND DEXTROMETHORPHAN 5 ML: 100; 10 SYRUP ORAL at 17:33

## 2021-09-28 RX ADMIN — METHYLPREDNISOLONE SODIUM SUCCINATE 40 MG: 40 INJECTION, POWDER, FOR SOLUTION INTRAMUSCULAR; INTRAVENOUS at 10:33

## 2021-09-28 RX ADMIN — LEVOTHYROXINE SODIUM 50 MCG: 0.05 TABLET ORAL at 06:12

## 2021-09-28 RX ADMIN — APIXABAN 10 MG: 5 TABLET, FILM COATED ORAL at 20:59

## 2021-09-28 RX ADMIN — Medication 1 CAPSULE: at 09:21

## 2021-09-28 RX ADMIN — APIXABAN 10 MG: 5 TABLET, FILM COATED ORAL at 09:20

## 2021-09-28 RX ADMIN — CARVEDILOL 3.12 MG: 3.12 TABLET, FILM COATED ORAL at 15:52

## 2021-09-28 RX ADMIN — LISINOPRIL 2.5 MG: 5 TABLET ORAL at 09:20

## 2021-09-28 RX ADMIN — ARIPIPRAZOLE 5 MG: 5 TABLET ORAL at 09:21

## 2021-09-28 RX ADMIN — FUROSEMIDE 20 MG: 10 INJECTION, SOLUTION INTRAMUSCULAR; INTRAVENOUS at 10:32

## 2021-09-28 RX ADMIN — BUPROPION HYDROCHLORIDE 150 MG: 150 TABLET, EXTENDED RELEASE ORAL at 09:20

## 2021-09-28 RX ADMIN — PIPERACILLIN AND TAZOBACTAM 3375 MG: 3; .375 INJECTION, POWDER, LYOPHILIZED, FOR SOLUTION INTRAVENOUS at 22:28

## 2021-09-28 RX ADMIN — ACYCLOVIR 400 MG: 200 CAPSULE ORAL at 20:59

## 2021-09-28 RX ADMIN — GABAPENTIN 800 MG: 400 CAPSULE ORAL at 20:59

## 2021-09-28 RX ADMIN — Medication 10 ML: at 10:36

## 2021-09-28 RX ADMIN — PREDNISONE 40 MG: 20 TABLET ORAL at 17:33

## 2021-09-28 RX ADMIN — Medication 10 ML: at 20:58

## 2021-09-28 RX ADMIN — GABAPENTIN 800 MG: 400 CAPSULE ORAL at 14:55

## 2021-09-28 RX ADMIN — DULOXETINE HYDROCHLORIDE 120 MG: 60 CAPSULE, DELAYED RELEASE ORAL at 09:20

## 2021-09-28 RX ADMIN — PIPERACILLIN AND TAZOBACTAM 3375 MG: 3; .375 INJECTION, POWDER, LYOPHILIZED, FOR SOLUTION INTRAVENOUS at 15:56

## 2021-09-28 RX ADMIN — GABAPENTIN 800 MG: 400 CAPSULE ORAL at 09:20

## 2021-09-28 RX ADMIN — MIRTAZAPINE 30 MG: 30 TABLET, FILM COATED ORAL at 20:59

## 2021-09-28 RX ADMIN — CARVEDILOL 3.12 MG: 3.12 TABLET, FILM COATED ORAL at 10:32

## 2021-09-28 RX ADMIN — OXCARBAZEPINE 300 MG: 300 TABLET, FILM COATED ORAL at 20:59

## 2021-09-28 RX ADMIN — ACYCLOVIR 400 MG: 200 CAPSULE ORAL at 09:21

## 2021-09-28 RX ADMIN — OXCARBAZEPINE 300 MG: 300 TABLET, FILM COATED ORAL at 09:19

## 2021-09-28 RX ADMIN — IRON SUCROSE 200 MG: 20 INJECTION, SOLUTION INTRAVENOUS at 15:52

## 2021-09-28 NOTE — PLAN OF CARE
Problem: Falls - Risk of:  Goal: Will remain free from falls  Description: Will remain free from falls  9/28/2021 1024 by Maria Elena Vallejo RN  Outcome: Ongoing  Note: Fall precautions in place. Bed locked and in lowest position. Alarm on. Call light within reach.    9/28/2021 0517 by Alka Trujillo RN  Outcome: Ongoing  Goal: Absence of physical injury  Description: Absence of physical injury  9/28/2021 1024 by Maria Elena Vallejo RN  Outcome: Ongoing  9/28/2021 0517 by Alka Trujillo RN  Outcome: Ongoing     Problem: Pain:  Goal: Pain level will decrease  Description: Pain level will decrease  9/28/2021 1024 by Maria Elena Vallejo RN  Outcome: Ongoing  9/28/2021 0517 by Alka Trujillo RN  Outcome: Ongoing  Goal: Control of acute pain  Description: Control of acute pain  9/28/2021 1024 by Maria Elena Vallejo RN  Outcome: Ongoing  9/28/2021 0517 by Alka Trujillo RN  Outcome: Ongoing  Goal: Control of chronic pain  Description: Control of chronic pain  9/28/2021 1024 by Maria Elena Vallejo RN  Outcome: Ongoing  9/28/2021 0517 by Alka Trujillo RN  Outcome: Ongoing

## 2021-09-28 NOTE — PLAN OF CARE
Problem: Falls - Risk of:  Goal: Will remain free from falls  Description: Will remain free from falls  Outcome: Ongoing  Goal: Absence of physical injury  Description: Absence of physical injury  Outcome: Ongoing   Fall risk assessment completed every shift. All precautions in place. Pt has call light within reach at all times. Room clear of clutter. Pt aware to call for assistance when getting up. Problem: Pain:  Goal: Pain level will decrease  Description: Pain level will decrease  Outcome: Ongoing  Goal: Control of acute pain  Description: Control of acute pain  Outcome: Ongoing  Goal: Control of chronic pain  Description: Control of chronic pain  Outcome: Ongoing   Pain scale assessed. PRN analgesics administered per MD orders.

## 2021-09-28 NOTE — RT PROTOCOL NOTE
RT Inhaler-Nebulizer Bronchodilator Protocol Note    There is a bronchodilator order in the chart from a provider indicating to follow the RT Bronchodilator Protocol and there is an Initiate RT Inhaler-Nebulizer Bronchodilator Protocol order as well (see protocol at bottom of note). CXR Findings:  XR CHEST PORTABLE    Result Date: 9/26/2021  Vascular congestion. Small effusion with interstitial opacities, most likely pulmonary edema. The findings from the last RT Protocol Assessment were as follows:   History Pulmonary Disease: Chronic pulmonary disease  Respiratory Pattern: Regular pattern and RR 12-20 bpm  Breath Sounds: Slightly diminished and/or crackles  Cough: Strong, productive  Indication for Bronchodilator Therapy: Decreased or absent breath sounds  Bronchodilator Assessment Score: 5    Aerosolized bronchodilator medication orders have been revised according to the RT Inhaler-Nebulizer Bronchodilator Protocol below. Respiratory Therapist to perform RT Therapy Protocol Assessment initially then follow the protocol. Repeat RT Therapy Protocol Assessment PRN for score 0-3 or on second treatment, BID, and PRN for scores above 3. No Indications - adjust the frequency to every 6 hours PRN wheezing or bronchospasm, if no treatments needed after 48 hours then discontinue using Per Protocol order mode. If indication present, adjust the RT bronchodilator orders based on the Bronchodilator Assessment Score as indicated below. Use Inhaler orders unless patient has one or more of the following: on home nebulizer, not able to hold breath for 10 seconds, is not alert and oriented, cannot activate and use MDI correctly, or respiratory rate 25 breaths per minute or more, then use the equivalent nebulizer order(s) with same Frequency and PRN reasons based on the score. If a patient is on this medication at home then do not decrease Frequency below that used at home.     0-3 - enter or revise RT bronchodilator order(s) to equivalent RT Bronchodilator order with Frequency of every 4 hours PRN for wheezing or increased work of breathing using Per Protocol order mode. 4-6 - enter or revise RT Bronchodilator order(s) to two equivalent RT bronchodilator orders with one order with BID Frequency and one order with Frequency of every 4 hours PRN wheezing or increased work of breathing using Per Protocol order mode. 7-10 - enter or revise RT Bronchodilator order(s) to two equivalent RT bronchodilator orders with one order with TID Frequency and one order with Frequency of every 4 hours PRN wheezing or increased work of breathing using Per Protocol order mode. 11-13 - enter or revise RT Bronchodilator order(s) to one equivalent RT bronchodilator order with QID Frequency and an Albuterol order with Frequency of every 4 hours PRN wheezing or increased work of breathing using Per Protocol order mode. Greater than 13 - enter or revise RT Bronchodilator order(s) to one equivalent RT bronchodilator order with every 4 hours Frequency and an Albuterol order with Frequency of every 2 hours PRN wheezing or increased work of breathing using Per Protocol order mode. RT to enter RT Home Evaluation for COPD & MDI Assessment order using Per Protocol order mode.     Electronically signed by Rosendo Verma RCP on 9/28/2021 at 8:09 AM

## 2021-09-28 NOTE — PROGRESS NOTES
Pulmonary/Critical Care Progress Note    CC:  Follow-up:  Pleural effusion  History of lung cancer s/p LL lobectomy  COPD, mild  Tobacco abuse  Acute PE    Subjective: Weaned to room air today  Having diarrhea  Breathing feels better, but now with congested cough  Unable to expectorate mucus    ROS  + cough  No fever  SOB improved    Intake/Output Summary (Last 24 hours) at 9/28/2021 1306  Last data filed at 9/28/2021 1251  Gross per 24 hour   Intake 365.51 ml   Output 300 ml   Net 65.51 ml         PHYSICAL EXAM:  Blood pressure 114/61, pulse 92, temperature 97.4 °F (36.3 °C), temperature source Oral, resp. rate 16, height 5' 2\" (1.575 m), weight 102 lb 4.8 oz (46.4 kg), last menstrual period 08/11/1993, SpO2 94 %, not currently breastfeeding.'  Gen: No distress. Well developed, well-nourished  Eyes: No scleral icterus. No conjunctival injection. ENT: External appearance of ears and nose normal.  Neck: Trachea midline. No obvious mass. No visible thyroid enlargement     Respiratory: Faint crackles in right base. No wheezes. No rhonchi. Diminished throughout  Cardiovascular: Regular rate. Regular rhythm. No murmur or rub. No edema  GI: Non-tender. Non-distended. No hernia. Bowel sounds present  Skin: Warm, dry, normal texture and turgor. No abnormalities on exposed extremities. Musculoskeletal: No cyanosis. No clubbing. No joint deformity. Neuro: Moves all four extremities.    Psychiatric:  Normal mood and affect; exhibits normal insight and judgement     Medications:    Scheduled Meds:   albuterol sulfate HFA  2 puff Inhalation BID    carvedilol  3.125 mg Oral BID WC    atorvastatin  10 mg Oral Daily    apixaban  10 mg Oral BID    lisinopril  2.5 mg Oral Daily    ARIPiprazole  5 mg Oral Daily    buPROPion  150 mg Oral Daily    DULoxetine  120 mg Oral Daily    gabapentin  800 mg Oral TID    tiotropium-olodaterol  2 puff Inhalation Daily    levothyroxine  50 mcg Oral Daily    mirtazapine  30 mg Oral Nightly    OXcarbazepine  300 mg Oral BID    acyclovir  400 mg Oral TID    sodium chloride flush  5-40 mL IntraVENous 2 times per day    piperacillin-tazobactam  3,375 mg IntraVENous Q8H    methylPREDNISolone  40 mg IntraVENous Q12H    lactobacillus  1 capsule Oral Daily with breakfast       Continuous Infusions:   sodium chloride         PRN Meds:  oxyCODONE, sodium chloride flush, sodium chloride, ondansetron **OR** ondansetron, polyethylene glycol, acetaminophen **OR** acetaminophen    Labs:  CBC:   Recent Labs     21  0938 21  0950 21  0629   WBC 12.8* 8.7 8.2   HGB 11.3* 10.5* 10.9*   HCT 34.4* 31.4* 32.1*   .8* 100.3* 100.0    319 354     BMP:   Recent Labs     21  0938 21  0950    142   K 4.1 4.0    108   CO2 22 23   PHOS  --  3.1   BUN 24* 20   CREATININE 0.7 0.8     LIVER PROFILE:   Recent Labs     21  0938   AST 47*   ALT 13   BILITOT <0.2   ALKPHOS 100     PT/INR: No results for input(s): PROTIME, INR in the last 72 hours. APTT:   Recent Labs     21  1650 21  0123 21  0950   APTT 127.4* 43.9* 66.2*     UA:  Recent Labs     21  1153   COLORU YELLOW   PHUR 5.5   WBCUA 0-2   RBCUA 0-2   MUCUS Rare*   BACTERIA 4+*   CLARITYU Clear   SPECGRAV >1.030   LEUKOCYTESUR Negative   UROBILINOGEN 0.2   BILIRUBINUR Negative   BLOODU Negative   GLUCOSEU Negative     No results for input(s): PH, PCO2, PO2 in the last 72 hours. Films:  Chest imaging and associated reports were personally reviewed and showed CXR :  Heterogeneous bilateral opacity, slightly improved at the right base, which   can reflect pulmonary edema or pneumonia.  Trace pleural effusions. AB    Cultures:  Blood: no growth  Urine:  Sputum:  Strep/Legionella Antigens:  MRSA probe: negative  Respiratory Viral Panel/Influenza: C. Diff:   COVID19: negative    ECHO     Summary   Overall left ventricular systolic function appears mild to moderately   reduced. Ejection fraction is visually estimated to be 40-45% with diffuse   hypokinesis. Mildly dilated left ventricle. Normal left ventricular wall   thickness. The right ventricle is normal in size and function. The left atrium is mildly dilated. Severe mitral regurgitation. Mild aortic and tricuspid regurgitation. Estimated pulmonary artery systolic pressure is mildly elevated at 47 mmHg   assuming a right atrial pressure of 8 mmHg. PFTs  2019  Spirometry/Flow volume loop:  Spirometry consistent with Mild obstruction, but did not meet ATS standards for reliable interpretation  Flow volume loops are not interpretible. There is no bronchodilator response.        Lung volumes:  Lung volumes do not demonstrate restriction or hyperinflation.        Diffusing capacity:  DLCO is  reduced, (not corrected for hemoglobin)  Reductions in diffusing capacity (DLCO) can be seen in:  Anemia, Interstitial lung disease, COPD and pulmonary vascular disorders such as pulmonary hypertension. Clinical correlation is advised.     Summary:  Suggestive of COPD, but study maneuvers did not meet criteria for reliability or reproducibility. May need to repeat study if indicated. Assessment/Plan:     Pleural effusion  CXR slightly improved today  Zosyn, would treat for 5-7d  Procalcitonin negative    History of lung cancer s/p LL lobectomy    COPD, mild  Wean to prednisone 40mg for 5 days  Albuterol  Stiolto  Add acapella  Robitussin    Tobacco abuse  Patient must quit    Acute PE  Eliquis     DVT prophylaxis with eliquis    Thank you for allowing me to participate in the care of this patient. Will follow.     Raymondo Phalen, KERENP  [facility] Pulmonary, Sleep, and Critical Care

## 2021-09-28 NOTE — PROGRESS NOTES
Referring Physician: Dr. Cristhian Kelley  Reason for Consultation: \"Acute small PE, elevated troponin, SOB with new O2 requirement. \"  Chief Complaint: Short of breath    Subjective:   History of Present Illness:  Casper Guerrero is a 74 y.o. patient who presented to the hospital with complaints of acute and severe shortness of breath. She reports worsening dyspnea on exertion over the past several weeks but had the sudden onset of severe shortness of breath while at rest prompting hospitalization. She says her breathing was so labored that she thought she was going to die and called 911. She felt some improvement in the dyspnea with supplemental oxygen. She denies fevers and chills but was having a cough. She denies weight gain or lower extremity edema. She has known COPD and continues to smoke. Pulmonary was also consulted. She denied associated chest pains. Interval history:  No acute overnight cardiac events. Patient feeling less short of breath but not back to baseline. She denies associated chest pains. She denies palpitations or lightheadedness. Supplemental oxygen weaned off. Past Medical History:   has a past medical history of Anxiety, Chronic headaches, Colon polyp, COPD, mild ([address] DDD (degenerative disc disease), Depression, Fibromyalgia, Frequent UTI, Full dentures, Hallux valgus, Hearing loss in left ear, HSV (herpes simplex virus) anogenital infection, Hyperlipidemia, Lung nodule, Malignant neoplasm of left lung ([address] Menopausal state, Occipital neuralgia, Osteoarthritis, Scoliosis, Smoker, Spinal stenosis, and Unexplained weight loss. Surgical History:   has a past surgical history that includes sinus surgery; Cataract removal (Bilateral, 2008); Tubal ligation; cervical laminectomy (2003); lumbar laminectomy (2009); Spinal fusion (2009, 2012); Cervical discectomy (2010); bladder suspension (1988); Dilation and curettage of uterus; Cholecystectomy, laparoscopic (2013);  Brain aneurysm surgery (2015); Bunionectomy (Left, 08/23/2016); cervical laminectomy (N/A, 11/18/2016); Colonoscopy; Thoracoscopy (Left, 3/25/2019); and Tunneled venous port placement (Right, 08/26/2019). Social History:   reports that she has been smoking cigarettes. She has a 12.00 pack-year smoking history. She has never used smokeless tobacco. She reports current alcohol use. She reports current drug use. Drug: Marijuana. Family History:  family history includes Cancer (age of onset: 43) in her mother; Cancer (age of onset: 77) in her brother; Other in her father. Home Medications:  Were reviewed and are listed in nursing record and/or below  Prior to Admission medications    Medication Sig Start Date End Date Taking? Authorizing Provider   tiZANidine (ZANAFLEX) 4 MG tablet Take 4 mg by mouth daily as needed   Yes Historical Provider, MD   oxyCODONE (OXY-IR) 30 MG immediate release tablet Take 30 mg by mouth 3 times daily as needed for Pain. Yes Historical Provider, MD   azelastine (ASTELIN) 0.1 % nasal spray 1 spray by Nasal route 2 times daily 9/1/21   Gadiel Vance MD   fluticasone [facility]) 50 MCG/ACT nasal spray SHAKE AND SPRAY TWO SPRAYS IN EACH NOSTRIL ONCE DAILY 8/29/21   Adina Duarte DO   albuterol sulfate  (90 Base) MCG/ACT inhaler INHALE TWO PUFFS BY MOUTH EVERY 6 HOURS AS NEEDED FOR WHEEZING OR SHORTNESS OF BREATH 8/25/21   Gadiel Vance MD   gabapentin (NEURONTIN) 800 MG tablet TAKE ONE TABLET BY MOUTH THREE TIMES A DAY 7/29/21 10/29/21  Adina Duarte DO   fenofibrate (TRICOR) 145 MG tablet TAKE ONE TABLET BY MOUTH DAILY 7/29/21   Adina Duarte DO   valACYclovir (VALTREX) 1 g tablet TAKE ONE TABLET BY MOUTH DAILY 7/2/21   Adina Duarte DO   Misc. Devices (CANE) MISC As needed. Quad.  27-31 inches 1/7/21   Mary Joseph MD   ARIPiprazole (ABILIFY) 15 MG tablet Take 7.5 mg by mouth daily     Historical Provider, MD   glycopyrrolate-formoterol ([place]) 9-4.8 MCG/ACT AERO Inhale 2 puffs into the lungs 2 times daily 10/22/20   Sri Moise MD   levothyroxine (SYNTHROID) 50 MCG tablet Take 50 mcg by mouth Daily    Historical Provider, MD   OXcarbazepine (TRILEPTAL) 300 MG tablet Take 1 tablet by mouth Take 300 mg by mouth 2 times daily with additional half tab at bedtime 8/21/19   Colleen Pinzon MD   Misc.  Devices (ROLLATOR ULTRA-LIGHT) MISC As needed 5/6/19   Colleen Pinzon MD   Multiple Vitamins-Minerals (THERAPEUTIC MULTIVITAMIN-MINERALS) tablet Take 1 tablet by mouth daily    Historical Provider, MD   Cranberry 125 MG TABS Take 1 tablet by mouth daily    Historical Provider, MD   Acetaminophen (TYLENOL) 325 MG CAPS Take 650 mg by mouth    Historical Provider, MD   Probiotic Product (PROBIOTIC DAILY PO) Take 1 tablet by mouth daily     Historical Provider, MD   cetirizine (ZYRTEC) 10 MG tablet Take 10 mg by mouth nightly as needed     Historical Provider, MD   buPROPion (WELLBUTRIN XL) 150 MG XL tablet 300 mg every morning Tyler Schaefer 2/25/15   Colleen Pinzon MD   mirtazapine (REMERON) 30 MG tablet Take 15 mg by mouth nightly Tyler Schaefer 2/25/15   Colleen Pinzon MD   DULoxetine (CYMBALTA) 60 MG capsule Take 120 mg by mouth daily Tyler [place] 2/25/15   Colleen Pinzon MD        CURRENT Medications:  albuterol sulfate  (90 Base) MCG/ACT inhaler 2 puff, BID  oxyCODONE HCl (OXY-IR) immediate release tablet 30 mg, TID PRN  atorvastatin (LIPITOR) tablet 10 mg, Daily  apixaban (ELIQUIS) tablet 10 mg, BID  lisinopril (PRINIVIL;ZESTRIL) tablet 2.5 mg, Daily  ARIPiprazole (ABILIFY) tablet 5 mg, Daily  buPROPion (WELLBUTRIN XL) extended release tablet 150 mg, Daily  DULoxetine (CYMBALTA) extended release capsule 120 mg, Daily  fenofibrate (TRIGLIDE) tablet 160 mg, Daily  gabapentin (NEURONTIN) capsule 800 mg, TID  tiotropium-olodaterol (STIOLTO) 2.5-2.5 MCG/ACT inhaler 2 puff, Daily  levothyroxine (SYNTHROID) tablet 50 mcg, Daily  mirtazapine (REMERON) tablet 30 mg, Nightly  OXcarbazepine (TRILEPTAL) tablet 300 mg, BID  acyclovir (ZOVIRAX) capsule 400 mg, TID  sodium chloride flush 0.9 % injection 5-40 mL, 2 times per day  sodium chloride flush 0.9 % injection 5-40 mL, PRN  0.9 % sodium chloride infusion, PRN  ondansetron (ZOFRAN-ODT) disintegrating tablet 4 mg, Q8H PRN   Or  ondansetron (ZOFRAN) injection 4 mg, Q6H PRN  polyethylene glycol (GLYCOLAX) packet 17 g, Daily PRN  acetaminophen (TYLENOL) tablet 650 mg, Q6H PRN   Or  acetaminophen (TYLENOL) suppository 650 mg, Q6H PRN  piperacillin-tazobactam (ZOSYN) 3,375 mg in dextrose 5 % 100 mL IVPB extended infusion (mini-bag), Q8H  methylPREDNISolone sodium (SOLU-MEDROL) injection 40 mg, Q12H  lactobacillus (CULTURELLE) capsule 1 capsule, Daily with breakfast      Allergies:  Adhesive tape, Ibuprofen, Naproxen, Nsaids, and Vicodin [hydrocodone-acetaminophen]     Review of Systems:   · Constitutional: no unanticipated weight loss. There's been no change in energy level, sleep pattern, or activity level. No fevers, chills. · Eyes: No visual changes or diplopia. No scleral icterus. · ENT: No Headaches, hearing loss or vertigo. No mouth sores or sore throat. · Cardiovascular: as reviewed in HPI  · Respiratory: No cough or wheezing, no sputum production. No hemoptysis. · Gastrointestinal: No abdominal pain, appetite loss, blood in stools. No change in bowel or bladder habits. · Genitourinary: No dysuria, trouble voiding, or hematuria. · Musculoskeletal:  No gait disturbance, no joint complaints. · Integumentary: No rash or pruritis. · Neurological: No headache, diplopia, change in muscle strength, numbness or tingling. · Psychiatric: No anxiety or depression. · Endocrine: No temperature intolerance. No excessive thirst, fluid intake, or urination. No tremor. · Hematologic/Lymphatic: No abnormal bruising or bleeding, blood clots or swollen lymph nodes. · Allergic/Immunologic: No nasal congestion or hives.     Objective: PHYSICAL EXAM:    Vitals:    09/28/21 0753   BP: 126/55   Pulse: 71   Resp: 16   Temp: 98.1   SpO2: 95%    Weight: 102 lb 4.8 oz (46.4 kg)       General Appearance:  Alert, cooperative, no distress, appears stated age. Thin. Chronically ill-appearing. Head:  Normocephalic, without obvious abnormality, atraumatic. Eyes:  Pupils equal and round. No scleral icterus. Mouth: Moist mucosa, no pharyngeal erythema. Nose: Nares normal. No drainage or sinus tenderness. Neck: Supple, symmetrical, trachea midline. No adenopathy. No tenderness/mass/nodules. No carotid bruit or elevated JVD. Lungs:   Respirations unlabored. No wheeze, rales, or rhonchi. Diminished breath sounds right base. Chest Wall:  No tenderness or deformity.  + Right chest port. Heart:  Regular rate. S1/S2 normal. No murmur, rub, or gallop. Abdomen:   Soft, non-tender, bowel sounds active. Musculoskeletal: No muscle wasting or digital clubbing. Extremities: Extremities normal, atraumatic. No cyanosis or edema. Pulses: 2+ radial and carotid pulses, symmetric. Skin: No rashes or lesions. Pysch: Normal mood and affect. Alert and oriented x 4.    Neurologic: Normal gross motor and sensory exam.       Labs     CBC:   Lab Results   Component Value Date    WBC 8.2 09/28/2021    RBC 3.21 09/28/2021    HGB 10.9 09/28/2021    HCT 32.1 09/28/2021    .0 09/28/2021    RDW 14.4 09/28/2021     09/28/2021     CMP:  Lab Results   Component Value Date     09/27/2021    K 4.0 09/27/2021    K 4.1 09/26/2021     09/27/2021    CO2 23 09/27/2021    BUN 20 09/27/2021    CREATININE 0.8 09/27/2021    GFRAA >60 09/27/2021    GFRAA >60 03/21/2013    AGRATIO 1.6 09/26/2021    LABGLOM >60 09/27/2021    LABGLOM 78 03/15/2012    LABGLOM 64 03/15/2012    GLUCOSE 94 09/27/2021    GLUCOSE 78 03/15/2012    PROT 6.2 09/26/2021    PROT 6.7 03/21/2013    CALCIUM 8.9 09/27/2021    BILITOT <0.2 09/26/2021    ALKPHOS 100 09/26/2021    AST 47 09/26/2021    ALT 13 09/26/2021     PT/INR:  No results found for: PTINR  HgBA1c:  Lab Results   Component Value Date    LABA1C 5.4 02/08/2019     Lab Results   Component Value Date    TROPONINI 0.04 (H) 09/26/2021       Cardiac Data     EKG: Personally interpreted. 9/26/2021. Sinus tachycardia. Possible left atrial enlargement. Left axis deviation. LVH with repolarization abnormality. Echo: 9/27/2021. Personally interpreted. Overall left ventricular systolic function appears mild to moderately reduced. Ejection fraction is visually estimated to be 40-45% with diffuse hypokinesis. Mildly dilated left ventricle. Normal left ventricular wall thickness. The right ventricle is normal in size and function. The left atrium is mildly dilated. Severe mitral regurgitation. Mild aortic and tricuspid regurgitation. Estimated pulmonary artery systolic pressure is mildly elevated at 47 mmHg assuming a right atrial pressure of 8 mmHg. Chest CT: 9/26/2021. Moderate coronary artery calcification. New tiny embolus peripherally right middle lobe. Emphysema. Telemetry: Personally interpreted. Sinus. Assessment and Plan   1) Acute on chronic systolic heart failure. EF 40-45%. Will give an additional dose of IV Lasix today. Continue ACE-I. Will start carvedilol today. No plans for starting Aldactone or SGLT2 with EF >40%. No indication for ICD with EF >35%. 2) Demand ischemia. History is not consistent with acute coronary syndrome. Patient with underlying CAD based on coronary artery calcifications. Would consider outpatient stress testing when acute illness improved. 3) Severe mitral regurgitation. Patient may have been volume overloaded at time of echo. We will continue to diurese and repeat echocardiogram after being on standard CHF therapies. 4) Acute PE. Not hemodynamically significant. Anticoagulation per primary team.    5)COPD exacerbation/acute respiratory failure with hypoxia.   Will defer work-up and treatment to primary team and pulmonary consultant. Currently on IV antibiotics and steroids. 6) CAD. Based on coronary artery calcifications. Continue statin therapy. 7) Nicotine Addiction. Encouraged smoking cessation but patient is in the contemplative stage. 8) Anemia. Ferritin less than 100. With CHF, will give IV iron. Overall, the problems requiring hospitalization are high in severity. Hopefully patient can be discharged in the next 24-48 hours. Thank you for allowing us to participate in the care of Hieu Moreno. Ernestina Wood.  Erik Mendoza[address]  9/28/2021 10:20 AM

## 2021-09-28 NOTE — PROGRESS NOTES
Patient Active Problem List   Diagnosis    COPD exacerbation ([address]    Scoliosis, thoracogenic    Hypercholesteremia    Disc disorder of cervical region    Fibromyalgia    Anxiety    Stress incontinence    Lumbar radiculopathy    Spinal stenosis, lumbar    Major depression, recurrent- Hx of multiple OD attempts    Herpes simplex- leg    Colon polyp    Hearing loss in left ear    Osteopenia- DXA -1.9 wrist (8/11)    Allergic rhinitis    Hypertriglyceridemia    Sciatic nerve disease    Frequent falls    Balance disorder    Bilateral occipital neuralgia    History of cerebral aneurysm repair 2015 anterior communicating clipped    Cervical stenosis of spine    Recurrent herpes simplex    S/P thoracotomy    Malignant neoplasm of lower lobe of left lung ([address]- incomplete resection due to vascular proximity 4/2019; XRT, chemo, immunotherapy    Encounter for antineoplastic chemotherapy and immunotherapy    Radiculopathy, cervical region    Underweight    Radiation-induced esophagitis    Other long term (current) drug therapy    Non-small cell lung cancer ([address]    Acquired hypothyroidism    History of colon polyps    Generalized osteoarthritis of multiple sites    Common bile duct dilation    Nicotine dependence    Chronic prescription opiate use    Body mass index less than 19, adult    Hypoxia    Demand ischemia (HCC)    Pulmonary embolus ([address]    Coronary artery disease involving native coronary artery of native heart without angina pectoris    Acute on chronic respiratory failure with hypoxia (HCC)    History of lung cancer        Allergies   Allergen Reactions    Adhesive Tape Dermatitis     Blisters on skin under tape or medication patches    Ibuprofen Nausea Only    Naproxen Nausea Only    Nsaids Other (See Comments)     GI upset    Vicodin [Hydrocodone-Acetaminophen] Nausea Only     headache        Current Inpatient Medications:    Current Facility-Administered Medications   Medication Dose Route Frequency Provider Last Rate Last Admin    albuterol sulfate  (90 Base) MCG/ACT inhaler 2 puff  2 puff Inhalation BID Lina Mclean MD        carvedilol (COREG) tablet 3.125 mg  3.125 mg Oral BID WC Donnie Feliz MD   3.125 mg at 09/28/21 1032    predniSONE (DELTASONE) tablet 40 mg  40 mg Oral Daily Bettey , APRN - CNP        zinc oxide 20 % ointment   Topical 4x Daily PRN Bettey , APRN - CNP        guaiFENesin-dextromethorphan (ROBITUSSIN DM) 100-10 MG/5ML syrup 5 mL  5 mL Oral Q4H PRN Bettey , APRN - CNP        oxyCODONE HCl (OXY-IR) immediate release tablet 30 mg  30 mg Oral TID PRN Moises M DO Denise        atorvastatin (LIPITOR) tablet 10 mg  10 mg Oral Daily Donnie Feliz MD   10 mg at 09/28/21 0920    apixaban (ELIQUIS) tablet 10 mg  10 mg Oral BID Lina Mclean MD   10 mg at 09/28/21 0920    lisinopril (PRINIVIL;ZESTRIL) tablet 2.5 mg  2.5 mg Oral Daily Donnie Feliz MD   2.5 mg at 09/28/21 0920    ARIPiprazole (ABILIFY) tablet 5 mg  5 mg Oral Daily Lina Mclean MD   5 mg at 09/28/21 2399    buPROPion (WELLBUTRIN XL) extended release tablet 150 mg  150 mg Oral Daily Lina Mclean MD   150 mg at 09/28/21 0920    DULoxetine (CYMBALTA) extended release capsule 120 mg  120 mg Oral Daily Lina Mclean MD   120 mg at 09/28/21 0920    gabapentin (NEURONTIN) capsule 800 mg  800 mg Oral TID Lina Mclean MD   800 mg at 09/28/21 0920    tiotropium-olodaterol (STIOLTO) 2.5-2.5 MCG/ACT inhaler 2 puff  2 puff Inhalation Daily Lina Mclean MD   2 puff at 09/28/21 0752    levothyroxine (SYNTHROID) tablet 50 mcg  50 mcg Oral Daily Lina Mclean MD   50 mcg at 09/28/21 0612    mirtazapine (REMERON) tablet 30 mg  30 mg Oral Nightly Lina Mclean MD   30 mg at 09/27/21 2137    OXcarbazepine (TRILEPTAL) tablet 300 mg  300 mg Oral BID Lina Mclean MD   300 mg at 09/28/21 0919    acyclovir (ZOVIRAX) capsule 400 mg  400 mg Oral TID Kee Robles MD   400 mg at 09/28/21 0921    sodium chloride flush 0.9 % injection 5-40 mL  5-40 mL IntraVENous 2 times per day Kee Robles MD   10 mL at 09/28/21 1036    sodium chloride flush 0.9 % injection 5-40 mL  5-40 mL IntraVENous PRN Kee Robles MD        0.9 % sodium chloride infusion  25 mL IntraVENous PRN Kee Robles MD        ondansetron (ZOFRAN-ODT) disintegrating tablet 4 mg  4 mg Oral Q8H PRN Kee Robles MD        Or    ondansetron (ZOFRAN) injection 4 mg  4 mg IntraVENous Q6H PRN Kee Robles MD        polyethylene glycol (GLYCOLAX) packet 17 g  17 g Oral Daily PRN Kee Robles MD        acetaminophen (TYLENOL) tablet 650 mg  650 mg Oral Q6H PRN Kee Robles MD        Or    acetaminophen (TYLENOL) suppository 650 mg  650 mg Rectal Q6H PRN Kee Robles MD        piperacillin-tazobactam (ZOSYN) 3,375 mg in dextrose 5 % 100 mL IVPB extended infusion (mini-bag)  3,375 mg IntraVENous Keanu Mackey MD   Stopped at 09/28/21 1021    lactobacillus (CULTURELLE) capsule 1 capsule  1 capsule Oral Daily with breakfast Kee Robles MD   1 capsule at 09/28/21 0921           Labs:  CBC with Differential:    Lab Results   Component Value Date    WBC 8.2 09/28/2021    RBC 3.21 09/28/2021    HGB 10.9 09/28/2021    HCT 32.1 09/28/2021     09/28/2021    .0 09/28/2021    MCH 33.8 09/28/2021    MCHC 33.8 09/28/2021    RDW 14.4 09/28/2021    SEGSPCT 76.3 03/21/2013    LYMPHOPCT 9.5 09/27/2021    MONOPCT 8.4 09/27/2021    BASOPCT 0.8 09/27/2021    MONOSABS 0.7 09/27/2021    LYMPHSABS 0.8 09/27/2021    EOSABS 0.0 09/27/2021    BASOSABS 0.1 09/27/2021    DIFFTYPE Auto-K 03/21/2013     CMP:    Lab Results   Component Value Date     09/27/2021    K 4.0 09/27/2021    K 4.1 09/26/2021     09/27/2021    CO2 23 09/27/2021 BUN 20 09/27/2021    CREATININE 0.8 09/27/2021    GFRAA >60 09/27/2021    GFRAA >60 03/21/2013    AGRATIO 1.6 09/26/2021    LABGLOM >60 09/27/2021    LABGLOM 78 03/15/2012    LABGLOM 64 03/15/2012    GLUCOSE 94 09/27/2021    GLUCOSE 78 03/15/2012    PROT 6.2 09/26/2021    PROT 6.7 03/21/2013    LABALBU 3.7 09/27/2021    CALCIUM 8.9 09/27/2021    BILITOT <0.2 09/26/2021    ALKPHOS 100 09/26/2021    AST 47 09/26/2021    ALT 13 09/26/2021     Hepatic Function Panel:    Lab Results   Component Value Date    ALKPHOS 100 09/26/2021    ALT 13 09/26/2021    AST 47 09/26/2021    PROT 6.2 09/26/2021    PROT 6.7 03/21/2013    BILITOT <0.2 09/26/2021    LABALBU 3.7 09/27/2021     Magnesium:  No results found for: MG  PT/INR:    Lab Results   Component Value Date    PROTIME 11.1 08/26/2019    PROTIME 13.1 03/15/2012    INR 0.97 08/26/2019     Last 3 Troponin:    Lab Results   Component Value Date    TROPONINI 0.04 09/26/2021    TROPONINI 0.04 09/26/2021    TROPONINI 0.06 09/26/2021     U/A:    Lab Results   Component Value Date    NITRITE neg 03/04/2014    COLORU YELLOW 09/26/2021    PHUR 5.5 09/26/2021    WBCUA 0-2 09/26/2021    RBCUA 0-2 09/26/2021    MUCUS Rare 09/26/2021    BACTERIA 4+ 09/26/2021    CLARITYU Clear 09/26/2021    SPECGRAV >1.030 09/26/2021    LEUKOCYTESUR Negative 09/26/2021    UROBILINOGEN 0.2 09/26/2021    BILIRUBINUR Negative 09/26/2021    BILIRUBINUR neg 03/04/2014    BILIRUBINUR NEGATIVE 04/26/2012    BLOODU Negative 09/26/2021    GLUCOSEU Negative 09/26/2021    GLUCOSEU NEGATIVE 04/26/2012    AMORPHOUS 2+ 04/26/2012     ABG:    Lab Results   Component Value Date    PHART 7.428 03/20/2019    GAK3LBJ 36.6 03/20/2019    PO2ART 73.9 03/20/2019    KRZ0TBX 23.8 03/20/2019    BEART 0.2 03/20/2019    WPH9ACU 24.9 03/20/2019    U3KBEOAJ 97.0 03/20/2019     FLP:    Lab Results   Component Value Date    TRIG 55 09/28/2021    HDL 55 09/28/2021    LDLCALC 72 09/28/2021    LABVLDL 11 09/28/2021     TSH:    Lab Results Component Value Date    TSH 2.36 12/10/2015      DATA:   ECG: Sinus Rhythm       ASSESSMENT:   1  Acute    Hypoxic  Respiratory  Failure  Clinically  Much  Better  2  Copd    Mild    3  Acute  Small PE  On  eliquis  4  Pleural  efuusion  Improved  5  LL  lobectemy    Lung  Ca  History  6  RLL  Pneumonia  On  Zosyn  7  Elevated  Troponin  Cardiology  Plan  On  OP  nuc  stress  PLAN    continue  Current  RX

## 2021-09-29 VITALS
BODY MASS INDEX: 18.34 KG/M2 | HEART RATE: 82 BPM | WEIGHT: 99.65 LBS | RESPIRATION RATE: 18 BRPM | OXYGEN SATURATION: 95 % | SYSTOLIC BLOOD PRESSURE: 125 MMHG | TEMPERATURE: 98.2 F | DIASTOLIC BLOOD PRESSURE: 70 MMHG | HEIGHT: 62 IN

## 2021-09-29 LAB
ANION GAP SERPL CALCULATED.3IONS-SCNC: 14 MMOL/L (ref 3–16)
BUN BLDV-MCNC: 32 MG/DL (ref 7–20)
CALCIUM SERPL-MCNC: 9.4 MG/DL (ref 8.3–10.6)
CHLORIDE BLD-SCNC: 104 MMOL/L (ref 99–110)
CO2: 24 MMOL/L (ref 21–32)
CREAT SERPL-MCNC: 0.7 MG/DL (ref 0.6–1.2)
GFR AFRICAN AMERICAN: >60
GFR NON-AFRICAN AMERICAN: >60
GLUCOSE BLD-MCNC: 113 MG/DL (ref 70–99)
MAGNESIUM: 1.5 MG/DL (ref 1.8–2.4)
POTASSIUM SERPL-SCNC: 4.1 MMOL/L (ref 3.5–5.1)
SODIUM BLD-SCNC: 142 MMOL/L (ref 136–145)

## 2021-09-29 PROCEDURE — 94669 MECHANICAL CHEST WALL OSCILL: CPT

## 2021-09-29 PROCEDURE — 6360000002 HC RX W HCPCS: Performed by: INTERNAL MEDICINE

## 2021-09-29 PROCEDURE — 2580000003 HC RX 258: Performed by: INTERNAL MEDICINE

## 2021-09-29 PROCEDURE — 80048 BASIC METABOLIC PNL TOTAL CA: CPT

## 2021-09-29 PROCEDURE — 6370000000 HC RX 637 (ALT 250 FOR IP): Performed by: INTERNAL MEDICINE

## 2021-09-29 PROCEDURE — 94761 N-INVAS EAR/PLS OXIMETRY MLT: CPT

## 2021-09-29 PROCEDURE — 94640 AIRWAY INHALATION TREATMENT: CPT

## 2021-09-29 PROCEDURE — 6370000000 HC RX 637 (ALT 250 FOR IP): Performed by: NURSE PRACTITIONER

## 2021-09-29 PROCEDURE — 99232 SBSQ HOSP IP/OBS MODERATE 35: CPT | Performed by: NURSE PRACTITIONER

## 2021-09-29 PROCEDURE — 83735 ASSAY OF MAGNESIUM: CPT

## 2021-09-29 PROCEDURE — 99233 SBSQ HOSP IP/OBS HIGH 50: CPT | Performed by: INTERNAL MEDICINE

## 2021-09-29 RX ORDER — LEVOFLOXACIN 750 MG/1
750 TABLET ORAL DAILY
Qty: 7 TABLET | Refills: 0 | Status: SHIPPED | OUTPATIENT
Start: 2021-09-29 | End: 2021-10-09

## 2021-09-29 RX ORDER — LISINOPRIL 2.5 MG/1
2.5 TABLET ORAL DAILY
Qty: 30 TABLET | Refills: 3 | Status: SHIPPED | OUTPATIENT
Start: 2021-09-30 | End: 2022-02-08 | Stop reason: SDUPTHER

## 2021-09-29 RX ORDER — FUROSEMIDE 20 MG/1
20 TABLET ORAL DAILY
Qty: 60 TABLET | Refills: 3 | Status: SHIPPED | OUTPATIENT
Start: 2021-09-30 | End: 2021-10-06

## 2021-09-29 RX ORDER — CARVEDILOL 3.12 MG/1
3.12 TABLET ORAL 2 TIMES DAILY WITH MEALS
Qty: 60 TABLET | Refills: 3 | Status: SHIPPED | OUTPATIENT
Start: 2021-09-29 | End: 2022-02-08 | Stop reason: SDUPTHER

## 2021-09-29 RX ORDER — MAGNESIUM SULFATE IN WATER 40 MG/ML
2000 INJECTION, SOLUTION INTRAVENOUS ONCE
Status: COMPLETED | OUTPATIENT
Start: 2021-09-29 | End: 2021-09-29

## 2021-09-29 RX ORDER — ATORVASTATIN CALCIUM 10 MG/1
10 TABLET, FILM COATED ORAL DAILY
Qty: 30 TABLET | Refills: 3 | Status: SHIPPED | OUTPATIENT
Start: 2021-09-30 | End: 2022-01-21

## 2021-09-29 RX ORDER — PREDNISONE 20 MG/1
40 TABLET ORAL DAILY
Qty: 6 TABLET | Refills: 0 | Status: SHIPPED | OUTPATIENT
Start: 2021-09-30 | End: 2021-10-03

## 2021-09-29 RX ADMIN — LISINOPRIL 2.5 MG: 5 TABLET ORAL at 08:30

## 2021-09-29 RX ADMIN — PREDNISONE 40 MG: 20 TABLET ORAL at 08:29

## 2021-09-29 RX ADMIN — LEVOTHYROXINE SODIUM 50 MCG: 0.05 TABLET ORAL at 06:17

## 2021-09-29 RX ADMIN — GABAPENTIN 800 MG: 400 CAPSULE ORAL at 13:36

## 2021-09-29 RX ADMIN — ALBUTEROL SULFATE 2 PUFF: 90 AEROSOL, METERED RESPIRATORY (INHALATION) at 07:39

## 2021-09-29 RX ADMIN — Medication 10 ML: at 09:06

## 2021-09-29 RX ADMIN — IRON SUCROSE 200 MG: 20 INJECTION, SOLUTION INTRAVENOUS at 09:06

## 2021-09-29 RX ADMIN — PIPERACILLIN AND TAZOBACTAM 3375 MG: 3; .375 INJECTION, POWDER, LYOPHILIZED, FOR SOLUTION INTRAVENOUS at 06:18

## 2021-09-29 RX ADMIN — ACYCLOVIR 400 MG: 200 CAPSULE ORAL at 13:36

## 2021-09-29 RX ADMIN — APIXABAN 10 MG: 5 TABLET, FILM COATED ORAL at 08:40

## 2021-09-29 RX ADMIN — MAGNESIUM SULFATE HEPTAHYDRATE 2000 MG: 40 INJECTION, SOLUTION INTRAVENOUS at 09:12

## 2021-09-29 RX ADMIN — ACYCLOVIR 400 MG: 200 CAPSULE ORAL at 08:40

## 2021-09-29 RX ADMIN — BUPROPION HYDROCHLORIDE 150 MG: 150 TABLET, EXTENDED RELEASE ORAL at 08:31

## 2021-09-29 RX ADMIN — ACETAMINOPHEN 650 MG: 325 TABLET ORAL at 11:48

## 2021-09-29 RX ADMIN — DULOXETINE HYDROCHLORIDE 120 MG: 60 CAPSULE, DELAYED RELEASE ORAL at 08:29

## 2021-09-29 RX ADMIN — ATORVASTATIN CALCIUM 10 MG: 10 TABLET, FILM COATED ORAL at 08:28

## 2021-09-29 RX ADMIN — CARVEDILOL 3.12 MG: 3.12 TABLET, FILM COATED ORAL at 08:29

## 2021-09-29 RX ADMIN — GABAPENTIN 800 MG: 400 CAPSULE ORAL at 08:30

## 2021-09-29 RX ADMIN — OXCARBAZEPINE 300 MG: 300 TABLET, FILM COATED ORAL at 08:40

## 2021-09-29 RX ADMIN — Medication 1 CAPSULE: at 08:29

## 2021-09-29 RX ADMIN — TIOTROPIUM BROMIDE AND OLODATEROL 2 PUFF: 3.124; 2.736 SPRAY, METERED RESPIRATORY (INHALATION) at 07:41

## 2021-09-29 RX ADMIN — ARIPIPRAZOLE 5 MG: 5 TABLET ORAL at 09:06

## 2021-09-29 RX ADMIN — FUROSEMIDE 20 MG: 20 TABLET ORAL at 08:30

## 2021-09-29 ASSESSMENT — PAIN DESCRIPTION - PAIN TYPE
TYPE: ACUTE PAIN
TYPE: ACUTE PAIN

## 2021-09-29 ASSESSMENT — PAIN DESCRIPTION - FREQUENCY
FREQUENCY: CONTINUOUS
FREQUENCY: CONTINUOUS

## 2021-09-29 ASSESSMENT — PAIN DESCRIPTION - LOCATION
LOCATION: HEAD
LOCATION: HEAD

## 2021-09-29 ASSESSMENT — PAIN SCALES - GENERAL
PAINLEVEL_OUTOF10: 0
PAINLEVEL_OUTOF10: 4
PAINLEVEL_OUTOF10: 0
PAINLEVEL_OUTOF10: 3
PAINLEVEL_OUTOF10: 2

## 2021-09-29 ASSESSMENT — PAIN DESCRIPTION - DESCRIPTORS
DESCRIPTORS: ACHING;CONSTANT
DESCRIPTORS: ACHING;CONSTANT

## 2021-09-29 ASSESSMENT — PAIN DESCRIPTION - ONSET
ONSET: ON-GOING
ONSET: GRADUAL

## 2021-09-29 NOTE — PLAN OF CARE
Problem: Falls - Risk of:  Goal: Will remain free from falls  Description: Will remain free from falls  Outcome: Ongoing     Problem: Falls - Risk of:  Goal: Absence of physical injury  Description: Absence of physical injury  Outcome: Ongoing     Problem: Pain:  Goal: Pain level will decrease  Description: Pain level will decrease  Outcome: Ongoing     Problem: Pain:  Goal: Control of acute pain  Description: Control of acute pain  Outcome: Ongoing     Problem: Pain:  Goal: Control of chronic pain  Description: Control of chronic pain  Outcome: Ongoing     Problem: OXYGENATION/RESPIRATORY FUNCTION  Goal: Patient will maintain patent airway  Outcome: Ongoing     Problem: OXYGENATION/RESPIRATORY FUNCTION  Goal: Patient will achieve/maintain normal respiratory rate/effort  Description: Respiratory rate and effort will be within normal limits for the patient  Outcome: Ongoing     Problem: HEMODYNAMIC STATUS  Goal: Patient has stable vital signs and fluid balance  Outcome: Ongoing     Problem: FLUID AND ELECTROLYTE IMBALANCE  Goal: Fluid and electrolyte balance are achieved/maintained  Outcome: Ongoing     Problem: ACTIVITY INTOLERANCE/IMPAIRED MOBILITY  Goal: Mobility/activity is maintained at optimum level for patient  Outcome: Ongoing

## 2021-09-29 NOTE — PROGRESS NOTES
Clinical Pharmacy Note  Medication Counseling    Reviewed new medications started during hospital admission: humera Christianson,eugene. Indications and side effects were emphasized during counseling. All medication-related questions addressed. Patient verbalized understanding of education. Should the patient express any additional questions or concerns regarding their medications, please do not hesitate to contact the pharmacy department. Patient/caregiver aware they may refuse medications during hospital stay. 10 minutes spent educating patient regarding medications.

## 2021-09-29 NOTE — CONSULTS
[facility]  HEART FAILURE PROGRAM      NAME:  Speedy Ackerman RECORD NUMBER:  [id number]  AGE: 74 y.o.    GENDER: female  : 1955  TODAY'S DATE:  2021    Subjective:     VISIT TYPE: evaluation     ADMITTING PHYSICIAN:  Tobias Gomez MD    PAST MEDICAL HISTORY        Diagnosis Date    Anxiety     Chronic headaches     CT done 2016    Colon polyp     COPD, mild ([address]     PFT  a smoker no symptoms-no meds    DDD (degenerative disc disease)     Depression     Fibromyalgia     Frequent UTI     Full dentures     Hallux valgus     Hearing loss in left ear 2011    HSV (herpes simplex virus) anogenital infection     leg    Hyperlipidemia     Lung nodule 2019    2 LLL nodules    Malignant neoplasm of left lung (HCC)     Menopausal state     Occipital neuralgia     Osteoarthritis     Scoliosis     neck and back brace intermittently, based on pain    Smoker     Spinal stenosis     cane, walker    Unexplained weight loss     Patient states over 30# weight loss over 6 months and pcp is aware       SOCIAL HISTORY    Social History     Tobacco Use    Smoking status: Current Every Day Smoker     Packs/day: 0.25     Years: 48.00     Pack years: 12.00     Types: Cigarettes     Last attempt to quit: 2019     Years since quittin.6    Smokeless tobacco: Never Used    Tobacco comment: started age 11, down to [address] [place] [address] [address] a day;   Vaping Use    Vaping Use: Former    Substances: Always   Substance Use Topics    Alcohol use: Yes     Comment: rare    Drug use: Yes     Types: Marijuana     Comment: occ       ALLERGIES    Allergies   Allergen Reactions    Adhesive Tape Dermatitis     Blisters on skin under tape or medication patches    Ibuprofen Nausea Only    Naproxen Nausea Only    Nsaids Other (See Comments)     GI upset    Vicodin [Hydrocodone-Acetaminophen] Nausea Only     headache       MEDICATIONS  Scheduled Meds:   albuterol sulfate HFA  2 09/29/2021    CO2 24 09/29/2021    BUN 32 09/29/2021    LABALBU 3.7 09/27/2021    CREATININE 0.7 09/29/2021    CALCIUM 9.4 09/29/2021    GFRAA >60 09/29/2021    GFRAA >60 03/21/2013    LABGLOM >60 09/29/2021    LABGLOM 78 03/15/2012    LABGLOM 64 03/15/2012    GLUCOSE 113 09/29/2021    GLUCOSE 78 03/15/2012     CBC:   Recent Labs     09/27/21  0950 09/28/21  0629   WBC 8.7 8.2   HGB 10.5* 10.9*   HCT 31.4* 32.1*   .3* 100.0    354     BNP: No results found for: BNP    ECHOCARDIOGRAM:   9/27/21  Summary   Overall left ventricular systolic function appears mild to moderately   reduced. Ejection fraction is visually estimated to be 40-45% with diffuse   hypokinesis. Mildly dilated left ventricle. Normal left ventricular wall   thickness. The right ventricle is normal in size and function. The left atrium is mildly dilated. Severe mitral regurgitation. Mild aortic and tricuspid regurgitation. Estimated pulmonary artery systolic pressure is mildly elevated at 47 mmHg   assuming a right atrial pressure of 8 mmHg. Assessment:     CONSULTS:   IP CONSULT TO PULMONOLOGY  IP CONSULT TO CARDIOLOGY  IP CONSULT TO HEART FAILURE NURSE/COORDINATOR    Patient has a CARDIOLOGY CONSULT: Yes - Dr Torsten Tolentino      Patient taking an ACEI/ARB:  Yes  - lisinopril      Patient taking a BETA BLOCKER:  Yes- coreg     SCALE AVAILABLE:  Yes     EDUCATION STATUS: Patient   [x]  Provided both written and verbal education on Heart Failure signs/symptoms. [x]  Provided instructions on daily medications. [x]  Provided instructions to monitor and record weight daily. [x]  Provided instructions to call if weight increases 3 lbs in one day or 5 lbs in one week. [x]  Received verbal acknowledgment/understanding of Heart Failure related causes. [x]  Provided instructions on how to maintain a low sodium diet.    [x]  Provided recommendations for smoking cessation programs  [x]  Provided recommendations on activity and exercise [x]  Other:    HF RN consult noted, chart reviewed. Pt came in with c/o SOB. Found to be in acute on chronic COPD as well as new acute systolic heart failure. An echo was obtained which revealed EF 40-45%. Cardiology was consulted and following. A dc order is now in place. Pt has received HF education at bedside however I went to see pt for further education to assess her understanding and to reiterate material.     Upon entering room, pt up in bed awake. I introduced myself and my role in her care, and she was agreeable to spend time with me. Teaching done on What is HF, systolic dysfunction, S&S of HF, risk factors for HF, importance of daily wts and who and when to call. Pt is agreeable to start weighing. Teaching done on the wt log sheet, HF zones, and encouraged pt to take to appts with her. Teaching done on need to follow low Na diet and 2 liter FR. Pt admits to being an avid salt user. I gave tips on other seasoning options etc. Went over how to read a label. Gave tips to handle a dry mouth and measure fluids. She doesn't feel the FR will be an issue but has concerns on the low Na diet. I will refer her to our [place] for further education as pt has a dc order. Stressed the need for smoking cessation. Pt states \"I've smoked for 50+ years. \" I encouraged her to find a support system and to talk with her Mds about any medication support. \"I can go few days without them, than I get all jonesy and need them. \" Teaching done. Offered the 1-800 quit #.    I went over her medications and importance of compliance and close MD f/u. Pt is new to [facility] so I gave her a brochure with info. Card f/u appt is in place, pt aware. Appt is on 10/5 at 11:00 with Viviane Maloney HF NP. Referral to our [facility] placed for on-going education with new dx. CURRENT DIET: ADULT DIET; Regular; Low Sodium (2 gm); 2000 ml    EDUCATIONAL PACKETS PROVIDED- PRINTED FROM [employer]. Titles and material given:   Yes

## 2021-09-29 NOTE — PROGRESS NOTES
[address]   Daily Progress Note      Admit Date:  9/26/2021    CC: SOB    HPI:   Greg Lam is a 74 y.o. female with PMH significant for COPD, lung CA with fibrosis s/p LLL lobectomy/chemo/radiation, Smoker,anxiety. Admitted to [address] with worsening dyspnea over last few weeks that suddenly became severe and labored while at rest. Found to have acute on chronic SHF, left pleural effusion, severe MR and acute PE.     SOB now improved. On RA. Ambulates in room without SOB, CP, LH, dizziness, palpitations,syncope. C/O generalized weakness and fatigue that has improved. Pulm following. Review of Systems:   General: Denies fever, chills  Skin: Denies skin changes, rash, itching, lesions.   HEENT: Denies headache, dizziness, vision changes, nosebleeds, sore throat, nasal drainage  RESP: Denies cough, sputum, dyspnea, wheeze, snoring  CARD: Denies palpitations,  murmur  GI:Denies nausea, vomiting, heartburn, loss of appetite, change in bowels  : Denies frequency, pain, incontinence, polyuria  VASC: Denies claudication, leg cramps, clots  MUSC/SKEL: Denies pain, stiffness, arthritis  PSYCH: Denies anxiety, depression, stress  NEURO: Denies numbness, tingling, weakness,change in mood or memory  HEME: Denies abn bruising, bleeding, anemia  ENDO: Denies intolerance to heat, cold, excessive thirst or hunger, hx thyroid disease    Objective:   /72   Pulse 80   Temp 97.5 °F (36.4 °C) (Oral)   Resp 18   Ht 5' 2\" (1.575 m)   Wt 99 lb 10.4 oz (45.2 kg)   LMP 08/11/1993 (Approximate)   SpO2 95%   BMI 18.23 kg/m²         Intake/Output Summary (Last 24 hours) at 9/29/2021 0839  Last data filed at 9/28/2021 2053  Gross per 24 hour   Intake 439.43 ml   Output 925 ml   Net -485.57 ml     I/O since adm: +618    WEIGHT:Admit Weight: 100 lb (45.4 kg)         Today  Weight: 99 lb 10.4 oz (45.2 kg)   DRY WEIGHT:  Wt Readings from Last 3 Encounters:   09/29/21 99 lb 10.4 oz (45.2 kg)   07/02/21 103 lb 12.8 oz (47.1 kg)   12/11/20 102 lb (46.3 kg)       Physical Exam:  GEN: Appears frail, no acute distress  SKIN: Pink, warm, dry. Nails without clubbing. HEENT: PERRLA. Normocephalic, atraumatic. Neck supple. No adenopathy. LUNG: AP diameter normal. Decreased BS throughout, few exp ronchi. No crackles or wheeze. Respiratory effort normal.  HEART: S1S2 A/R. No JVD. No carotid bruit. No murmur, rub or gallop. ABD: Soft, nontender. +BS X 4 quads. No hepatomegaly. EXT: Radial and pedal pulses 2+ and symmetric. Without varicosities. No edema. MUSCSKEL: Good ROM X4 extremities. No deformity. NEURO: A/O X3. Calm and cooperative. Telemetry: NSR HR 94    Medications:    albuterol sulfate HFA  2 puff Inhalation BID    carvedilol  3.125 mg Oral BID WC    predniSONE  40 mg Oral Daily    furosemide  20 mg Oral Daily    iron sucrose  200 mg IntraVENous Daily    atorvastatin  10 mg Oral Daily    apixaban  10 mg Oral BID    lisinopril  2.5 mg Oral Daily    ARIPiprazole  5 mg Oral Daily    buPROPion  150 mg Oral Daily    DULoxetine  120 mg Oral Daily    gabapentin  800 mg Oral TID    tiotropium-olodaterol  2 puff Inhalation Daily    levothyroxine  50 mcg Oral Daily    mirtazapine  30 mg Oral Nightly    OXcarbazepine  300 mg Oral BID    acyclovir  400 mg Oral TID    sodium chloride flush  5-40 mL IntraVENous 2 times per day    piperacillin-tazobactam  3,375 mg IntraVENous Q8H    lactobacillus  1 capsule Oral Daily with breakfast      sodium chloride       zinc oxide, guaiFENesin-dextromethorphan, oxyCODONE, sodium chloride flush, sodium chloride, ondansetron **OR** ondansetron, polyethylene glycol, acetaminophen **OR** acetaminophen    Lab Data: I have reviewed all labs below today.    CBC:   Recent Labs     09/26/21  0938 09/27/21  0950 09/28/21  0629   WBC 12.8* 8.7 8.2   HGB 11.3* 10.5* 10.9*   HCT 34.4* 31.4* 32.1*   .8* 100.3* 100.0    319 354     BMP:   Recent Labs 21  0938 21  0950 21  0610    142 142   K 4.1 4.0 4.1    108 104   CO2 22 23 24   PHOS  --  3.1  --    BUN 24* 20 32*   CREATININE 0.7 0.8 0.7     GLUCOSE:   Recent Labs     21  0938 21  0950 21  0610   GLUCOSE 142* 94 113*     LIVER PROFILE:   Lab Results   Component Value Date    AST 47 2021    ALT 13 2021    LIPASE 34.68 2013    AMYLASE 50 2013    LABALBU 3.7 2021    BILITOT <0.2 2021    ALKPHOS 100 2021     PT/INR:   Lab Results   Component Value Date    PROTIME 11.1 2019    PROTIME 13.1 03/15/2012    INR 0.97 2019     APTT:   Lab Results   Component Value Date    APTT 66.2 2021     Pro-BNP:    Lab Results   Component Value Date    PROBNP 7,582 2021    PROBNP 1,119 2019     Parameters:   > 450 pg/mL under age 48  > 900 pg/mL ages 54-65  > 1800 pg/mL over age 76    ENZYMES:  Lab Results   Component Value Date    TROPONINI 0.04 2021   Results for Bebe Izquierdo (MRN [id number]) as of 2021 11:03   Ref. Range 2021 09:38 2021 16:50 2021 21:52   Troponin Latest Ref Range: <0.01 ng/mL 0.06 (H) 0.04 (H) 0.04 (H)     FASTING LIPID PANEL:  Lab Results   Component Value Date    CHOL 138 2021    HDL 55 2021    LDLCALC 72 2021    TRIG 55 2021       Diagnostics:      EK2021. Sinus tachycardia. Possible left atrial enlargement. Left axis deviation. LVH with repolarization abnormality. ECHO:  2021. Overall left ventricular systolic function appears mild to moderately reduced. Ejection fraction is visually estimated to be 40-45% with diffuse hypokinesis. Mildly dilated left ventricle.  Normal left ventricular wall thickness. The right ventricle is normal in size and function. The left atrium is mildly dilated. Severe mitral regurgitation. Mild aortic and tricuspid regurgitation. Estimated pulmonary artery systolic pressure is mildly elevated at 47 mmHg assuming a right atrial pressure of 8 mmHg. Chest CT: 9/26/2021. Moderate coronary artery calcification. New tiny embolus peripherally right middle lobe. Emphysema. CXR   9/28/2021  Impression   Heterogeneous bilateral opacity, slightly improved at the right base, which   can reflect pulmonary edema or pneumonia.  Trace pleural effusions. 9/26/2021  Impression   Vascular congestion.       Small effusion with interstitial opacities, most likely pulmonary edema. BLE Venous Doppler 9/27/2021  Summary  No evidence of deep vein or superficial vein thrombosis involving the left lower extremity and the contralateral proximal  right common femoral vein. Assessment/Plan:    1.) Acute on chronic systolic heart failure moderate EF 40-45%, diffuse hypokinesis, mild dilated LV. SOB improved and multifactorial with lung CA/COPD. NYHA Class III   Stage C  Diuretic: lasix  Beta Blocker: coreg  ACEi/ARB/ARNI:lisinopril  Aldosterone Antagonist: Not indicated for EF>35%  SGLT2 Inhibitor: Not indicated for EF>35%  2gm Na diet, daily weight, 64 oz fluid restriction  Avoid NSAIDS and other nephrotoxic meds  Cardiac Rehab: Not indicated for EF>35%  ICD:Not indicated for EF>35%  [facility] Referral: OP      2.) Severe MR: Volume overloaded at time of ECHO. Will repeat after 3mos HF GDMT. Cont diuresis    3.) Elevated troponin: Mild, flat. Most likely demand ischemia  Plan for stress test as OP    4.) Acute PE without cor pulmonale:  Hemodynamically stable  Cont Eliquis  Plan is to FU with Hem/ONC  after DC.     5.) Anemia:  Venofer    6.) Smoking addction:  Smoking cessation counseling provided > 3 min. Benefits of smoking cessation include decrease risk for lung disease, heart disease, stroke, and peripheral vascular disease. Recommendations include medications such as Chantix, Zyban or nicotine replacement products, counseling and resources such as 1-800-QUIT-NOW.      Patient is stable from cardiology standpoint and OK for discharge. Will follow up in [facility] office within 7 days of discharge.     Electronically signed by HEATHER Rubio CNP on 9/29/2021 at 8:39 AM

## 2021-09-29 NOTE — CARE COORDINATION
DISCHARGE SUMMARY     DATE OF DISCHARGE: 9/29/2021    DISCHARGE DESTINATION: Home, family can assist if needed    DME ORDERED: None    TRANSPORTATION: Family transport     Time: TBD    COMMENTS: Met with patient. Patient ready to discharge home. Denies home care needs. Resume [facility], antoni garcia/ [place]  [phone], faxed [phone]. Family transport.    Electronically signed by Kelley Brown RN Case Management [phone] on 9/29/2021 at 11:59 AM

## 2021-09-29 NOTE — DISCHARGE INSTR - COC
 Influenza, High-dose, Quadv, 65 yrs +, IM (Fluzone) 10/21/2020    Influenza, Intradermal, Preservative free 09/10/2012, 09/29/2015    Influenza, Intradermal, Quadrivalent, Preservative Free 09/29/2016    Pneumococcal Conjugate 13-valent (Mrkaqak54) 08/02/2018    Pneumococcal Polysaccharide ([id number]) 10/21/2008, 10/10/2013, 08/21/2019    Td, unspecified formulation 11/01/2003    Tdap (Boostrix, Adacel) 08/02/2017    Zoster Live (Zostavax) 10/15/2013    Zoster Recombinant (Shingrix) 12/04/2019       Active Problems:  Patient Active Problem List   Diagnosis Code    COPD exacerbation ([facility] [address] J44.1    Scoliosis, thoracogenic M41.30    Hypercholesteremia E78.00    Disc disorder of cervical region M50.90    Fibromyalgia M79.7    Anxiety F41.9    Stress incontinence N39.3    Lumbar radiculopathy M54.16    Spinal stenosis, lumbar M48.061    Major depression, recurrent- Hx of multiple OD attempts F33.9    Herpes simplex- leg B00.9    Colon polyp K63.5    Hearing loss in left ear H91.92    Osteopenia- DXA -1.9 wrist (8/11) M85.80    Allergic rhinitis J30.9    Hypertriglyceridemia E78.1    Sciatic nerve disease G57.00    Frequent falls R29.6    Balance disorder R26.89    Bilateral occipital neuralgia M54.81    History of cerebral aneurysm repair 2015 anterior communicating clipped Z98.890, Z86.79    Cervical stenosis of spine M48.02    Recurrent herpes simplex B00.9    S/P thoracotomy Z98.890    Malignant neoplasm of lower lobe of left lung (HCC)- incomplete resection due to vascular proximity 4/2019; XRT, chemo, immunotherapy C34.32    Encounter for antineoplastic chemotherapy and immunotherapy Z51.11, Z51.12    Radiculopathy, cervical region M54.12    Underweight R63.6    Radiation-induced esophagitis K20.80, T66. Janis Held Other long term (current) drug therapy Z79.899    Non-small cell lung cancer ([facility] [address] C34.90    Acquired hypothyroidism E03.9    History of colon polyps Z86.010    Generalized osteoarthritis of multiple sites M15.9    Common bile duct dilation K83.8    Nicotine dependence F17.200    Chronic prescription opiate use Z79.891    Body mass index less than 19, adult Z68.1    Hypoxia R09.02    Demand ischemia (HCC) I24.8    Pulmonary embolus (HCC) I26.99    Coronary artery disease involving native coronary artery of native heart without angina pectoris I25.10    Acute on chronic respiratory failure with hypoxia ([address] J96.21    History of lung cancer Z85. 118    Acute on chronic systolic heart failure (HCC) I50.23       Isolation/Infection:   Isolation          No Isolation        Patient Infection Status     Infection Onset Added Last Indicated Last Indicated By Review Planned Expiration Resolved Resolved By    None active    Resolved    COVID-19 Rule Out 09/26/21 09/26/21 09/26/21 COVID-19, Rapid (Ordered)   09/26/21 Rule-Out Test Resulted    COVID-19 Rule Out 09/26/21 09/26/21 09/26/21 SARS-CoV-2 NAAT (Rapid) (Ordered)   09/26/21 Rule-Out Test Resulted          Nurse Assessment:  Last Vital Signs: /70   Pulse 82   Temp 98.2 °F (36.8 °C) (Oral)   Resp 18   Ht 5' 2\" (1.575 m)   Wt 99 lb 10.4 oz (45.2 kg)   LMP 08/11/1993 (Approximate)   SpO2 95%   BMI 18.23 kg/m²     Last documented pain score (0-10 scale): Pain Level: 4  Last Weight:   Wt Readings from Last 1 Encounters:   09/29/21 99 lb 10.4 oz (45.2 kg)     Mental Status:  {IP PT MENTAL STATUS:20030}    IV Access:  { [facility] IV ACCESS:135121588}    Nursing Mobility/ADLs:  Walking   {[facility] DME WOLP:196842037}  Transfer  {[facility] DME EKIR:171433232}  Bathing  {[facility] DME WBEK:377068125}  Dressing  {[facility] DME NRZY:160495681}  Toileting  {[facility] DME ARDH:433979475}  Feeding  {[facility] DME UFBX:464989948}  Med Admin  {[facility] DME ILCD:343784376}  Med Delivery   { [facility] Delivery:688462268}    Wound Care Documentation and Therapy:  Incision 06/19/18 Foot Right (Active)   Number of days: 1198       Wound 03/26/19 Rib Cage Lateral;Left applicable)   · Name:  · Address:  · Dialysis Schedule:  · Phone:  · Fax:    / signature: Electronically signed by Lisa Squires RN Case Management [phone] on 9/29/2021 at 12:02 PM      PHYSICIAN SECTION    Prognosis: {Prognosis:3810635863}    Condition at Discharge: Dio Figueroa Patient Condition:815222089}    Rehab Potential (if transferring to Rehab): {Prognosis:5377784063}    Recommended Labs or Other Treatments After Discharge: ***    Physician Certification: I certify the above information and transfer of Lindsey   is necessary for the continuing treatment of the diagnosis listed and that she requires {Admit to Appropriate Level of Care:15274} for {GREATER/LESS:209900930} 30 days.      Update Admission H&P: {[facility] DME Changes [facility]:886696450}    PHYSICIAN SIGNATURE:  {Esignature:919236141}

## 2021-09-29 NOTE — PROGRESS NOTES
CLINICAL PHARMACY NOTE: MEDS TO BEDS    Total # of Prescriptions Filled: 2   The following medications were delivered to the patient:  Current Discharge Medication List      START taking these medications    Details   apixaban (ELIQUIS) 5 MG TABS tablet Take 2 tablets by mouth 2 times daily for 9 doses  Qty: 60 tablet, Refills: 1      atorvastatin (LIPITOR) 10 MG tablet Take 1 tablet by mouth daily  Qty: 30 tablet, Refills: 3      lisinopril (PRINIVIL;ZESTRIL) 2.5 MG tablet Take 1 tablet by mouth daily  Qty: 30 tablet, Refills: 3      carvedilol (COREG) 3.125 MG tablet Take 1 tablet by mouth 2 times daily (with meals)  Qty: 60 tablet, Refills: 3      predniSONE (DELTASONE) 20 MG tablet Take 2 tablets by mouth daily for 3 doses  Qty: 6 tablet, Refills: 0      furosemide (LASIX) 20 MG tablet Take 1 tablet by mouth daily  Qty: 60 tablet, Refills: 3      levoFLOXacin (LEVAQUIN) 750 MG tablet Take 1 tablet by mouth daily for 10 days  Qty: 7 tablet, Refills: 0         ·   ·     Additional Documentation:

## 2021-09-29 NOTE — PROGRESS NOTES
Discharge paperwork reviewed with patient. Patient provided with 2 prescriptions from retail pharmacy. Patient educated on new prescriptions sent to [facility]. Patient verbalized understanding about the importance of stopping smoking and provided with education. Heart failure RN and pharmacist provided education in regards to new medications. Chest port was de-accessed. Heart monitor returned to [address] [place] [address]. Patient to be transported home by family.      Electronically signed by Anna Abdul RN on 9/29/2021 at 2:53 PM

## 2021-09-29 NOTE — PROGRESS NOTES
Physician Progress Note      Gil Hutton  CSN #:                  [id number]  :                       1955  ADMIT DATE:       2021 8:52 AM  [address] [place] DATE:  RESPONDING  PROVIDER #:        Blanche Lopez MD          QUERY TEXT:    Dear Dr. Shanda Honeycutt,  Pt admitted with RLL Pneumonia, Exac of COPD, Acute Small PE, . Pt noted to   have Leukocytosis, Neutrophilia, Tachycardia, Tachypnea, Hypoxia Glucose>140   in non-diabetic. If possible, please document in the progress notes and   discharge summary if you are evaluating and /or treating any of the following: The medical record reflects the following:  Risk Factors: Hx Lung Ca s/p lobectomy, chemo and radiation,COPD, current   smoker, Current RLL Pneumonia  Clinical Indicators:  ED per EMS for SOB, found to be 50% sat on RA (not   on home O2), BJ=[phone],Resp=27, WBC=12.8, Neutrophils=11.7,Glucose=142,   Trop=0.06,  CT with Patchy right lower lobe airspace disease, suspicious for   pneumonia, Admit for RLL pneumonia, Cardiology notes Demand ischemia,  Treatment: Cefepime, Azithromycin, Zosyn, Pulmonary consult, O2  Thank you,  Katherine Benavidez RN, CDS  [email or web address]  Options provided:  -- Sepsis, present on admission  -- Sepsis, present on admission, now resolved  -- Pneumonia without Sepsis  -- Other - I will add my own diagnosis  -- Disagree - Not applicable / Not valid  -- Disagree - Clinically unable to determine / Unknown  -- Refer to Clinical Documentation Reviewer    PROVIDER RESPONSE TEXT:    This patient has pneumonia without Sepsis. Query created by:  Holden Walsh on 2021 11:53 AM      Electronically signed by:  Blanche Lopez MD 2021 7:33 AM

## 2021-09-29 NOTE — PROGRESS NOTES
Pulmonary Progress Note    Date of Admission: 9/26/2021   LOS: 3 days       CC:  Pneumonia    Subjective:  Feeling better. Would like to be discharged. ROS:   No nausea  No Vomiting  No chest pain      Assessment:     Left pleural effusion  Hx lung cancer with fibrosis status post left lower lobeectomy, chemo/rads  COPD , mild  allergic rhinitis  Chronic pain  Hx spinal stenosis  Severe mitral regurgitation  Acute on chronic systolic CHF, EF 87%  Tobacco abuse    Plan:        Hospital Day: 3     Left pleural effusion  * Agree with antibiotics. Zosyn  * follow Procalcitonin   *Chest x-ray leukocytosis improving  *Okay to discharge on Augmentin x7 days     COPD w/ exacerbation  * Solumedrol bid, change prednisone 40 mg daily  * albuterol   * Stiolto         Acute hypoxemia  * Baseline room air  *Return to room air     Tobacco abuse  * must quit        Acute PE  * small. *Eliquis, defer to Dr. Dexter Alex, outpatient hematologist, for duration. Okay to discharge from pulmonary standpoint. Data:        PHYSICAL EXAM:   Blood pressure 127/72, pulse 80, temperature 97.5 °F (36.4 °C), temperature source Oral, resp. rate 18, height 5' 2\" (1.575 m), weight 99 lb 10.4 oz (45.2 kg), last menstrual period 08/11/1993, SpO2 95 %, not currently breastfeeding.'  Body mass index is 18.23 kg/m². Gen: No distress. ENT:   Resp: No accessory muscle use. No crackles. No wheezes. No rhonchi. CV: Regular rate. Regular rhythm. No murmur or rub. No edema. Skin: Warm, dry, normal texture and turgor. No nodule on exposed extremities. M/S: No cyanosis. No clubbing. No joint deformity. Psych: Oriented x 3. No anxiety. Awake. Alert. Intact judgement and insight. Good Mood / Affect.   Memory appears in tact       Medications:    Scheduled Meds:   magnesium sulfate  2,000 mg IntraVENous Once    albuterol sulfate HFA  2 puff Inhalation BID    carvedilol  3.125 mg Oral BID WC    predniSONE  40 mg Oral Daily    furosemide  20 mg Oral Daily    iron sucrose  200 mg IntraVENous Daily    atorvastatin  10 mg Oral Daily    apixaban  10 mg Oral BID    lisinopril  2.5 mg Oral Daily    ARIPiprazole  5 mg Oral Daily    buPROPion  150 mg Oral Daily    DULoxetine  120 mg Oral Daily    gabapentin  800 mg Oral TID    tiotropium-olodaterol  2 puff Inhalation Daily    levothyroxine  50 mcg Oral Daily    mirtazapine  30 mg Oral Nightly    OXcarbazepine  300 mg Oral BID    acyclovir  400 mg Oral TID    sodium chloride flush  5-40 mL IntraVENous 2 times per day    piperacillin-tazobactam  3,375 mg IntraVENous Q8H    lactobacillus  1 capsule Oral Daily with breakfast       Continuous Infusions:   sodium chloride         PRN Meds:  zinc oxide, guaiFENesin-dextromethorphan, oxyCODONE, sodium chloride flush, sodium chloride, ondansetron **OR** ondansetron, polyethylene glycol, acetaminophen **OR** acetaminophen    Labs reviewed:  CBC:   Recent Labs     09/26/21  0938 09/27/21  0950 09/28/21  0629   WBC 12.8* 8.7 8.2   HGB 11.3* 10.5* 10.9*   HCT 34.4* 31.4* 32.1*   .8* 100.3* 100.0    319 354     BMP:   Recent Labs     09/26/21  0938 09/27/21  0950 09/29/21  0610    142 142   K 4.1 4.0 4.1    108 104   CO2 22 23 24   PHOS  --  3.1  --    BUN 24* 20 32*   CREATININE 0.7 0.8 0.7     LIVER PROFILE:   Recent Labs     09/26/21  0938   AST 47*   ALT 13   BILITOT <0.2   ALKPHOS 100     PT/INR: No results for input(s): PROTIME, INR in the last 72 hours. APTT:   Recent Labs     09/26/21  1650 09/27/21  0123 09/27/21  0950   APTT 127.4* 43.9* 66.2*     UA:  Recent Labs     09/26/21  1153   COLORU YELLOW   PHUR 5.5   WBCUA 0-2   RBCUA 0-2   MUCUS Rare*   BACTERIA 4+*   CLARITYU Clear   SPECGRAV >1.030   LEUKOCYTESUR Negative   UROBILINOGEN 0.2   BILIRUBINUR Negative   BLOODU Negative   GLUCOSEU Negative     No results for input(s): PH, PCO2, PO2 in the last 72 hours. Cx:      Films:        This note was transcribed using [id number] [facility]. Please disregard any translational errors.       Yin Mcdermott [facility] [phone]

## 2021-09-29 NOTE — DISCHARGE SUMMARY
Hospital Medicine Discharge Summary    Felton Quiroz  :  1955  MRN:  [id number]    Admit date:  2021  Discharge date:  2021    Admitting Physician:  Marilyn Singleton MD  Primary Care Physician:  Polly Daly,       Discharge Diagnoses: Active Problems:    COPD exacerbation (HCC)    Nicotine dependence    Hypoxia    Demand ischemia ([address]    Pulmonary embolus (HCC)    Coronary artery disease involving native coronary artery of native heart without angina pectoris    Acute on chronic respiratory failure with hypoxia (HCC)    History of lung cancer    Acute on chronic systolic heart failure ([address]  Resolved Problems:    * No resolved hospital problems. *      Hospital Course:   Felton Quiroz is a 74 y.o. female that was admitted and treated at [facility] for the following medical issues:    The patient is a 74 y.o. female w hx of Lung Ca first diagnosed about 2 years ago, s/p LLL lobectomy, chemotherapy, radiation therapy (30 sessions) and immunotherapy, presently in remission, Hx of COPD still smokes, Hx of multiple prior L spine and C spine surgical interventions Hx of spinal stenosis, chronic pain on multiple medications for that. Patient  Treated  For  Acute  hypoic  Respiratory  Failure,  Has  Pneumonia  And  Small  PE,        Mildly  Elevated  Troponin  And  Cardiology  Plan  On  OP  Nuclear  Stress  Patient  Off  oygen  And  Ok  To  Discharge  Per  Consultants    Follow  Up  With  He  pcp   And  Cardiology      Active Problems:    COPD exacerbation ([address]    Nicotine dependence    Hypoxia    Demand ischemia ([address]    Pulmonary embolus ([address]    Coronary artery disease involving native coronary artery of native heart without angina pectoris    Acute on chronic respiratory failure with hypoxia ([address]    History of lung cancer    Acute on chronic systolic heart failure ([address]  Resolved Problems:    * No resolved hospital problems.  *      Patient was seen by the following consultants while admitted to [facility]:   Consults:  IP CONSULT TO PULMONOLOGY  IP CONSULT TO CARDIOLOGY  IP CONSULT TO HEART FAILURE NURSE/COORDINATOR    Significant Diagnostic Studies:    ECHO Complete 2D W Doppler W Color    Result Date: 9/27/2021  Transthoracic Echocardiography Report (TTE)  Demographics   Patient Name      Imelda Grant   Date of Study     09/27/2021          Gender              Female   Patient Number    [id number]          Date of Birth       1955   Visit Number      [id number]           Age                 74 year(s)   Accession Number  [id number]          Room Number         2219   Corporate ID      [id number]            Sonographer         Micki Kerns,                                                            ANNIA, RVT   Ordering          Bacanurschi,        Interpreting        [address] [place] [address].  Physician         Katty Johnson MD        Physician           Romeo Subramanian MD  Procedure Type of Study   TTE procedure:ECHOCARDIOGRAM COMPLETE 2D W DOPPLER W COLOR. Procedure Date Date: 09/27/2021 Start: 10:56 AM Study Location: [facility] Technical Quality: Adequate visualization Indications:Pulmonary embolus. Additional Indications:H/o HLD, COPD, current smoker. Patient Status: Routine Height: 62 inches Weight: 104 pounds BSA: 1.45 m2 BMI: 19.02 kg/m2 Rhythm: Within normal limits HR: 97 bpm BP: 103/58 mmHg  Conclusions   Summary  Overall left ventricular systolic function appears mild to moderately  reduced. Ejection fraction is visually estimated to be 40-45% with diffuse  hypokinesis. Mildly dilated left ventricle. Normal left ventricular wall  thickness. The right ventricle is normal in size and function. The left atrium is mildly dilated. Severe mitral regurgitation. Mild aortic and tricuspid regurgitation.   Estimated pulmonary artery systolic pressure is mildly elevated at 47 AO Root Dimension: 2.8 cm  RV Diastolic Dimension: 8.97 cm                                  LA Area: 18.5 cm2  LVOT: 2.01 cm                   LA volume/Index: 53.2 ml /37 ml/m2  Doppler Measurements   AV Peak Velocity: 141 cm/s     MV Peak E-Wave: 127 cm/s  AV Peak Gradient: 7.95 mmHg    MV Peak A-Wave: 45.3 cm/s  LVOT Peak Velocity: 123 cm/s   MV E/A Ratio: 2.8                                 MV P1/2t: 32 msec  TR Velocity:311 cm/s           MV Mean Gradient: 4 mmHg  TR Gradient:38.69 mmHg         MV Max P mmHg  Estimated RAP:8 mmHg           MV Vmax:571 cm/s  Estimated RVSP: 47 mmHg        MV VTI:21.4 cm/s  E' Septal Velocity: 6.81 cm/s  E' Lateral Velocity: 7.87 cm/s MV Deceleration Time: 108 msec  PV Peak Velocity: 95 cm/s      MV Area (PHT): 6.88 cm2  PV Peak Gradient: 3.61 mmHg   Aortic Valve   Peak Velocity: 141 cm/s  Peak Gradient: 7.95 mmHg   AI P1/2t: 368 msec  Aorta   Aortic Root: 2.8 cm  Ascending Aorta: 2.94 cm  LVOT Diameter: 2.01 cm      VL Extremity Venous Left    Result Date: 2021  Lower Extremities DVT Study  Demographics   Patient Name      Berdine Sea   Date of Study     2021         Gender             Female   Patient Number    [id number]         Date of Birth      1955   Visit Number      [id number]          Age                74 year(s)   Accession Number  [id number]         Room Number        2313   Corporate ID      [id number]           Tonia Urena ADRIANA                                                          CCT   Ordering          Juan,       He [address] [facility]  Physician         Elige Sacks, MD       Physician          Makenzie Loja MD  Procedure Type of Study:   Veins:Lower Extremities DVT Study, VL EXTREMITY VENOUS DUPLEX LEFT. Vascular Sonographer Report  Indications for Study:Swelling and Suspected pulmonary embolism.  Additional Indications:Patient states her left leg has been swelling since May. Patient has PE. Impressions Left Impression No evidence of deep vein or superficial vein thrombosis involving the left lower extremity and the right common femoral vein. Conclusions   Summary   No evidence of deep vein or superficial vein thrombosis involving the left  lower extremity and the contralateral proximal right common femoral vein. Signature   ------------------------------------------------------------------  Electronically signed by Nino Ormond, MD  (Interpreting physician) on 09/27/2021 at 03:29 PM  ------------------------------------------------------------------  Patient Status:Routine. Study [address] V[facility] Lab. Technical Quality:Adequate visualization. Velocities are measured in cm/s ; Diameters are measured in mm Right Lower Extremities DVT Study Measurements Right 2D Measurements +--------------+----------+---------------+----------+ ! Location      ! Visualized! Compressibility! Thrombosis! +--------------+----------+---------------+----------+ ! Common Femoral!Yes       ! Yes            ! None      ! +--------------+----------+---------------+----------+ Right Doppler Measurements +--------------+------+------+------------+ ! Location      ! Signal!Reflux! Reflux (sec)! +--------------+------+------+------------+ ! Common Femoral!Phasic! No    !            ! +--------------+------+------+------------+ Left Lower Extremities DVT Study Measurements Left 2D Measurements +------------------------+----------+---------------+----------+ ! Location                ! Visualized! Compressibility! Thrombosis! +------------------------+----------+---------------+----------+ ! Sapheno Femoral Junction! Yes       ! Yes            ! None      ! +------------------------+----------+---------------+----------+ ! GSV Thigh               ! Yes       ! Yes            ! None      ! +------------------------+----------+---------------+----------+ ! Common Femoral          !Yes       ! Yes            ! None      ! +------------------------+----------+---------------+----------+ ! Prox Femoral            !Yes       ! Yes            ! None      ! +------------------------+----------+---------------+----------+ ! Mid Femoral             !Yes       ! Yes            ! None      ! +------------------------+----------+---------------+----------+ ! Dist Femoral            !Yes       ! Yes            ! None      ! +------------------------+----------+---------------+----------+ ! Deep Femoral            !Yes       ! Yes            ! None      ! +------------------------+----------+---------------+----------+ ! Popliteal               !Yes       ! Yes            ! None      ! +------------------------+----------+---------------+----------+ ! GSV Below Knee          ! Yes       ! Yes            ! None      ! +------------------------+----------+---------------+----------+ ! Gastroc                 ! Yes       ! Yes            ! None      ! +------------------------+----------+---------------+----------+ ! Soleal                  !Yes       ! Yes            ! None      ! +------------------------+----------+---------------+----------+ ! PTV                     ! Yes       ! Yes            ! None      ! +------------------------+----------+---------------+----------+ ! ATV                     ! Yes       ! Yes            ! None      ! +------------------------+----------+---------------+----------+ ! Peroneal                !Yes       ! Yes            ! None      ! +------------------------+----------+---------------+----------+ ! GSV Calf                ! Yes       ! Yes            ! None      ! +------------------------+----------+---------------+----------+ ! SSV                     ! Yes       ! Yes            ! None      ! +------------------------+----------+---------------+----------+ Left Doppler Measurements +--------------+------+------+------------+ ! Location      ! Signal!Reflux! Reflux (sec)! +--------------+------+------+------------+ ! Common Femoral!Phasic! No    !            ! +--------------+------+------+------------+ ! Popliteal     !Phasic! No    !            ! +--------------+------+------+------------+    XR CHEST PORTABLE    Result Date: 9/26/2021  EXAMINATION: ONE XRAY VIEW OF THE CHEST 9/26/2021 9:05 am COMPARISON: None. HISTORY: ORDERING SYSTEM PROVIDED HISTORY: r/o PNA TECHNOLOGIST PROVIDED HISTORY: Reason for exam:->r/o PNA Reason for Exam: Shortness of Breath Acuity: Acute Type of Exam: Initial Relevant Medical/Surgical History: COPD FINDINGS: A right jugular infusion port catheter terminates over the superior right atrium. Anterior and posterior fusion hardware in the cervicothoracic spine, as well as pedicle screw fusion system in the thoracolumbar spine, are noted. There is residual scoliotic curvature of the upper thoracic spine. The cardiac silhouette is normal.  Pulmonary vessels are congested. There are small bilateral pleural effusions. Interstitial opacities are increased. No consolidation is appreciated. Vascular congestion. Small effusion with interstitial opacities, most likely pulmonary edema. CT CHEST PULMONARY EMBOLISM W CONTRAST    Result Date: 9/26/2021  EXAMINATION: CTA OF THE CHEST 9/26/2021 10:24 am TECHNIQUE: CTA of the chest was performed after the administration of intravenous contrast.  Multiplanar reformatted images are provided for review. MIP images are provided for review. Dose modulation, iterative reconstruction, and/or weight based adjustment of the mA/kV was utilized to reduce the radiation dose to as low as reasonably achievable.  COMPARISON: Noncontrast study 07/08/2021 HISTORY: ORDERING SYSTEM PROVIDED HISTORY: r/o PE TECHNOLOGIST PROVIDED HISTORY: Reason for exam:->r/o PE Decision Support Exception - unselect if not a suspected or confirmed emergency medical condition->Emergency Medical Condition (MA) Reason for Exam: SOB, eval for PE, hx lung ca Acuity: Acute Type of Exam: Initial FINDINGS Mediastinum: Tip of MediPort seen in the right atrium. Trace pericardial fluid is seen. .  Small mediastinal and hilar nodes are seen, similar to prior. Moderate coronary artery calcification is seen. Trace pericardial fluid is seen No intimal flap seen in aorta No embolus is seen in the central, right, or left main pulmonary artery. Small tiny embolus is seen peripherally in the medial branch of right middle lobe pulmonary artery. Lungs/pleura: Moderate emphysematous changes are seen. Scattered areas of bronchial wall thickening are seen. There is patchy opacity seen at the right lung base, new compared to prior. There is a new tiny right-sided pleural effusion. A few linear and curvilinear opacities in the right upper lobe appears similar. On the left, there is a new small left-sided pleural effusion seen. There is increased fluid seen anteriorly and medially. Scattered opacities are seen throughout the left lung. Staple line seen in the left lung base. Bandlike opacities along the staple line in the left lung base appears similar Tangential paramediastinal opacity is seen on the left raising the question of post radiation change Upper Abdomen: Adrenal glands appear normal. Soft Tissues/Bones: Spurring is seen in the spine. Spurring is seen in the shoulder joints. There is streak artifact from fusion hardware. New tiny embolus peripherally right middle lobe pulmonary artery. Patchy right lower lobe airspace disease, suspicious for pneumonia. .  There is underlying emphysema Increased likely sided pleural effusion which appears non dependent, particularly superiorly and medially on the left, suggesting loculation. Postsurgical changes seen on the left Results discussed with JUD PHAN by Ever Concepcion.  Armond Frey MD at 11:14 am on 9/26/2021       Discharge Medications: House             Misc. Devices (CANE) MISC  As needed. Quad. 27-31 inches             Misc. Devices (ROLLATOR ULTRA-LIGHT) MISC  As needed             Multiple Vitamins-Minerals (THERAPEUTIC MULTIVITAMIN-MINERALS) tablet  Take 1 tablet by mouth daily             OXcarbazepine (TRILEPTAL) 300 MG tablet  Take 1 tablet by mouth Take 300 mg by mouth 2 times daily with additional half tab at bedtime             oxyCODONE (OXY-IR) 30 MG immediate release tablet  Take 30 mg by mouth 3 times daily as needed for Pain. predniSONE (DELTASONE) 20 MG tablet  Take 2 tablets by mouth daily for 3 doses             Probiotic Product (PROBIOTIC DAILY PO)  Take 1 tablet by mouth daily              tiZANidine (ZANAFLEX) 4 MG tablet  Take 4 mg by mouth daily as needed             valACYclovir (VALTREX) 1 g tablet  TAKE ONE TABLET BY MOUTH DAILY                 Disposition:   Discharged to Home. Any [facility] needs that were indicated and/or required as been addressed and set up by Social Work. Condition at discharge: Pt was medically stable at the time of discharge. Activity: activity as tolerated    Total time taken for discharging this patient: 40 minutes. Greater than 70% of time was spent focused exclusively on this patient. Time was taken to review chart, discuss plans with consultants, reconciling medications, discussing plan answering questions with patient.      Signed:  Remington Moreno MD  9/29/2021, 11:40 AM  ----------------------------------------------------------------------------------------------------------------------    Lindsay Anthony,     Please return to ER or call 911 if you develop any significant signs or symptoms.     I may not have addressed all of your medical illnesses or the abnormal blood work or imaging therefore please ask your PCP, Paolo Crowley DO ,  to obtain [facility] record to follow up on all of the abnormal labs, imaging and findings that I have and have not addressed during your hospitalization.      Discharging you from the hospital does not mean that your medical care ends here and now. You may still need additional work up, investigation, monitoring, and treatment to be handled from this point on by outside providers including your PCP, Minoo Márquez DO , Specialists and other healthcare providers.      Please review your list of discharge medications prior to resuming medications you might still have at home, as the medications you need to be taking, dosages or how often you must take them may have changed. For medication questions, contact your retail pharmacy and your PCP, Minoo Márquez DO .     ** I STRONGLY RECOMMEND that you follow up with Minoo Márquez DO within 3 to 5 days for a post hospitalization evaluation. This specific office visit is covered by your insurance, and is not the same as your annual doctor visit/ check up. This office visit is important, as it may prevent need for repeat and/or future hospitalizations. **    Your medical team at [facility] ([facility]) appreciates the opportunity to work with you to get well!     Sincerely,  Verona Dunne MD

## 2021-09-30 ENCOUNTER — CARE COORDINATION (OUTPATIENT)
Dept: CASE MANAGEMENT | Age: 66
End: 2021-09-30

## 2021-09-30 DIAGNOSIS — I50.23 ACUTE ON CHRONIC SYSTOLIC HEART FAILURE (HCC): Primary | ICD-10-CM

## 2021-09-30 LAB
BLOOD CULTURE, ROUTINE: NORMAL
CULTURE, BLOOD 2: NORMAL

## 2021-09-30 PROCEDURE — 1111F DSCHRG MED/CURRENT MED MERGE: CPT | Performed by: INTERNAL MEDICINE

## 2021-09-30 NOTE — CARE COORDINATION
[address] Transitions Initial Follow Up Call    Call within 2 business days of discharge: Yes    Patient: Inna Lee Patient : 1955   MRN: [id number]  Reason for Admission: COPD exacerbation, CHF, PE  Discharge Date: 21 RARS: Readmission Risk Score: 28      Last Discharge [facility]       Complaint Diagnosis Description Type Department Provider    21 Shortness of Breath Acute on chronic respiratory failure with hypoxia ([facility] . .. ED to Hosp-Admission (Discharged) (ADMITTED) PEPE StillN Cassandra Arroyo MD; Nsehniit. .. Spoke with: [id number]: [facility]    Non-face-to-face services provided:  Obtained and reviewed discharge summary and/or continuity of care documents    Transitions of Care Initial Call    Was this an external facility discharge? No Discharge Facility: NA    Challenges to be reviewed by the provider   Additional needs identified to be addressed with provider: No  none             Method of communication with provider : phone      Advance Care Planning:   Does patient have an Advance Directive: reviewed and current. Advance Care Planning   Healthcare Decision Maker:    Primary Decision Maker: Bozena Hanna - [phone]    Was this a readmission? No  Patient stated reason for admission: SOB  Patients top risk factors for readmission: medical condition-COPD exacerbation, CHF, PE    Care Transition Nurse (CTN) contacted the patient by telephone to perform post hospital discharge assessment. Verified name and  with patient as identifiers. Provided introduction to self, and explanation of the CTN role. CTN reviewed discharge instructions, medical action plan and red flags with patient who verbalized understanding. Patient given an opportunity to ask questions and does not have any further questions or concerns at this time. Were discharge instructions available to patient? Yes.  Reviewed appropriate site of care based on symptoms and resources available to patient including: PCP and Specialist. The patient agrees to contact the PCP office for questions related to their healthcare. Medication reconciliation was performed with patient, who verbalizes understanding of administration of home medications. Advised obtaining a 90-day supply of all daily and as-needed medications. Covid Risk Education  Completed Nick Benav[address]     Educated patient about risk for severe COVID-19 due to risk factors according to CDC guidelines. LPN CC reviewed discharge instructions, medical action plan and red flag symptoms with the patient who verbalized understanding. Discussed COVID vaccination status: Yes. Education provided on COVID-19 vaccination as appropriate. Discussed exposure protocols and quarantine with CDC Guidelines. Patient was given an opportunity to verbalize any questions and concerns and agrees to contact LPN CC or health care provider for questions related to their healthcare. Reviewed and educated patient on any new and changed medications related to discharge diagnosis. Was patient discharged with a pulse oximeter? No Discussed and confirmed pulse oximeter discharge instructions and when to notify provider or seek emergency care. Patient verified  and was pleasant and agreeable to transition calls. States she is ok. Has not checked weight yet today. States she did not sleep well. Reports sl SOB. Denies cough, edema. Patient states she is trying to stick to low Na diet and 2L fluid restriction. Full medication reconciliation and 1111f completed. [facility] f/u 10/1, cardiology f/u 10/5, PCP f/u 10/8. Denies any acute needs at present time. Agreeable to f/u calls. Educated on the use of urgent care or physicians 24 hr access line if assistance is needed after hours. LPN CC provided contact information. Plan for follow-up call in 5-7 days based on severity of symptoms and risk factors.   Jewels Aquino LPJAYRO AGUILAR  Care Transitions  [phone]    Care Transitions 24 Hour Call    Do you have any ongoing symptoms?: No  Do you have a copy of your discharge instructions?: Yes  Do you have all of your prescriptions and are they filled?: Yes  Have you been contacted by a [address]?: No  Have you scheduled your follow up appointment?: Yes  How are you going to get to your appointment?: Car - drive self  Were you discharged with any Home Care or Post Acute Services: No  Do you feel like you have everything you need to keep you well at home?: Yes  Care Transitions Interventions         Follow Up  Future Appointments   Date Time Provider Bobo Marks   10/5/2021 11:00 AM HEATHER Scott CNP [facility]   10/8/2021  2:00 PM DO Serafin [place] BRENDON JOEL   11/22/2021  2:00 PM Astrid Zamudio MD [address] [place] [address]

## 2021-10-01 ENCOUNTER — TELEPHONE (OUTPATIENT)
Dept: CARDIOLOGY CLINIC | Age: 66
End: 2021-10-01

## 2021-10-01 NOTE — TELEPHONE ENCOUNTER
Yoko Vitale called in stating that when she was in the hospital and they were giving her Furosemide is was going to the bathroom a lot but now since she's at home she not going to the bathroom when she takes the Furosemide. Yoko Vitale also stated that she is worried.       Yoko Vitale can be reached at [phone]

## 2021-10-01 NOTE — TELEPHONE ENCOUNTER
Called patient who denies any CHF symptoms. She is feeling well and taking all medication as prescribed. I asked her to continue current dose of Lasix (20 mg daily) and if she has any of the symptoms we discuss, she can take an additional dose of Lasix. Keep f/u on 10/5/21 with Criselda. Patient v/u.

## 2021-10-05 ENCOUNTER — OFFICE VISIT (OUTPATIENT)
Dept: CARDIOLOGY CLINIC | Age: 66
End: 2021-10-05
Payer: COMMERCIAL

## 2021-10-05 VITALS
BODY MASS INDEX: 18.88 KG/M2 | SYSTOLIC BLOOD PRESSURE: 112 MMHG | WEIGHT: 102.6 LBS | OXYGEN SATURATION: 99 % | HEART RATE: 67 BPM | HEIGHT: 62 IN | DIASTOLIC BLOOD PRESSURE: 60 MMHG

## 2021-10-05 DIAGNOSIS — I50.23 ACUTE ON CHRONIC SYSTOLIC HEART FAILURE (HCC): ICD-10-CM

## 2021-10-05 DIAGNOSIS — I50.31 ACUTE DIASTOLIC HEART FAILURE (HCC): ICD-10-CM

## 2021-10-05 DIAGNOSIS — I50.31 ACUTE DIASTOLIC HEART FAILURE (HCC): Primary | ICD-10-CM

## 2021-10-05 LAB
ANION GAP SERPL CALCULATED.3IONS-SCNC: 15 MMOL/L (ref 3–16)
BUN BLDV-MCNC: 47 MG/DL (ref 7–20)
CALCIUM SERPL-MCNC: 9.4 MG/DL (ref 8.3–10.6)
CHLORIDE BLD-SCNC: 101 MMOL/L (ref 99–110)
CO2: 24 MMOL/L (ref 21–32)
CREAT SERPL-MCNC: 1.6 MG/DL (ref 0.6–1.2)
GFR AFRICAN AMERICAN: 39
GFR NON-AFRICAN AMERICAN: 32
GLUCOSE BLD-MCNC: 79 MG/DL (ref 70–99)
MAGNESIUM: 1.8 MG/DL (ref 1.8–2.4)
POTASSIUM SERPL-SCNC: 3.8 MMOL/L (ref 3.5–5.1)
PRO-BNP: 1254 PG/ML (ref 0–124)
SODIUM BLD-SCNC: 140 MMOL/L (ref 136–145)

## 2021-10-05 PROCEDURE — G8399 PT W/DXA RESULTS DOCUMENT: HCPCS | Performed by: NURSE PRACTITIONER

## 2021-10-05 PROCEDURE — 99214 OFFICE O/P EST MOD 30 MIN: CPT | Performed by: NURSE PRACTITIONER

## 2021-10-05 PROCEDURE — 1123F ACP DISCUSS/DSCN MKR DOCD: CPT | Performed by: NURSE PRACTITIONER

## 2021-10-05 PROCEDURE — G8427 DOCREV CUR MEDS BY ELIG CLIN: HCPCS | Performed by: NURSE PRACTITIONER

## 2021-10-05 PROCEDURE — 1111F DSCHRG MED/CURRENT MED MERGE: CPT | Performed by: NURSE PRACTITIONER

## 2021-10-05 PROCEDURE — 1090F PRES/ABSN URINE INCON ASSESS: CPT | Performed by: NURSE PRACTITIONER

## 2021-10-05 PROCEDURE — 3017F COLORECTAL CA SCREEN DOC REV: CPT | Performed by: NURSE PRACTITIONER

## 2021-10-05 PROCEDURE — G8420 CALC BMI NORM PARAMETERS: HCPCS | Performed by: NURSE PRACTITIONER

## 2021-10-05 PROCEDURE — 1036F TOBACCO NON-USER: CPT | Performed by: NURSE PRACTITIONER

## 2021-10-05 PROCEDURE — G8484 FLU IMMUNIZE NO ADMIN: HCPCS | Performed by: NURSE PRACTITIONER

## 2021-10-05 PROCEDURE — 4040F PNEUMOC VAC/ADMIN/RCVD: CPT | Performed by: NURSE PRACTITIONER

## 2021-10-05 NOTE — PROGRESS NOTES
The [address] [facility], [address] [place] [address] [address] [address]  [phone]    PrimaryCare Doctor:  Tran Scott DO  Primary Cardiologist: Dr. Jayne Castañeda    Chief Complaint   Patient presents with    Follow-up     fatigue        History of Present Illness:  Sherrie Rodriguez is a 74 y.o. female with PMH significant for COPD, lung CA with fibrosis s/p LLL lobectomy/chemo/radiation, Smoker,anxiety    Patient was admitted to [facility] with worsening dyspnea over last few weeks that suddenly became severe and labored while at rest. Found to have acute on chronic SHF, left pleural effusion, severe MR and acute PE.      Patient presents to [facility] cardiology for follow up for heart failure. C/O generalized weakness since discharge. Decreaed activity tolerance and fatigue. Nothing specific makes it worse or better. Has noticed decreased response to lasix since in hospital. Does not recall the color of her urine. Denies SOB, CP, palpitations, LH, syncope, edema.  + diarrhea - started in hospital. Occurring every day several times a day. Woke her up from sleep last night. Review of Systems:   General: Denies fever, chills  Skin: Denies skin changes, rash, itching, lesions.   HEENT: Denies headache, dizziness, vision changes, nosebleeds, sore throat, nasal drainage  RESP: Denies cough, sputum, dyspnea, wheeze, snoring  CARD: Denies palpitations,  murmur  GI:Denies nausea, vomiting, heartburn, loss of appetite  : Denies frequency, pain, incontinence, polyuria  VASC: Denies claudication, leg cramps, clots  MUSC/SKEL: Denies pain, stiffness, arthritis  PSYCH: Denies anxiety, depression, stress  NEURO: Denies numbness, tingling, weakness,change in mood or memory  HEME: Denies abn bruising, bleeding, anemia  ENDO: Denies intolerance to heat, cold, excessive thirst or hunger, hx thyroid disease    /60   Pulse 67   Ht 5' 2\" (1.575 m)   Wt 102 lb 9.6 oz (46.5 kg)   LMP 08/11/1993 (Approximate)   SpO2 99%   BMI 18.77 kg/m²   Wt Readings from Last 3 Encounters:   10/05/21 102 lb 9.6 oz (46.5 kg)   09/29/21 99 lb 10.4 oz (45.2 kg)   07/02/21 103 lb 12.8 oz (47.1 kg)       Physical Exam:  GEN: Appears frail, no acute distress  SKIN: Pink, warm, dry. Nails without clubbing. HEENT: PERRLA. Normocephalic, atraumatic. Neck supple. No adenopathy. LUNG: AP diameter normal. Clear bilateral. Decrease BS throughout bilateral. No wheeze, rales, or ronchi. Respiratory effort normal.  HEART: S1S2 A/R. No JVD. No carotid bruit. No murmur, rub or gallop. ABD: Soft, nontender. +BS X 4 quads. No hepatomegaly. EXT: Radial and pedal pulses 2+ and symmetric. Without varicosities. No edema. MUSCSKEL: Good ROM X4 extremities. No deformity. NEURO: A/O X3. Calm and cooperative. Past Medical History:   has a past medical history of Anxiety, Chronic headaches, Colon polyp, COPD, mild ([address] DDD (degenerative disc disease), Depression, Fibromyalgia, Frequent UTI, Full dentures, Hallux valgus, Hearing loss in left ear, HSV (herpes simplex virus) anogenital infection, Hyperlipidemia, Lung nodule, Malignant neoplasm of left lung ([address] Menopausal state, Occipital neuralgia, Osteoarthritis, Scoliosis, Smoker, Spinal stenosis, and Unexplained weight loss. Surgical History:   has a past surgical history that includes sinus surgery; Cataract removal (Bilateral, 2008); Tubal ligation; cervical laminectomy (2003); lumbar laminectomy (2009); Spinal fusion (2009, 2012); Cervical discectomy (2010); bladder suspension (1988); Dilation and curettage of uterus; Cholecystectomy, laparoscopic (2013); Brain aneurysm surgery (2015); Bunionectomy (Left, 08/23/2016); cervical laminectomy (N/A, 11/18/2016); Colonoscopy; Thoracoscopy (Left, 3/25/2019); and Tunneled venous port placement (Right, 08/26/2019). Social History:   reports that she quit smoking about 2 years ago. Her smoking use included cigarettes.  She has a 12.00 pack-year smoking history. She has never used smokeless tobacco. She reports current alcohol use. She reports current drug use. Drug: Marijuana. Family History:   Family History   Problem Relation Age of Onset    Cancer Mother 43        breast    Cancer Brother 77        throat    Other Father         Macular Degeration       HomeMedications:  Prior to Admission medications    Medication Sig Start Date End Date Taking? Authorizing Provider   NONFORMULARY Calcium vitamin D   Yes Historical Provider, MD   atorvastatin (LIPITOR) 10 MG tablet Take 1 tablet by mouth daily 9/30/21  Yes Arabella Rivas MD   lisinopril (PRINIVIL;ZESTRIL) 2.5 MG tablet Take 1 tablet by mouth daily 9/30/21  Yes Arabella Rivas MD   carvedilol (COREG) 3.125 MG tablet Take 1 tablet by mouth 2 times daily (with meals) 9/29/21  Yes Arabella Rivas MD   furosemide (LASIX) 20 MG tablet Take 1 tablet by mouth daily 9/30/21  Yes Arabella Rivas MD   levoFLOXacin (LEVAQUIN) 750 MG tablet Take 1 tablet by mouth daily for 10 days 9/29/21 10/9/21 Yes Arabella Rivas MD   tiZANidine (ZANAFLEX) 4 MG tablet Take 4 mg by mouth daily as needed   Yes Historical Provider, MD   oxyCODONE (OXY-IR) 30 MG immediate release tablet Take 30 mg by mouth 3 times daily as needed for Pain.    Yes Historical Provider, MD   azelastine (ASTELIN) 0.1 % nasal spray 1 spray by Nasal route 2 times daily 9/1/21  Yes Nathaly Lockett MD   fluticasone [facility]) 50 MCG/ACT nasal spray SHAKE AND SPRAY TWO SPRAYS IN EACH NOSTRIL ONCE DAILY 8/29/21  Yes Adina Duarte DO   albuterol sulfate  (90 Base) MCG/ACT inhaler INHALE TWO PUFFS BY MOUTH EVERY 6 HOURS AS NEEDED FOR WHEEZING OR SHORTNESS OF BREATH 8/25/21  Yes Nathaly Lockett MD   gabapentin (NEURONTIN) 800 MG tablet TAKE ONE TABLET BY MOUTH THREE TIMES A DAY 7/29/21 10/29/21 Yes Adina Duarte DO   fenofibrate (TRICOR) 145 MG tablet TAKE ONE TABLET BY MOUTH DAILY 7/29/21  Yes Kade Hernandez Gerald,    valACYclovir (VALTREX) 1 g tablet TAKE ONE TABLET BY MOUTH DAILY 7/2/21  Yes Adina Duarte DO   Misc. Devices (CANE) MISC As needed. Quad. 27-31 inches 1/7/21  Yes Mary Joseph MD   ARIPiprazole (ABILIFY) 15 MG tablet Take 7.5 mg by mouth daily    Yes Historical Provider, MD   glycopyrrolate-formoterol ([place]) 9-4.8 MCG/ACT AERO Inhale 2 puffs into the lungs 2 times daily 10/22/20  Yes Gadiel Vance MD   levothyroxine (SYNTHROID) 50 MCG tablet Take 50 mcg by mouth Daily   Yes Historical Provider, MD   OXcarbazepine (TRILEPTAL) 300 MG tablet Take 1 tablet by mouth Take 300 mg by mouth 2 times daily with additional half tab at bedtime 8/21/19  Yes Mary Joseph MD   Misc. Devices (ROLLATOR ULTRA-LIGHT) MISC As needed 5/6/19  Yes Mary Joseph MD   Multiple Vitamins-Minerals (THERAPEUTIC MULTIVITAMIN-MINERALS) tablet Take 1 tablet by mouth daily   Yes Historical Provider, MD   Cranberry 125 MG TABS Take 1 tablet by mouth daily   Yes Historical Provider, MD   Acetaminophen (TYLENOL) 325 MG CAPS Take 650 mg by mouth   Yes Historical Provider, MD   Probiotic Product (PROBIOTIC DAILY PO) Take 1 tablet by mouth daily    Yes Historical Provider, MD   cetirizine (ZYRTEC) 10 MG tablet Take 10 mg by mouth nightly as needed    Yes Historical Provider, MD   buPROPion (WELLBUTRIN XL) 150 MG XL tablet 300 mg every morning [facility] 2/25/15  Yes Mary Joseph MD   mirtazapine (REMERON) 30 MG tablet Take 15 mg by mouth nightly [facility] 2/25/15  Yes Mary Joseph MD   DULoxetine (CYMBALTA) 60 MG capsule Take 30 mg by mouth 2 times daily [facility] 2/25/15  Yes Mary Joseph MD        Allergies:  Adhesive tape, Ibuprofen, Naproxen, Nsaids, and Vicodin [hydrocodone-acetaminophen]       LABS: Results reviewed with patient today.     CBC:   Lab Results   Component Value Date    WBC 8.2 09/28/2021    WBC 8.7 09/27/2021    WBC 12.8 09/26/2021    RBC 3.21 09/28/2021    RBC 3.13 09/27/2021    RBC 3.38 09/26/2021    HGB 10.9 09/28/2021    HGB 10.5 09/27/2021    HGB 11.3 09/26/2021    HCT 32.1 09/28/2021    HCT 31.4 09/27/2021    HCT 34.4 09/26/2021    .0 09/28/2021    .3 09/27/2021    .8 09/26/2021    RDW 14.4 09/28/2021    RDW 15.0 09/27/2021    RDW 14.6 09/26/2021     09/28/2021     09/27/2021     09/26/2021     BMP:  Lab Results   Component Value Date     09/29/2021     09/27/2021     09/26/2021    K 4.1 09/29/2021    K 4.0 09/27/2021    K 4.1 09/26/2021    K 3.7 03/31/2019    K 4.1 03/26/2019    K 4.1 02/08/2019     09/29/2021     09/27/2021     09/26/2021    CO2 24 09/29/2021    CO2 23 09/27/2021    CO2 22 09/26/2021    PHOS 3.1 09/27/2021    BUN 32 09/29/2021    BUN 20 09/27/2021    BUN 24 09/26/2021    CREATININE 0.7 09/29/2021    CREATININE 0.8 09/27/2021    CREATININE 0.7 09/26/2021     BNP:   Lab Results   Component Value Date    PROBNP 7,582 09/26/2021    PROBNP 1,119 02/06/2019       Parameters:   > 450 pg/mL under age 48  > 900 pg/mL ages 54-65  > 1800 pg/mL over age 76    Iron Studies:    Lab Results   Component Value Date    TIBC 332 09/28/2021    TIBC 345 09/10/2012    FERRITIN 61.0 09/28/2021    FERRITIN 108.6 09/10/2012     GLUCOSE: No results for input(s): GLUCOSE in the last 72 hours.   LIVER PROFILE:   Lab Results   Component Value Date    AST 47 09/26/2021    ALT 13 09/26/2021    LIPASE 34.68 03/21/2013    AMYLASE 50 03/21/2013    LABALBU 3.7 09/27/2021    BILITOT <0.2 09/26/2021    ALKPHOS 100 09/26/2021     PT/INR:   Lab Results   Component Value Date    PROTIME 11.1 08/26/2019    PROTIME 13.1 03/15/2012    INR 0.97 08/26/2019     Cardiac Enzymes:  Lab Results   Component Value Date    TROPONINI 0.04 09/26/2021     FASTING LIPID PANEL:  Lab Results   Component Value Date    CHOL 138 09/28/2021    HDL 55 09/28/2021    LDLCALC 72 09/28/2021    TRIG 55 09/28/2021       Cardiac Imaging: Reports reviewed with other nephrotoxic meds  Cardiac Rehab: Not indicated for EF>35%  ICD:Not indicated for EF>35%  [facility] Referral: OP     2.) Severe MR: Volume overloaded at time of ECHO. Will repeat after 3mos HF GDMT.    3.) Elevated troponin: Mild, flat. Most likely demand ischemia  Plan for stress test as OP     4.) Acute PE without cor pulmonale:  Hemodynamically stable  Cont Eliquis  Plan is to FU with Hem/ONC  after DC.      5.) Anemia:  Venofer     6.) Smoking addction:  Smoking cessation counseling provided > 3 min. Benefits of smoking cessation include decrease risk for lung disease, heart disease, stroke, and peripheral vascular disease. Recommendations include medications such as Chantix, Zyban or nicotine replacement products, counseling and resources such as 1-800-QUIT-NOW.        Instructions:    Follow with Dr. Morejon Forth regarding diarrhea this Friday    I appreciate the opportunity of cooperating in the care of this individual.    AMERICA Clements, APRN - CNP, CNP, 10/5/2021,11:43 AM

## 2021-10-06 RX ORDER — FUROSEMIDE 20 MG/1
20 TABLET ORAL EVERY OTHER DAY
Qty: 60 TABLET | Refills: 3
Start: 2021-10-06 | End: 2021-10-13

## 2021-10-12 ENCOUNTER — HOSPITAL ENCOUNTER (OUTPATIENT)
Dept: GENERAL RADIOLOGY | Age: 66
Discharge: HOME | End: 2021-10-12
Payer: COMMERCIAL

## 2021-10-12 ENCOUNTER — HOSPITAL ENCOUNTER (OUTPATIENT)
Age: 66
Discharge: HOME | End: 2021-10-12
Payer: COMMERCIAL

## 2021-10-12 ENCOUNTER — TELEPHONE (OUTPATIENT)
Dept: PHARMACY | Age: 66
End: 2021-10-12

## 2021-10-12 ENCOUNTER — CARE COORDINATION (OUTPATIENT)
Dept: CASE MANAGEMENT | Age: 66
End: 2021-10-12

## 2021-10-12 DIAGNOSIS — M25.562 CHRONIC PAIN OF LEFT KNEE: ICD-10-CM

## 2021-10-12 DIAGNOSIS — G89.29 CHRONIC PAIN OF LEFT KNEE: ICD-10-CM

## 2021-10-12 PROCEDURE — 73560 X-RAY EXAM OF KNEE 1 OR 2: CPT

## 2021-10-12 NOTE — CARE COORDINATION
[facility] Transitions Follow Up Call    10/12/2021    Patient: Jose Anderson  Patient : 1955   MRN: [id number]  Reason for Admission: copd, CHF  Discharge Date: 21 RARS: Readmission Risk Score: 28         Spoke with: no one    Care Transitions Subsequent and Final Call    Subsequent and Final Calls  Care Transitions Interventions  Other Interventions: Follow Up  Future Appointments   Date Time Provider Bobo Marks   10/18/2021 11:30 AM [facility] MED CAM RM 2 WS STRESS [facility]   10/18/2021 12:30 PM SCHEDULE, WST[facility] STRESS ROOM 2 WS STRESS [facility]   11/3/2021  3:00 PM Criselda Farah, APRN - CNP [facility]   2021  1:20 PM Sarahi Dillon DO [facility]   2021  2:00 PM Delon Barthel, MD [facility]       Follow up outreach call attempt, no answer. CTN left VM with contact information and request for return call. CTN will continue with outreach call attempts.     TRICIA Meadows, RN   Care Transition Nurse  Mobile: [phone]

## 2021-10-13 RX ORDER — FUROSEMIDE 20 MG/1
20 TABLET ORAL DAILY
Qty: 60 TABLET | Refills: 3
Start: 2021-10-13 | End: 2022-05-16

## 2021-10-18 ENCOUNTER — HOSPITAL ENCOUNTER (OUTPATIENT)
Dept: NON INVASIVE DIAGNOSTICS | Age: 66
Discharge: HOME | End: 2021-10-18
Payer: COMMERCIAL

## 2021-10-18 DIAGNOSIS — I50.31 ACUTE DIASTOLIC HEART FAILURE (HCC): ICD-10-CM

## 2021-10-18 LAB
LV EF: 37 %
LVEF MODALITY: NORMAL

## 2021-10-18 PROCEDURE — 6360000002 HC RX W HCPCS: Performed by: NURSE PRACTITIONER

## 2021-10-18 PROCEDURE — 93017 CV STRESS TEST TRACING ONLY: CPT

## 2021-10-18 PROCEDURE — 78452 HT MUSCLE IMAGE SPECT MULT: CPT

## 2021-10-18 PROCEDURE — 3430000000 HC RX DIAGNOSTIC RADIOPHARMACEUTICAL: Performed by: NURSE PRACTITIONER

## 2021-10-18 PROCEDURE — A9502 TC99M TETROFOSMIN: HCPCS | Performed by: NURSE PRACTITIONER

## 2021-10-18 RX ADMIN — REGADENOSON 0.4 MG: 0.08 INJECTION, SOLUTION INTRAVENOUS at 13:07

## 2021-10-18 RX ADMIN — TETROFOSMIN 10 MILLICURIE: 1.38 INJECTION, POWDER, LYOPHILIZED, FOR SOLUTION INTRAVENOUS at 11:44

## 2021-10-18 RX ADMIN — TETROFOSMIN 30 MILLICURIE: 1.38 INJECTION, POWDER, LYOPHILIZED, FOR SOLUTION INTRAVENOUS at 12:58

## 2021-10-20 ENCOUNTER — CARE COORDINATION (OUTPATIENT)
Dept: CASE MANAGEMENT | Age: 66
End: 2021-10-20

## 2021-10-20 NOTE — CARE COORDINATION
Care Transitions Outreach Attempt    Call within 2 business days of discharge: Yes   Attempted to reach patient for transitions of care follow up. Unable to reach patient. Unable to lvm. Left HIPPA Compliant VM. Inocencio Mar LPN, [facility] 30: [phone]      Patient: Nuvia Miguel Patient : 1955 MRN: [id number]    Last Discharge [facility]       Complaint Diagnosis Description Type Department Provider    21 Shortness of Breath Acute on chronic respiratory failure with hypoxia ([address] . .. ED to Hosp-Admission (Discharged) (ADMITTED) [facility] StillN Kuldip Khan MD; Nsehniit. .. Was this an external facility discharge?  No Discharge Facility:     Noted following upcoming appointments from discharge chart review:   [facility]):   Future Appointments   Date Time Provider Bobo Marks   10/21/2021 11:00 AM HEATHER Hylton CNP [facility]   11/3/2021  3:00 PM HEATHER Olivares CNP [facility]   2021  1:20 PM Adina Duarte DO [facility]   2021  2:00 PM Alan Bob MD [facility]     Non-[facility] follow up appointment(s):

## 2021-10-21 ENCOUNTER — VIRTUAL VISIT (OUTPATIENT)
Dept: PHARMACY | Age: 66
End: 2021-10-21
Payer: COMMERCIAL

## 2021-10-21 DIAGNOSIS — I50.23 ACUTE ON CHRONIC SYSTOLIC HEART FAILURE (HCC): Primary | ICD-10-CM

## 2021-10-21 PROCEDURE — 99211 OFF/OP EST MAY X REQ PHY/QHP: CPT | Performed by: NURSE PRACTITIONER

## 2021-10-21 NOTE — PROGRESS NOTES
[facility]  Heart Failure    [address], [address]  Phone: [phone]  Fax: [phone]      NAME: Juan Carlos Gray  MEDICAL RECORD NUMBER:  [id number]  AGE: 74 y.o. GENDER: female  : 1955  EPISODE DATE:  10/21/2021      Chief Complaint   Patient presents with    Congestive Heart Failure        Past Medical History:   Diagnosis Date    Anxiety     Chronic headaches     CT done 2016    Colon polyp     COPD, mild ([address]     PFT  a smoker no symptoms-no meds    DDD (degenerative disc disease)     Depression     Fibromyalgia     Frequent UTI     Full dentures     Hallux valgus     Hearing loss in left ear 2011    HSV (herpes simplex virus) anogenital infection     leg    Hyperlipidemia     Lung nodule     2 LLL nodules    Malignant neoplasm of left lung (HCC)     Menopausal state     Occipital neuralgia     Osteoarthritis     Scoliosis     neck and back brace intermittently, based on pain    Smoker     Spinal stenosis     cane, walker    Unexplained weight loss     Patient states over 30# weight loss over 6 months and pcp is aware      Past Surgical History:   Procedure Laterality Date    BLADDER SUSPENSION      BRAIN ANEURYSM SURGERY  2015    anterior communicating clipped    BUNIONECTOMY Left 2016    Ty    CATARACT REMOVAL Bilateral 2008    CERVICAL DISCECTOMY  2010    CERVICAL LAMINECTOMY  2003    Anterior approach, C4-7    CERVICAL LAMINECTOMY N/A 2016    Posterior, C2 - C5    CHOLECYSTECTOMY, LAPAROSCOPIC      COLONOSCOPY      DILATION AND CURETTAGE OF UTERUS      LUMBAR LAMINECTOMY  2009    x2    SINUS SURGERY      x2    SPINAL FUSION  ,     post T12-L5 fusion and redone    THORACOSCOPY Left 3/25/2019    Dr. Susie Celis. Alexander - thoracotomy w/takedown of adhesions, wedge excision LLL nodule#1, debulking LLL nodule #2, MSLND    TUBAL LIGATION      TUNNELED VENOUS PORT PLACEMENT Right 2019    Dr. Chloé prather     Family History   Problem Relation Age of Onset    Cancer Mother 43        breast    Cancer Brother 77        throat    Other Father         Macular Degeration     Social History     Tobacco Use    Smoking status: Former Smoker     Packs/day: 0.25     Years: 48.00     Pack years: 12.00     Types: Cigarettes     Quit date: 2019     Years since quittin.7    Smokeless tobacco: Never Used    Tobacco comment: started age 11, down to [address] a day;   Vaping Use    Vaping Use: Former    Substances: Always   Substance Use Topics    Alcohol use: Yes     Comment: rare    Drug use: Yes     Types: Marijuana     Comment: occ     Counseling given: Not Answered  Comment: started age 11, down to [address] a day;     Immunization History   Administered Date(s) Administered    COVID-19, Moderna, PF, 100mcg/0.5mL 2021, 2021    Influenza A ([id number]) Vaccine PF IM 2009, 2009    Influenza Vaccine, unspecified formulation 2017    Influenza Virus Vaccine 10/21/2008, 2009, 2013    Influenza, High Dose (Fluzone 65 yrs and older) 2018, 2019, 2019    Influenza, High-dose, Quadv, 65 yrs +, IM (Fluzone) 10/21/2020, 10/21/2020, 2021    Influenza, Intradermal, Preservative free 09/10/2012, 2015    Influenza, Intradermal, Quadrivalent, Preservative Free 2016    Pneumococcal Conjugate 13-valent ([id number]) 2018    Pneumococcal Polysaccharide ([id number]) 10/21/2008, 10/21/2008, 10/10/2013, 2019    Td, unspecified formulation 2003    Tdap (Boostrix, Adacel) 2017    Zoster Live (Zostavax) 10/15/2013    Zoster Recombinant (Shingrix) 2019     Allergies   Allergen Reactions    Adhesive Tape Dermatitis     Blisters on skin under tape or medication patches    Ibuprofen Nausea Only    Naproxen Nausea Only    Nsaids Other (See Comments) GI upset    Vicodin [Hydrocodone-Acetaminophen] Nausea Only     headache        ECHO EF%: 40-45%  Date: 9/26/2021      Last Hospitalization: D/C 9/29/2021    History of VINCE: Denies  []BIPAP/CPAP use:   []Education about proper cleaning completed today     Subjective   HPI: Ms. Carlie Son is a 74 y.o. female being evaluated by a virtual visit via telephone encounter from patient's home due to [facility]-26 public health emergency. Due to the circumstances physical examination is very limited. This visit was conducted with the patient's consent for billing of her insurance. A telephone visit was necessary today to reduce the patient's exposure to COVID-19 and to provide necessary medical care. This patient has been advised to contact this office, her cardiologist, or primary care physician if symptoms develop or to call 911 for emergency medical treatment if deemed necessary. She has a medical history significant for chronic systolic heart failure, COPD, CAD, lung CA, depression and anxiety. Also reports that she has fibromyalgia and this leaves her feeling tired and weak all the time. Tells me that she would have expected to feel back to baseline since hospitalization at this point but she does not. Tells me that she has been weighing herself daily and is always a consistent 102 pounds. She tries to watch her sodium but has not really been limiting her consumption. We talked about some alternatives to ham and cheese sandwiches and some other foods that she prefers that have higher amounts of sodium. She is drinking approximately 32 ounces a day but tells me that she \"cannot do much more\". Has some shortness of breath with exertion but no edema, CP, palpitations, orthopnea, or abdominal fullness. Her COPD is well controlled and she uses her albuterol inhaler very infrequently. States that her allergies sometimes trigger the need for albuterol use but nothing recently.   Denies wheezing, tightness, cough, or sputum currently. Has a aide that comes to help her at home twice a week. Reports good sleep. Has all of her medications and reports good compliance. Does not need refills today. Has follow-up scheduled with cardiology and she is aware of this appointment. Does not feel that follow-up would be beneficial for her at this time. Review of Systems:   Constitutional: +fatigue, Negative for activity change, appetite change, chills, diaphoresis, fever and unexpected weight change. HENT: Negative for congestion, ear pain, postnasal drip, rhinorrhea, sinus pressure, sinus pain, sneezing, sore throat and trouble swallowing. Eyes: Negative for discharge and redness. Respiratory: Negative for cough, chest tightness, sputum, shortness of breath and wheezing. Cardiovascular: Negative for chest pain, palpitations and leg swelling. Gastrointestinal: Negative for abdominal distention, abdominal pain, constipation, diarrhea, nausea and vomiting. Genitourinary: Negative for decreased urine volume, difficulty urinating, dysuria and hematuria. Skin: Negative for pallor and rash. Neurological: Negative for dizziness, tremors, weakness, light-headedness and headaches. Psychiatric/Behavioral: Negative for confusion. The patient is not nervous/anxious. Sleep: Negative for restless sleep, snoring, frequent waking       Functional Activity New York Heart Association Classification  Patient Symptoms   []   Class I No limitation of physical activity. Ordinary physical activity does not cause undue fatigue, palpitation, dyspnea (shortness of breath). [x]   Class II Slight limitation of physical activity. Comfortable at rest. Ordinary physical activity results in fatigue, palpitation, dyspnea (shortness of breath). []   Class III  Nghia limitation of physical activity. Comfortable at rest.  Less than ordinary activity causes fatigue, palpitation, dyspnea.   []   Class IV Unable to carry on any physical activity without discomfort. Symptoms of heart failure at rest.  If any physical activity is undertaken, discomfort increases. Shortness of Breath:   []   None   [x]   Dyspnea on exertion   []   Dyspnea with normal activities  []   Dyspnea at rest  []   Dyspnea while sleeping    Patient Findings:   []  Missed doses  []  Diet changes  []  Sodium intake changes    []  Alcohol intake changes  []  Night time cough  []  Edema    []  Activity changes   [x]  Fatigue that limits activity []  Acute illness  []  Early saiety, abd fullness []  Chest pain   []  Other        Objective:   LMP 08/11/1993 (Approximate)   BP Readings from Last 3 Encounters:   10/08/21 122/78   10/05/21 112/60   09/29/21 125/70     Wt Readings from Last 6 Encounters:   10/08/21 103 lb 12.8 oz (47.1 kg)   10/05/21 102 lb 9.6 oz (46.5 kg)   09/29/21 99 lb 10.4 oz (45.2 kg)   07/02/21 103 lb 12.8 oz (47.1 kg)   12/11/20 102 lb (46.3 kg)   11/18/20 102 lb (46.3 kg)     Physical Exam:   Constitutional: Oriented to person, place, and time. No distress detected. Psychiatric: Normal mood and affect.      BMP:   Lab Results   Component Value Date     10/12/2021    K 4.2 10/12/2021    K 4.1 09/26/2021     10/12/2021    CO2 26 10/12/2021    BUN 20 10/12/2021    CREATININE 0.8 10/12/2021       CBC:    Lab Results   Component Value Date    WBC 8.2 09/28/2021    HGB 10.9 09/28/2021    HCT 32.1 09/28/2021     09/28/2021     HgA1C:   Lab Results   Component Value Date    LABA1C 5.4 02/08/2019     TSH:   Lab Results   Component Value Date    TSH 2.36 12/10/2015     Lipids:   Lab Results   Component Value Date    CHOL 138 09/28/2021    TRIG 55 09/28/2021    HDL 55 09/28/2021    LDLCALC 72 09/28/2021     ProBNP:   Lab Results   Component Value Date    PROBNP 3,971 10/12/2021    PROBNP 1,254 10/05/2021    PROBNP 7,582 09/26/2021       [x]Reviewed daily weight log and assessed self management skills including early recognition and notification of exacerbation   [x]Encouraged daily weights and to call with increase of 3 pounds in one day or 5 pounds in one week or weight increased above dry weight    [x]Discussed fluid restriction and sodium restriction as well as nutrition goals   []Weight loss counseling performed, including carbohydrate and calorie reduction  []Exercise Counseling performed      Medications reviewed:   [x] compared patient's bottles with EPIC list  [] patient did not bring medication bottles      Current medications:  Outpatient Medications Marked as Taking for the 10/21/21 encounter (Virtual Visit) with HEATHER Quinn CNP   Medication Sig Dispense Refill    valACYclovir (VALTREX) 1 g tablet TAKE ONE TABLET BY MOUTH DAILY 90 tablet 0    furosemide (LASIX) 20 MG tablet Take 1 tablet by mouth daily 60 tablet 3    NONFORMULARY Calcium vitamin D      atorvastatin (LIPITOR) 10 MG tablet Take 1 tablet by mouth daily 30 tablet 3    lisinopril (PRINIVIL;ZESTRIL) 2.5 MG tablet Take 1 tablet by mouth daily 30 tablet 3    carvedilol (COREG) 3.125 MG tablet Take 1 tablet by mouth 2 times daily (with meals) 60 tablet 3    tiZANidine (ZANAFLEX) 4 MG tablet Take 4 mg by mouth daily as needed      oxyCODONE (OXY-IR) 30 MG immediate release tablet Take 30 mg by mouth 3 times daily as needed for Pain.  azelastine (ASTELIN) 0.1 % nasal spray 1 spray by Nasal route 2 times daily 1 each 11    fluticasone (FLONASE) 50 MCG/ACT nasal spray SHAKE AND SPRAY TWO SPRAYS IN EACH NOSTRIL ONCE DAILY 1 Bottle 11    albuterol sulfate  (90 Base) MCG/ACT inhaler INHALE TWO PUFFS BY MOUTH EVERY 6 HOURS AS NEEDED FOR WHEEZING OR SHORTNESS OF BREATH 1 Inhaler 5    gabapentin (NEURONTIN) 800 MG tablet TAKE ONE TABLET BY MOUTH THREE TIMES A  tablet 0    fenofibrate (TRICOR) 145 MG tablet TAKE ONE TABLET BY MOUTH DAILY 90 tablet 0    Misc. Devices (CANE) MISC As needed. Quad.  27-31 inches 1 each 0    ARIPiprazole (ABILIFY) 15 MG tablet Take 7.5 mg by mouth daily       glycopyrrolate-formoterol (BEVESPI AEROSPHERE) 9-4.8 MCG/ACT AERO Inhale 2 puffs into the lungs 2 times daily 1 Inhaler 5    levothyroxine (SYNTHROID) 50 MCG tablet Take 50 mcg by mouth Daily      OXcarbazepine (TRILEPTAL) 300 MG tablet Take 1 tablet by mouth Take 300 mg by mouth 2 times daily with additional half tab at bedtime      Misc. Devices (ROLLATOR ULTRA-LIGHT) MISC As needed 1 each 0    Multiple Vitamins-Minerals (THERAPEUTIC MULTIVITAMIN-MINERALS) tablet Take 1 tablet by mouth daily      Cranberry 125 MG TABS Take 1 tablet by mouth daily      Acetaminophen (TYLENOL) 325 MG CAPS Take 650 mg by mouth      Probiotic Product (PROBIOTIC DAILY PO) Take 1 tablet by mouth daily       cetirizine (ZYRTEC) 10 MG tablet Take 10 mg by mouth nightly as needed       buPROPion (WELLBUTRIN XL) 150 MG XL tablet 300 mg every morning [facility] 30 tablet     mirtazapine (REMERON) 30 MG tablet Take 15 mg by mouth nightly [facility] 30 tablet     DULoxetine (CYMBALTA) 60 MG capsule Take 30 mg by mouth 2 times daily [facility] 30 capsule        [x]Reviewed heart failure medications including how they work and potential side effects. [x]Advised patient to avoid NSAIDs. Get With The Guidelines  Ms. Carpenter is on a Beta-Blocker for (HFrEF) (systolic) EF </= 34%  Yes    Ms. Carpenter is on an Ace-Inhibitor / ARB / Entresto for (HFrEF) (systolic) EF </= 25% Yes      Ms. Carpenter is on a Aldosterone Receptor Antagonist for (HFrEF) (systolic) EF </= 91% or EF </= 40% with MI (Okay to use if SCr </= 2.5mg/dL in men, SCr </= 2mg/dL in women; Potassium < 5.0meq/L) No      Ms. Theodora Sawant is on a Diuretic Yes    If the above guidelines are not met, reason documented above or recommendations made: Yes.        [x] Advanced Directives completed and scanned at a prior encounter  [] Advanced Directives need to be addressed        Barriers to Adherence: Active attentive participant    Environmental Concerns: not applicable    Readiness to Change  contemplation   Lindsay Anthony is a 74 y.o. female evaluated via telephone on 10/21/2021. Consent:  She and/or health care decision maker is aware that that she may receive a bill for this telephone service, depending on her insurance coverage, and has provided verbal consent to proceed: Yes      Documentation:  I communicated with the patient and/or health care decision maker about her chronic heart failure. Details of this discussion including any medical advice provided: See visit note      I affirm this is a Patient Initiated Episode with a Patient who has not had a related appointment within my department in the past 7 days or scheduled within the next 24 hours. Patient identification was verified at the start of the visit: Yes    Total Time: minutes: 21-30 minutes    Note: not billable if this call serves to triage the patient into an appointment for the relevant concern      HEATHER RODRIGUEZ CNP      A heart failure binder has been provided prior to discharge in addition to extensive education including the information noted above. Assessment/Plan     Problem List Items Addressed This Visit     Acute on chronic systolic heart failure ([facility] [address] - Primary     No symptoms of acute exacerbation today. Continue daily weight. Encouraged monitoring sodium closely. Continue fluid restriction to 64 ounces with a minimum of 32 ounces a day. Emergency action plan reviewed today with patient. Continue management follow-up with cardiology as scheduled. No orders of the defined types were placed in this encounter. Follow up with wellness center: No follow-up needed at this time  Time spent in visit today including counseling and education: 35 minutes. *This note was transcribed using [id number] [employer]. Please disregard any translational errors.          Electronically signed by HEATHER Martinez CNP, on 10/21/2021 at 12:10 PM

## 2021-10-21 NOTE — ASSESSMENT & PLAN NOTE
No symptoms of acute exacerbation today. Continue daily weight. Encouraged monitoring sodium closely. Continue fluid restriction to 64 ounces with a minimum of 32 ounces a day. Emergency action plan reviewed today with patient. Continue management follow-up with cardiology as scheduled.

## 2021-10-25 DIAGNOSIS — M79.7 FIBROMYALGIA: ICD-10-CM

## 2021-10-25 RX ORDER — GABAPENTIN 800 MG/1
TABLET ORAL
Qty: 270 TABLET | Refills: 0 | Status: SHIPPED | OUTPATIENT
Start: 2021-10-25 | End: 2022-01-21 | Stop reason: SDUPTHER

## 2021-10-27 ENCOUNTER — CARE COORDINATION (OUTPATIENT)
Dept: CASE MANAGEMENT | Age: 66
End: 2021-10-27

## 2021-10-27 NOTE — CARE COORDINATION
[facility] Transitions Follow Up Call    10/27/2021    Patient: Nichol White  Patient : 1955   MRN: <[id number]>  Reason for Admission: COPD exacerbation /CHF  Discharge Date: 21 RARS: Readmission Risk Score: 28    Spoke with: Nichol Wihte who reports that it was going real slow there for a while but now she is doing a lot better. Patient reports that she got her booster Covid vaccine yesterday. States that she is taking all medications as ordered. Patient reports that she is following low sodium diet which is very hard because she loves her salt. She reports that she is surprising herself though and following the restriction of fluid intake. Patient denies cp, sob,(baseline), cough, dizziness, headache, n/v, diarrhea, abdominal pains, fever, or chills. Patient report that appetite not so great but improving and fluid intake is good and denies any problems with bowel or bladder. Denies any needs at this time. Has COA aid in 2 times a week. Patient instructed to continue to monitor s/s, reporting any that may present to MD immediately for early intervention. Reminded of COVID 19 precautions. Agreeable to f/u calls. Educated on the use of urgent care or physicians 24 hr access line if assistance is needed after hours. Care Transitions Subsequent and Final Call    Subsequent and Final Calls  Care Transitions Interventions  Other Interventions:            Follow Up  Future Appointments   Date Time Provider Bobo Marks   11/3/2021  3:00 PM HEATHER Allison - CNP [facility]   2021  1:20 PM Yessenia Greene DO [facility] [place] - DYMICHELLE   2021  2:00 PM Nabila Vale MD [facility] PUL BELEM Ospina LPN

## 2021-11-02 ENCOUNTER — CARE COORDINATION (OUTPATIENT)
Dept: CASE MANAGEMENT | Age: 66
End: 2021-11-02

## 2021-11-03 ENCOUNTER — OFFICE VISIT (OUTPATIENT)
Dept: CARDIOLOGY CLINIC | Age: 66
End: 2021-11-03
Payer: COMMERCIAL

## 2021-11-03 VITALS
DIASTOLIC BLOOD PRESSURE: 80 MMHG | OXYGEN SATURATION: 98 % | HEART RATE: 95 BPM | BODY MASS INDEX: 18.95 KG/M2 | HEIGHT: 62 IN | WEIGHT: 103 LBS | SYSTOLIC BLOOD PRESSURE: 122 MMHG

## 2021-11-03 DIAGNOSIS — I50.23 ACUTE ON CHRONIC SYSTOLIC HEART FAILURE (HCC): Primary | ICD-10-CM

## 2021-11-03 PROCEDURE — 1036F TOBACCO NON-USER: CPT | Performed by: NURSE PRACTITIONER

## 2021-11-03 PROCEDURE — 1123F ACP DISCUSS/DSCN MKR DOCD: CPT | Performed by: NURSE PRACTITIONER

## 2021-11-03 PROCEDURE — 4040F PNEUMOC VAC/ADMIN/RCVD: CPT | Performed by: NURSE PRACTITIONER

## 2021-11-03 PROCEDURE — G8427 DOCREV CUR MEDS BY ELIG CLIN: HCPCS | Performed by: NURSE PRACTITIONER

## 2021-11-03 PROCEDURE — G8420 CALC BMI NORM PARAMETERS: HCPCS | Performed by: NURSE PRACTITIONER

## 2021-11-03 PROCEDURE — G8399 PT W/DXA RESULTS DOCUMENT: HCPCS | Performed by: NURSE PRACTITIONER

## 2021-11-03 PROCEDURE — 99213 OFFICE O/P EST LOW 20 MIN: CPT | Performed by: NURSE PRACTITIONER

## 2021-11-03 PROCEDURE — G8484 FLU IMMUNIZE NO ADMIN: HCPCS | Performed by: NURSE PRACTITIONER

## 2021-11-03 PROCEDURE — 1090F PRES/ABSN URINE INCON ASSESS: CPT | Performed by: NURSE PRACTITIONER

## 2021-11-03 PROCEDURE — 3017F COLORECTAL CA SCREEN DOC REV: CPT | Performed by: NURSE PRACTITIONER

## 2021-11-03 RX ORDER — METHOCARBAMOL 750 MG/1
750 TABLET, FILM COATED ORAL PRN
COMMUNITY

## 2021-11-03 NOTE — CARE COORDINATION
[facility] Transitions Follow Up Call    11/3/2021    Patient: Keena Dorman  Patient : 1955   MRN: [id number]  Reason for Admission: COPD, CHF  Discharge Date: 21 RARS: Readmission Risk Score: 28         Spoke with: [address] Polanco Transitions Follow Up Call    Needs to be reviewed by the provider   Additional needs identified to be addressed with provider: No  none             Method of communication with provider : phone      Care Transition Nurse (CTN) contacted the patient by telephone to follow up after admission. Verified name and  with patient as identifiers. Addressed changes since last contact: none  Discussed follow-up appointments. If no appointment was previously scheduled, appointment scheduling offered: Yes. Is follow up appointment scheduled within 7 days of discharge? Yes. Advance Care Planning:   Does patient have an Advance Directive: reviewed and current. Advance Care Planning   Healthcare Decision Maker:    Primary Decision Maker: Elias Washington  [phone]    CTN reviewed discharge instructions, medical action plan and red flags with patient and discussed any barriers to care and/or understanding of plan of care after discharge. Discussed appropriate site of care based on symptoms and resources available to patient including: PCP and Specialist. The patient agrees to contact the PCP office for questions related to their healthcare. Patients top risk factors for readmission: medical condition-COPD, CHF    Inbound call from patient. Patient verified  and was pleasant and agreeable to transition calls. States she is doing better, just fatigues easily. Weight 99.5. Denies N/V/D, CP, SOB, edema, orthopnea. Does report PAYTON. Appetite good. States she feels she is doing well with fluid restrictions and low sodium diet. LPN CC praised patient for her efforts. Denies problems with bowels or bladder. Denies medication changes.  States she is scheduled with PCP 11/5, but that she will have to see a new PCP in January as Dr. Judah Kaur is leaving the practice. Patient is set up with Dr. Dorian Hutchison in January. Cardiology f/u today. Pulmonology 11/22. Denies any acute needs at present time. Agreeable to f/u calls. Educated on the use of urgent care or physicians 24 hr access line if assistance is needed after hours. CTN provided contact information for future needs. No further follow-up call indicated based on severity of symptoms and risk factors. Episode ended d/t end of transition period. Cassidy Hagen  [facility][1]  Care Transitions  [phone]    Care Transitions Subsequent and Final Call    Subsequent and Final Calls  Do you have any ongoing symptoms?: No  Have your medications changed?: No  Do you have any questions related to your medications?: No  Do you currently have any active services?: Yes  Are you currently active with any services?: Other  Do you have any needs or concerns that I can assist you with?: No  Identified Barriers: None  Care Transitions Interventions  Other Interventions:            Follow Up  Future Appointments   Date Time Provider Bobo Marks   11/3/2021  3:00 PM HEATHER Daniels - CNP [facility]   11/5/2021  1:20 PM Newton Wheat DO [facility] [place] - DYD   11/22/2021  2:00 PM Antoinette Gaines MD [facility]   1/3/2022  1:00 PM Daniela Rodriguez MD [facility]UL[facility]org[facility], LPN

## 2021-11-03 NOTE — PROGRESS NOTES
(1.575 m)   Wt 103 lb (46.7 kg)   LMP 08/11/1993 (Approximate)   SpO2 98%   BMI 18.84 kg/m²   Wt Readings from Last 3 Encounters:   11/03/21 103 lb (46.7 kg)   10/08/21 103 lb 12.8 oz (47.1 kg)   10/05/21 102 lb 9.6 oz (46.5 kg)       Physical Exam:  GEN: Appears frail, no acute distress  SKIN: Pink, warm, dry. Nails without clubbing. HEENT: PERRLA. Normocephalic, atraumatic. Neck supple. No adenopathy. LUNG: AP diameter normal. Clear bilateral. Decrease BS throughout bilateral. No wheeze, rales, or ronchi. Respiratory effort normal.  HEART: S1S2 A/R. No JVD. No carotid bruit. No murmur, rub or gallop. ABD: Soft, nontender. +BS X 4 quads. No hepatomegaly. EXT: Radial and pedal pulses 2+ and symmetric. Without varicosities. No edema. MUSCSKEL: Good ROM X4 extremities. No deformity. NEURO: A/O X3. Calm and cooperative. Past Medical History:   has a past medical history of Anxiety, Chronic headaches, Colon polyp, COPD, mild ([address] DDD (degenerative disc disease), Depression, Fibromyalgia, Frequent UTI, Full dentures, Hallux valgus, Hearing loss in left ear, HSV (herpes simplex virus) anogenital infection, Hyperlipidemia, Lung nodule, Malignant neoplasm of left lung ([address] Menopausal state, Occipital neuralgia, Osteoarthritis, Scoliosis, Smoker, Spinal stenosis, and Unexplained weight loss. Surgical History:   has a past surgical history that includes sinus surgery; Cataract removal (Bilateral, 2008); Tubal ligation; cervical laminectomy (2003); lumbar laminectomy (2009); Spinal fusion (2009, 2012); Cervical discectomy (2010); bladder suspension (1988); Dilation and curettage of uterus; Cholecystectomy, laparoscopic (2013); Brain aneurysm surgery (2015); Bunionectomy (Left, 08/23/2016); cervical laminectomy (N/A, 11/18/2016); Colonoscopy; Thoracoscopy (Left, 3/25/2019); and Tunneled venous port placement (Right, 08/26/2019). Social History:   reports that she quit smoking about 2 years ago.  Her smoking use included cigarettes. She has a 12.00 pack-year smoking history. She has never used smokeless tobacco. She reports current alcohol use. She reports current drug use. Drug: Marijuana Simmie Inder). Family History:   Family History   Problem Relation Age of Onset    Cancer Mother 43        breast    Cancer Brother 77        throat    Other Father         Macular Degeration       HomeMedications:  Prior to Admission medications    Medication Sig Start Date End Date Taking? Authorizing Provider   methocarbamol (ROBAXIN) 750 MG tablet Take 750 mg by mouth as needed   Yes Historical Provider, MD   gabapentin (NEURONTIN) 800 MG tablet TAKE ONE TABLET BY MOUTH THREE TIMES A DAY 10/25/21 1/24/22 Yes Adina Duarte DO   valACYclovir (VALTREX) 1 g tablet TAKE ONE TABLET BY MOUTH DAILY 10/20/21  Yes Adina Duarte DO   furosemide (LASIX) 20 MG tablet Take 1 tablet by mouth daily 10/13/21  Yes HEATHER Patterson CNP   NONFORMULARY Calcium vitamin D   Yes Historical Provider, MD   atorvastatin (LIPITOR) 10 MG tablet Take 1 tablet by mouth daily 9/30/21  Yes María Earl MD   lisinopril (PRINIVIL;ZESTRIL) 2.5 MG tablet Take 1 tablet by mouth daily 9/30/21  Yes María Earl MD   carvedilol (COREG) 3.125 MG tablet Take 1 tablet by mouth 2 times daily (with meals) 9/29/21  Yes María Earl MD   oxyCODONE (OXY-IR) 30 MG immediate release tablet Take 30 mg by mouth 3 times daily as needed for Pain.    Yes Historical Provider, MD   azelastine (ASTELIN) 0.1 % nasal spray 1 spray by Nasal route 2 times daily 9/1/21  Yes Cyndy Reyes MD   fluticasone Cherie Hilt) 50 MCG/ACT nasal spray SHAKE AND SPRAY TWO SPRAYS IN EACH NOSTRIL ONCE DAILY 8/29/21  Yes Adina Duarte DO   albuterol sulfate  (90 Base) MCG/ACT inhaler INHALE TWO PUFFS BY MOUTH EVERY 6 HOURS AS NEEDED FOR WHEEZING OR SHORTNESS OF BREATH 8/25/21  Yes Cyndy Reyes MD   fenofibrate (TRICOR) 145 MG tablet TAKE ONE TABLET BY MOUTH DAILY 7/29/21  Yes Adina Duarte,    Misc. Devices (CANE) MISC As needed. Quad. 27-31 inches 1/7/21  Yes Donivan Kehr, MD   ARIPiprazole (ABILIFY) 15 MG tablet Take 7.5 mg by mouth daily    Yes Historical Provider, MD   glycopyrrolate-formoterol (Delma Shallow) 9-4.8 MCG/ACT AERO Inhale 2 puffs into the lungs 2 times daily 10/22/20  Yes Antoinette Gaines MD   levothyroxine (SYNTHROID) 50 MCG tablet Take 50 mcg by mouth Daily   Yes Historical Provider, MD   OXcarbazepine (TRILEPTAL) 300 MG tablet Take 1 tablet by mouth Take 300 mg by mouth 2 times daily with additional half tab at bedtime 8/21/19  Yes Donivan Kehr, MD   Misc. Devices (ROLLATOR ULTRA-LIGHT) MISC As needed 5/6/19  Yes Donivan Kehr, MD   Multiple Vitamins-Minerals (THERAPEUTIC MULTIVITAMIN-MINERALS) tablet Take 1 tablet by mouth daily   Yes Historical Provider, MD   Cranberry 125 MG TABS Take 1 tablet by mouth daily   Yes Historical Provider, MD   Acetaminophen (TYLENOL) 325 MG CAPS Take 650 mg by mouth   Yes Historical Provider, MD   Probiotic Product (PROBIOTIC DAILY PO) Take 1 tablet by mouth daily    Yes Historical Provider, MD   cetirizine (ZYRTEC) 10 MG tablet Take 10 mg by mouth nightly as needed    Yes Historical Provider, MD   buPROPion (WELLBUTRIN XL) 150 MG XL tablet 300 mg every morning [facility] 2/25/15  Yes Donivan Kehr, MD   mirtazapine (REMERON) 30 MG tablet Take 15 mg by mouth nightly [facility] 2/25/15  Yes Donivan Kehr, MD   DULoxetine (CYMBALTA) 60 MG capsule Take 30 mg by mouth 2 times daily [facility] 2/25/15  Yes Donivan Kehr, MD   tiZANidine (ZANAFLEX) 4 MG tablet Take 4 mg by mouth daily as needed  Patient not taking: Reported on 11/3/2021    Historical Provider, MD        Allergies:  Adhesive tape, Ibuprofen, Naproxen, Nsaids, and Vicodin [hydrocodone-acetaminophen]       LABS: Results reviewed with patient today.     CBC:   Lab Results   Component Value Date    WBC 8.2 09/28/2021    WBC 8.7 09/27/2021 09/28/2021    LDLCALC 72 09/28/2021    TRIG 55 09/28/2021       Cardiac Imaging: Reports reviewed with patient today. EKG:Sinus tachycardiaPossible Left atrial enlargementLeft axis deviationLeft ventricular hypertrophy with repolarization abnormalityAbnormal ECGWhen compared with ECG of 20-MAR-2019 09:31,Premature atrial complexes are no longer PresentVent. rate has increased BY  47 BPMIncomplete right bundle branch block is no longer PresentConfirmed by DANNIELLE ISABEL, Nasreen Villareal ([id number]) on 9/27/2021 8:27:27 AM    ECHO:   9/27/2021.    Overall left ventricular systolic function appears mild to moderately reduced. Ejection fraction is visually estimated to be 40-45% with diffuse hypokinesis. Mildly dilated left ventricle. Normal left ventricular wall thickness. The right ventricle is normal in size and function. The left atrium is mildly dilated. Severe mitral regurgitation. Mild aortic and tricuspid regurgitation. Estimated pulmonary artery systolic pressure is mildly elevated at 47 mmHg assuming a right atrial pressure of 8 mmHg. Chest CT: 9/26/2021. Moderate coronary artery calcification.  New tiny embolus peripherally right middle lobe. Emphysema. BLE Venous Doppler 9/27/2021  Summary  No evidence of deep vein or superficial vein thrombosis involving the left lower extremity and the contralateral proximal  right common femoral vein. NM Stress 10/18/2021       Summary    small size moderate severity fixed apical defect most consistent with    artifact. no evidence of ischemia. Reduced LV systolic function, EF    calculated 37%. ( please correlate with echo)       Assessment/Plan:      1.) Acute on chronic systolic heart failure moderate EF 40-45%, non ischemic (stress neg) diffuse hypokinesis, mild dilated LV. SOB improved and multifactorial with lung CA/COPD. C/O generalized weakness and fatigue. Now back on lasix.   NYHA Class III              Stage C  Diuretic: lasix  Beta Blocker: coreg  ACEi/ARB/ARNI:lisinopril  Aldosterone Antagonist: Not indicated for EF>35%  SGLT2 Inhibitor: Not indicated for EF>35%  2gm Na diet, daily weight, 64 oz fluid restriction  Avoid NSAIDS and other nephrotoxic meds  Cardiac Rehab: Not indicated for EF>35%  ICD:Not indicated for EF>35%  [facility] Referral: OP     2.) Severe MR: Volume overloaded at time of ECHO. Will repeat after 3mos HF GDMT.    3.) Elevated troponin: Mild, flat. Most likely demand ischemia  Stress test neg.     4.) Acute PE without cor pulmonale:  Hemodynamically stable  Cont Eliquis  Plan is to FU with Hem/ONC        5.) Smoking addction:  Smoking cessation counseling provided > 3 min. Benefits of smoking cessation include decrease risk for lung disease, heart disease, stroke, and peripheral vascular disease.  Recommendations include medications such as Chantix, Zyban or nicotine replacement products, counseling and resources such as 1-800-QUIT-NOW.      6.) Hyperlipidemia:   LDL Goal < 70  Statin:  lipitor + fenofibrate          Low cholesterol diet  Liver fx 2 months after initiation then q6mos  Lipid panel annually    I appreciate the opportunity of cooperating in the care of this individual.    AMERICA Powers, APRN - CNP, CNP, 11/3/2021,3:14 PM

## 2021-11-22 ENCOUNTER — OFFICE VISIT (OUTPATIENT)
Dept: PULMONOLOGY | Age: 66
End: 2021-11-22
Payer: COMMERCIAL

## 2021-11-22 VITALS
OXYGEN SATURATION: 96 % | WEIGHT: 105 LBS | HEIGHT: 62 IN | BODY MASS INDEX: 19.32 KG/M2 | TEMPERATURE: 97.5 F | RESPIRATION RATE: 16 BRPM | DIASTOLIC BLOOD PRESSURE: 68 MMHG | SYSTOLIC BLOOD PRESSURE: 100 MMHG | HEART RATE: 104 BPM

## 2021-11-22 DIAGNOSIS — J30.1 ALLERGIC RHINITIS DUE TO POLLEN, UNSPECIFIED SEASONALITY: ICD-10-CM

## 2021-11-22 DIAGNOSIS — J44.9 COPD, MILD (HCC): ICD-10-CM

## 2021-11-22 PROCEDURE — G8420 CALC BMI NORM PARAMETERS: HCPCS | Performed by: INTERNAL MEDICINE

## 2021-11-22 PROCEDURE — 4004F PT TOBACCO SCREEN RCVD TLK: CPT | Performed by: INTERNAL MEDICINE

## 2021-11-22 PROCEDURE — G8484 FLU IMMUNIZE NO ADMIN: HCPCS | Performed by: INTERNAL MEDICINE

## 2021-11-22 PROCEDURE — 3017F COLORECTAL CA SCREEN DOC REV: CPT | Performed by: INTERNAL MEDICINE

## 2021-11-22 PROCEDURE — 99214 OFFICE O/P EST MOD 30 MIN: CPT | Performed by: INTERNAL MEDICINE

## 2021-11-22 PROCEDURE — 4040F PNEUMOC VAC/ADMIN/RCVD: CPT | Performed by: INTERNAL MEDICINE

## 2021-11-22 PROCEDURE — 1123F ACP DISCUSS/DSCN MKR DOCD: CPT | Performed by: INTERNAL MEDICINE

## 2021-11-22 PROCEDURE — G8399 PT W/DXA RESULTS DOCUMENT: HCPCS | Performed by: INTERNAL MEDICINE

## 2021-11-22 PROCEDURE — G8926 SPIRO NO PERF OR DOC: HCPCS | Performed by: INTERNAL MEDICINE

## 2021-11-22 PROCEDURE — 1090F PRES/ABSN URINE INCON ASSESS: CPT | Performed by: INTERNAL MEDICINE

## 2021-11-22 PROCEDURE — G8427 DOCREV CUR MEDS BY ELIG CLIN: HCPCS | Performed by: INTERNAL MEDICINE

## 2021-11-22 PROCEDURE — 3023F SPIROM DOC REV: CPT | Performed by: INTERNAL MEDICINE

## 2021-11-22 ASSESSMENT — COPD QUESTIONNAIRES
QUESTION3_CHESTTIGHTNESS: 0
CAT_TOTALSCORE: 21
QUESTION8_ENERGYLEVEL: 5
QUESTION1_COUGHFREQUENCY: 3
QUESTION6_LEAVINGHOUSE: 1
QUESTION2_CHESTPHLEGM: 4
QUESTION7_SLEEPQUALITY: 0
QUESTION4_WALKINCLINE: 4
QUESTION5_HOMEACTIVITIES: 4

## 2021-11-22 NOTE — PROGRESS NOTES
REASON FOR CONSULTATION/CC:    Chief Complaint   Patient presents with    COPD     f/u COPD        Consult at request of  Mikhail Salinas DO     PCP: Mikhail Salinas DO    HISTORY OF PRESENT ILLNESS: Omari Hoffmann is a 74y.o. year old female with a history of copd who presents :        Recovering from hospital and ~ baseline. COPD   Using  Bevespi bid. albuterol prn     allergic rhinitis  Astelin bid  Flonase  Daily . Continues to have phlegm from drainage. covid vaccine   status post booster. REVIEW OF SYSTEMS:  Constitutional: Negative for fever   HENT: Negative for sore throat  Respiratory: Negative for dyspnea, cough  Cardiovascular: Negative for chest pain  Gastrointestinal: Negative for vomiting, diarrhea   Skin: Negative for rash  Psychiatric/Behavorial: Negative for anxiety     Objective:   PHYSICAL EXAM:  Blood pressure 100/68, pulse 104, temperature 97.5 °F (36.4 °C), temperature source Infrared, resp. rate 16, height 5' 2\" (1.575 m), weight 105 lb (47.6 kg), last menstrual period 08/11/1993, SpO2 96 %, not currently breastfeeding.'  Gen: No distress. ENT:   Resp: No accessory muscle use. No crackles. No wheezes. No rhonchi. CV: Regular rate. Regular rhythm. No murmur or rub. No edema. Skin: Warm, dry, normal texture and turgor. No nodule on exposed extremities. M/S: No cyanosis. No clubbing. No joint deformity. Psych: Oriented x 3. No anxiety. Awake. Alert. Intact judgement and insight. Good Mood / Affect.   Memory appears in tact        Data Reviewed:        Assessment:     ·     · Lung cancer  · CoPD   ·  allergic rhinitis       Plan:      Problem List Items Addressed This Visit     COPD, mild ([address]      Controlled with Bevespi and albuterol prn         Allergic rhinitis     Astelin, Flonase                    JAQUELINE MAGAÑA [address] [place] [facility]  [phone]

## 2021-12-07 NOTE — TELEPHONE ENCOUNTER
Medication Refill    Medication needing refilled:  Eliquis     Dosage of the medication:  5 mg     How are you taking this medication (QD, BID, TID, QID, PRN):  Twice Daily     30 or 90 day supply called in:    When will you run out of your medication:    Which Pharmacy are we sending the medication to?:    [facility] [address] [facility] [place] [phone]   [address], [address] [place] [address]   Phone:  [phone]  Fax:  [phone]

## 2022-01-04 ENCOUNTER — TELEPHONE (OUTPATIENT)
Dept: CARDIOLOGY CLINIC | Age: 67
End: 2022-01-04

## 2022-01-04 NOTE — TELEPHONE ENCOUNTER
Is This A New Presentation, Or A Follow-Up?: Discoloration Last O/V:  11/03/2021  Next O/V:  02/02/2022    Last Labs: BMP :  10/12/2021 How Severe Is It?: mild Additional History: Pt has noticed the discolorization has been changing since around the summer of 2021, at that time she had been on a OCP

## 2022-01-04 NOTE — TELEPHONE ENCOUNTER
Medication Refill     Medication needing refilled:  Eliquis      Dosage of the medication:  5 mg      How are you taking this medication (QD, BID, TID, QID, PRN):  Twice Daily      30 or 90 day supply called in:     When will you run out of your medication:     Which Pharmacy are we sending the medication to?:     [facility] [address] [facility] [phone] [facility] [phone] [address] [facility], [place] [address]   Phone:  [phone]  Fax:  [phone]

## 2022-01-21 ENCOUNTER — OFFICE VISIT (OUTPATIENT)
Dept: INTERNAL MEDICINE CLINIC | Age: 67
End: 2022-01-21
Payer: COMMERCIAL

## 2022-01-21 VITALS
WEIGHT: 105.2 LBS | TEMPERATURE: 97.6 F | HEART RATE: 97 BPM | DIASTOLIC BLOOD PRESSURE: 75 MMHG | RESPIRATION RATE: 12 BRPM | BODY MASS INDEX: 19.24 KG/M2 | SYSTOLIC BLOOD PRESSURE: 130 MMHG | OXYGEN SATURATION: 94 %

## 2022-01-21 DIAGNOSIS — M48.061 SPINAL STENOSIS OF LUMBAR REGION, UNSPECIFIED WHETHER NEUROGENIC CLAUDICATION PRESENT: ICD-10-CM

## 2022-01-21 DIAGNOSIS — E03.9 ACQUIRED HYPOTHYROIDISM: ICD-10-CM

## 2022-01-21 DIAGNOSIS — J44.9 COPD, MILD (HCC): ICD-10-CM

## 2022-01-21 DIAGNOSIS — R73.9 HYPERGLYCEMIA: ICD-10-CM

## 2022-01-21 DIAGNOSIS — F33.42 RECURRENT MAJOR DEPRESSIVE DISORDER, IN FULL REMISSION (HCC): ICD-10-CM

## 2022-01-21 DIAGNOSIS — J96.11 CHRONIC RESPIRATORY FAILURE WITH HYPOXIA AND HYPERCAPNIA (HCC): ICD-10-CM

## 2022-01-21 DIAGNOSIS — C34.32 MALIGNANT NEOPLASM OF LOWER LOBE OF LEFT LUNG (HCC): ICD-10-CM

## 2022-01-21 DIAGNOSIS — I50.22 CHRONIC SYSTOLIC CONGESTIVE HEART FAILURE (HCC): ICD-10-CM

## 2022-01-21 DIAGNOSIS — J96.12 CHRONIC RESPIRATORY FAILURE WITH HYPOXIA AND HYPERCAPNIA (HCC): ICD-10-CM

## 2022-01-21 DIAGNOSIS — R29.6 FREQUENT FALLS: ICD-10-CM

## 2022-01-21 DIAGNOSIS — Z86.79 HISTORY OF CEREBRAL ANEURYSM REPAIR: ICD-10-CM

## 2022-01-21 DIAGNOSIS — Z00.00 ROUTINE GENERAL MEDICAL EXAMINATION AT A HEALTH CARE FACILITY: Primary | ICD-10-CM

## 2022-01-21 DIAGNOSIS — M15.9 GENERALIZED OSTEOARTHRITIS OF MULTIPLE SITES: ICD-10-CM

## 2022-01-21 DIAGNOSIS — J30.1 ALLERGIC RHINITIS DUE TO POLLEN, UNSPECIFIED SEASONALITY: ICD-10-CM

## 2022-01-21 DIAGNOSIS — R63.6 UNDERWEIGHT: ICD-10-CM

## 2022-01-21 DIAGNOSIS — Z76.89 ENCOUNTER TO ESTABLISH CARE WITH NEW DOCTOR: ICD-10-CM

## 2022-01-21 DIAGNOSIS — I24.8 DEMAND ISCHEMIA (HCC): ICD-10-CM

## 2022-01-21 DIAGNOSIS — Z98.890 HISTORY OF CEREBRAL ANEURYSM REPAIR: ICD-10-CM

## 2022-01-21 DIAGNOSIS — Z86.711 HISTORY OF PULMONARY EMBOLUS (PE): ICD-10-CM

## 2022-01-21 DIAGNOSIS — E78.00 HYPERCHOLESTEREMIA: ICD-10-CM

## 2022-01-21 DIAGNOSIS — C34.90 NON-SMALL CELL LUNG CANCER, UNSPECIFIED LATERALITY (HCC): Primary | ICD-10-CM

## 2022-01-21 DIAGNOSIS — F17.200 NICOTINE DEPENDENCE, UNCOMPLICATED, UNSPECIFIED NICOTINE PRODUCT TYPE: ICD-10-CM

## 2022-01-21 DIAGNOSIS — M79.7 FIBROMYALGIA: ICD-10-CM

## 2022-01-21 PROBLEM — Z51.11 ENCOUNTER FOR ANTINEOPLASTIC CHEMOTHERAPY AND IMMUNOTHERAPY: Status: RESOLVED | Noted: 2020-08-26 | Resolved: 2022-01-21

## 2022-01-21 PROBLEM — B00.9 RECURRENT HERPES SIMPLEX: Status: RESOLVED | Noted: 2019-01-18 | Resolved: 2022-01-21

## 2022-01-21 PROBLEM — Z51.12 ENCOUNTER FOR ANTINEOPLASTIC CHEMOTHERAPY AND IMMUNOTHERAPY: Status: RESOLVED | Noted: 2020-08-26 | Resolved: 2022-01-21

## 2022-01-21 PROBLEM — R09.02 HYPOXIA: Status: RESOLVED | Noted: 2021-09-26 | Resolved: 2022-01-21

## 2022-01-21 LAB
A/G RATIO: 1.8 (ref 1.1–2.2)
ALBUMIN SERPL-MCNC: 4.4 G/DL (ref 3.4–5)
ALP BLD-CCNC: 88 U/L (ref 40–129)
ALT SERPL-CCNC: 13 U/L (ref 10–40)
ANION GAP SERPL CALCULATED.3IONS-SCNC: 12 MMOL/L (ref 3–16)
AST SERPL-CCNC: 37 U/L (ref 15–37)
BILIRUB SERPL-MCNC: <0.2 MG/DL (ref 0–1)
BUN BLDV-MCNC: 30 MG/DL (ref 7–20)
CALCIUM SERPL-MCNC: 10 MG/DL (ref 8.3–10.6)
CHLORIDE BLD-SCNC: 107 MMOL/L (ref 99–110)
CO2: 24 MMOL/L (ref 21–32)
CREAT SERPL-MCNC: 0.8 MG/DL (ref 0.6–1.2)
GFR AFRICAN AMERICAN: >60
GFR NON-AFRICAN AMERICAN: >60
GLUCOSE BLD-MCNC: 92 MG/DL (ref 70–99)
POTASSIUM SERPL-SCNC: 4.9 MMOL/L (ref 3.5–5.1)
SODIUM BLD-SCNC: 143 MMOL/L (ref 136–145)
TOTAL PROTEIN: 6.8 G/DL (ref 6.4–8.2)
TSH REFLEX FT4: 1.48 UIU/ML (ref 0.27–4.2)

## 2022-01-21 PROCEDURE — 3017F COLORECTAL CA SCREEN DOC REV: CPT | Performed by: INTERNAL MEDICINE

## 2022-01-21 PROCEDURE — G0439 PPPS, SUBSEQ VISIT: HCPCS | Performed by: INTERNAL MEDICINE

## 2022-01-21 PROCEDURE — G8484 FLU IMMUNIZE NO ADMIN: HCPCS | Performed by: INTERNAL MEDICINE

## 2022-01-21 PROCEDURE — 99406 BEHAV CHNG SMOKING 3-10 MIN: CPT | Performed by: INTERNAL MEDICINE

## 2022-01-21 PROCEDURE — 99215 OFFICE O/P EST HI 40 MIN: CPT | Performed by: INTERNAL MEDICINE

## 2022-01-21 PROCEDURE — G8399 PT W/DXA RESULTS DOCUMENT: HCPCS | Performed by: INTERNAL MEDICINE

## 2022-01-21 PROCEDURE — 3023F SPIROM DOC REV: CPT | Performed by: INTERNAL MEDICINE

## 2022-01-21 PROCEDURE — G8427 DOCREV CUR MEDS BY ELIG CLIN: HCPCS | Performed by: INTERNAL MEDICINE

## 2022-01-21 PROCEDURE — 1123F ACP DISCUSS/DSCN MKR DOCD: CPT | Performed by: INTERNAL MEDICINE

## 2022-01-21 PROCEDURE — 1090F PRES/ABSN URINE INCON ASSESS: CPT | Performed by: INTERNAL MEDICINE

## 2022-01-21 PROCEDURE — G8420 CALC BMI NORM PARAMETERS: HCPCS | Performed by: INTERNAL MEDICINE

## 2022-01-21 PROCEDURE — 4040F PNEUMOC VAC/ADMIN/RCVD: CPT | Performed by: INTERNAL MEDICINE

## 2022-01-21 PROCEDURE — 4004F PT TOBACCO SCREEN RCVD TLK: CPT | Performed by: INTERNAL MEDICINE

## 2022-01-21 RX ORDER — FLUTICASONE PROPIONATE 50 MCG
SPRAY, SUSPENSION (ML) NASAL
Qty: 1 EACH | Refills: 11 | Status: SHIPPED | OUTPATIENT
Start: 2022-01-21

## 2022-01-21 RX ORDER — ATORVASTATIN CALCIUM 40 MG/1
40 TABLET, FILM COATED ORAL DAILY
Qty: 90 TABLET | Refills: 3 | Status: SHIPPED | OUTPATIENT
Start: 2022-01-21

## 2022-01-21 RX ORDER — GABAPENTIN 800 MG/1
800 TABLET ORAL 2 TIMES DAILY
Qty: 180 TABLET | Refills: 1 | Status: SHIPPED | OUTPATIENT
Start: 2022-01-21 | End: 2022-07-18 | Stop reason: SDUPTHER

## 2022-01-21 RX ORDER — BUPROPION HYDROCHLORIDE 300 MG/1
300 TABLET ORAL EVERY MORNING
COMMUNITY
Start: 2021-11-17

## 2022-01-21 ASSESSMENT — PATIENT HEALTH QUESTIONNAIRE - PHQ9
SUM OF ALL RESPONSES TO PHQ QUESTIONS 1-9: 6
10. IF YOU CHECKED OFF ANY PROBLEMS, HOW DIFFICULT HAVE THESE PROBLEMS MADE IT FOR YOU TO DO YOUR WORK, TAKE CARE OF THINGS AT HOME, OR GET ALONG WITH OTHER PEOPLE: 0
1. LITTLE INTEREST OR PLEASURE IN DOING THINGS: 1
SUM OF ALL RESPONSES TO PHQ QUESTIONS 1-9: 2
5. POOR APPETITE OR OVEREATING: 1
7. TROUBLE CONCENTRATING ON THINGS, SUCH AS READING THE NEWSPAPER OR WATCHING TELEVISION: 1
2. FEELING DOWN, DEPRESSED OR HOPELESS: 1
SUM OF ALL RESPONSES TO PHQ9 QUESTIONS 1 & 2: 2
2. FEELING DOWN, DEPRESSED OR HOPELESS: 1
SUM OF ALL RESPONSES TO PHQ QUESTIONS 1-9: 2
SUM OF ALL RESPONSES TO PHQ QUESTIONS 1-9: 2
3. TROUBLE FALLING OR STAYING ASLEEP: 1
9. THOUGHTS THAT YOU WOULD BE BETTER OFF DEAD, OR OF HURTING YOURSELF: 0
1. LITTLE INTEREST OR PLEASURE IN DOING THINGS: 1
SUM OF ALL RESPONSES TO PHQ QUESTIONS 1-9: 6
4. FEELING TIRED OR HAVING LITTLE ENERGY: 0
SUM OF ALL RESPONSES TO PHQ QUESTIONS 1-9: 2
6. FEELING BAD ABOUT YOURSELF - OR THAT YOU ARE A FAILURE OR HAVE LET YOURSELF OR YOUR FAMILY DOWN: 1
SUM OF ALL RESPONSES TO PHQ QUESTIONS 1-9: 6
SUM OF ALL RESPONSES TO PHQ9 QUESTIONS 1 & 2: 2
SUM OF ALL RESPONSES TO PHQ QUESTIONS 1-9: 6
8. MOVING OR SPEAKING SO SLOWLY THAT OTHER PEOPLE COULD HAVE NOTICED. OR THE OPPOSITE, BEING SO FIGETY OR RESTLESS THAT YOU HAVE BEEN MOVING AROUND A LOT MORE THAN USUAL: 0

## 2022-01-21 ASSESSMENT — LIFESTYLE VARIABLES
HOW OFTEN DO YOU HAVE A DRINK CONTAINING ALCOHOL: 0
HOW OFTEN DO YOU HAVE A DRINK CONTAINING ALCOHOL: 0

## 2022-01-21 NOTE — PROGRESS NOTES
Chief Complaint   Patient presents with    Established New Doctor       HPI: Here to establish PCP, change from Dr Robert Gresham who left practice, and for followup and management of multiple chronic conditions as per the active problems list on chart, which I reviewed and updated with the patient today. States doing well with no new concerns except if noted below. Previously patient of Dr Nery Wong, left after became upset about not getting in for acute needs. Dr Jessie Moses onc  Dr Leona Person, manages pain meds  Dr Jaswant Neff cardio  Dr Marni Neal at [facility] psychiatrist  Dr Clarence Hernandez spine/NUS at [facility] (not ongoing)    She has numerous painful complaints with stable management per Dr Paulo Lu, as well as recurrence of headaches similar to past \"occipital neuralgia\" for which she had injections by DR Cordell Burch. Hasn't reached out to him. Had MRI at [facility] per Dr Jessie Moses last week, told reassuring (requested report). States Dr. Jessie Moses has also been managing her hypothyroidism, but lab results are not available in epic. States not making progress on stopping smoking due to stress and anxiety. I have reviewed the chart notes available from myself and other providers. I have reviewed and addressed all active problems and created or updated the problems list in detail, as needed. No problem-specific Assessment & Plan notes found for this encounter.       I have extensively reviewed and reconciled the medication list, discontinued medications not taking or no longer appropriate, and updated the active meds list      Lab Results   Component Value Date    LABA1C 5.4 02/08/2019    LABMICR YES 09/26/2021    LABMICR YES 03/20/2019    LABMICR Not Indicated 02/06/2019       Lab Results   Component Value Date     10/12/2021     10/05/2021     09/29/2021    K 4.2 10/12/2021    K 3.8 10/05/2021    K 4.1 09/29/2021     10/12/2021     10/05/2021     09/29/2021    CO2 26 10/12/2021    CO2 24 10/05/2021    CO2 24 09/29/2021    BUN 20 10/12/2021    BUN 47 (H) 10/05/2021    BUN 32 (H) 09/29/2021    CREATININE 0.8 10/12/2021    CREATININE 1.6 (H) 10/05/2021    CREATININE 0.7 09/29/2021    GLUCOSE 109 (H) 10/12/2021    GLUCOSE 79 10/05/2021    GLUCOSE 113 (H) 09/29/2021    CALCIUM 9.0 10/12/2021    CALCIUM 9.4 10/05/2021    CALCIUM 9.4 09/29/2021       Lab Results   Component Value Date    CHOL 138 09/28/2021    CHOL 174 02/02/2018    CHOL 175 02/08/2017    TRIG 55 09/28/2021    TRIG 78 02/02/2018    TRIG 78 02/08/2017    HDL 55 09/28/2021    HDL 55 02/02/2018    HDL 59 02/08/2017    LDLCALC 72 09/28/2021    LDLCALC 103 (H) 02/02/2018    LDLCALC 100 (H) 02/08/2017       Lab Results   Component Value Date    ALT 13 09/26/2021    ALT 10 02/06/2019    ALT 10 02/02/2018    AST 47 (H) 09/26/2021    AST 20 02/06/2019    AST 27 02/02/2018       Lab Results   Component Value Date    TSH 2.36 12/10/2015    TSH 2.11 03/21/2013    TSH 1.37 09/10/2012       Lab Results   Component Value Date    WBC 8.2 09/28/2021    WBC 8.7 09/27/2021    WBC 12.8 (H) 09/26/2021    HGB 10.9 (L) 09/28/2021    HGB 10.5 (L) 09/27/2021    HGB 11.3 (L) 09/26/2021    HCT 32.1 (L) 09/28/2021    HCT 31.4 (L) 09/27/2021    HCT 34.4 (L) 09/26/2021    .0 09/28/2021    .3 (H) 09/27/2021    .8 (H) 09/26/2021     09/28/2021     09/27/2021     09/26/2021       Lab Results   Component Value Date    INR 0.97 08/26/2019    INR 0.93 11/14/2016    INR 1.0 03/15/2012        No results found for: PSA     No results found for: LABURIC          /75   Pulse 97   Temp 97.6 °F (36.4 °C) (Infrared)   Resp 12   Wt 105 lb 3.2 oz (47.7 kg)   LMP 08/11/1993 (Approximate)   SpO2 94%   BMI 19.24 kg/m²   Body mass index is 19.24 kg/m². Physical Exam  Constitutional:       General: She is not in acute distress. HENT:      Head: Normocephalic and atraumatic.       Nose: Nose normal. Mouth/Throat:      Pharynx: No oropharyngeal exudate. Eyes:      General: No scleral icterus. Right eye: No discharge. Left eye: No discharge. Conjunctiva/sclera: Conjunctivae normal.      Pupils: Pupils are equal, round, and reactive to light. Neck:      Thyroid: No thyromegaly. Vascular: No JVD. Trachea: No tracheal deviation. Cardiovascular:      Rate and Rhythm: Normal rate and regular rhythm. Heart sounds: Normal heart sounds. No murmur heard. No friction rub. No gallop. Pulmonary:      Effort: Pulmonary effort is normal. No respiratory distress. Breath sounds: Normal breath sounds. No wheezing or rales. Chest:      Chest wall: No tenderness. Abdominal:      General: Bowel sounds are normal. There is no distension. Palpations: Abdomen is soft. There is no mass. Tenderness: There is no abdominal tenderness. There is no guarding or rebound. Musculoskeletal:         General: No tenderness. Normal range of motion. Cervical back: Neck supple. Lymphadenopathy:      Cervical: No cervical adenopathy. Skin:     General: Skin is warm and dry. Coloration: Skin is not pale. Findings: No erythema or rash. Neurological:      Mental Status: She is alert and oriented to person, place, and time. Cranial Nerves: No cranial nerve deficit. Motor: No abnormal muscle tone. Coordination: Coordination normal.      Deep Tendon Reflexes: Reflexes are normal and symmetric. Reflexes normal.   Psychiatric:         Mood and Affect: Mood normal.         Behavior: Behavior normal.         Thought Content:  Thought content normal.         Judgment: Judgment normal.         ASSESSMENT AND PLANS:      Except as noted below, all chronic problems have been reviewed and are stable to continue medications or other therapy as previously documented in the patient's chart, with changes per orders or documentation below:        Assessment and Plan: Patient received counseling and, if relevant,printed instructions for all symptoms listed in CC and HPI, as well as for all diagnoses brought onto today's visit note below. Typical counseling includes, but is not limited to, non pharmacologic measures to manage listed symptoms and conditions; appropriate use, risks and benefits for all prescribed medications; potential interactions between medications both prescribed and OTC; diet; exercise; healthy behaviors; and goalsetting to improve health. Pt.or responsible party was involved in shared decision making and had opportunity to have all questions answered. 1. Non-small cell lung cancer, unspecified laterality ([address]    2. Encounter to establish care with new doctor    3. Nicotine dependence, uncomplicated, unspecified nicotine product type    4. History of pulmonary embolus (PE)    5. Body mass index less than 19, adult    6. Demand ischemia ([address]    7. Chronic systolic congestive heart failure ([address]    8. Chronic respiratory failure with hypoxia and hypercapnia (HCC)    9. Generalized osteoarthritis of multiple sites    10. Malignant neoplasm of lower lobe of left lung ([address]- incomplete resection due to vascular proximity 4/2019; XRT, chemo, immunotherapy    11. COPD, mild ([address]    12. Hypercholesteremia  - atorvastatin (LIPITOR) 40 MG tablet; Take 1 tablet by mouth daily  Dispense: 90 tablet; Refill: 3    13. Fibromyalgia  - gabapentin (NEURONTIN) 800 MG tablet; Take 1 tablet by mouth 2 times daily for 180 days. Dispense: 180 tablet; Refill: 1    14. Spinal stenosis of lumbar region, unspecified whether neurogenic claudication present    15. Recurrent major depressive disorder, in full remission ([address]    16. Frequent falls    17. Underweight    18. Hyperglycemia  - COMPREHENSIVE METABOLIC PANEL; Future  - HEMOGLOBIN A1C; Future    19. History of cerebral aneurysm repair 2015 anterior communicating clipped    20.  Acquired hypothyroidism  - TSH WITH REFLEX TO FT4; Future    21. Allergic rhinitis due to pollen, unspecified seasonality  - fluticasone (FLONASE) 50 MCG/ACT nasal spray; SHAKE AND SPRAY TWO SPRAYS IN EACH NOSTRIL ONCE DAILY  Dispense: 1 each;  Refill: 11            Problem List     COPD, mild (HCC)    Relevant Medications    glycopyrrolate-formoterol (BEVESPI AEROSPHERE) 9-4.8 MCG/ACT AERO    albuterol sulfate  (90 Base) MCG/ACT inhaler    azelastine (ASTELIN) 0.1 % nasal spray    triamcinolone acetonide (KENALOG-40) injection 40 mg    fluticasone (FLONASE) 50 MCG/ACT nasal spray    Fibromyalgia    Relevant Medications    buPROPion (WELLBUTRIN XL) 150 MG XL tablet    mirtazapine (REMERON) 30 MG tablet    DULoxetine (CYMBALTA) 60 MG capsule    Acetaminophen (TYLENOL) 325 MG CAPS    OXcarbazepine (TRILEPTAL) 300 MG tablet    oxyCODONE (OXY-IR) 30 MG immediate release tablet    methocarbamol (ROBAXIN) 750 MG tablet    triamcinolone acetonide (KENALOG-40) injection 40 mg    gabapentin (NEURONTIN) 800 MG tablet    Hypercholesteremia    Relevant Medications    lisinopril (PRINIVIL;ZESTRIL) 2.5 MG tablet    carvedilol (COREG) 3.125 MG tablet    furosemide (LASIX) 20 MG tablet    apixaban (ELIQUIS) 5 MG TABS tablet    atorvastatin (LIPITOR) 40 MG tablet    Spinal stenosis, lumbar    Major depression, recurrent- Hx of multiple OD attempts    Relevant Medications    buPROPion (WELLBUTRIN XL) 150 MG XL tablet    mirtazapine (REMERON) 30 MG tablet    DULoxetine (CYMBALTA) 60 MG capsule    Allergic rhinitis    Relevant Medications    glycopyrrolate-formoterol (BEVESPI AEROSPHERE) 9-4.8 MCG/ACT AERO    albuterol sulfate  (90 Base) MCG/ACT inhaler    azelastine (ASTELIN) 0.1 % nasal spray    fluticasone (FLONASE) 50 MCG/ACT nasal spray    History of cerebral aneurysm repair 2015 anterior communicating clipped    Frequent falls    Malignant neoplasm of lower lobe of left lung [address]- incomplete resection due to vascular proximity 4/2019; XRT, chemo, immunotherapy    Relevant Medications    Acetaminophen (TYLENOL) 325 MG CAPS    OXcarbazepine (TRILEPTAL) 300 MG tablet    oxyCODONE (OXY-IR) 30 MG immediate release tablet    triamcinolone acetonide (KENALOG-40) injection 40 mg    gabapentin (NEURONTIN) 800 MG tablet    Underweight    Non-small cell lung cancer (HCC) - Primary    Relevant Medications    Acetaminophen (TYLENOL) 325 MG CAPS    OXcarbazepine (TRILEPTAL) 300 MG tablet    oxyCODONE (OXY-IR) 30 MG immediate release tablet    triamcinolone acetonide (KENALOG-40) injection 40 mg    gabapentin (NEURONTIN) 800 MG tablet    Acquired hypothyroidism    Relevant Medications    levothyroxine (SYNTHROID) 50 MCG tablet    Other Relevant Orders    TSH WITH REFLEX TO FT4    Generalized osteoarthritis of multiple sites    Relevant Medications    Acetaminophen (TYLENOL) 325 MG CAPS    oxyCODONE (OXY-IR) 30 MG immediate release tablet    methocarbamol (ROBAXIN) 750 MG tablet    triamcinolone acetonide (KENALOG-40) injection 40 mg    Nicotine dependence    Demand ischemia (HCC)    Chronic respiratory failure with hypoxia and hypercapnia (HCC)    Chronic systolic congestive heart failure (HCC)    History of pulmonary embolus (PE)    RESOLVED: Body mass index less than 19, adult        Orders Placed This Encounter   Procedures    COMPREHENSIVE METABOLIC PANEL     Standing Status:   Future     Standing Expiration Date:   1/21/2023    HEMOGLOBIN A1C     Standing Status:   Future     Standing Expiration Date:   1/21/2023    TSH WITH REFLEX TO FT4     Standing Status:   Future     Standing Expiration Date:   1/21/2023       I have reconciled the medications in chart with what patient reports to be taking, andreviewed action/ sideeffects and how to take any new medications. Patient/caregiver understands purpose and side effects. A complete  list of medications was provided in their after-visit summary.     Return in about 6 months (around 7/21/2022) for f/u [facility] extended. Time basedbilling: I spent over50  minutes with this patient, and as is the nature of primary care and typical for my extended visits, over 50 percent of this visit was spent on counseling and coordination ofcare.

## 2022-01-21 NOTE — PROGRESS NOTES
Medicare Annual Wellness Visit  Name: Simon Spangler Date: 2022   MRN: [id number] Sex: Female   Age: 74 y.o. Ethnicity: Non- / Non    : 1955 Race: White (non-)      Sweta Spencer is here for Medicare AWV    Screenings for behavioral, psychosocial and functional/safety risks, and cognitive dysfunction are all negative except as indicated below. These results, as well as other patient data from the [address] form, are documented in Flowsheets linked to this Encounter. Allergies   Allergen Reactions    Adhesive Tape Dermatitis     Blisters on skin under tape or medication patches    Ibuprofen Nausea Only    Naproxen Nausea Only    Nsaids Other (See Comments)     GI upset    Vicodin [Hydrocodone-Acetaminophen] Nausea Only     headache       Prior to Visit Medications    Medication Sig Taking? Authorizing Provider   fluticasone (FLONASE) 50 MCG/ACT nasal spray SHAKE AND SPRAY TWO SPRAYS IN EACH NOSTRIL ONCE DAILY  Myrle Lefort, MD   atorvastatin (LIPITOR) 40 MG tablet Take 1 tablet by mouth daily  Myrle Lefort, MD   gabapentin (NEURONTIN) 800 MG tablet Take 1 tablet by mouth 2 times daily for 180 days.   Myrle Lefort, MD   buPROPion (WELLBUTRIN XL) 300 MG extended release tablet   Historical Provider, MD   apixaban (ELIQUIS) 5 MG TABS tablet Take 1 tablet by mouth 2 times daily  HEATHER Dickerson CNP   methocarbamol (ROBAXIN) 750 MG tablet Take 750 mg by mouth as needed  Historical Provider, MD   valACYclovir (VALTREX) 1 g tablet TAKE ONE TABLET BY MOUTH DAILY  Adina Duarte DO   furosemide (LASIX) 20 MG tablet Take 1 tablet by mouth daily  HEATHER Dickerson CNP   lisinopril (PRINIVIL;ZESTRIL) 2.5 MG tablet Take 1 tablet by mouth daily  Tata Carbajal MD   carvedilol (COREG) 3.125 MG tablet Take 1 tablet by mouth 2 times daily (with meals)  Colton Boggs MD   oxyCODONE (OXY-IR) 30 MG immediate release tablet Take 30 mg by mouth 3 times daily as needed for Pain.   Historical Provider, MD   azelastine (ASTELIN) 0.1 % nasal spray 1 spray by Nasal route 2 times daily  Chirag Bourne MD   albuterol sulfate  (90 Base) MCG/ACT inhaler INHALE TWO PUFFS BY MOUTH EVERY 6 HOURS AS NEEDED FOR WHEEZING OR SHORTNESS OF BREATH  Chirag Bourne MD   ARIPiprazole (ABILIFY) 15 MG tablet Take 7.5 mg by mouth daily   Historical Provider, MD   glycopyrrolate-formoterol (Vane Sitter) 9-4.8 MCG/ACT AERO Inhale 2 puffs into the lungs 2 times daily  Chirag Bourne MD   levothyroxine (SYNTHROID) 50 MCG tablet Take 50 mcg by mouth Daily  Historical Provider, MD   OXcarbazepine (TRILEPTAL) 300 MG tablet Take 1 tablet by mouth Take 300 mg by mouth 2 times daily with additional half tab at bedtime  Nanette Brito MD   Multiple Vitamins-Minerals (THERAPEUTIC MULTIVITAMIN-MINERALS) tablet Take 1 tablet by mouth daily  Historical Provider, MD   Cranberry 125 MG TABS Take 1 tablet by mouth daily  Historical Provider, MD   Acetaminophen (TYLENOL) 325 MG CAPS Take 650 mg by mouth  Historical Provider, MD   Probiotic Product (PROBIOTIC DAILY PO) Take 1 tablet by mouth daily   Historical Provider, MD   mirtazapine (REMERON) 30 MG tablet Take 15 mg by mouth nightly Amaya Ramirez MD   DULoxetine (CYMBALTA) 60 MG capsule Take 30 mg by mouth 2 times daily Amaya Ramirez MD       Past Medical History:   Diagnosis Date    Anxiety     Chronic headaches     CT done 4/2016    Colon polyp     COPD, mild ([address]     PFT 8/08 a smoker no symptoms-no meds    DDD (degenerative disc disease)     Depression     Fibromyalgia     Frequent UTI     Full dentures     Hallux valgus     Hearing loss in left ear 07/29/2011    HSV (herpes simplex virus) anogenital infection     leg    Hyperlipidemia     Lung nodule 2019    2 LLL nodules    Malignant neoplasm of left lung ([address]     Menopausal state 1994    Occipital neuralgia     Osteoarthritis     Scoliosis     neck and back brace intermittently, based on pain    Smoker     Spinal stenosis     cane, walker    Unexplained weight loss     Patient states over 30# weight loss over 6 months and pcp is aware       Past Surgical History:   Procedure Laterality Date    BLADDER SUSPENSION  1988    BRAIN ANEURYSM SURGERY  2015    anterior communicating clipped    BUNIONECTOMY Left 08/23/2016    Ty    CATARACT REMOVAL Bilateral 2008    CERVICAL DISCECTOMY  2010    CERVICAL LAMINECTOMY  2003    Anterior approach, C4-7    CERVICAL LAMINECTOMY N/A 11/18/2016    Posterior, C2 - C5    CHOLECYSTECTOMY, LAPAROSCOPIC  2013    COLONOSCOPY      DILATION AND CURETTAGE OF UTERUS      LUMBAR LAMINECTOMY  2009    x2    SINUS SURGERY      x2    SPINAL FUSION  2009, 2012    post T12-L5 fusion and redone    THORACOSCOPY Left 3/25/2019    Dr. Alonzo . Alexander - thoracotomy w/takedown of adhesions, wedge excision LLL nodule#1, debulking LLL nodule #2, MSLND    TUBAL LIGATION      TUNNELED VENOUS PORT PLACEMENT Right 08/26/2019    Dr. Red Alpers port       Family History   Problem Relation Age of Onset    Cancer Mother 43        breast    Cancer Brother 77        throat    Other Father         Macular Degeration       CareTeam (Including outside providers/suppliers regularly involved in providing care):   Patient Care Team:  Daisy Huggins MD as PCP - General (Internal Medicine)  [facility] Consulting Physician (Rheumatology)  Xochitl Kelly MD as Consulting Physician (Neurosurgery)  Jonh Lam MD as Consulting Physician (Physical Medicine and Rehab)    Wt Readings from Last 3 Encounters:   01/21/22 105 lb 3.2 oz (47.7 kg)   11/22/21 105 lb (47.6 kg)   11/05/21 105 lb 9.6 oz (47.9 kg)     There were no vitals filed for this visit. There is no height or weight on file to calculate BMI.     Based upon direct observation of the patient, evaluation of cognition reveals recent and remote memory intact. Patient's complete Health Risk Assessment and screening values have been reviewed and are found in Flowsheets. The following problems were reviewed today and where indicated follow up appointments were made and/or referrals ordered. Positive Risk Factor Screenings with Interventions:     Fall Risk:  Timed Up and Go Test > 12 seconds? (Complete if either Fall Risk answers are Yes): no  2 or more falls in past year?: (!) yes  Fall Risk Interventions:    · Home safety tips provided  · Patient declines any further evaluation/treatment for this issue  · patient wears life alert necklace     Depression:  PHQ-2 Score: 2  PHQ-9 Total Score: 6    Severity:1-4 = minimal depression, 5-9 = mild depression, 10-14 = moderate depression, 15-19 = moderately severe depression, 20-27 = severe depression  Depression Interventions:  · Patient is taking medication for this and feels it helps. Substance History:  Social History     Tobacco History     Smoking Status  Current Some Day Smoker Last attempt to quit  1/20/2019 Smoking Frequency  0.25 packs/day for 48 years (12 pk yrs) Smoking Tobacco Type  Cigarettes    Smokeless Tobacco Use  Never Used    Tobacco Comment  started age 11, down to [address] a day; Alcohol History     Alcohol Use Status  Not Currently          Drug Use     Drug Use Status  Not Currently Types  Marijuana [facility])          Sexual Activity     Sexually Active  Never               Alcohol Screening:       A score of 8 or more is associated with harmful or hazardous drinking. A score of 13 or more in women, and 15 or more in men, is likely to indicate alcohol dependence. Substance Abuse Interventions:  · no concerns for substance abuse    General Health and ACP:  General  In general, how would you say your health is?: Fair  In the past 7 days, have you experienced any of the following?  New or Increased Pain, New or Increased Fatigue, Loneliness, Social Isolation, Stress or Anger?: None of These  Do you get the social and emotional support that you need?: (!) No  Do you have a Living Will?: Yes  Advance Directives     Power of  Living Will ACP-Advance Directive ACP-Power of     Not on File [place] on 03/25/19 Filed [address] Risk Interventions:  · Inadequate social/emotional support: patient's comments regarding inadequate social support: Patient states her children do not talk to her however she has a few friends.     Health Habits/Nutrition:  Health Habits/Nutrition  Do you exercise for at least 20 minutes 2-3 times per week?: (!) No  Have you lost any weight without trying in the past 3 months?: (!) Yes  Do you eat only one meal per day?: No  Have you seen the dentist within the past year?: N/A - wear dentures     Health Habits/Nutrition Interventions:  · Inadequate physical activity:  patient is not ready to increase his/her physical activity level at this time       Personalized Preventive Plan   Current Health Maintenance Status  Immunization History   Administered Date(s) Administered    COVID-19[place]illa, Primary or Immunocompromised, PF, 100mcg/0.5mL 03/23/2021, 04/22/2021, 10/20/2021    Influenza A ([id number]) Vaccine PF IM 12/11/2009, 12/11/2009    Influenza Vaccine, unspecified formulation 09/27/2017    Influenza Virus Vaccine 10/21/2008, 12/11/2009, 09/25/2013    Influenza, High Dose (Fluzone 65 yrs and older) 09/13/2018, 09/03/2019, 09/03/2019    Influenza, High-dose, Quadv, 65 yrs +, IM (Fluzone) 10/21/2020, 10/21/2020, 09/25/2021    Influenza, Intradermal, Preservative free 09/10/2012, 09/29/2015    Influenza, Intradermal, Quadrivalent, Preservative Free 09/29/2016    Pneumococcal Conjugate 13-valent ([id number]) 08/02/2018    Pneumococcal Polysaccharide ([id number]) 10/21/2008, 10/21/2008, 10/10/2013, 08/21/2019    Td, unspecified formulation 11/01/2003    Tdap (Boostrix, Adacel) 08/02/2017    Zoster Live (Zostavax) 10/15/2013    Zoster Recombinant (Shingrix) 12/04/2019        Health Maintenance   Topic Date Due    Annual Wellness Visit (AWV)  Never done    Shingles Vaccine (3 of 3) 01/29/2020    TSH testing  09/25/2021    Breast cancer screen  03/10/2022    Lipid screen  09/28/2022    Potassium monitoring  10/12/2022    Creatinine monitoring  10/12/2022    Depression Monitoring  01/21/2023    Colon cancer screen colonoscopy  05/24/2023    DTaP/Tdap/Td vaccine (2 - Td or Tdap) 08/02/2027    DEXA (modify frequency per FRAX score)  Completed    Flu vaccine  Completed    Pneumococcal 65+ years Vaccine  Completed    COVID-19 Vaccine  Completed    Hepatitis C screen  Completed    Hepatitis A vaccine  Aged Out    Hepatitis B vaccine  Aged Out    Hib vaccine  Aged Out    Meningococcal (ACWY) vaccine  Aged Out     Recommendations for [employer] Due: see orders and patient instructions/AVS.  . Recommended screening schedule for the next 5-10 years is provided to the patient in written form: see Patient Instructions/AVS.    Lay Otoole LPN, 2/32/3847, performed the documented evaluation under the direct supervision of the attending physician. This encounter was performed under Santos gomez MDs, direct supervision, 1/21/2022.

## 2022-01-21 NOTE — PATIENT INSTRUCTIONS
Personalized Preventive Plan for [place] - 1/21/2022  Medicare offers a range of preventive health benefits. Some of the tests and screenings are paid in full while other may be subject to a deductible, co-insurance, and/or copay. Some of these benefits include a comprehensive review of your medical history including lifestyle, illnesses that may run in your family, and various assessments and screenings as appropriate. After reviewing your medical record and screening and assessments performed today your provider may have ordered immunizations, labs, imaging, and/or referrals for you. A list of these orders (if applicable) as well as your Preventive Care list are included within your After Visit Summary for your review. Other Preventive Recommendations:    · A preventive eye exam performed by an eye specialist is recommended every 1-2 years to screen for glaucoma; cataracts, macular degeneration, and other eye disorders. · A preventive dental visit is recommended every 6 months. · Try to get at least 150 minutes of exercise per week or 10,000 steps per day on a pedometer . · Order or download the FREE \"Exercise & Physical Activity: Your Everyday Guide\" from [employer]. Call 9-581.733.9816 or search [employer] online. · You need 7905-1972 mg of calcium and 6533-6599 IU of vitamin D per day. It is possible to meet your calcium requirement with diet alone, but a vitamin D supplement is usually necessary to meet this goal.  · When exposed to the sun, use a sunscreen that protects against both UVA and UVB radiation with an SPF of 30 or greater. Reapply every 2 to 3 hours or after sweating, drying off with a towel, or swimming. · Always wear a seat belt when traveling in a car. Always wear a helmet when riding a bicycle or motorcycle.

## 2022-01-22 LAB
ESTIMATED AVERAGE GLUCOSE: 102.5 MG/DL
HBA1C MFR BLD: 5.2 %

## 2022-01-28 DIAGNOSIS — B00.9 HERPES SIMPLEX: ICD-10-CM

## 2022-01-28 RX ORDER — VALACYCLOVIR HYDROCHLORIDE 1 G/1
1000 TABLET, FILM COATED ORAL DAILY
Qty: 90 TABLET | Refills: 3 | Status: SHIPPED | OUTPATIENT
Start: 2022-01-28

## 2022-02-08 RX ORDER — CARVEDILOL 3.12 MG/1
3.12 TABLET ORAL 2 TIMES DAILY WITH MEALS
Qty: 180 TABLET | Refills: 3 | Status: SHIPPED | OUTPATIENT
Start: 2022-02-08 | End: 2022-06-13

## 2022-02-08 RX ORDER — LISINOPRIL 2.5 MG/1
2.5 TABLET ORAL DAILY
Qty: 90 TABLET | Refills: 3 | Status: SHIPPED | OUTPATIENT
Start: 2022-02-08 | End: 2022-05-16

## 2022-02-08 NOTE — TELEPHONE ENCOUNTER
Medication Refill    Medication needing refilled:  lisinopril (PRINIVIL;ZESTRIL)   carvedilol (COREG)     Dosage of the medication:  2.5 MG tablet   3.125 MG tablet     How are you taking this medication (QD, BID, TID, QID, PRN):  Take 1 tablet by mouth daily  Take 1 tablet by mouth 2 times daily (with meals)    30 or 90 day supply called in:    When will you run out of your medication:    Which Pharmacy are we sending the medication to?:    [address] [place] [address] 108, [address], [address] [place] [address]   Phone:  [phone]  Fax:  [phone]

## 2022-02-08 NOTE — TELEPHONE ENCOUNTER
Last OV: 11/03/2021  Next OV: 02/14/2021  Last refill:  Most recent Labs: cmp, tsh 01/21/2022  Last EKG (if needed):09/26/2021

## 2022-02-13 NOTE — PROGRESS NOTES
The [address] [place] [address], [address] [place] [address] [address] [address]  [phone]    PrimaryCare Doctor:  Rere Grant MD  Primary Cardiologist: Dr. Bin Loving    Chief Complaint   Patient presents with    Follow-up     no cc       History of Present Illness:  Mojgan Jasmine is a 74 y.o. female with PMH significant for COPD, lung CA with fibrosis s/p LLL lobectomy/chemo/radiation, Smoker,anxiety    Patient was admitted to [facility] 9/2021 with worsening dyspnea over few weeks that suddenly became severe and labored while at rest. Found to have acute on chronic SHF, left pleural effusion, severe MR and acute PE. Stress test negative. Patient presents to [facility] cardiology for follow up for heart failure. Today she is feeling OK. She generally is weak and fatigue with poor activity tolerance. This is chronic. +SOB with activity, resolves at rest, chronic. She denies SOB, CP, LH, dizziness, syncope, palpitations, orthopnea, edema. BP elevated >Repeat /92. Review of Systems:   General: Denies fever, chills  Skin: Denies skin changes, rash, itching, lesions.   HEENT: Denies headache, dizziness, vision changes, nosebleeds, sore throat, nasal drainage  RESP: Denies cough, sputum, wheeze, snoring  CARD: Denies palpitations,  murmur  GI:Denies nausea, vomiting, heartburn, loss of appetite  : Denies frequency, pain, incontinence, polyuria  VASC: Denies claudication, leg cramps, clots  MUSC/SKEL: Denies pain, stiffness, arthritis  PSYCH: Denies anxiety, depression, stress  NEURO: Denies numbness, tingling, weakness,change in mood or memory  HEME: Denies abn bruising, bleeding, anemia  ENDO: Denies intolerance to heat, cold, excessive thirst or hunger, hx thyroid disease    BP (!) 148/80   Pulse 83   Ht 5' 2\" (1.575 m)   Wt 105 lb (47.6 kg)   LMP 08/11/1993 (Approximate)   SpO2 93%   BMI 19.20 kg/m²   Wt Readings from Last 3 Encounters:   02/14/22 105 lb (47.6 kg) 02/14/22 105 lb (47.6 kg)   01/21/22 105 lb 3.2 oz (47.7 kg)       Physical Exam:  GEN: Appears frail, no acute distress  SKIN: Pink, warm, dry. Nails without clubbing. HEENT: PERRLA. Normocephalic, atraumatic. Neck supple. No adenopathy. LUNG: AP diameter normal. Clear bilateral. Decrease BS throughout bilateral. No wheeze, rales, or ronchi. Respiratory effort normal.  HEART: S1S2 A/R. No JVD. No carotid bruit. No murmur, rub or gallop. ABD: Soft, nontender. +BS X 4 quads. No hepatomegaly. EXT: Radial and pedal pulses 2+ and symmetric. Without varicosities. No edema. MUSCSKEL: Good ROM X4 extremities. No deformity. NEURO: A/O X3. Calm and cooperative. Past Medical History:   has a past medical history of Anxiety, Chronic headaches, Colon polyp, COPD, mild ([address] DDD (degenerative disc disease), Depression, Fibromyalgia, Frequent UTI, Full dentures, Hallux valgus, Hearing loss in left ear, HSV (herpes simplex virus) anogenital infection, Hyperlipidemia, Lung nodule, Malignant neoplasm of left lung ([address] Menopausal state, Occipital neuralgia, Osteoarthritis, Scoliosis, Smoker, Spinal stenosis, and Unexplained weight loss. Surgical History:   has a past surgical history that includes sinus surgery; Cataract removal (Bilateral, 2008); Tubal ligation; cervical laminectomy (2003); lumbar laminectomy (2009); Spinal fusion (2009, 2012); Cervical discectomy (2010); bladder suspension (1988); Dilation and curettage of uterus; Cholecystectomy, laparoscopic (2013); Brain aneurysm surgery (2015); Bunionectomy (Left, 08/23/2016); cervical laminectomy (N/A, 11/18/2016); Colonoscopy; Thoracoscopy (Left, 3/25/2019); and Tunneled venous port placement (Right, 08/26/2019). Social History:   reports that she has been smoking cigarettes. She has a 12.00 pack-year smoking history. She has never used smokeless tobacco. She reports previous alcohol use. She reports previous drug use. Drug: Marijuana Johny Alberts).    Family History:   Family History   Problem Relation Age of Onset    Cancer Mother 43        breast    Cancer Brother 77        throat    Other Father         Macular Degeration       HomeMedications:  Prior to Admission medications    Medication Sig Start Date End Date Taking? Authorizing Provider   lisinopril (PRINIVIL;ZESTRIL) 2.5 MG tablet Take 1 tablet by mouth daily 2/8/22  Yes HEATHER Fung CNP   carvedilol (COREG) 3.125 MG tablet Take 1 tablet by mouth 2 times daily (with meals) 2/8/22  Yes HEATHER Fung CNP   valACYclovir (VALTREX) 1 g tablet Take 1 tablet by mouth daily 1/28/22  Yes Danna Laguna MD   fluticasone Wyvonnia Kessler) 50 MCG/ACT nasal spray SHAKE AND SPRAY TWO SPRAYS IN EACH NOSTRIL ONCE DAILY 1/21/22  Yes Danna Laguna MD   atorvastatin (LIPITOR) 40 MG tablet Take 1 tablet by mouth daily 1/21/22  Yes Danna Laguna MD   gabapentin (NEURONTIN) 800 MG tablet Take 1 tablet by mouth 2 times daily for 180 days. 1/21/22 7/20/22 Yes Danna Laguna MD   buPROPion (WELLBUTRIN XL) 300 MG extended release tablet  11/17/21  Yes Historical Provider, MD   apixaban (ELIQUIS) 5 MG TABS tablet Take 1 tablet by mouth 2 times daily 12/7/21 12/2/22 Yes HEATHER Fung CNP   methocarbamol (ROBAXIN) 750 MG tablet Take 750 mg by mouth as needed   Yes Historical Provider, MD   furosemide (LASIX) 20 MG tablet Take 1 tablet by mouth daily 10/13/21  Yes HEATHER Gibson CNP   oxyCODONE (OXY-IR) 30 MG immediate release tablet Take 30 mg by mouth 3 times daily as needed for Pain.    Yes Historical Provider, MD   azelastine (ASTELIN) 0.1 % nasal spray 1 spray by Nasal route 2 times daily 9/1/21  Yes Olive Caban MD   albuterol sulfate  (90 Base) MCG/ACT inhaler INHALE TWO PUFFS BY MOUTH EVERY 6 HOURS AS NEEDED FOR WHEEZING OR SHORTNESS OF BREATH 8/25/21  Yes Olive Caban MD   ARIPiprazole (ABILIFY) 15 MG tablet Take 7.5 mg by mouth daily    Yes Historical Provider, MD glycopyrrolate-formoterol (BEVESPI AEROSPHERE) 9-4.8 MCG/ACT AERO Inhale 2 puffs into the lungs 2 times daily 10/22/20  Yes Gaudencio Gomez MD   levothyroxine (SYNTHROID) 50 MCG tablet Take 50 mcg by mouth Daily   Yes Historical Provider, MD   OXcarbazepine (TRILEPTAL) 300 MG tablet Take 1 tablet by mouth Take 300 mg by mouth 2 times daily with additional half tab at bedtime 8/21/19  Yes Hari Ford MD   Multiple Vitamins-Minerals (THERAPEUTIC MULTIVITAMIN-MINERALS) tablet Take 1 tablet by mouth daily   Yes Historical Provider, MD   Cranberry 125 MG TABS Take 1 tablet by mouth daily   Yes Historical Provider, MD   Acetaminophen (TYLENOL) 325 MG CAPS Take 650 mg by mouth   Yes Historical Provider, MD   Probiotic Product (PROBIOTIC DAILY PO) Take 1 tablet by mouth daily    Yes Historical Provider, MD   mirtazapine (REMERON) 30 MG tablet Take 15 mg by mouth nightly [facility] 2/25/15  Yes Hari Ford MD   DULoxetine (CYMBALTA) 60 MG capsule Take 30 mg by mouth 2 times daily [facility] 2/25/15  Yes Hari Ford MD        Allergies:  Adhesive tape, Ibuprofen, Naproxen, Nsaids, and Vicodin [hydrocodone-acetaminophen]       LABS: Results reviewed with patient today.     CBC:   Lab Results   Component Value Date    WBC 8.2 09/28/2021    WBC 8.7 09/27/2021    WBC 12.8 09/26/2021    RBC 3.21 09/28/2021    RBC 3.13 09/27/2021    RBC 3.38 09/26/2021    HGB 10.9 09/28/2021    HGB 10.5 09/27/2021    HGB 11.3 09/26/2021    HCT 32.1 09/28/2021    HCT 31.4 09/27/2021    HCT 34.4 09/26/2021    .0 09/28/2021    .3 09/27/2021    .8 09/26/2021    RDW 14.4 09/28/2021    RDW 15.0 09/27/2021    RDW 14.6 09/26/2021     09/28/2021     09/27/2021     09/26/2021     BMP:  Lab Results   Component Value Date     01/21/2022     10/12/2021     10/05/2021    K 4.9 01/21/2022    K 4.2 10/12/2021    K 3.8 10/05/2021    K 4.1 09/26/2021    K 4.1 03/26/2019    K 4.1 02/08/2019  01/21/2022     10/12/2021     10/05/2021    CO2 24 01/21/2022    CO2 26 10/12/2021    CO2 24 10/05/2021    PHOS 3.1 09/27/2021    BUN 30 01/21/2022    BUN 20 10/12/2021    BUN 47 10/05/2021    CREATININE 0.8 01/21/2022    CREATININE 0.8 10/12/2021    CREATININE 1.6 10/05/2021     BNP:   Lab Results   Component Value Date    PROBNP 3,971 10/12/2021    PROBNP 1,254 10/05/2021    PROBNP 7,582 09/26/2021       Parameters:   > 450 pg/mL under age 48  > 900 pg/mL ages 54-65  > 1800 pg/mL over age 76    Iron Studies:    Lab Results   Component Value Date    TIBC 332 09/28/2021    TIBC 345 09/10/2012    FERRITIN 61.0 09/28/2021    FERRITIN 108.6 09/10/2012     GLUCOSE: No results for input(s): GLUCOSE in the last 72 hours. LIVER PROFILE:   Lab Results   Component Value Date    AST 37 01/21/2022    ALT 13 01/21/2022    LIPASE 34.68 03/21/2013    AMYLASE 50 03/21/2013    LABALBU 4.4 01/21/2022    BILITOT <0.2 01/21/2022    ALKPHOS 88 01/21/2022     PT/INR:   Lab Results   Component Value Date    PROTIME 11.1 08/26/2019    PROTIME 13.1 03/15/2012    INR 0.97 08/26/2019     Cardiac Enzymes:  Lab Results   Component Value Date    TROPONINI 0.04 09/26/2021     FASTING LIPID PANEL:  Lab Results   Component Value Date    CHOL 138 09/28/2021    HDL 55 09/28/2021    LDLCALC 72 09/28/2021    TRIG 55 09/28/2021       Cardiac Imaging: Reports reviewed with patient today. EKG:Sinus tachycardiaPossible Left atrial enlargementLeft axis deviationLeft ventricular hypertrophy with repolarization abnormalityAbnormal ECGWhen compared with ECG of 20-MAR-2019 09:31,Premature atrial complexes are no longer PresentVent. rate has increased BY  47 BPMIncomplete right bundle branch block is no longer PresentConfirmed by Rogelio SPICER MD ([id number]) on 9/27/2021 8:27:27 AM    ECHO:   9/27/2021.    Overall left ventricular systolic function appears mild to moderately reduced.  Ejection fraction is visually estimated to be 40-45% with diffuse hypokinesis. Mildly dilated left ventricle. Normal left ventricular wall thickness. The right ventricle is normal in size and function. The left atrium is mildly dilated. Severe mitral regurgitation. Mild aortic and tricuspid regurgitation. Estimated pulmonary artery systolic pressure is mildly elevated at 47 mmHg assuming a right atrial pressure of 8 mmHg. Chest CT: 9/26/2021. Moderate coronary artery calcification.  New tiny embolus peripherally right middle lobe. Emphysema. BLE Venous Doppler 9/27/2021  Summary  No evidence of deep vein or superficial vein thrombosis involving the left lower extremity and the contralateral proximal  right common femoral vein. NM Stress 10/18/2021       Summary    small size moderate severity fixed apical defect most consistent with    artifact. no evidence of ischemia. Reduced LV systolic function, EF    calculated 37%. ( please correlate with echo)       Assessment/Plan:      1.) Chronic systolic heart failure moderate EF 40-45%, non ischemic (stress neg) diffuse hypokinesis, mild dilated LV. SOB improved and multifactorial with lung CA/COPD. C/O generalized weakness and fatigue. Appears euvolemic. NYHA Class III              Stage C  Diuretic: lasix  Beta Blocker: coreg  ACEi/ARB/ARNI:lisinopril  Aldosterone Antagonist: Not indicated for EF>35%  SGLT2 Inhibitor: Not indicated for EF>35%  2gm Na diet, daily weight, 64 oz fluid restriction  Avoid NSAIDS and other nephrotoxic meds  Cardiac Rehab: Not indicated for EF>35%  ICD:Not indicated for EF>35%  [facility] Referral: OP     2.) Severe MR: Volume overloaded at time of ECHO. Will repeat after 3mos HF GDMT. Ordered today. 3.) Acute PE 9/2021 without cor pulmonale:  Hemodynamically stable  Cont Eliquis  Plan is to FU with Hem/ONC        4.) Smoking addction:  Smoking cessation counseling provided > 3 min.  Benefits of smoking cessation include decrease risk for lung disease, heart disease, stroke, and peripheral vascular disease. Recommendations include medications such as Chantix, Zyban or nicotine replacement products, counseling and resources such as 1-800-QUIT-NOW.      5.) Hyperlipidemia:   LDL Goal < 70  Statin:  lipitor + fenofibrate          Low cholesterol diet  Liver fx 2 months after initiation then q6mos  Lipid panel annually    6.) COPD: follows with Dr. Gerhardt Africa (last OV 11/2021)    7.) Hypertension:   Goal BP <130/80. Not met. Patient agrees to obtain electronic BP cuff and monitor her BP at home. IF BP consistently >130/80 then can increase lisinopril.    Non pharmacologic interventions include:   -weight loss  -heart healthy low sodium and low fat diet that consist of mostly fruits, vegetables and grains (Dash diet)  -limited amount of alcohol (no more than 1 drink/day for women, 2 drinks/day for men)  -regular physical activity  -no smoking  -stress reduction     I appreciate the opportunity of cooperating in the care of this individual.    AMERICA Cole, HEATHER - CNP, CNP, 2/14/2022,1:28 PM

## 2022-02-14 ENCOUNTER — OFFICE VISIT (OUTPATIENT)
Dept: CARDIOLOGY CLINIC | Age: 67
End: 2022-02-14
Payer: COMMERCIAL

## 2022-02-14 ENCOUNTER — TELEPHONE (OUTPATIENT)
Dept: ORTHOPEDIC SURGERY | Age: 67
End: 2022-02-14

## 2022-02-14 ENCOUNTER — OFFICE VISIT (OUTPATIENT)
Dept: ORTHOPEDIC SURGERY | Age: 67
End: 2022-02-14
Payer: COMMERCIAL

## 2022-02-14 VITALS
BODY MASS INDEX: 19.32 KG/M2 | HEIGHT: 62 IN | OXYGEN SATURATION: 93 % | SYSTOLIC BLOOD PRESSURE: 148 MMHG | WEIGHT: 105 LBS | HEART RATE: 83 BPM | DIASTOLIC BLOOD PRESSURE: 80 MMHG

## 2022-02-14 VITALS — WEIGHT: 105 LBS | BODY MASS INDEX: 19.32 KG/M2 | HEIGHT: 62 IN

## 2022-02-14 DIAGNOSIS — I50.22 CHRONIC SYSTOLIC HEART FAILURE (HCC): Primary | ICD-10-CM

## 2022-02-14 DIAGNOSIS — G56.02 CARPAL TUNNEL SYNDROME, LEFT: Primary | ICD-10-CM

## 2022-02-14 DIAGNOSIS — I50.22 CHRONIC SYSTOLIC CONGESTIVE HEART FAILURE (HCC): ICD-10-CM

## 2022-02-14 PROCEDURE — 4040F PNEUMOC VAC/ADMIN/RCVD: CPT | Performed by: NURSE PRACTITIONER

## 2022-02-14 PROCEDURE — 3017F COLORECTAL CA SCREEN DOC REV: CPT | Performed by: NURSE PRACTITIONER

## 2022-02-14 PROCEDURE — 99204 OFFICE O/P NEW MOD 45 MIN: CPT | Performed by: ORTHOPAEDIC SURGERY

## 2022-02-14 PROCEDURE — 4004F PT TOBACCO SCREEN RCVD TLK: CPT | Performed by: NURSE PRACTITIONER

## 2022-02-14 PROCEDURE — 4004F PT TOBACCO SCREEN RCVD TLK: CPT | Performed by: ORTHOPAEDIC SURGERY

## 2022-02-14 PROCEDURE — G8427 DOCREV CUR MEDS BY ELIG CLIN: HCPCS | Performed by: NURSE PRACTITIONER

## 2022-02-14 PROCEDURE — G8420 CALC BMI NORM PARAMETERS: HCPCS | Performed by: ORTHOPAEDIC SURGERY

## 2022-02-14 PROCEDURE — G8399 PT W/DXA RESULTS DOCUMENT: HCPCS | Performed by: ORTHOPAEDIC SURGERY

## 2022-02-14 PROCEDURE — 4040F PNEUMOC VAC/ADMIN/RCVD: CPT | Performed by: ORTHOPAEDIC SURGERY

## 2022-02-14 PROCEDURE — 1090F PRES/ABSN URINE INCON ASSESS: CPT | Performed by: ORTHOPAEDIC SURGERY

## 2022-02-14 PROCEDURE — G8427 DOCREV CUR MEDS BY ELIG CLIN: HCPCS | Performed by: ORTHOPAEDIC SURGERY

## 2022-02-14 PROCEDURE — G8484 FLU IMMUNIZE NO ADMIN: HCPCS | Performed by: ORTHOPAEDIC SURGERY

## 2022-02-14 PROCEDURE — 99214 OFFICE O/P EST MOD 30 MIN: CPT | Performed by: NURSE PRACTITIONER

## 2022-02-14 PROCEDURE — G8484 FLU IMMUNIZE NO ADMIN: HCPCS | Performed by: NURSE PRACTITIONER

## 2022-02-14 PROCEDURE — G8420 CALC BMI NORM PARAMETERS: HCPCS | Performed by: NURSE PRACTITIONER

## 2022-02-14 PROCEDURE — G8399 PT W/DXA RESULTS DOCUMENT: HCPCS | Performed by: NURSE PRACTITIONER

## 2022-02-14 PROCEDURE — 1123F ACP DISCUSS/DSCN MKR DOCD: CPT | Performed by: ORTHOPAEDIC SURGERY

## 2022-02-14 PROCEDURE — 3017F COLORECTAL CA SCREEN DOC REV: CPT | Performed by: ORTHOPAEDIC SURGERY

## 2022-02-14 PROCEDURE — 1123F ACP DISCUSS/DSCN MKR DOCD: CPT | Performed by: NURSE PRACTITIONER

## 2022-02-14 PROCEDURE — 1090F PRES/ABSN URINE INCON ASSESS: CPT | Performed by: NURSE PRACTITIONER

## 2022-02-14 NOTE — PROGRESS NOTES
This 74 y.o., left hand dominant retired woman is seen in referral for Lamar Kumar MD with a chief complaint of numbness and tingling of the left hand. Symptoms have been present for 6 months. The patient also frequently notices pain in the hand, wrist and arm, as well as finger stiffness,  weakness of  and swelling as well as difficulty performing functions which require fine touch. Symptoms are sometimes worse at night and can disturb sleep. The problem appears to recently have become worse. Conservative treatment has not been prescribed. There is no history of wrist fracture. There is no history and/or family history of diabetes. There is no history of thyroid disease. There is no family history of carpal tunnel syndrome. She has had 6 cervical operative procedures. In 2019 she had an EMG of the right upper extremity which showed moderately severe carpal tunnel syndrome(see below). The pain assessment has been reviewed and is correct as stated. The patient's social history, past medical history, family history, medications, allergies and review of systems, entered 2/14/22, have been reviewed, and dated and are recorded in the chart. On examination the patient is Height: 5' 2\" (157.5 cm) tall and weighs Weight: 105 lb (47.6 kg). Respirations are 18 per minute. The patient is well nourished, is oriented to time and place, demonstrates appropriate mood and affect as well as normal gait and station. Cervical range of motion is limited but does now produce upper extremity pain or paresthesias. There is soft tissue swelling involving the volar aspect of the left wrist.  There is severe left thenar muscle atrophy and intrinsic muscle atrophy. The Tinel sign is negative over the median nerve at the  carpal tunnel bilaterally and negative over the ulnar nerve at the elbow bilaterally. The Phalen test is negative bilaterally at 30 seconds.   There is hypalgesia, with associated dysesthesia, on sensory testing over the median nerve distribution. Two point discrimination is abnormal at the tip of the left middle finger. Range of motion of the fingers, thumbs and wrists is normal.  Skin is intact without lesions. Distal circulation is normal.  All joints are stable. Fine motor coordination and gross muscle strength are normal. Deep tendon reflexes are brisk and present bilaterally. There are no subcutaneous masses or enlarged epitrochlear lymph nodes. I have personally reviewed and interpreted all previous external imaging studies, laboratory tests(CMP,HbA1c, TSH), diagnostic procedures(EMG 12/31/19) and medical encounters pertinent to this patient's visit today. Impression:     Carpal tunnel syndrome, left, with clinical evidence of nerve damage. Possible cubital tunnel syndrome. Possible peripheral neuropathy. The nature of this medical problem is fully discussed with the patient, including all treatment options. All questions are answered. An EMG/NCS will be ordered and I will call the patient to discuss the results, when available. For now the patient is instructed to continue any treatment previously recommended.

## 2022-02-14 NOTE — PATIENT INSTRUCTIONS
Obtain electric blood pressure cuff and record daily blood pressures. Goal blood pressure is less than 130/80. Notify Cardiology if home BP remains >130/80 at home.

## 2022-02-23 ENCOUNTER — TELEPHONE (OUTPATIENT)
Dept: ORTHOPEDIC SURGERY | Age: 67
End: 2022-02-23

## 2022-03-04 ENCOUNTER — TELEPHONE (OUTPATIENT)
Dept: ORTHOPEDIC SURGERY | Age: 67
End: 2022-03-04

## 2022-03-04 NOTE — TELEPHONE ENCOUNTER
Spoke with patient and let her know that Parviz Damian is on vacation, but will be back Monday and he will call her then.

## 2022-03-04 NOTE — TELEPHONE ENCOUNTER
Test Results     Type of Test: EMG  Date of Test: 02/25/00  Patient Contact Number: [phone]  PT STATES SHE WOULD LIKE HER RESULTS.

## 2022-03-23 DIAGNOSIS — J43.9 PULMONARY EMPHYSEMA, UNSPECIFIED EMPHYSEMA TYPE (HCC): ICD-10-CM

## 2022-03-23 RX ORDER — GLYCOPYRROLATE AND FORMOTEROL FUMARATE 9; 4.8 UG/1; UG/1
AEROSOL, METERED RESPIRATORY (INHALATION)
Qty: 10.7 G | Refills: 5 | Status: SHIPPED | OUTPATIENT
Start: 2022-03-23 | End: 2022-10-21 | Stop reason: SDUPTHER

## 2022-04-04 ENCOUNTER — OFFICE VISIT (OUTPATIENT)
Dept: INTERNAL MEDICINE CLINIC | Age: 67
End: 2022-04-04
Payer: COMMERCIAL

## 2022-04-04 VITALS — DIASTOLIC BLOOD PRESSURE: 73 MMHG | SYSTOLIC BLOOD PRESSURE: 124 MMHG | OXYGEN SATURATION: 96 % | HEART RATE: 89 BPM

## 2022-04-04 DIAGNOSIS — M21.512 CLAW HAND OF LEFT UPPER EXTREMITY: Primary | ICD-10-CM

## 2022-04-04 DIAGNOSIS — E44.0 MODERATE PROTEIN-CALORIE MALNUTRITION (HCC): ICD-10-CM

## 2022-04-04 DIAGNOSIS — R94.131 ABNORMAL EMG: ICD-10-CM

## 2022-04-04 DIAGNOSIS — G37.9 DEMYELINATING DISORDER (HCC): ICD-10-CM

## 2022-04-04 DIAGNOSIS — M48.03 SPINAL STENOSIS OF CERVICOTHORACIC REGION: ICD-10-CM

## 2022-04-04 DIAGNOSIS — E43 UNSPECIFIED SEVERE PROTEIN-CALORIE MALNUTRITION (HCC): ICD-10-CM

## 2022-04-04 DIAGNOSIS — Z12.31 ENCOUNTER FOR SCREENING MAMMOGRAM FOR MALIGNANT NEOPLASM OF BREAST: ICD-10-CM

## 2022-04-04 LAB
FOLATE: >20 NG/ML (ref 4.78–24.2)
REASON FOR REJECTION: NORMAL
REJECTED TEST: NORMAL
VITAMIN B-12: 697 PG/ML (ref 211–911)
VITAMIN D 25-HYDROXY: 44.2 NG/ML

## 2022-04-04 PROCEDURE — 4040F PNEUMOC VAC/ADMIN/RCVD: CPT | Performed by: INTERNAL MEDICINE

## 2022-04-04 PROCEDURE — 1090F PRES/ABSN URINE INCON ASSESS: CPT | Performed by: INTERNAL MEDICINE

## 2022-04-04 PROCEDURE — 3017F COLORECTAL CA SCREEN DOC REV: CPT | Performed by: INTERNAL MEDICINE

## 2022-04-04 PROCEDURE — 99214 OFFICE O/P EST MOD 30 MIN: CPT | Performed by: INTERNAL MEDICINE

## 2022-04-04 PROCEDURE — 4004F PT TOBACCO SCREEN RCVD TLK: CPT | Performed by: INTERNAL MEDICINE

## 2022-04-04 PROCEDURE — G8427 DOCREV CUR MEDS BY ELIG CLIN: HCPCS | Performed by: INTERNAL MEDICINE

## 2022-04-04 PROCEDURE — 1123F ACP DISCUSS/DSCN MKR DOCD: CPT | Performed by: INTERNAL MEDICINE

## 2022-04-04 PROCEDURE — G8420 CALC BMI NORM PARAMETERS: HCPCS | Performed by: INTERNAL MEDICINE

## 2022-04-04 PROCEDURE — G8399 PT W/DXA RESULTS DOCUMENT: HCPCS | Performed by: INTERNAL MEDICINE

## 2022-04-04 ASSESSMENT — PATIENT HEALTH QUESTIONNAIRE - PHQ9
SUM OF ALL RESPONSES TO PHQ QUESTIONS 1-9: 0
1. LITTLE INTEREST OR PLEASURE IN DOING THINGS: 0
SUM OF ALL RESPONSES TO PHQ QUESTIONS 1-9: 0
SUM OF ALL RESPONSES TO PHQ9 QUESTIONS 1 & 2: 0
2. FEELING DOWN, DEPRESSED OR HOPELESS: 0
SUM OF ALL RESPONSES TO PHQ QUESTIONS 1-9: 0
SUM OF ALL RESPONSES TO PHQ QUESTIONS 1-9: 0

## 2022-04-04 NOTE — PROGRESS NOTES
Chief Complaint   Patient presents with    Other     f/u after visit with Dr. Berto Abraham       HPI: Here to follow-up again about her left hand problem with her second and third digits drawn up into a claw position. She states that this started after her most recent neck surgery in June 2020 and was affecting both hands initially but has improved now only affecting these 2. She denies pain but is having difficulty using the hand and since she is left-hand-dominant this is been a problem. She attempted an EMG but it was technically limited; however, it was read as showing a demyelinating condition. Dr. Kiera Naidu recommended that she see her neurosurgeon who did the most recent cervicothoracic spine surgery, as well as a neurologist.    I reviewed Dr. Ty Rowan notes and the EMG in detail with the patient today    Note that she has lost a little more weight and BMI is currently at 19.20. She states she has been sick with diarrhea and vomiting a couple of times since I last saw her. She is not using any nutritional supplements. Also sees rheumatologist Dr Piedad Bains, who has said this is not a rheumatologic problem. Medications reviewed and reconciled with what patient reports to be taking. /73   Pulse 89   LMP 08/11/1993 (Approximate)   SpO2 96%     Physical Exam  Overall appears cachectic and older than stated age  Left 2nd and 3rd digits hyperextended at  MCPs and contractured at IPs with very reduced ROM and increased atrophy of soft tissues between 2nd and 3rd metarcarpals left (very thin, bony appearance of hands). ASSESSMENT/PLAN: Pt received counseling and, if relevant, printed instructions for all symptoms listed in CC and HPI, as well as for all diagnoses listed below. 1. Claw hand of left upper extremity--since symptoms started after her most recent surgery I agree that going back to the neurosurgeon should be the next step and she has that scheduled for later this month.   However, given the demyelinating condition seen on the EMG I would like neurology to evaluate her as well. - SALLY Suarez MD, Neurology, [facility]    2. Demyelinating disorder (HCC)  - Von Dorsey MD, Neurology, [address] [place] [address]; Future  - Vitamin D 25 Hydroxy; Future  - VITAMIN B6; Future    3. Moderate protein-calorie malnutrition (HCC)--discussed that nutritional deficiencies may aggravate what other neurologic conditions are underlying her clawhand problem. I have encouraged her to increase her iron intake through dietary sources and we will check her B and D vitamins as well. - Vitamin B12 & Folate; Future  - Vitamin D 25 Hydroxy; Future  - VITAMIN B6; Future    4. Abnormal EMG  - SALLY Suarez MD, Neurology, [facility]    5. Spinal stenosis of cervicothoracic region    6. Encounter for screening mammogram for malignant neoplasm of breast  - ASIF DIGITAL SCREEN W OR WO CAD BILATERAL; Future      Problem List Items Addressed This Visit     Demyelinating disorder ([facility] [address]    Relevant Orders    SALLY Suarez MD, Neurology, [facility]    Vitamin B12 & Folate    Vitamin D 25 Hydroxy    VITAMIN B6    Moderate protein-calorie malnutrition ([facility] [address]    Relevant Orders    Vitamin B12 & Folate    Vitamin D 25 Hydroxy    VITAMIN B6    Claw hand of left upper extremity - Primary    Relevant Orders    SALLY Suarez MD, Neurology, [facility]    Abnormal EMG    Relevant Orders    SALLY Suarez MD, Neurology, [facility]    Spinal stenosis of cervicothoracic region      Other Visit Diagnoses     Encounter for screening mammogram for malignant neoplasm of breast        Relevant Orders    ASIF DIGITAL SCREEN W OR WO CAD BILATERAL            Return if symptoms worsen or fail to improve.

## 2022-04-05 ENCOUNTER — TELEPHONE (OUTPATIENT)
Dept: INTERNAL MEDICINE CLINIC | Age: 67
End: 2022-04-05

## 2022-04-05 NOTE — TELEPHONE ENCOUNTER
The lab called stating that the patients specimen was rejected due to it not being protected from sunlight when it was sent over to them. Stated lab doesn't need to be re ordered and pt. Can go to lab to have lab test redrawn. Spoke with pt and told her what happened and that she is able to walk in to get blood test re drawn in lab.

## 2022-04-12 LAB — VITAMIN B6: 180.2 NMOL/L (ref 20–125)

## 2022-04-21 ENCOUNTER — HOSPITAL ENCOUNTER (OUTPATIENT)
Dept: NON INVASIVE DIAGNOSTICS | Age: 67
Discharge: HOME | End: 2022-04-21
Payer: COMMERCIAL

## 2022-04-21 DIAGNOSIS — I50.22 CHRONIC SYSTOLIC HEART FAILURE (HCC): ICD-10-CM

## 2022-04-21 LAB
LV EF: 38 %
LVEF MODALITY: NORMAL

## 2022-04-21 PROCEDURE — 93306 TTE W/DOPPLER COMPLETE: CPT

## 2022-04-25 RX ORDER — SPIRONOLACTONE 25 MG/1
12.5 TABLET ORAL DAILY
Qty: 45 TABLET | Refills: 3 | Status: SHIPPED | OUTPATIENT
Start: 2022-04-25

## 2022-05-13 ENCOUNTER — HOSPITAL ENCOUNTER (OUTPATIENT)
Dept: WOMENS IMAGING | Age: 67
Discharge: HOME | End: 2022-05-13
Payer: COMMERCIAL

## 2022-05-13 DIAGNOSIS — Z12.31 ENCOUNTER FOR SCREENING MAMMOGRAM FOR MALIGNANT NEOPLASM OF BREAST: ICD-10-CM

## 2022-05-13 PROCEDURE — 77063 BREAST TOMOSYNTHESIS BI: CPT

## 2022-05-16 ENCOUNTER — OFFICE VISIT (OUTPATIENT)
Dept: CARDIOLOGY CLINIC | Age: 67
End: 2022-05-16
Payer: COMMERCIAL

## 2022-05-16 VITALS
BODY MASS INDEX: 18.11 KG/M2 | WEIGHT: 98.4 LBS | HEIGHT: 62 IN | HEART RATE: 83 BPM | SYSTOLIC BLOOD PRESSURE: 134 MMHG | DIASTOLIC BLOOD PRESSURE: 82 MMHG | OXYGEN SATURATION: 94 %

## 2022-05-16 DIAGNOSIS — I35.1 NONRHEUMATIC AORTIC VALVE INSUFFICIENCY: ICD-10-CM

## 2022-05-16 DIAGNOSIS — I34.0 NONRHEUMATIC MITRAL VALVE REGURGITATION: ICD-10-CM

## 2022-05-16 DIAGNOSIS — Z86.711 HISTORY OF PULMONARY EMBOLISM: ICD-10-CM

## 2022-05-16 DIAGNOSIS — I50.22 CHRONIC SYSTOLIC HEART FAILURE (HCC): Primary | ICD-10-CM

## 2022-05-16 DIAGNOSIS — F17.219 CIGARETTE NICOTINE DEPENDENCE WITH NICOTINE-INDUCED DISORDER: ICD-10-CM

## 2022-05-16 DIAGNOSIS — I25.10 CORONARY ARTERY DISEASE INVOLVING NATIVE CORONARY ARTERY OF NATIVE HEART WITHOUT ANGINA PECTORIS: ICD-10-CM

## 2022-05-16 PROCEDURE — 99214 OFFICE O/P EST MOD 30 MIN: CPT | Performed by: INTERNAL MEDICINE

## 2022-05-16 PROCEDURE — G8399 PT W/DXA RESULTS DOCUMENT: HCPCS | Performed by: INTERNAL MEDICINE

## 2022-05-16 PROCEDURE — 1123F ACP DISCUSS/DSCN MKR DOCD: CPT | Performed by: INTERNAL MEDICINE

## 2022-05-16 PROCEDURE — 4004F PT TOBACCO SCREEN RCVD TLK: CPT | Performed by: INTERNAL MEDICINE

## 2022-05-16 PROCEDURE — G8427 DOCREV CUR MEDS BY ELIG CLIN: HCPCS | Performed by: INTERNAL MEDICINE

## 2022-05-16 PROCEDURE — 1090F PRES/ABSN URINE INCON ASSESS: CPT | Performed by: INTERNAL MEDICINE

## 2022-05-16 PROCEDURE — G8419 CALC BMI OUT NRM PARAM NOF/U: HCPCS | Performed by: INTERNAL MEDICINE

## 2022-05-16 PROCEDURE — 3017F COLORECTAL CA SCREEN DOC REV: CPT | Performed by: INTERNAL MEDICINE

## 2022-05-16 PROCEDURE — 4040F PNEUMOC VAC/ADMIN/RCVD: CPT | Performed by: INTERNAL MEDICINE

## 2022-05-16 RX ORDER — FUROSEMIDE 20 MG/1
20 TABLET ORAL AS NEEDED
Qty: 60 TABLET | Refills: 3
Start: 2022-05-16

## 2022-05-16 NOTE — PATIENT INSTRUCTIONS
Patient Education        Fluid Restriction: Care Instructions  Your Care Instructions     A buildup of fluid in the body can cause low sodium levels in the blood. It may also cause symptoms such as swelling and pain. Your doctor may suggest that you limit liquids, including foods that contain a lot of liquid. Limiting liquidsis called fluid restriction. Keeping track of the amount of fluids you take in may help you feel better. Your doctor will tell you how much fluid you can have in a day. Follow-up care is a key part of your treatment and safety. Be sure to make and go to all appointments, and call your doctor if you are having problems. It's also a good idea to know your test results and keep alist of the medicines you take. How can you care for yourself at home?  Find a way of tracking the fluids you take in that works for you. Here are two methods you can try:  ? Write down how much you drink throughout the day. ? Keep a container filled with the amount of liquid allowed for the day. As you drink liquids during the day, such as a 6-ounce cup of coffee, pour that same amount out of the container. When the container is empty, you've had your liquid for the day.  Count any foods that will melt (such as ice cream, gelatin, or flavored ice treats) or liquid foods (such as soup) as part of your fluids for the day. Also count the liquid in canned fruits and vegetables as part of your daily intake, or drain them well before serving.  Space your liquids throughout the day. Then you won't be tempted to drink more than the amount your doctor recommends.  To relieve thirst without taking in extra water, try chewing gum, sucking on hard candy (sugarless if you have diabetes), or rinsing your mouth with water and spitting it out. Where can you learn more? Go to [email or web address] and sign in to your [employer] account.  Enter K609 in the Ampulse box to learn more about \"Fluid Restriction: Care Instructions. \"     If you do not have an account, please click on the \"Sign Up Now\" link. Current as of: January 10, 2022               Content Version: 13.2  © 2006-2022 Healthwise, Incorporated. Care instructions adapted under license by [facility] ([facility]). If you have questions about a medical condition or this instruction, always ask your healthcare professional. [address] any warranty or liability for your use of this information.

## 2022-05-16 NOTE — PROGRESS NOTES
[facility]      Cardiology Consult    Nadeem Higuera  1955    May 16, 2022    Referring Physician: Shelton Martinez MD  Reason for Referral: CHF    CC: \"I keep losing weight\"    HPI:  The patient is 74 y.o. female with a past medical history significant for hyperlipidemia, systolic heart failure, COPD, and mitral regurgitation who presents today for management of heart failure. She was initially 9/2021 while admitted for a PE. Her echo showed severe MR and an EF 40%. She followed with Kandi Siddiqui NP for outpatient management of heart failure. Her repeat echocardiogram showed her EF remains reduced at 35% which prompted closer follow up. Today, she denies any new cardiac sounding complaints. She endorses chronic dyspnea on exertion but denies any acute or progressive changes. She denies associated chest pains. She monitors her blood pressure at home and states it will typically average 130/70. She reports compliance with her medications but uncertain if she is taking the lasix daily or as needed. She denies any abnormal bleeding or bruising. She endorses unintentional weight loss and follows with [facility] for her prior lung cancer. Patient denies exertional chest pain/pressure, dyspnea at rest, worsening PAYTON, PND, orthopnea, palpitations, lightheadedness, LE edema, and syncope.      Past Medical History:   Diagnosis Date    Anxiety     Chronic headaches     CT done 4/2016    Colon polyp     COPD, mild ([address]     PFT 8/08 a smoker no symptoms-no meds    DDD (degenerative disc disease)     Depression     Fibromyalgia     Frequent UTI     Full dentures     Hallux valgus     Hearing loss in left ear 07/29/2011    HSV (herpes simplex virus) anogenital infection     leg    Hyperlipidemia     Lung nodule 2019    2 LLL nodules    Malignant neoplasm of left lung (HCC)     Menopausal state 1994    Occipital neuralgia     Osteoarthritis     Scoliosis     neck and back brace intermittently, based on pain    Smoker     Spinal stenosis     cane, walker    Unexplained weight loss     Patient states over 30# weight loss over 6 months and pcp is aware     Past Surgical History:   Procedure Laterality Date    BLADDER SUSPENSION  1988    BRAIN ANEURYSM SURGERY  2015    anterior communicating clipped    BUNIONECTOMY Left 08/23/2016    Ty    CATARACT REMOVAL Bilateral 2008    CERVICAL DISCECTOMY  2010    CERVICAL LAMINECTOMY  2003    Anterior approach, C4-7    CERVICAL LAMINECTOMY N/A 11/18/2016    Posterior, C2 - C5    CHOLECYSTECTOMY, LAPAROSCOPIC  2013    COLONOSCOPY      DILATION AND CURETTAGE OF UTERUS      LUMBAR LAMINECTOMY  2009    x2    SINUS SURGERY      x2    SPINAL FUSION  2009, 2012    post T12-L5 fusion and redone    THORACOSCOPY Left 3/25/2019    Dr. Yoder Graft. Alexander - thoracotomy w/takedown of adhesions, wedge excision LLL nodule#1, debulking LLL nodule #2, MSLND    TUBAL LIGATION      TUNNELED VENOUS PORT PLACEMENT Right 08/26/2019    Dr. Renaldo Farrell port     Family History   Problem Relation Age of Onset    Cancer Mother 43        breast    Cancer Brother 77        throat    Other Father         Macular Degeration     Social History     Tobacco Use    Smoking status: Current Some Day Smoker     Packs/day: 0.25     Years: 48.00     Pack years: 12.00     Types: Cigarettes     Last attempt to quit: 1/20/2019     Years since quitting: 3.3    Smokeless tobacco: Never Used    Tobacco comment: [facility] age 11, [facility] [address] [facility] [address] [facility];   Vaping Use    Vaping Use: Former    Substances: Always   Substance Use Topics    Alcohol use: Not Currently    Drug use: Not Currently     Types: Marijuana (Weed)       Allergies   Allergen Reactions    Adhesive Tape Dermatitis     Blisters on skin under tape or medication patches    Ibuprofen Nausea Only    Naproxen Nausea Only    Nsaids Other (See Comments)     GI upset    Vicodin [Hydrocodone-Acetaminophen] Nausea Only     headache     Current Outpatient Medications   Medication Sig Dispense Refill    spironolactone (ALDACTONE) 25 MG tablet Take 0.5 tablets by mouth daily 45 tablet 3    BEVESPI AEROSPHERE 9-4.8 MCG/ACT AERO INHALE TWO PUFFS BY MOUTH TWICE A DAY 10.7 g 5    lisinopril (PRINIVIL;ZESTRIL) 2.5 MG tablet Take 1 tablet by mouth daily 90 tablet 3    carvedilol (COREG) 3.125 MG tablet Take 1 tablet by mouth 2 times daily (with meals) 180 tablet 3    valACYclovir (VALTREX) 1 g tablet Take 1 tablet by mouth daily 90 tablet 3    fluticasone (FLONASE) 50 MCG/ACT nasal spray SHAKE AND SPRAY TWO SPRAYS IN EACH NOSTRIL ONCE DAILY 1 each 11    atorvastatin (LIPITOR) 40 MG tablet Take 1 tablet by mouth daily 90 tablet 3    gabapentin (NEURONTIN) 800 MG tablet Take 1 tablet by mouth 2 times daily for 180 days. 180 tablet 1    buPROPion (WELLBUTRIN XL) 300 MG extended release tablet       apixaban (ELIQUIS) 5 MG TABS tablet Take 1 tablet by mouth 2 times daily 180 tablet 3    methocarbamol (ROBAXIN) 750 MG tablet Take 750 mg by mouth as needed      oxyCODONE (OXY-IR) 30 MG immediate release tablet Take 30 mg by mouth 3 times daily as needed for Pain.       azelastine (ASTELIN) 0.1 % nasal spray 1 spray by Nasal route 2 times daily 1 each 11    albuterol sulfate  (90 Base) MCG/ACT inhaler INHALE TWO PUFFS BY MOUTH EVERY 6 HOURS AS NEEDED FOR WHEEZING OR SHORTNESS OF BREATH 1 Inhaler 5    ARIPiprazole (ABILIFY) 15 MG tablet Take 7.5 mg by mouth daily       levothyroxine (SYNTHROID) 50 MCG tablet Take 50 mcg by mouth Daily      OXcarbazepine (TRILEPTAL) 300 MG tablet Take 1 tablet by mouth Take 300 mg by mouth 2 times daily with additional half tab at bedtime      Multiple Vitamins-Minerals (THERAPEUTIC MULTIVITAMIN-MINERALS) tablet Take 1 tablet by mouth daily      Cranberry 125 MG TABS Take 1 tablet by mouth daily      Acetaminophen (TYLENOL) 325 MG CAPS Take 650 mg by mouth      Probiotic Product (PROBIOTIC DAILY PO) Take 1 tablet by mouth daily       mirtazapine (REMERON) 30 MG tablet Take 15 mg by mouth nightly [facility] 30 tablet     DULoxetine (CYMBALTA) 60 MG capsule Take 30 mg by mouth daily [facility] 30 capsule     furosemide (LASIX) 20 MG tablet Take 1 tablet by mouth daily (Patient not taking: Reported on 5/16/2022) 60 tablet 3     Current Facility-Administered Medications   Medication Dose Route Frequency Provider Last Rate Last Admin    triamcinolone acetonide (KENALOG-40) injection 40 mg  40 mg IntraMUSCular Once [employer], DO           Review of Systems:  · Constitutional: No unanticipated weight gain. There's been no change in energy level, sleep pattern, or activity level. No fevers, chills. · Eyes: No visual changes or diplopia. No scleral icterus. · ENT: No Headaches, hearing loss or vertigo. No mouth sores or sore throat. · Cardiovascular: as reviewed in HPI  · Respiratory: No cough or wheezing, no sputum production. No hemoptysis. · Gastrointestinal: No abdominal pain, appetite loss, blood in stools. No change in bowel or bladder habits. · Genitourinary: No dysuria, trouble voiding, or hematuria. · Musculoskeletal:  No gait disturbance, no joint complaints. · Integumentary: No rash or pruritis. · Neurological: No headache, diplopia, change in muscle strength, numbness or tingling. · Psychiatric: No anxiety or depression. · Endocrine: No temperature intolerance. No excessive thirst, fluid intake, or urination. No tremor. · Hematologic/Lymphatic: No abnormal bruising or bleeding, blood clots or swollen lymph nodes. · Allergic/Immunologic: No nasal congestion or hives.     Physical Exam:   /82   Pulse 83   Ht 5' 2\" (1.575 m)   Wt 98 lb 6.4 oz (44.6 kg)   LMP 08/11/1993 (Approximate)   SpO2 94%   BMI 18.00 kg/m²   Wt Readings from Last 3 Encounters:   05/16/22 98 lb 6.4 oz (44.6 kg)   02/14/22 105 lb (47.6 kg)   02/14/22 105 lb (47.6 kg) Constitutional: She is oriented to person, place, and time. She appears thin. In no acute distress. Head: Normocephalic and atraumatic. Pupils equal and round. Neck: Neck supple. No JVP or carotid bruit appreciated. No mass and no thyromegaly present. No lymphadenopathy present. Cardiovascular: Normal rate. Normal heart sounds. Exam reveals no gallop and no friction rub. No murmur heard. Pulmonary/Chest: Effort normal and breath sounds normal. No respiratory distress. She has no wheezes, rhonchi or rales. Abdominal: Soft, non-tender. Bowel sounds are normal. She exhibits no organomegaly, mass or bruit. Extremities: No edema. No cyanosis or clubbing. Pulses are 2+ radial/carotid bilaterally. Neurological: No gross cranial nerve deficit. Coordination normal.   Skin: Skin is warm and dry. There is no rash or diaphoresis. Psychiatric: She has a normal mood and affect. Her speech is normal and behavior is normal.     Lab Review:   FLP:    Lab Results   Component Value Date    TRIG 55 09/28/2021    HDL 55 09/28/2021    LDLCALC 72 09/28/2021    LABVLDL 11 09/28/2021     BUN/Creatinine:    Lab Results   Component Value Date    BUN 30 01/21/2022    CREATININE 0.8 01/21/2022     EKG: Personally interpreted. 9/26/2021. Sinus tachycardia. Possible left atrial enlargement. Left axis deviation. LVH with repolarization abnormality.     Echo: 9/27/2021. Personally interpreted. Overall left ventricular systolic function appears mild to moderately reduced. Ejection fraction is visually estimated to be 40-45% with diffuse hypokinesis. Mildly dilated left ventricle. Normal left ventricular wall thickness. The right ventricle is normal in size and function. The left atrium is mildly dilated. Severe mitral regurgitation. Mild aortic and tricuspid regurgitation. Estimated pulmonary artery systolic pressure is mildly elevated at 47 mmHg assuming a right atrial pressure of 8 mmHg.     Chest CT: 9/26/2021.  Moderate patient is in the contemplative stage.    8) Unintentional weight loss. Patient states that [facility] and PCP are aware. She believes she is scheduled for her next CT in about 3 months. Follow up in 3-4 weeks. Thank you very much for allowing me to participate in the care of your patient. Please do not hesitate to contact me if you have any questions. Sincerely,  Benjamín Solitario. Petr Singer, [address] [place], [place] [address] [address]  Ph: [phone]  Fax: [phone]    This note was scribed in the presence of Dr Petr Singer MD by Jackie Baldwin RN. Physician Attestation: The scribes documentation has been prepared under my direction and personally reviewed by me in its entirety. I confirm that the note above accurately reflects all work, treatment, procedures, and medical decision making performed by me. All portions of the note including but not limited to the chief complaint, history of present illness, physical exam, assessment and plan/medical decision making were personally reviewed, edited, and updated on the day of the visit.

## 2022-05-24 ENCOUNTER — OFFICE VISIT (OUTPATIENT)
Dept: PULMONOLOGY | Age: 67
End: 2022-05-24
Payer: COMMERCIAL

## 2022-05-24 VITALS
HEIGHT: 62 IN | DIASTOLIC BLOOD PRESSURE: 80 MMHG | SYSTOLIC BLOOD PRESSURE: 140 MMHG | HEART RATE: 70 BPM | WEIGHT: 101.4 LBS | TEMPERATURE: 97.4 F | BODY MASS INDEX: 18.66 KG/M2 | OXYGEN SATURATION: 99 %

## 2022-05-24 DIAGNOSIS — J44.9 COPD, MILD (HCC): ICD-10-CM

## 2022-05-24 DIAGNOSIS — J30.1 ALLERGIC RHINITIS DUE TO POLLEN, UNSPECIFIED SEASONALITY: Primary | ICD-10-CM

## 2022-05-24 PROCEDURE — 4004F PT TOBACCO SCREEN RCVD TLK: CPT | Performed by: INTERNAL MEDICINE

## 2022-05-24 PROCEDURE — 99214 OFFICE O/P EST MOD 30 MIN: CPT | Performed by: INTERNAL MEDICINE

## 2022-05-24 PROCEDURE — 1090F PRES/ABSN URINE INCON ASSESS: CPT | Performed by: INTERNAL MEDICINE

## 2022-05-24 PROCEDURE — G8399 PT W/DXA RESULTS DOCUMENT: HCPCS | Performed by: INTERNAL MEDICINE

## 2022-05-24 PROCEDURE — 3017F COLORECTAL CA SCREEN DOC REV: CPT | Performed by: INTERNAL MEDICINE

## 2022-05-24 PROCEDURE — 3023F SPIROM DOC REV: CPT | Performed by: INTERNAL MEDICINE

## 2022-05-24 PROCEDURE — 1123F ACP DISCUSS/DSCN MKR DOCD: CPT | Performed by: INTERNAL MEDICINE

## 2022-05-24 PROCEDURE — G8420 CALC BMI NORM PARAMETERS: HCPCS | Performed by: INTERNAL MEDICINE

## 2022-05-24 PROCEDURE — G8427 DOCREV CUR MEDS BY ELIG CLIN: HCPCS | Performed by: INTERNAL MEDICINE

## 2022-05-24 RX ORDER — IPRATROPIUM BROMIDE 21 UG/1
2 SPRAY, METERED NASAL 2 TIMES DAILY
Qty: 1 EACH | Refills: 11 | Status: SHIPPED | OUTPATIENT
Start: 2022-05-24

## 2022-05-24 NOTE — PROGRESS NOTES
REASON FOR CONSULTATION/CC:    Chief Complaint   Patient presents with    COPD    Follow-up        Consult at request of  Jalen Panchal MD     PCP: Jalen Panchal MD    HISTORY OF PRESENT ILLNESS: Hector Johns is a 74y.o. year old female with a history of copd who presents :           COPD  Bevespi and albuterol (rare)  No issues with cost or side effects     allergic rhinitis    Astelin   Flonase    Both bid  Increased sinus congestion. Zyrtec. Objective:   PHYSICAL EXAM:  Blood pressure (!) 140/80, pulse 70, temperature 97.4 °F (36.3 °C), temperature source Infrared, height 5' 2\" (1.575 m), weight 101 lb 6.4 oz (46 kg), last menstrual period 08/11/1993, SpO2 99 %, not currently breastfeeding.'  Gen: No distress. ENT:   Resp: No accessory muscle use. No crackles. No wheezes. No rhonchi. CV: Regular rate. Regular rhythm. No murmur or rub. No edema. Skin: Warm, dry, normal texture and turgor. No nodule on exposed extremities. M/S: No cyanosis. No clubbing. No joint deformity. Psych: Oriented x 3. No anxiety. Awake. Alert. Intact judgement and insight. Good Mood / Affect. Memory appears in tact        Data Reviewed:        Assessment:     ·     · Lung cancer  · CoPD   ·  allergic rhinitis       Plan:      Problem List Items Addressed This Visit     COPD, mild ([address]     Well-controlled with Breztri twice daily with albuterol as needed         Relevant Medications    ipratropium (ATROVENT) 0.03 % nasal spray    Allergic rhinitis - Primary      Despite using Astelin and Flonase continues to have increased sinus congestion drainage with postnasal drip leading to cough. Her technique with sinus sprays is was reviewed. We discussed options in regards to no additional therapy, additional sinus spray, additional oral medication and referral to specialist.  She would like to try a new sinus spray. Add ipratropium nasal twice daily.   She was advised that this is effective to

## 2022-05-24 NOTE — ASSESSMENT & PLAN NOTE
Despite using Astelin and Flonase continues to have increased sinus congestion drainage with postnasal drip leading to cough. Her technique with sinus sprays is was reviewed. We discussed options in regards to no additional therapy, additional sinus spray, additional oral medication and referral to specialist.  She would like to try a new sinus spray. Add ipratropium nasal twice daily. She was advised that this is effective to stop which ever sinus bradycardia she deems the least effective. She expressed understanding for this.

## 2022-06-06 DIAGNOSIS — I50.22 CHRONIC SYSTOLIC HEART FAILURE (HCC): ICD-10-CM

## 2022-06-06 LAB
ANION GAP SERPL CALCULATED.3IONS-SCNC: 13 MMOL/L (ref 3–16)
BUN BLDV-MCNC: 12 MG/DL (ref 7–20)
CALCIUM SERPL-MCNC: 9.1 MG/DL (ref 8.3–10.6)
CHLORIDE BLD-SCNC: 96 MMOL/L (ref 99–110)
CO2: 23 MMOL/L (ref 21–32)
CREAT SERPL-MCNC: 0.6 MG/DL (ref 0.6–1.2)
GFR AFRICAN AMERICAN: >60
GFR NON-AFRICAN AMERICAN: >60
GLUCOSE BLD-MCNC: 77 MG/DL (ref 70–99)
HCT VFR BLD CALC: 34.6 % (ref 36–48)
HEMOGLOBIN: 11.9 G/DL (ref 12–16)
MCH RBC QN AUTO: 34.5 PG (ref 26–34)
MCHC RBC AUTO-ENTMCNC: 34.4 G/DL (ref 31–36)
MCV RBC AUTO: 100.1 FL (ref 80–100)
PDW BLD-RTO: 14.5 % (ref 12.4–15.4)
PLATELET # BLD: 300 K/UL (ref 135–450)
PMV BLD AUTO: 7.1 FL (ref 5–10.5)
POTASSIUM SERPL-SCNC: 4 MMOL/L (ref 3.5–5.1)
RBC # BLD: 3.46 M/UL (ref 4–5.2)
SODIUM BLD-SCNC: 132 MMOL/L (ref 136–145)
WBC # BLD: 7.2 K/UL (ref 4–11)

## 2022-06-09 NOTE — PROGRESS NOTES
ANEURYSM SURGERY  2015    anterior communicating clipped    BUNIONECTOMY Left 08/23/2016    Ty    CATARACT REMOVAL Bilateral 2008    CERVICAL DISCECTOMY  2010    CERVICAL LAMINECTOMY  2003    Anterior approach, C4-7    CERVICAL LAMINECTOMY N/A 11/18/2016    Posterior, C2 - C5    CHOLECYSTECTOMY, LAPAROSCOPIC  2013    COLONOSCOPY      DILATION AND CURETTAGE OF UTERUS      LUMBAR LAMINECTOMY  2009    x2    SINUS SURGERY      x2    SPINAL FUSION  2009, 2012    post T12-L5 fusion and redone    THORACOSCOPY Left 3/25/2019    Dr. Imelda Johns. Alexander - thoracotomy w/takedown of adhesions, wedge excision LLL nodule#1, debulking LLL nodule #2, MSLND    TUBAL LIGATION      TUNNELED VENOUS PORT PLACEMENT Right 08/26/2019    Dr. Anabel Bazzi port     Family History   Problem Relation Age of Onset    Cancer Mother 43        breast    Cancer Brother 77        throat    Other Father         Macular Degeration     Social History     Tobacco Use    Smoking status: Current Some Day Smoker     Packs/day: 0.25     Years: 48.00     Pack years: 12.00     Types: Cigarettes     Last attempt to quit: 1/20/2019     Years since quitting: 3.3    Smokeless tobacco: Never Used    Tobacco comment: started age 11, down to [address] [place] [address] [address] a day;   Vaping Use    Vaping Use: Former    Substances: Always   Substance Use Topics    Alcohol use: Not Currently    Drug use: Not Currently     Types: Marijuana (Weed)       Allergies   Allergen Reactions    Adhesive Tape Dermatitis     Blisters on skin under tape or medication patches    Ibuprofen Nausea Only    Naproxen Nausea Only    Nsaids Other (See Comments)     GI upset    Vicodin [Hydrocodone-Acetaminophen] Nausea Only     headache     Current Outpatient Medications   Medication Sig Dispense Refill    ipratropium (ATROVENT) 0.03 % nasal spray 2 sprays by Nasal route 2 times daily 1 each 11    sacubitril-valsartan (ENTRESTO) 24-26 MG per tablet Take 1 tablet by mouth 2 times daily 60 tablet 3    furosemide (LASIX) 20 MG tablet Take 1 tablet by mouth as needed (worsening shortness of breath, weight gain) 60 tablet 3    spironolactone (ALDACTONE) 25 MG tablet Take 0.5 tablets by mouth daily 45 tablet 3    BEVESPI AEROSPHERE 9-4.8 MCG/ACT AERO INHALE TWO PUFFS BY MOUTH TWICE A DAY 10.7 g 5    carvedilol (COREG) 3.125 MG tablet Take 1 tablet by mouth 2 times daily (with meals) 180 tablet 3    valACYclovir (VALTREX) 1 g tablet Take 1 tablet by mouth daily 90 tablet 3    fluticasone (FLONASE) 50 MCG/ACT nasal spray SHAKE AND SPRAY TWO SPRAYS IN EACH NOSTRIL ONCE DAILY 1 each 11    atorvastatin (LIPITOR) 40 MG tablet Take 1 tablet by mouth daily 90 tablet 3    gabapentin (NEURONTIN) 800 MG tablet Take 1 tablet by mouth 2 times daily for 180 days. 180 tablet 1    buPROPion (WELLBUTRIN XL) 300 MG extended release tablet       apixaban (ELIQUIS) 5 MG TABS tablet Take 1 tablet by mouth 2 times daily 180 tablet 3    methocarbamol (ROBAXIN) 750 MG tablet Take 750 mg by mouth as needed      oxyCODONE (OXY-IR) 30 MG immediate release tablet Take 30 mg by mouth 3 times daily as needed for Pain.       azelastine (ASTELIN) 0.1 % nasal spray 1 spray by Nasal route 2 times daily 1 each 11    albuterol sulfate  (90 Base) MCG/ACT inhaler INHALE TWO PUFFS BY MOUTH EVERY 6 HOURS AS NEEDED FOR WHEEZING OR SHORTNESS OF BREATH 1 Inhaler 5    ARIPiprazole (ABILIFY) 15 MG tablet Take 7.5 mg by mouth daily       levothyroxine (SYNTHROID) 50 MCG tablet Take 50 mcg by mouth Daily      OXcarbazepine (TRILEPTAL) 300 MG tablet Take 1 tablet by mouth Take 300 mg by mouth 2 times daily with additional half tab at bedtime      Multiple Vitamins-Minerals (THERAPEUTIC MULTIVITAMIN-MINERALS) tablet Take 1 tablet by mouth daily      Cranberry 125 MG TABS Take 1 tablet by mouth daily      Acetaminophen (TYLENOL) 325 MG CAPS Take 650 mg by mouth      Probiotic Product (PROBIOTIC DAILY PO) Take 1 tablet by mouth daily       mirtazapine (REMERON) 30 MG tablet Take 15 mg by mouth nightly [facility] 30 tablet     DULoxetine (CYMBALTA) 60 MG capsule Take 60 mg by mouth daily [facility] 30 capsule      Current Facility-Administered Medications   Medication Dose Route Frequency Provider Last Rate Last Admin    triamcinolone acetonide (KENALOG-40) injection 40 mg  40 mg IntraMUSCular Once [employer], DO         Review of Systems:  · Constitutional: No unanticipated weight gain. There's been no change in energy level, sleep pattern, or activity level. No fevers, chills. · Eyes: No visual changes or diplopia. No scleral icterus. · ENT: No Headaches, hearing loss or vertigo. No mouth sores or sore throat. · Cardiovascular: as reviewed in HPI  · Respiratory: No cough or wheezing, no sputum production. No hemoptysis. · Gastrointestinal: No abdominal pain, appetite loss, blood in stools. No change in bowel or bladder habits. · Genitourinary: No dysuria, trouble voiding, or hematuria. · Musculoskeletal:  No gait disturbance, no joint complaints. · Integumentary: No rash or pruritis. · Neurological: No headache, diplopia, change in muscle strength, numbness or tingling. · Psychiatric: No anxiety or depression. · Endocrine: No temperature intolerance. No excessive thirst, fluid intake, or urination. No tremor. · Hematologic/Lymphatic: No abnormal bruising or bleeding, blood clots or swollen lymph nodes. · Allergic/Immunologic: No nasal congestion or hives. Physical Exam:   /80   Pulse 84   Ht 5' 2\" (1.575 m)   Wt 103 lb (46.7 kg)   LMP 08/11/1993 (Approximate)   SpO2 96%   BMI 18.84 kg/m²   Wt Readings from Last 3 Encounters:   06/13/22 103 lb (46.7 kg)   05/24/22 101 lb 6.4 oz (46 kg)   05/16/22 98 lb 6.4 oz (44.6 kg)     Constitutional: She is oriented to person, place, and time. She appears thin. In no acute distress. Head: Normocephalic and atraumatic. Pupils equal and round.   Neck: Neck supple. No JVP or carotid bruit appreciated. No mass and no thyromegaly present. No lymphadenopathy present. Cardiovascular: Normal rate. Normal heart sounds. Exam reveals no gallop and no friction rub. I-II/systolic murmur at the apex. Pulmonary/Chest: Effort normal and breath sounds normal. No respiratory distress. She has no wheezes, rhonchi or rales. Abdominal: Soft, non-tender. Bowel sounds are normal. She exhibits no organomegaly, mass or bruit. Extremities: No edema. No cyanosis or clubbing. Pulses are 2+ radial/carotid bilaterally. Neurological: No gross cranial nerve deficit. Coordination normal.   Skin: Skin is warm and dry. There is no rash or diaphoresis. Psychiatric: She has a normal mood and affect. Her speech is normal and behavior is normal.     Lab Review:   FLP:    Lab Results   Component Value Date    TRIG 55 09/28/2021    HDL 55 09/28/2021    LDLCALC 72 09/28/2021    LABVLDL 11 09/28/2021     BUN/Creatinine:    Lab Results   Component Value Date    BUN 12 06/06/2022    CREATININE 0.6 06/06/2022     EKG: Personally interpreted. 9/26/2021. Sinus tachycardia. Possible left atrial enlargement. Left axis deviation. LVH with repolarization abnormality.     Echo: 9/27/2021. Personally interpreted. Overall left ventricular systolic function appears mild to moderately reduced. Ejection fraction is visually estimated to be 40-45% with diffuse hypokinesis. Mildly dilated left ventricle. Normal left ventricular wall thickness. The right ventricle is normal in size and function. The left atrium is mildly dilated. Severe mitral regurgitation. Mild aortic and tricuspid regurgitation. Estimated pulmonary artery systolic pressure is mildly elevated at 47 mmHg assuming a right atrial pressure of 8 mmHg.     Chest CT: 9/26/2021. Moderate coronary artery calcification. New tiny embolus peripherally right middle lobe. Emphysema.     Stress test: 10/18/21   Small size moderate severity fixed apical defect most consistent with artifact. no evidence of ischemia. Reduced LV systolic function, EF calculated 37%. ( please correlate with echo). Echo: 4/21/22  Left ventricular cavity size is normal. Ejection fraction is moderately reduced visually estimated to be 35-40%. There is diffuse hypokinesis. Grade II diastolic dysfunction with elevated LV filling pressures. Mild to moderate mitral regurgitation. The left atrium is mildly dilated. Moderate aortic regurgitation. The right ventricle is normal in size and function. Assessment/Plan:   1) Chronic systolic and diastolic heart failure. EF 35%. Pt appears euvolemic today. Continue with medical therapy including B-blocker, Entresto 24-26 mg BID, and aldactone. Will increase Coreg to 6.25 mg BID and if tolerating will increase Entresto to 49-51 mg BID. Encouraged low sodium diet and monitor weights daily. Continue to titrate medications as tolerated and will repeat echocardiogram after maximal medical therapy. Discussed ICD referral if EF does not improve with medical therapy. Discussed addition of SGLT2i and repeat lab work at follow-up     2) Demand ischemia. History was not consistent with acute coronary syndrome. Patient with underlying CAD based on coronary artery calcifications. Stress test showed no reversible ischemia.    3) Mitral/aortic regurgitation. Mild to moderate per echo 4/2022. Will continue to monitor clinically and titrate CHF medications as tolerated. 4) History of PE. Currently on Eliquis 5 mg BID.      5) COPD/lung cancer. Chronic PAYTON that remains unchanged. Management per PCP and following with [facility]. 6) CAD. Based on coronary artery calcifications. Stress test showed no reversible ischemia. Continue statin therapy.    7) Nicotine Addiction. Encouraged smoking cessation but patient is in the contemplative stage.    8) Unintentional weight loss. [facility].   She believes she is scheduled for her next CT in about 3 months. Follow up in 4 weeks     Thank you very much for allowing me to participate in the care of your patient. Please do not hesitate to contact me if you have any questions. Sincerely,  Lillie Davila. Anne Bob, [address] [place] [address] [facility]  Ph: [phone]  Fax: [phone]    This note was scribed in the presence of Dr Anne Bob MD by Yoko Dobson RN. Physician Attestation: The scribes documentation has been prepared under my direction and personally reviewed by me in its entirety. I confirm that the note above accurately reflects all work, treatment, procedures, and medical decision making performed by me. All portions of the note including but not limited to the chief complaint, history of present illness, physical exam, assessment and plan/medical decision making were personally reviewed, edited, and updated on the day of the visit.

## 2022-06-09 NOTE — PATIENT INSTRUCTIONS
Patient Education        Learning About Coronary Artery Disease (CAD)  What is coronary artery disease? Coronary artery disease is a condition that occurs when plaque builds up in the arteries that bring oxygen-rich blood to your heart. Plaque is a fatty substance made of cholesterol, calcium, and other substances in the blood. Thisprocess is called hardening of the arteries, or atherosclerosis. What happens when you have coronary artery disease?  Plaque may narrow the coronary arteries. Narrowed arteries cause poor blood flow. This can lead to angina symptoms such as chest pain or discomfort. If blood flow is completely blocked, you could have a heart attack.  You can slow and reduce the risk of future problems by making changes in your lifestyle. These include quitting smoking and eating heart-healthy foods.  Treatment, along with changes in your lifestyle, can help you live a longer and healthier life. How can you prevent coronary artery disease?  Do not smoke. It may be the best thing you can do to prevent coronary artery disease. If you need help quitting, talk to your doctor about stop-smoking programs and medicines. These can increase your chances of quitting for good.  Be active. Try to do moderate activity at least 2½ hours a week. Or try to do vigorous activity at least 1¼ hours a week. You may want to walk or try other activities, such as running, swimming, cycling, or playing tennis or team sports.  Eat heart-healthy foods. Eat more fruits and vegetables and less food that contains saturated and trans fats. Limit alcohol, sodium, and sweets.  Stay at a healthy weight. Lose weight if you need to.  Manage other health problems such as diabetes, high blood pressure, and high cholesterol. How is coronary artery disease treated?  Your doctor will suggest that you make lifestyle changes.  For example, your doctor may ask you to eat healthy foods, quit smoking, lose extra weight, and be more active.  You will take medicines that help prevent a heart attack.  Your doctor may suggest a procedure to open narrowed or blocked arteries. This is called angioplasty. Or your doctor may suggest using healthy blood vessels to create detours around narrowed or blocked arteries. This is called bypass surgery. Follow-up care is a key part of your treatment and safety. Be sure to make and go to all appointments, and call your doctor if you are having problems. It's also a good idea to know your test results and keep alist of the medicines you take. Where can you learn more? Go to https://Crowdlinker.NXE. org and sign in to your [employer] account. Enter [phone] in the [employer] box to learn more about \"Learning About Coronary Artery Disease (CAD). \"     If you do not have an account, please click on the \"Sign Up Now\" link. Current as of: January 10, 2022               Content Version: 13.2  © 2006-2022 Healthwise, Incorporated. Care instructions adapted under license by [facility]. If you have questions about a medical condition or this instruction, always ask your healthcare professional. [place] [address] any warranty or liability for your use of this information.

## 2022-06-13 ENCOUNTER — OFFICE VISIT (OUTPATIENT)
Dept: CARDIOLOGY CLINIC | Age: 67
End: 2022-06-13
Payer: COMMERCIAL

## 2022-06-13 VITALS
HEIGHT: 62 IN | BODY MASS INDEX: 18.95 KG/M2 | WEIGHT: 103 LBS | SYSTOLIC BLOOD PRESSURE: 136 MMHG | OXYGEN SATURATION: 96 % | HEART RATE: 84 BPM | DIASTOLIC BLOOD PRESSURE: 80 MMHG

## 2022-06-13 DIAGNOSIS — I50.22 CHRONIC SYSTOLIC HEART FAILURE (HCC): Primary | ICD-10-CM

## 2022-06-13 DIAGNOSIS — Z86.711 HISTORY OF PULMONARY EMBOLISM: ICD-10-CM

## 2022-06-13 DIAGNOSIS — I35.1 NONRHEUMATIC AORTIC VALVE INSUFFICIENCY: ICD-10-CM

## 2022-06-13 DIAGNOSIS — I34.0 NONRHEUMATIC MITRAL VALVE REGURGITATION: ICD-10-CM

## 2022-06-13 DIAGNOSIS — I25.10 CORONARY ARTERY DISEASE INVOLVING NATIVE CORONARY ARTERY OF NATIVE HEART WITHOUT ANGINA PECTORIS: ICD-10-CM

## 2022-06-13 DIAGNOSIS — F17.219 CIGARETTE NICOTINE DEPENDENCE WITH NICOTINE-INDUCED DISORDER: ICD-10-CM

## 2022-06-13 PROCEDURE — 99214 OFFICE O/P EST MOD 30 MIN: CPT | Performed by: INTERNAL MEDICINE

## 2022-06-13 PROCEDURE — 1123F ACP DISCUSS/DSCN MKR DOCD: CPT | Performed by: INTERNAL MEDICINE

## 2022-06-13 PROCEDURE — 1090F PRES/ABSN URINE INCON ASSESS: CPT | Performed by: INTERNAL MEDICINE

## 2022-06-13 PROCEDURE — 3017F COLORECTAL CA SCREEN DOC REV: CPT | Performed by: INTERNAL MEDICINE

## 2022-06-13 PROCEDURE — 4004F PT TOBACCO SCREEN RCVD TLK: CPT | Performed by: INTERNAL MEDICINE

## 2022-06-13 PROCEDURE — G8427 DOCREV CUR MEDS BY ELIG CLIN: HCPCS | Performed by: INTERNAL MEDICINE

## 2022-06-13 PROCEDURE — G8420 CALC BMI NORM PARAMETERS: HCPCS | Performed by: INTERNAL MEDICINE

## 2022-06-13 PROCEDURE — G8399 PT W/DXA RESULTS DOCUMENT: HCPCS | Performed by: INTERNAL MEDICINE

## 2022-06-13 RX ORDER — CARVEDILOL 6.25 MG/1
6.25 TABLET ORAL 2 TIMES DAILY WITH MEALS
Qty: 60 TABLET | Refills: 3 | Status: SHIPPED | OUTPATIENT
Start: 2022-06-13 | End: 2022-08-30 | Stop reason: SDUPTHER

## 2022-06-14 ENCOUNTER — OFFICE VISIT (OUTPATIENT)
Dept: INTERNAL MEDICINE CLINIC | Age: 67
End: 2022-06-14
Payer: COMMERCIAL

## 2022-06-14 DIAGNOSIS — I10 ESSENTIAL HYPERTENSION: ICD-10-CM

## 2022-06-14 DIAGNOSIS — L98.9 SKIN LESION: Primary | ICD-10-CM

## 2022-06-14 DIAGNOSIS — G37.9 DEMYELINATING DISORDER (HCC): ICD-10-CM

## 2022-06-14 PROCEDURE — 1090F PRES/ABSN URINE INCON ASSESS: CPT | Performed by: INTERNAL MEDICINE

## 2022-06-14 PROCEDURE — 99213 OFFICE O/P EST LOW 20 MIN: CPT | Performed by: INTERNAL MEDICINE

## 2022-06-14 PROCEDURE — 1123F ACP DISCUSS/DSCN MKR DOCD: CPT | Performed by: INTERNAL MEDICINE

## 2022-06-14 PROCEDURE — G8420 CALC BMI NORM PARAMETERS: HCPCS | Performed by: INTERNAL MEDICINE

## 2022-06-14 PROCEDURE — 4004F PT TOBACCO SCREEN RCVD TLK: CPT | Performed by: INTERNAL MEDICINE

## 2022-06-14 PROCEDURE — G8427 DOCREV CUR MEDS BY ELIG CLIN: HCPCS | Performed by: INTERNAL MEDICINE

## 2022-06-14 PROCEDURE — 3017F COLORECTAL CA SCREEN DOC REV: CPT | Performed by: INTERNAL MEDICINE

## 2022-06-14 PROCEDURE — G8399 PT W/DXA RESULTS DOCUMENT: HCPCS | Performed by: INTERNAL MEDICINE

## 2022-06-14 NOTE — PROGRESS NOTES
Chief Complaint   Patient presents with    Skin Problem       HPI: c/o skin lesion (scheduled as \"bed sore\" but patient denies, is not immobile) on back x several weeks. Medications reviewed and reconciled with what patient reports to be taking. /88   Pulse 89   Ht 5' 2\" (1.575 m)   Wt 101 lb 6 oz (46 kg)   LMP 08/11/1993 (Approximate)   SpO2 95%   BMI 18.54 kg/m²     Physical Exam overall  In usual state of health  Note elevated blood pressure in office, pt states at home it is better  Has raised irregular melanotic 0.8 cm lesion on buttock    ASSESSMENT/PLAN: Pt received counseling and, if relevant, printed instructions for all symptoms listed in CC and HPI, as well as for all diagnoses listed below. 1. Skin lesion  - [facility] - Glennon Burkitt, MD, Dermatology, [place]    2. Demyelinating disorder ([facility] [address]    3. Essential hypertension      Problem List Items Addressed This Visit     Essential hypertension    Demyelinating disorder ([facility] [address]      Other Visit Diagnoses     Skin lesion    -  Primary    Relevant Orders    [facility] - Glennon Burkitt, MD, Dermatology, [place]            Return already scheduled.

## 2022-06-24 ENCOUNTER — TELEPHONE (OUTPATIENT)
Dept: INTERNAL MEDICINE CLINIC | Age: 67
End: 2022-06-24

## 2022-06-24 NOTE — TELEPHONE ENCOUNTER
PT STATES THE PLACE SHE WAS REFERRED TO FOR DERM DOESN'T ACCEPT HER INSURANCE, SHE IS WONDERING IF SHE CAN GET A NEW REFERRAL PLACED - SHE HAS [employer].

## 2022-06-28 VITALS
WEIGHT: 101.38 LBS | HEART RATE: 89 BPM | HEIGHT: 62 IN | SYSTOLIC BLOOD PRESSURE: 138 MMHG | OXYGEN SATURATION: 95 % | BODY MASS INDEX: 18.66 KG/M2 | DIASTOLIC BLOOD PRESSURE: 88 MMHG

## 2022-06-28 PROBLEM — F41.1 GENERALIZED ANXIETY DISORDER: Status: ACTIVE | Noted: 2021-05-10

## 2022-06-28 PROBLEM — I10 ESSENTIAL HYPERTENSION: Status: ACTIVE | Noted: 2022-04-05

## 2022-07-15 NOTE — PRE-PROCEDURE INSTRUCTIONS
[address]        Pre-Op Phone Call:     Patient Name: Sherrie Rodriguez     Telephone Information:   Mobile [phone]     Home phone:  [phone]    Surgery Time:   4:20 pm     Arrival Time:  3:05 pm      Left extended Message:  Yes     Message left with: Patient      Recent change in health status:  No       Remarks:        Electronically signed by:  Brooks Curling, RN at 7/15/2022 11:59 AM

## 2022-07-17 ENCOUNTER — PREP FOR PROCEDURE (OUTPATIENT)
Dept: OPHTHALMOLOGY | Age: 67
End: 2022-07-17

## 2022-07-17 RX ORDER — PHENYLEPHRINE HYDROCHLORIDE 25 MG/ML
1 SOLUTION/ DROPS OPHTHALMIC SEE ADMIN INSTRUCTIONS
Status: CANCELLED | OUTPATIENT
Start: 2022-07-17

## 2022-07-17 RX ORDER — BRIMONIDINE TARTRATE 2 MG/ML
1 SOLUTION/ DROPS OPHTHALMIC SEE ADMIN INSTRUCTIONS
Status: CANCELLED | OUTPATIENT
Start: 2022-07-17

## 2022-07-17 RX ORDER — PREDNISOLONE ACETATE 10 MG/ML
1 SUSPENSION/ DROPS OPHTHALMIC SEE ADMIN INSTRUCTIONS
Status: CANCELLED | OUTPATIENT
Start: 2022-07-17

## 2022-07-17 RX ORDER — TROPICAMIDE 10 MG/ML
1 SOLUTION/ DROPS OPHTHALMIC SEE ADMIN INSTRUCTIONS
Status: CANCELLED | OUTPATIENT
Start: 2022-07-17

## 2022-07-17 RX ORDER — SODIUM CHLORIDE 9 MG/ML
INJECTION, SOLUTION INTRAVENOUS PRN
Status: CANCELLED | OUTPATIENT
Start: 2022-07-17

## 2022-07-17 RX ORDER — SODIUM CHLORIDE 0.9 % (FLUSH) 0.9 %
5-40 SYRINGE (ML) INJECTION EVERY 12 HOURS SCHEDULED
Status: CANCELLED | OUTPATIENT
Start: 2022-07-17

## 2022-07-17 RX ORDER — SODIUM CHLORIDE 0.9 % (FLUSH) 0.9 %
5-40 SYRINGE (ML) INJECTION PRN
Status: CANCELLED | OUTPATIENT
Start: 2022-07-17

## 2022-07-18 ENCOUNTER — HOSPITAL ENCOUNTER (OUTPATIENT)
Dept: SURGERY | Age: 67
Discharge: HOME | End: 2022-07-18
Payer: COMMERCIAL

## 2022-07-18 VITALS — SYSTOLIC BLOOD PRESSURE: 169 MMHG | DIASTOLIC BLOOD PRESSURE: 90 MMHG

## 2022-07-18 DIAGNOSIS — M79.7 FIBROMYALGIA: ICD-10-CM

## 2022-07-18 PROCEDURE — 66821 AFTER CATARACT LASER SURGERY: CPT

## 2022-07-18 PROCEDURE — 6370000000 HC RX 637 (ALT 250 FOR IP): Performed by: OPHTHALMOLOGY

## 2022-07-18 RX ORDER — SODIUM CHLORIDE 0.9 % (FLUSH) 0.9 %
5-40 SYRINGE (ML) INJECTION EVERY 12 HOURS SCHEDULED
Status: DISCONTINUED | OUTPATIENT
Start: 2022-07-18 | End: 2022-07-19 | Stop reason: HOSPADM

## 2022-07-18 RX ORDER — SODIUM CHLORIDE 0.9 % (FLUSH) 0.9 %
5-40 SYRINGE (ML) INJECTION PRN
Status: DISCONTINUED | OUTPATIENT
Start: 2022-07-18 | End: 2022-07-19 | Stop reason: HOSPADM

## 2022-07-18 RX ORDER — TROPICAMIDE 10 MG/ML
1 SOLUTION/ DROPS OPHTHALMIC SEE ADMIN INSTRUCTIONS
Status: COMPLETED | OUTPATIENT
Start: 2022-07-18 | End: 2022-07-18

## 2022-07-18 RX ORDER — BRIMONIDINE TARTRATE 2 MG/ML
1 SOLUTION/ DROPS OPHTHALMIC SEE ADMIN INSTRUCTIONS
Status: COMPLETED | OUTPATIENT
Start: 2022-07-18 | End: 2022-07-18

## 2022-07-18 RX ORDER — PREDNISOLONE ACETATE 10 MG/ML
1 SUSPENSION/ DROPS OPHTHALMIC SEE ADMIN INSTRUCTIONS
Status: COMPLETED | OUTPATIENT
Start: 2022-07-18 | End: 2022-07-18

## 2022-07-18 RX ORDER — SODIUM CHLORIDE 9 MG/ML
INJECTION, SOLUTION INTRAVENOUS PRN
Status: DISCONTINUED | OUTPATIENT
Start: 2022-07-18 | End: 2022-07-19 | Stop reason: HOSPADM

## 2022-07-18 RX ORDER — PHENYLEPHRINE HCL 2.5 %
1 DROPS OPHTHALMIC (EYE) SEE ADMIN INSTRUCTIONS
Status: COMPLETED | OUTPATIENT
Start: 2022-07-18 | End: 2022-07-18

## 2022-07-18 RX ORDER — GABAPENTIN 800 MG/1
800 TABLET ORAL 2 TIMES DAILY
Qty: 180 TABLET | Refills: 1 | Status: SHIPPED | OUTPATIENT
Start: 2022-07-18 | End: 2023-01-14

## 2022-07-18 RX ADMIN — PHENYLEPHRINE HYDROCHLORIDE 1 DROP: 25 SOLUTION/ DROPS OPHTHALMIC at 15:20

## 2022-07-18 RX ADMIN — PHENYLEPHRINE HYDROCHLORIDE 1 DROP: 25 SOLUTION/ DROPS OPHTHALMIC at 15:15

## 2022-07-18 RX ADMIN — BRIMONIDINE TARTRATE 1 DROP: 2 SOLUTION OPHTHALMIC at 15:49

## 2022-07-18 RX ADMIN — TROPICAMIDE 1 DROP: 10 SOLUTION/ DROPS OPHTHALMIC at 15:15

## 2022-07-18 RX ADMIN — TROPICAMIDE 1 DROP: 10 SOLUTION/ DROPS OPHTHALMIC at 15:28

## 2022-07-18 RX ADMIN — TROPICAMIDE 1 DROP: 10 SOLUTION/ DROPS OPHTHALMIC at 15:20

## 2022-07-18 RX ADMIN — PREDNISOLONE ACETATE 1 DROP: 10 SUSPENSION/ DROPS OPHTHALMIC at 15:49

## 2022-07-18 RX ADMIN — PHENYLEPHRINE HYDROCHLORIDE 1 DROP: 25 SOLUTION/ DROPS OPHTHALMIC at 15:28

## 2022-07-18 NOTE — TELEPHONE ENCOUNTER
Patient calling requesting refill on Gabapentin three times a day previous PCP filled this   Requesting refill from Dr. Hernán Plascencia.    Pharmacy verified [facility]

## 2022-07-18 NOTE — OP NOTE
OPERATIVE NOTE    [facility]  [address]  [phone]      7/18/2022    Patient name: Ingrid Camarillo  YOB: 1955  MRN: [id number]         DATE OF OPERATION: 7/18/2022     SURGEON: Forest Oliveira MD  ASSISTANT(S): None           PREOPERATIVE DIAGNOSIS(ES): Posterior Capsule Opacification- Right eye  POSTOPERATIVE DIAGNOSIS(ES):  Same    OPERATION(S) PERFORMED: Posterior capsulotomy per YAG laser, Right eye    ESTIMATED BLOOD LOSS:  None  TYPE OF ANESTHESIA:  Proparacaine (Alcaine) 0.5% one drop to operative eye    NO SPECIMENS OBTAINED    INDICATIONS FOR PROCEDURE:  Patient complains of decrease vision in operative eye    DESCRIPTION OF PROCEDURE: The patient's operative eye was dilated with 1 drop of Tropicamide 1% opthalmic solution and 1 drop of 2.5% phenylephrine ophthalmic solution preoperatively. The patient was brought to the [facility] room. The operative eye was identified. A drop of Proparacaine was placed in the eye. The YAG Capsulotomy lens was placed on the operative eye. The YAG laser was applied and the posterior capsule was opened. The YAG Capsulotomy lens was removed. Alphagan P and Pred Forte 1% were given to the patient operatively in the PACU. The patient was discharged in good condition. ATTENDING ATTESTATION: I was present and scrubbed for the entire procedure.       ELECTRONICALLY SIGNED BY: Forest Oliveira MD, 7/18/2022 3:53 PM

## 2022-07-18 NOTE — DISCHARGE INSTRUCTIONS
[facility]   [address]   [phone]   Topical Eye Discharge Instructions    2022    Patient Name: Hieu Mayer  : 1955  MRN: [id number]    Post-Operative Instructions for Day of Laser Surgery    right     Follow up with Dr. Yoko Dunlap at his office tomorrow at appointment time already given. Do not drive today. Medication Instructions:     Pred Forte (shake bottle before each use) - 1 drop to operative eye 4 times a day  for 3 days.       Breakfast, Lunch, Dinner, Bedtime    Call Lars Dial for any concerns at [phone]

## 2022-07-18 NOTE — PROGRESS NOTES
Patient: Clarence Pathak  1955, 67 y.o./female    Ambulatory to laser room. Right eye marked and numbed by Dr. Jeff Britt. Laser procedure done and tolerated well by patient. Post  instructions given to [facility] by provider.       Power setting was 1.1 mJ        # of shots was 166    Total power was 0.16 J    Emili Schaefer RN

## 2022-07-29 ENCOUNTER — OFFICE VISIT (OUTPATIENT)
Dept: CARDIOLOGY CLINIC | Age: 67
End: 2022-07-29
Payer: COMMERCIAL

## 2022-07-29 ENCOUNTER — OFFICE VISIT (OUTPATIENT)
Dept: INTERNAL MEDICINE CLINIC | Age: 67
End: 2022-07-29
Payer: COMMERCIAL

## 2022-07-29 VITALS
BODY MASS INDEX: 18.11 KG/M2 | WEIGHT: 99 LBS | HEART RATE: 91 BPM | DIASTOLIC BLOOD PRESSURE: 75 MMHG | OXYGEN SATURATION: 95 % | SYSTOLIC BLOOD PRESSURE: 129 MMHG

## 2022-07-29 VITALS
BODY MASS INDEX: 18.18 KG/M2 | OXYGEN SATURATION: 95 % | WEIGHT: 98.8 LBS | SYSTOLIC BLOOD PRESSURE: 148 MMHG | HEART RATE: 86 BPM | DIASTOLIC BLOOD PRESSURE: 94 MMHG | HEIGHT: 62 IN

## 2022-07-29 DIAGNOSIS — F17.218 CIGARETTE NICOTINE DEPENDENCE WITH OTHER NICOTINE-INDUCED DISORDER: ICD-10-CM

## 2022-07-29 DIAGNOSIS — I35.1 NONRHEUMATIC AORTIC VALVE INSUFFICIENCY: ICD-10-CM

## 2022-07-29 DIAGNOSIS — C34.90 NON-SMALL CELL LUNG CANCER, UNSPECIFIED LATERALITY (HCC): ICD-10-CM

## 2022-07-29 DIAGNOSIS — L98.9 SKIN LESION: ICD-10-CM

## 2022-07-29 DIAGNOSIS — E44.0 MODERATE PROTEIN-CALORIE MALNUTRITION (HCC): ICD-10-CM

## 2022-07-29 DIAGNOSIS — Z86.711 HISTORY OF PULMONARY EMBOLISM: ICD-10-CM

## 2022-07-29 DIAGNOSIS — I25.10 CORONARY ARTERY DISEASE INVOLVING NATIVE CORONARY ARTERY OF NATIVE HEART WITHOUT ANGINA PECTORIS: ICD-10-CM

## 2022-07-29 DIAGNOSIS — I10 ESSENTIAL HYPERTENSION: ICD-10-CM

## 2022-07-29 DIAGNOSIS — E03.9 ACQUIRED HYPOTHYROIDISM: ICD-10-CM

## 2022-07-29 DIAGNOSIS — F33.0 MILD EPISODE OF RECURRENT MAJOR DEPRESSIVE DISORDER (HCC): ICD-10-CM

## 2022-07-29 DIAGNOSIS — G37.9 DEMYELINATING DISORDER (HCC): ICD-10-CM

## 2022-07-29 DIAGNOSIS — I50.22 CHRONIC SYSTOLIC CONGESTIVE HEART FAILURE (HCC): ICD-10-CM

## 2022-07-29 DIAGNOSIS — I10 ESSENTIAL HYPERTENSION: Primary | ICD-10-CM

## 2022-07-29 DIAGNOSIS — E78.00 HYPERCHOLESTEREMIA: ICD-10-CM

## 2022-07-29 DIAGNOSIS — I34.0 NONRHEUMATIC MITRAL VALVE REGURGITATION: ICD-10-CM

## 2022-07-29 DIAGNOSIS — M79.7 FIBROMYALGIA: ICD-10-CM

## 2022-07-29 DIAGNOSIS — I50.42 CHRONIC COMBINED SYSTOLIC AND DIASTOLIC CONGESTIVE HEART FAILURE (HCC): Primary | ICD-10-CM

## 2022-07-29 PROCEDURE — G8427 DOCREV CUR MEDS BY ELIG CLIN: HCPCS | Performed by: INTERNAL MEDICINE

## 2022-07-29 PROCEDURE — G8399 PT W/DXA RESULTS DOCUMENT: HCPCS | Performed by: INTERNAL MEDICINE

## 2022-07-29 PROCEDURE — 4004F PT TOBACCO SCREEN RCVD TLK: CPT | Performed by: INTERNAL MEDICINE

## 2022-07-29 PROCEDURE — 1123F ACP DISCUSS/DSCN MKR DOCD: CPT | Performed by: INTERNAL MEDICINE

## 2022-07-29 PROCEDURE — 99214 OFFICE O/P EST MOD 30 MIN: CPT | Performed by: INTERNAL MEDICINE

## 2022-07-29 PROCEDURE — G8419 CALC BMI OUT NRM PARAM NOF/U: HCPCS | Performed by: INTERNAL MEDICINE

## 2022-07-29 PROCEDURE — 1090F PRES/ABSN URINE INCON ASSESS: CPT | Performed by: INTERNAL MEDICINE

## 2022-07-29 PROCEDURE — 3017F COLORECTAL CA SCREEN DOC REV: CPT | Performed by: INTERNAL MEDICINE

## 2022-07-29 RX ORDER — MIRTAZAPINE 15 MG/1
15 TABLET, FILM COATED ORAL NIGHTLY
COMMUNITY
Start: 2022-07-22

## 2022-07-29 RX ORDER — METHYLPREDNISOLONE 4 MG/1
TABLET ORAL
COMMUNITY
Start: 2022-05-11 | End: 2022-07-29

## 2022-07-29 RX ORDER — LISINOPRIL 2.5 MG/1
2.5 TABLET ORAL
COMMUNITY
Start: 2021-09-30 | End: 2022-08-30

## 2022-07-29 RX ORDER — ANORECTAL OINTMENT 15.7; .44; 24; 20.6 G/100G; G/100G; G/100G; G/100G
OINTMENT TOPICAL 4 TIMES DAILY PRN
Qty: 1 EACH | Refills: 4 | Status: SHIPPED | OUTPATIENT
Start: 2022-07-29 | End: 2022-08-05

## 2022-07-29 NOTE — PROGRESS NOTES
[address]      Cardiology Consult    Nichol White  [birth year]    July 29, 2022    Referring Physician: Carlos Manuel Fleming MD  Reason for Referral: CHF    CC: \"Nothing new. \"     HPI:  The patient is 69 y.o. female with a past medical history significant for hyperlipidemia, systolic heart failure, COPD, and mitral regurgitation who presents today for management of heart failure. She was initially 9/2021 while admitted for a PE. Her echo showed severe MR and an EF 40%. She followed with Jose C Crain NP for outpatient management of heart failure. Her repeat echocardiogram showed her EF remains reduced at 35%. Today, she states overall she is doing well. She denies any new cardiac complaints. She denies any chest pains or worsening shortness of breath. She reports compliance with her medications and tolerating. She denies any abnormal bleeding or bruising. Patient denies exertional chest pain/pressure, dyspnea at rest, worsening PAYTON, PND, orthopnea, palpitations, lightheadedness, weight changes, changes in LE edema, and syncope.     Past Medical History:   Diagnosis Date    Anxiety     Chronic headaches     CT done 4/2016    Colon polyp     COPD, mild ([address]     PFT 8/08 a smoker no symptoms-no meds    DDD (degenerative disc disease)     Depression     Fibromyalgia     Frequent UTI     Full dentures     Hallux valgus     Hearing loss in left ear 07/29/2011    HSV (herpes simplex virus) anogenital infection     leg    Hyperlipidemia     Lung nodule 2019    2 LLL nodules    Malignant neoplasm of left lung (HCC)     Menopausal state 1994    Occipital neuralgia     Osteoarthritis     Scoliosis     neck and back brace intermittently, based on pain    Smoker     Spinal stenosis     cane, walker    Unexplained weight loss     Patient states over 30# weight loss over 6 months and pcp is aware     Past Surgical History:   Procedure Laterality Date    [address] [place] [address] ANEURYSM SURGERY  2015 anterior communicating clipped    BUNIONECTOMY Left 08/23/2016    Ty    CATARACT REMOVAL Bilateral 2008    CERVICAL DISCECTOMY  2010    CERVICAL LAMINECTOMY  2003    Anterior approach, C4-7    CERVICAL LAMINECTOMY N/A 11/18/2016    Posterior, C2 - C5    CHOLECYSTECTOMY, LAPAROSCOPIC  2013    COLONOSCOPY      DILATION AND CURETTAGE OF UTERUS      LUMBAR LAMINECTOMY  2009    x2    SINUS SURGERY      x2    SPINAL FUSION  2009, 2012    post T12-L5 fusion and redone    THORACOSCOPY Left 3/25/2019    Dr. Bhakti Pugh. Alexander - thoracotomy w/takedown of adhesions, wedge excision LLL nodule#1, debulking LLL nodule #2, MSLND    TUBAL LIGATION      TUNNELED VENOUS PORT PLACEMENT Right 08/26/2019    Dr. Alejo prather     Family History   Problem Relation Age of Onset    Cancer Mother 43        breast    Cancer Brother 77        throat    Other Father         Macular Degeration     Social History     Tobacco Use    Smoking status: Some Days     Packs/day: 0.25     Years: 48.00     Pack years: 12.00     Types: Cigarettes     Last attempt to quit: 1/20/2019     Years since quitting: 3.5    Smokeless tobacco: Never    Tobacco comments:     started age 11, down to [address] [place] [address] [address] a day;   Vaping Use    Vaping Use: Former    Substances: Always   Substance Use Topics    Alcohol use: Not Currently    Drug use: Not Currently     Types: Marijuana (Weed)       Allergies   Allergen Reactions    Adhesive Tape Dermatitis     Blisters on skin under tape or medication patches    Ibuprofen Nausea Only    Naproxen Nausea Only    Nsaids Other (See Comments)     GI upset    Vicodin [Hydrocodone-Acetaminophen] Nausea Only     headache     Current Outpatient Medications   Medication Sig Dispense Refill    gabapentin (NEURONTIN) 800 MG tablet Take 1 tablet by mouth in the morning and 1 tablet before bedtime. Do all this for 180 days.  180 tablet 1    carvedilol (COREG) 6.25 MG tablet Take 1 tablet by mouth 2 times daily (with meals) 60 tablet 3 sacubitril-valsartan (ENTRESTO) 49-51 MG per tablet Take 1 tablet by mouth 2 times daily 60 tablet 3    ipratropium (ATROVENT) 0.03 % nasal spray 2 sprays by Nasal route 2 times daily 1 each 11    furosemide (LASIX) 20 MG tablet Take 1 tablet by mouth as needed (worsening shortness of breath, weight gain) 60 tablet 3    spironolactone (ALDACTONE) 25 MG tablet Take 0.5 tablets by mouth daily 45 tablet 3    BEVESPI AEROSPHERE 9-4.8 MCG/ACT AERO INHALE TWO PUFFS BY MOUTH TWICE A DAY 10.7 g 5    valACYclovir (VALTREX) 1 g tablet Take 1 tablet by mouth daily 90 tablet 3    fluticasone (FLONASE) 50 MCG/ACT nasal spray SHAKE AND SPRAY TWO SPRAYS IN EACH NOSTRIL ONCE DAILY 1 each 11    buPROPion (WELLBUTRIN XL) 300 MG extended release tablet Take 300 mg by mouth every morning      apixaban (ELIQUIS) 5 MG TABS tablet Take 1 tablet by mouth 2 times daily 180 tablet 3    methocarbamol (ROBAXIN) 750 MG tablet Take 750 mg by mouth as needed      oxyCODONE (OXY-IR) 30 MG immediate release tablet Take 30 mg by mouth 3 times daily as needed for Pain.       azelastine (ASTELIN) 0.1 % nasal spray 1 spray by Nasal route 2 times daily 1 each 11    albuterol sulfate  (90 Base) MCG/ACT inhaler INHALE TWO PUFFS BY MOUTH EVERY 6 HOURS AS NEEDED FOR WHEEZING OR SHORTNESS OF BREATH 1 Inhaler 5    ARIPiprazole (ABILIFY) 15 MG tablet Take 7.5 mg by mouth daily       levothyroxine (SYNTHROID) 50 MCG tablet Take 50 mcg by mouth Daily      OXcarbazepine (TRILEPTAL) 300 MG tablet Take 1 tablet by mouth Take 300 mg by mouth 2 times daily with additional half tab at bedtime      Multiple Vitamins-Minerals (THERAPEUTIC MULTIVITAMIN-MINERALS) tablet Take 1 tablet by mouth daily      Acetaminophen 325 MG CAPS Take 650 mg by mouth      Probiotic Product (PROBIOTIC DAILY PO) Take 1 tablet by mouth daily       mirtazapine (REMERON) 30 MG tablet Take 15 mg by mouth nightly [facility] 30 tablet     DULoxetine (CYMBALTA) 60 MG capsule Take 60 mg by mouth daily [facility] 30 capsule     empagliflozin (JARDIANCE) 10 MG tablet Take 1 tablet by mouth in the morning. 90 tablet 5    atorvastatin (LIPITOR) 40 MG tablet Take 1 tablet by mouth daily 90 tablet 3     Current Facility-Administered Medications   Medication Dose Route Frequency Provider Last Rate Last Admin    triamcinolone acetonide (KENALOG-40) injection 40 mg  40 mg IntraMUSCular Once [employer], DO         Review of Systems:  Constitutional: No unanticipated weight gain. There's been no change in energy level, sleep pattern, or activity level. No fevers, chills. Eyes: No visual changes or diplopia. No scleral icterus. ENT: No Headaches, hearing loss or vertigo. No mouth sores or sore throat. Cardiovascular: as reviewed in HPI  Respiratory: No cough or wheezing, no sputum production. No hemoptysis. Gastrointestinal: No abdominal pain, appetite loss, blood in stools. No change in bowel or bladder habits. Genitourinary: No dysuria, trouble voiding, or hematuria. Musculoskeletal:  No gait disturbance, no joint complaints. Integumentary: No rash or pruritis. Neurological: No headache, diplopia, change in muscle strength, numbness or tingling. Psychiatric: No anxiety or depression. Endocrine: No temperature intolerance. No excessive thirst, fluid intake, or urination. No tremor. Hematologic/Lymphatic: No abnormal bruising or bleeding, blood clots or swollen lymph nodes. Allergic/Immunologic: No nasal congestion or hives. Physical Exam:   BP (!) 148/94   Pulse 86   Ht 5' 2\" (1.575 m)   Wt 98 lb 12.8 oz (44.8 kg)   LMP 08/11/1993 (Approximate)   SpO2 95%   BMI 18.07 kg/m²   Wt Readings from Last 3 Encounters:   07/29/22 98 lb 12.8 oz (44.8 kg)   06/14/22 101 lb 6 oz (46 kg)   06/13/22 103 lb (46.7 kg)     Constitutional: She is oriented to person, place, and time. She appears thin. In no acute distress. Head: Normocephalic and atraumatic. Pupils equal and round.   Neck: Neck Emphysema. Stress test: 10/18/21   Small size moderate severity fixed apical defect most consistent with artifact. no evidence of ischemia. Reduced LV systolic function, EF calculated 37%. ( please correlate with echo). Echo: 4/21/22  Left ventricular cavity size is normal. Ejection fraction is moderately reduced visually estimated to be 35-40%. There is diffuse hypokinesis. Grade II diastolic dysfunction with elevated LV filling pressures. Mild to moderate mitral regurgitation. The left atrium is mildly dilated. Moderate aortic regurgitation. The right ventricle is normal in size and function. Assessment/Plan:   1) Chronic systolic and diastolic heart failure. EF 35%. Pt appears euvolemic today. Continue with medical therapy including Entresto 49-51 mg BID, Coreg 6.25 mg BID, and aldactone. Will initiate Jardiance 10 mg daily. Encouraged low sodium diet and monitor weights daily. Continue to titrate medications as tolerated and will repeat echocardiogram after maximal medical therapy. Discussed ICD referral if EF does not improve with medical therapy. 2) Essential hypertension. Uncontrolled. Goal BP <130/80. Continue medical therapy and continue to titrate CHF therapies as tolerated. 3) Mitral/aortic regurgitation. Mild to moderate per echo 4/2022. Will continue to monitor clinically and titrate CHF medications as tolerated. 4) History of PE. Currently on Eliquis 5 mg BID. 5) COPD/lung cancer. Chronic PAYTON that remains unchanged. Management per PCP and following with [facility]. 6) CAD. Based on coronary artery calcifications. Stress test showed no reversible ischemia. Continue statin therapy. 7) Nicotine Addiction. Encouraged smoking cessation but patient is in the contemplative stage. 8) Unintentional weight loss. Patient states that [facility] and PCP are aware. She is scheduled for her next CT 8/8/22.      Follow up in     Thank you very much for allowing me to participate in the care of your patient. Please do not hesitate to contact me if you have any questions. Sincerely,  George Narayan. Velma Jasso, [address] [facility], [address] [place] [address] Spike Funes [facility], 1928–1983  Ph: [phone]  Fax: [phone]    This note was scribed in the presence of Dr Velma Jasso MD by Pipe Strange RN. Physician Attestation: The scribes documentation has been prepared under my direction and personally reviewed by me in its entirety. I confirm that the note above accurately reflects all work, treatment, procedures, and medical decision making performed by me. All portions of the note including but not limited to the chief complaint, history of present illness, physical exam, assessment and plan/medical decision making were personally reviewed, edited, and updated on the day of the visit.

## 2022-07-29 NOTE — PROGRESS NOTES
Chief Complaint   Patient presents with    Follow-up     6 mo. HPI: Here for htn followup and management of multiple chronic conditions as per the active problems list on chart, which I reviewed and updated with the patient today. States doing well with no new concerns except if noted below. Despite all that she is going through, she states she is doing as well as could be expected. She is still concerned about the skin lesion on the buttock fold medial right buttock. She states she could not get an appointment with dermatology for 6 months and is requesting any other options for sooner appointment    I have reviewed the chart notes available from myself and other providers. I have reviewed and addressed all active problems and created or updated the problems list in detail, as needed. No problem-specific Assessment & Plan notes found for this encounter.       I have extensively reviewed and reconciled the medication list, discontinued medications not taking or no longer appropriate, and updated the active meds list      Lab Results   Component Value Date    LABA1C 5.2 01/21/2022    LABA1C 5.4 02/08/2019    LABMICR YES 09/26/2021    LABMICR YES 03/20/2019    LABMICR Not Indicated 02/06/2019       Lab Results   Component Value Date     (L) 06/06/2022     01/21/2022     10/12/2021    K 4.0 06/06/2022    K 4.9 01/21/2022    K 4.2 10/12/2021    CL 96 (L) 06/06/2022     01/21/2022     10/12/2021    CO2 23 06/06/2022    CO2 24 01/21/2022    CO2 26 10/12/2021    BUN 12 06/06/2022    BUN 30 (H) 01/21/2022    BUN 20 10/12/2021    CREATININE 0.6 06/06/2022    CREATININE 0.8 01/21/2022    CREATININE 0.8 10/12/2021    GLUCOSE 77 06/06/2022    GLUCOSE 92 01/21/2022    GLUCOSE 109 (H) 10/12/2021    CALCIUM 9.1 06/06/2022    CALCIUM 10.0 01/21/2022    CALCIUM 9.0 10/12/2021       Lab Results   Component Value Date    CHOL 138 09/28/2021    CHOL 174 02/02/2018    CHOL 175 02/08/2017    TRIG 55 09/28/2021    TRIG 78 02/02/2018    TRIG 78 02/08/2017    HDL 55 09/28/2021    HDL 55 02/02/2018    HDL 59 02/08/2017    LDLCALC 72 09/28/2021    LDLCALC 103 (H) 02/02/2018    LDLCALC 100 (H) 02/08/2017       Lab Results   Component Value Date    ALT 13 01/21/2022    ALT 13 09/26/2021    ALT 10 02/06/2019    AST 37 01/21/2022    AST 47 (H) 09/26/2021    AST 20 02/06/2019       Lab Results   Component Value Date    TSH 2.36 12/10/2015    TSH 2.11 03/21/2013    TSH 1.37 09/10/2012       Lab Results   Component Value Date    WBC 7.2 06/06/2022    WBC 8.2 09/28/2021    WBC 8.7 09/27/2021    HGB 11.9 (L) 06/06/2022    HGB 10.9 (L) 09/28/2021    HGB 10.5 (L) 09/27/2021    HCT 34.6 (L) 06/06/2022    HCT 32.1 (L) 09/28/2021    HCT 31.4 (L) 09/27/2021    .1 (H) 06/06/2022    .0 09/28/2021    .3 (H) 09/27/2021     06/06/2022     09/28/2021     09/27/2021       Lab Results   Component Value Date    INR 0.97 08/26/2019    INR 0.93 11/14/2016    INR 1.0 03/15/2012        No results found for: PSA     No results found for: LABURIC          /75   Pulse 91   Wt 99 lb (44.9 kg)   LMP 08/11/1993 (Approximate)   SpO2 95%   BMI 18.11 kg/m²   Body mass index is 18.11 kg/m². Physical Exam  Constitutional:       General: She is not in acute distress. HENT:      Head: Normocephalic and atraumatic. Nose:      Comments: masked  Eyes:      General: No scleral icterus. Right eye: No discharge. Left eye: No discharge. Extraocular Movements: Extraocular movements intact. Conjunctiva/sclera: Conjunctivae normal.      Pupils: Pupils are equal, round, and reactive to light. Neck:      Thyroid: No thyromegaly. Vascular: No JVD. Trachea: No tracheal deviation. Cardiovascular:      Rate and Rhythm: Normal rate and regular rhythm. Heart sounds: Normal heart sounds. No murmur heard. No friction rub. No gallop.    Pulmonary: Effort: Pulmonary effort is normal. No respiratory distress. Breath sounds: Normal breath sounds. No wheezing or rales. Chest:      Chest wall: No tenderness. Abdominal:      General: Bowel sounds are normal. There is no distension. Palpations: Abdomen is soft. There is no mass. Tenderness: There is no abdominal tenderness. There is no guarding or rebound. Musculoskeletal:         General: No tenderness. Normal range of motion. Cervical back: Neck supple. Right lower leg: No edema. Left lower leg: No edema. Lymphadenopathy:      Cervical: No cervical adenopathy. Skin:     General: Skin is warm and dry. Coloration: Skin is not pale. Findings: Lesion (hyperkeratocic 0.9 cm round raised lesion on right medial buttock in crease) present. No erythema or rash. Neurological:      Mental Status: She is alert and oriented to person, place, and time. Mental status is at baseline. Cranial Nerves: No cranial nerve deficit. Motor: No abnormal muscle tone. Coordination: Coordination normal.      Deep Tendon Reflexes: Reflexes are normal and symmetric. Reflexes normal.   Psychiatric:         Mood and Affect: Mood normal.         Behavior: Behavior normal.         Thought Content: Thought content normal.         Judgment: Judgment normal.       ASSESSMENT AND PLANS:      Except as noted below, all chronic problems have been reviewed and are stable to continue medications or other therapy as previously documented in the patient's chart, with changes per orders or documentation below:        Assessment and Plan: Patient received counseling and, if relevant,printed instructions for all symptoms listed in CC and HPI, as well as for all diagnoses brought onto today's visit note below.  Typical counseling includes, but is not limited to, non pharmacologic measures to manage listed symptoms and conditions; appropriate use, risks and benefits for all prescribed medications; potential interactions between medications both prescribed and OTC; diet; exercise; healthy behaviors; and goalsetting to improve health. Pt.or responsible party was involved in shared decision making and had opportunity to have all questions answered. 1. Essential hypertension--controlled to continue meds    2. Fibromyalgia    3. Non-small cell lung cancer, unspecified laterality ([facility]    4. Demyelinating disorder ([facility]    5. Moderate protein-calorie malnutrition ([facility]    6. Acquired hypothyroidism    7. Mild episode of recurrent major depressive disorder ([facility]    8. Chronic systolic congestive heart failure ([facility]    9. Coronary artery disease involving native coronary artery of native heart without angina pectoris    10. Hypercholesteremia    11.  Skin lesion  - Lizz Bell MD, General Surgery, [facility]          Problem List       Fibromyalgia    Relevant Medications    mirtazapine (REMERON) 30 MG tablet    DULoxetine (CYMBALTA) 60 MG capsule    Acetaminophen 325 MG CAPS    OXcarbazepine (TRILEPTAL) 300 MG tablet    oxyCODONE (OXY-IR) 30 MG immediate release tablet    methocarbamol (ROBAXIN) 750 MG tablet    triamcinolone acetonide (KENALOG-40) injection 40 mg    buPROPion (WELLBUTRIN XL) 300 MG extended release tablet    gabapentin (NEURONTIN) 800 MG tablet    mirtazapine (REMERON) 15 MG tablet    Hypercholesteremia    Relevant Medications    apixaban (ELIQUIS) 5 MG TABS tablet    atorvastatin (LIPITOR) 40 MG tablet    spironolactone (ALDACTONE) 25 MG tablet    furosemide (LASIX) 20 MG tablet    carvedilol (COREG) 6.25 MG tablet    sacubitril-valsartan (ENTRESTO) 49-51 MG per tablet    lisinopril (PRINIVIL;ZESTRIL) 2.5 MG tablet    Essential hypertension - Primary    Mild episode of recurrent major depressive disorder (HCC)    Relevant Medications    mirtazapine (REMERON) 30 MG tablet    DULoxetine (CYMBALTA) 60 MG capsule    OXcarbazepine (TRILEPTAL) 300 MG tablet    ARIPiprazole (ABILIFY) 15 MG tablet    buPROPion (WELLBUTRIN XL) 300 MG extended release tablet    gabapentin (NEURONTIN) 800 MG tablet    mirtazapine (REMERON) 15 MG tablet    Non-small cell lung cancer (HCC)    Relevant Medications    Acetaminophen 325 MG CAPS    OXcarbazepine (TRILEPTAL) 300 MG tablet    oxyCODONE (OXY-IR) 30 MG immediate release tablet    triamcinolone acetonide (KENALOG-40) injection 40 mg    gabapentin (NEURONTIN) 800 MG tablet    Acquired hypothyroidism    Relevant Medications    levothyroxine (SYNTHROID) 50 MCG tablet    Coronary artery disease involving native coronary artery of native heart without angina pectoris    Relevant Medications    apixaban (ELIQUIS) 5 MG TABS tablet    atorvastatin (LIPITOR) 40 MG tablet    spironolactone (ALDACTONE) 25 MG tablet    furosemide (LASIX) 20 MG tablet    carvedilol (COREG) 6.25 MG tablet    sacubitril-valsartan (ENTRESTO) 49-51 MG per tablet    lisinopril (PRINIVIL;ZESTRIL) 2.5 MG tablet    Chronic systolic congestive heart failure (HCC)    Relevant Medications    apixaban (ELIQUIS) 5 MG TABS tablet    atorvastatin (LIPITOR) 40 MG tablet    spironolactone (ALDACTONE) 25 MG tablet    furosemide (LASIX) 20 MG tablet    carvedilol (COREG) 6.25 MG tablet    lisinopril (PRINIVIL;ZESTRIL) 2.5 MG tablet    Demyelinating disorder (HCC)    Moderate protein-calorie malnutrition ([address]     Orders Placed This Encounter   Procedures    Salina Read MD, General Surgery, [facility]     Referral Priority:   Routine     Referral Type:   Eval and Treat     Referral Reason:   Specialty Services Required     Referred to Provider:   Franco Carlson MD     Requested Specialty:   General Surgery     Number of Visits Requested:   1       I have reconciled the medications in chart with what patient reports to be taking, andreviewed action/ sideeffects and how to take any new medications. Patient/caregiver understands purpose and side effects.   A complete  list of medications was provided in their after-visit summary. Return in about 6 months (around 1/29/2023). Time basedbilling: I spent over 30 minutes with this patient, and as is the nature of primary care and typical for my extended visits, over 50 percent of this visit was spent on counseling and coordination ofcare.

## 2022-08-03 ENCOUNTER — OFFICE VISIT (OUTPATIENT)
Dept: SURGERY | Age: 67
End: 2022-08-03
Payer: COMMERCIAL

## 2022-08-03 VITALS
DIASTOLIC BLOOD PRESSURE: 76 MMHG | WEIGHT: 98.8 LBS | HEIGHT: 62 IN | BODY MASS INDEX: 18.18 KG/M2 | HEART RATE: 90 BPM | SYSTOLIC BLOOD PRESSURE: 132 MMHG

## 2022-08-03 DIAGNOSIS — L98.411 SKIN ULCER OF BUTTOCK, LIMITED TO BREAKDOWN OF SKIN (HCC): Primary | ICD-10-CM

## 2022-08-03 PROCEDURE — 99203 OFFICE O/P NEW LOW 30 MIN: CPT | Performed by: SURGERY

## 2022-08-03 PROCEDURE — G8419 CALC BMI OUT NRM PARAM NOF/U: HCPCS | Performed by: SURGERY

## 2022-08-03 PROCEDURE — 1090F PRES/ABSN URINE INCON ASSESS: CPT | Performed by: SURGERY

## 2022-08-03 PROCEDURE — G8427 DOCREV CUR MEDS BY ELIG CLIN: HCPCS | Performed by: SURGERY

## 2022-08-03 NOTE — PROGRESS NOTES
Subjective:      Patient ID: Greg Lam is a 69 y.o. female. HPI  Chief Complaint: skin lesion  Patient referred by Dr. Arianne Hines for evaluation of skin lesion. Patient reports symptoms of pain. Location of symptoms is right buttock. Symptoms were first noted 4 months ago. Alleviated by nothing. Symptoms aggravated by certain ways she sits. Has been applying PSO and prep H without relief. Previous evaluation includes exam by PCP. Patient has a history of chronic pain, HTN, hypothyroid, lung cancer, DM., PE on Eliquis Will plan following treatment: keep dry and see if heals. Otherwise consider excision if no better.         Past Medical History:   Diagnosis Date    Anxiety     Chronic headaches     CT done 4/2016    Colon polyp     COPD, mild ([address]     PFT 8/08 a smoker no symptoms-no meds    DDD (degenerative disc disease)     Depression     Fibromyalgia     Frequent UTI     Full dentures     Hallux valgus     Hearing loss in left ear 07/29/2011    HSV (herpes simplex virus) anogenital infection     leg    Hyperlipidemia     Lung nodule 2019    2 LLL nodules    Malignant neoplasm of left lung (HCC)     Menopausal state 1994    Occipital neuralgia     Osteoarthritis     Scoliosis     neck and back brace intermittently, based on pain    Smoker     Spinal stenosis     cane, walker    Unexplained weight loss     Patient states over 30# weight loss over 6 months and pcp is aware       Past Surgical History:   Procedure Laterality Date    [address] [place] [address] ANEURYSM SURGERY  2015    anterior communicating clipped    BUNIONECTOMY Left 08/23/2016    Ty    CATARACT REMOVAL Bilateral 2008    CERVICAL DISCECTOMY  2010    CERVICAL LAMINECTOMY  2003    Anterior approach, C4-7    CERVICAL LAMINECTOMY N/A 11/18/2016    Posterior, C2 - C5    CHOLECYSTECTOMY, LAPAROSCOPIC  2013    COLONOSCOPY      DILATION AND CURETTAGE OF UTERUS      LUMBAR LAMINECTOMY  2009    x2    SINUS SURGERY      x2    SPINAL FUSION  2009, 2012    post T12-L5 fusion and redone    THORACOSCOPY Left 3/25/2019    Dr. Bhakti Pugh. Alexander - thoracotomy w/takedown of adhesions, wedge excision LLL nodule#1, debulking LLL nodule #2, MSLND    TUBAL LIGATION      TUNNELED VENOUS PORT PLACEMENT Right 08/26/2019    Dr. Alejo prather       Current Outpatient Medications   Medication Sig Dispense Refill    empagliflozin (JARDIANCE) 10 MG tablet Take 1 tablet by mouth in the morning. 90 tablet 5    mirtazapine (REMERON) 15 MG tablet       lisinopril (PRINIVIL;ZESTRIL) 2.5 MG tablet 2.5 mg      menthol-zinc oxide (CALMOSEPTINE) 0.44-20.6 % OINT ointment Apply topically 4 times daily as needed (pain) Max 30 ml per day. 1 each 4    gabapentin (NEURONTIN) 800 MG tablet Take 1 tablet by mouth in the morning and 1 tablet before bedtime. Do all this for 180 days.  180 tablet 1    carvedilol (COREG) 6.25 MG tablet Take 1 tablet by mouth 2 times daily (with meals) 60 tablet 3    sacubitril-valsartan (ENTRESTO) 49-51 MG per tablet Take 1 tablet by mouth 2 times daily 60 tablet 3    ipratropium (ATROVENT) 0.03 % nasal spray 2 sprays by Nasal route 2 times daily 1 each 11    furosemide (LASIX) 20 MG tablet Take 1 tablet by mouth as needed (worsening shortness of breath, weight gain) 60 tablet 3    spironolactone (ALDACTONE) 25 MG tablet Take 0.5 tablets by mouth daily 45 tablet 3    BEVESPI AEROSPHERE 9-4.8 MCG/ACT AERO INHALE TWO PUFFS BY MOUTH TWICE A DAY 10.7 g 5    valACYclovir (VALTREX) 1 g tablet Take 1 tablet by mouth daily 90 tablet 3    fluticasone (FLONASE) 50 MCG/ACT nasal spray SHAKE AND SPRAY TWO SPRAYS IN EACH NOSTRIL ONCE DAILY 1 each 11    atorvastatin (LIPITOR) 40 MG tablet Take 1 tablet by mouth daily 90 tablet 3    buPROPion (WELLBUTRIN XL) 300 MG extended release tablet Take 300 mg by mouth every morning      apixaban (ELIQUIS) 5 MG TABS tablet Take 1 tablet by mouth 2 times daily 180 tablet 3    methocarbamol (ROBAXIN) 750 MG tablet Take 750 mg by mouth as needed      oxyCODONE (OXY-IR) 30 MG immediate release tablet Take 30 mg by mouth 3 times daily as needed for Pain. azelastine (ASTELIN) 0.1 % nasal spray 1 spray by Nasal route 2 times daily 1 each 11    albuterol sulfate  (90 Base) MCG/ACT inhaler INHALE TWO PUFFS BY MOUTH EVERY 6 HOURS AS NEEDED FOR WHEEZING OR SHORTNESS OF BREATH 1 Inhaler 5    ARIPiprazole (ABILIFY) 15 MG tablet Take 7.5 mg by mouth daily       levothyroxine (SYNTHROID) 50 MCG tablet Take 50 mcg by mouth Daily      OXcarbazepine (TRILEPTAL) 300 MG tablet Take 1 tablet by mouth Take 300 mg by mouth 2 times daily with additional half tab at bedtime      Multiple Vitamins-Minerals (THERAPEUTIC MULTIVITAMIN-MINERALS) tablet Take 1 tablet by mouth daily      Acetaminophen 325 MG CAPS Take 650 mg by mouth      Probiotic Product (PROBIOTIC DAILY PO) Take 1 tablet by mouth daily       mirtazapine (REMERON) 30 MG tablet Take 15 mg by mouth nightly [facility] 30 tablet     DULoxetine (CYMBALTA) 60 MG capsule Take 60 mg by mouth daily [facility] 30 capsule      Current Facility-Administered Medications   Medication Dose Route Frequency Provider Last Rate Last Admin    triamcinolone acetonide (KENALOG-40) injection 40 mg  40 mg IntraMUSCular Once [employer], DO           Prior to Admission medications    Medication Sig Start Date End Date Taking? Authorizing Provider   empagliflozin (JARDIANCE) 10 MG tablet Take 1 tablet by mouth in the morning. 7/29/22   Wayne Adorno MD   mirtazapine (REMERON) 15 MG tablet  7/22/22   Historical Provider, MD   lisinopril (PRINIVIL;ZESTRIL) 2.5 MG tablet 2.5 mg 9/30/21   Historical Provider, MD   menthol-zinc oxide (CALMOSEPTINE) 0.44-20.6 % OINT ointment Apply topically 4 times daily as needed (pain) Max 30 ml per day. 7/29/22 8/5/22  Maria Elena Bob MD   gabapentin (NEURONTIN) 800 MG tablet Take 1 tablet by mouth in the morning and 1 tablet before bedtime. Do all this for 180 days. 7/18/22 1/14/23  Brendan Barron MD   carvedilol (COREG) 6.25 MG tablet Take 1 tablet by mouth 2 times daily (with meals) 6/13/22   Deven Hampton MD   sacubitril-valsartan [facility]) 49-51 MG per tablet Take 1 tablet by mouth 2 times daily 6/13/22   Deven Hampton MD   ipratropium (ATROVENT) 0.03 % nasal spray 2 sprays by Nasal route 2 times daily 5/24/22   Josh Velasco MD   furosemide (LASIX) 20 MG tablet Take 1 tablet by mouth as needed (worsening shortness of breath, weight gain) 5/16/22   Deven Hampton MD   spironolactone (ALDACTONE) 25 MG tablet Take 0.5 tablets by mouth daily 4/25/22   Criselda Dumont APRN - CNP   BEVESPI AEROSPHERE 9-4.8 MCG/ACT AERO INHALE TWO PUFFS BY MOUTH TWICE A DAY 3/23/22   HEATHER Davis CNP   valACYclovir (VALTREX) 1 g tablet Take 1 tablet by mouth daily 1/28/22   Brendan Barron MD   fluticasone [facility]) 50 MCG/ACT nasal spray SHAKE AND SPRAY TWO SPRAYS IN EACH NOSTRIL ONCE DAILY 1/21/22   Brendan Barron MD   atorvastatin (LIPITOR) 40 MG tablet Take 1 tablet by mouth daily 1/21/22   Brendan Barron MD   buPROPion (WELLBUTRIN XL) 300 MG extended release tablet Take 300 mg by mouth every morning 11/17/21   Historical Provider, MD   apixaban (ELIQUIS) 5 MG TABS tablet Take 1 tablet by mouth 2 times daily 12/7/21 12/2/22  Criselda Dumont APRN - CNP   methocarbamol (ROBAXIN) 750 MG tablet Take 750 mg by mouth as needed    Historical Provider, MD   oxyCODONE (OXY-IR) 30 MG immediate release tablet Take 30 mg by mouth 3 times daily as needed for Pain.     Historical Provider, MD   azelastine (ASTELIN) 0.1 % nasal spray 1 spray by Nasal route 2 times daily 9/1/21   Josh Velasco MD   albuterol sulfate  (90 Base) MCG/ACT inhaler INHALE TWO PUFFS BY MOUTH EVERY 6 HOURS AS NEEDED FOR WHEEZING OR SHORTNESS OF BREATH 8/25/21   Josh Velasco MD   ARIPiprazole (ABILIFY) 15 MG tablet Take 7.5 mg by mouth daily     Historical Provider, MD levothyroxine (SYNTHROID) 50 MCG tablet Take 50 mcg by mouth Daily    Historical Provider, MD   OXcarbazepine (TRILEPTAL) 300 MG tablet Take 1 tablet by mouth Take 300 mg by mouth 2 times daily with additional half tab at bedtime 8/21/19   Clemencia Brownlee MD   Multiple Vitamins-Minerals (THERAPEUTIC MULTIVITAMIN-MINERALS) tablet Take 1 tablet by mouth daily    Historical Provider, MD   Acetaminophen 325 MG CAPS Take 650 mg by mouth    Historical Provider, MD   Probiotic Product (PROBIOTIC DAILY PO) Take 1 tablet by mouth daily     Historical Provider, MD   mirtazapine (REMERON) 30 MG tablet Take 15 mg by mouth nightly [facility] 2/25/15   Clemencia Brownlee MD   DULoxetine (CYMBALTA) 60 MG capsule Take 60 mg by mouth daily [address] [place] [address] 2/25/15   Clemencia Brownlee MD         Allergies   Allergen Reactions    Adhesive Tape Dermatitis     Blisters on skin under tape or medication patches    Ibuprofen Nausea Only    Naproxen Nausea Only    Nsaids Other (See Comments)     GI upset    Vicodin [Hydrocodone-Acetaminophen] Nausea Only     headache       Social History     Socioeconomic History    Marital status: Single     Spouse name: Not on file    Number of children: 4    Years of education: Not on file    Highest education level: Not on file   Occupational History    Occupation: disabled   Tobacco Use    Smoking status: Some Days     Packs/day: 0.25     Years: 48.00     Pack years: 12.00     Types: Cigarettes     Last attempt to quit: 1/20/2019     Years since quitting: 3.5    Smokeless tobacco: Never    Tobacco comments:     started age 11, down to [address] [place] [address] [address] a day;   Vaping Use    Vaping Use: Former    Substances: Always   Substance and Sexual Activity    Alcohol use: Not Currently    Drug use: Not Currently     Types: Marijuana Lucianne Bars)    Sexual activity: Never   Other Topics Concern    Not on file   Social History Narrative    Exercise:  never. Living will:  no,   additional information provided. Lives alone.     Seatbelt use: Always. Social Determinants of Health     Financial Resource Strain: Not on file   Food Insecurity: Not on file   Transportation Needs: Not on file   Physical Activity: Not on file   Stress: Not on file   Social Connections: Not on file   Intimate Partner Violence: Not on file   Housing Stability: Not on file       Family History   Problem Relation Age of Onset    Cancer Mother 43        breast    Cancer Brother 77        throat    Other Father         Macular Degeration       Review of Systems   Skin: Negative. All other systems reviewed and are negative. Objective:   Physical Exam  Vitals reviewed. Constitutional:       General: She is not in acute distress. Appearance: She is well-developed. She is not diaphoretic. HENT:      Head: Normocephalic and atraumatic. Right Ear: External ear normal.      Left Ear: External ear normal.      Nose: Nose normal.   Eyes:      General: No scleral icterus. Conjunctiva/sclera: Conjunctivae normal.   Pulmonary:      Effort: Pulmonary effort is normal. No respiratory distress. Abdominal:      General: There is no distension. Palpations: Abdomen is soft. Tenderness: There is no abdominal tenderness. Musculoskeletal:         General: Normal range of motion. Cervical back: Normal range of motion and neck supple. Skin:     General: Skin is warm and dry. Findings: No erythema. Comments: Small scabbed wound right superior gluteal cleft. No cellulitis or erythema     Neurological:      Mental Status: She is alert and oriented to person, place, and time. Psychiatric:         Behavior: Behavior normal.         Thought Content: Thought content normal.         Judgment: Judgment normal.       Assessment:       Diagnosis Orders   1.  Skin ulcer of buttock, limited to breakdown of skin ([address]                Plan:      Unclear etiology but she reports prolonged sitting and has pretty bony sacrum with thin overlying skin  Recommend D/C PSO and Prep H and allow to dry out  Off-load area  If fails to resolve then consider excision but biggest risks would be poor wound healing.     F/U 3-4 weeks if no better        Bill Clay MD

## 2022-08-08 ENCOUNTER — HOSPITAL ENCOUNTER (OUTPATIENT)
Dept: CT IMAGING | Age: 67
Discharge: HOME | End: 2022-08-08
Payer: COMMERCIAL

## 2022-08-08 DIAGNOSIS — C34.32 PRIMARY MALIGNANT NEOPLASM OF BRONCHUS OF LEFT LOWER LOBE (HCC): ICD-10-CM

## 2022-08-08 PROCEDURE — 71250 CT THORAX DX C-: CPT

## 2022-08-16 ENCOUNTER — HOSPITAL ENCOUNTER (OUTPATIENT)
Dept: PET IMAGING | Age: 67
Discharge: HOME | End: 2022-08-16
Payer: COMMERCIAL

## 2022-08-16 DIAGNOSIS — C34.90 MALIGNANT NEOPLASM OF LUNG, UNSPECIFIED LATERALITY, UNSPECIFIED PART OF LUNG (HCC): ICD-10-CM

## 2022-08-16 PROCEDURE — 78815 PET IMAGE W/CT SKULL-THIGH: CPT

## 2022-08-16 PROCEDURE — 3430000000 HC RX DIAGNOSTIC RADIOPHARMACEUTICAL: Performed by: INTERNAL MEDICINE

## 2022-08-16 PROCEDURE — A9552 F18 FDG: HCPCS | Performed by: INTERNAL MEDICINE

## 2022-08-16 RX ORDER — FLUDEOXYGLUCOSE F 18 200 MCI/ML
15.33 INJECTION, SOLUTION INTRAVENOUS
Status: COMPLETED | OUTPATIENT
Start: 2022-08-16 | End: 2022-08-16

## 2022-08-16 RX ADMIN — FLUDEOXYGLUCOSE F 18 15.33 MILLICURIE: 200 INJECTION, SOLUTION INTRAVENOUS at 13:07

## 2022-08-30 RX ORDER — CARVEDILOL 6.25 MG/1
6.25 TABLET ORAL 2 TIMES DAILY WITH MEALS
Qty: 180 TABLET | Refills: 3 | Status: SHIPPED | OUTPATIENT
Start: 2022-08-30

## 2022-09-02 ENCOUNTER — OFFICE VISIT (OUTPATIENT)
Dept: CARDIOLOGY CLINIC | Age: 67
End: 2022-09-02
Payer: COMMERCIAL

## 2022-09-02 VITALS
BODY MASS INDEX: 18.58 KG/M2 | SYSTOLIC BLOOD PRESSURE: 128 MMHG | DIASTOLIC BLOOD PRESSURE: 84 MMHG | OXYGEN SATURATION: 94 % | HEIGHT: 62 IN | WEIGHT: 101 LBS | HEART RATE: 82 BPM

## 2022-09-02 DIAGNOSIS — I50.42 CHRONIC COMBINED SYSTOLIC AND DIASTOLIC CONGESTIVE HEART FAILURE (HCC): ICD-10-CM

## 2022-09-02 DIAGNOSIS — I10 ESSENTIAL HYPERTENSION: ICD-10-CM

## 2022-09-02 DIAGNOSIS — I50.42 CHRONIC COMBINED SYSTOLIC AND DIASTOLIC CONGESTIVE HEART FAILURE (HCC): Primary | ICD-10-CM

## 2022-09-02 DIAGNOSIS — I34.0 NONRHEUMATIC MITRAL VALVE REGURGITATION: ICD-10-CM

## 2022-09-02 DIAGNOSIS — F17.219 CIGARETTE NICOTINE DEPENDENCE WITH NICOTINE-INDUCED DISORDER: ICD-10-CM

## 2022-09-02 DIAGNOSIS — I25.10 CORONARY ARTERY DISEASE INVOLVING NATIVE CORONARY ARTERY OF NATIVE HEART WITHOUT ANGINA PECTORIS: ICD-10-CM

## 2022-09-02 DIAGNOSIS — Z86.711 HISTORY OF PULMONARY EMBOLISM: ICD-10-CM

## 2022-09-02 DIAGNOSIS — I35.1 NONRHEUMATIC AORTIC VALVE INSUFFICIENCY: ICD-10-CM

## 2022-09-02 LAB
ANION GAP SERPL CALCULATED.3IONS-SCNC: 11 MMOL/L (ref 3–16)
BUN BLDV-MCNC: 20 MG/DL (ref 7–20)
CALCIUM SERPL-MCNC: 9.5 MG/DL (ref 8.3–10.6)
CHLORIDE BLD-SCNC: 101 MMOL/L (ref 99–110)
CO2: 27 MMOL/L (ref 21–32)
CREAT SERPL-MCNC: 0.7 MG/DL (ref 0.6–1.2)
GFR AFRICAN AMERICAN: >60
GFR NON-AFRICAN AMERICAN: >60
GLUCOSE BLD-MCNC: 82 MG/DL (ref 70–99)
POTASSIUM SERPL-SCNC: 4.8 MMOL/L (ref 3.5–5.1)
SODIUM BLD-SCNC: 139 MMOL/L (ref 136–145)

## 2022-09-02 PROCEDURE — 4004F PT TOBACCO SCREEN RCVD TLK: CPT | Performed by: INTERNAL MEDICINE

## 2022-09-02 PROCEDURE — G8427 DOCREV CUR MEDS BY ELIG CLIN: HCPCS | Performed by: INTERNAL MEDICINE

## 2022-09-02 PROCEDURE — 1090F PRES/ABSN URINE INCON ASSESS: CPT | Performed by: INTERNAL MEDICINE

## 2022-09-02 PROCEDURE — 3017F COLORECTAL CA SCREEN DOC REV: CPT | Performed by: INTERNAL MEDICINE

## 2022-09-02 PROCEDURE — 93000 ELECTROCARDIOGRAM COMPLETE: CPT | Performed by: INTERNAL MEDICINE

## 2022-09-02 PROCEDURE — G8419 CALC BMI OUT NRM PARAM NOF/U: HCPCS | Performed by: INTERNAL MEDICINE

## 2022-09-02 PROCEDURE — 1123F ACP DISCUSS/DSCN MKR DOCD: CPT | Performed by: INTERNAL MEDICINE

## 2022-09-02 PROCEDURE — G8399 PT W/DXA RESULTS DOCUMENT: HCPCS | Performed by: INTERNAL MEDICINE

## 2022-09-02 PROCEDURE — 99214 OFFICE O/P EST MOD 30 MIN: CPT | Performed by: INTERNAL MEDICINE

## 2022-09-02 NOTE — PROGRESS NOTES
Past Surgical History:   Procedure Laterality Date    BLADDER SUSPENSION  1988    BRAIN ANEURYSM SURGERY  2015    anterior communicating clipped    BUNIONECTOMY Left 08/23/2016    Ty    CATARACT REMOVAL Bilateral 2008    CERVICAL DISCECTOMY  2010    CERVICAL LAMINECTOMY  2003    Anterior approach, C4-7    CERVICAL LAMINECTOMY N/A 11/18/2016    Posterior, C2 - C5    CHOLECYSTECTOMY, LAPAROSCOPIC  2013    COLONOSCOPY      DILATION AND CURETTAGE OF UTERUS      LUMBAR LAMINECTOMY  2009    x2    SINUS SURGERY      x2    SPINAL FUSION  2009, 2012    post T12-L5 fusion and redone    THORACOSCOPY Left 3/25/2019    Dr. Rafat Crow. Alexander - thoracotomy w/takedown of adhesions, wedge excision LLL nodule#1, debulking LLL nodule #2, MSLND    TUBAL LIGATION      TUNNELED VENOUS PORT PLACEMENT Right 08/26/2019    Dr. Eaton Safe port     Family History   Problem Relation Age of Onset    Cancer Mother 43        breast    Cancer Brother 77        throat    Other Father         Macular Degeration     Social History     Tobacco Use    Smoking status: Some Days     Packs/day: 0.25     Years: 48.00     Pack years: 12.00     Types: Cigarettes     Last attempt to quit: 1/20/2019     Years since quitting: 3.6    Smokeless tobacco: Never    Tobacco comments:     started age 11, down to [address] [place] [address] [address] a day;   Vaping Use    Vaping Use: Former    Substances: Always   Substance Use Topics    Alcohol use: Not Currently    Drug use: Not Currently     Types: Marijuana (Weed)       Allergies   Allergen Reactions    Adhesive Tape Dermatitis     Blisters on skin under tape or medication patches    Ibuprofen Nausea Only    Naproxen Nausea Only    Nsaids Other (See Comments)     GI upset    Vicodin [Hydrocodone-Acetaminophen] Nausea Only     headache     Current Outpatient Medications   Medication Sig Dispense Refill    CRANBERRY-VITAMIN C PO Take by mouth daily 25/200mg      CALCIUM-VITAMIN D PO Take by mouth daily 1200mg/25mcg      sacubitril-valsartan (ENTRESTO) 49-51 MG per tablet Take 1 tablet by mouth 2 times daily 180 tablet 3    carvedilol (COREG) 6.25 MG tablet Take 1 tablet by mouth 2 times daily (with meals) 180 tablet 3    empagliflozin (JARDIANCE) 10 MG tablet Take 1 tablet by mouth in the morning. 90 tablet 5    mirtazapine (REMERON) 15 MG tablet Take 15 mg by mouth nightly      gabapentin (NEURONTIN) 800 MG tablet Take 1 tablet by mouth in the morning and 1 tablet before bedtime. Do all this for 180 days. 180 tablet 1    ipratropium (ATROVENT) 0.03 % nasal spray 2 sprays by Nasal route 2 times daily 1 each 11    furosemide (LASIX) 20 MG tablet Take 1 tablet by mouth as needed (worsening shortness of breath, weight gain) 60 tablet 3    spironolactone (ALDACTONE) 25 MG tablet Take 0.5 tablets by mouth daily 45 tablet 3    BEVESPI AEROSPHERE 9-4.8 MCG/ACT AERO INHALE TWO PUFFS BY MOUTH TWICE A DAY 10.7 g 5    valACYclovir (VALTREX) 1 g tablet Take 1 tablet by mouth daily 90 tablet 3    fluticasone (FLONASE) 50 MCG/ACT nasal spray SHAKE AND SPRAY TWO SPRAYS IN EACH NOSTRIL ONCE DAILY 1 each 11    atorvastatin (LIPITOR) 40 MG tablet Take 1 tablet by mouth daily 90 tablet 3    buPROPion (WELLBUTRIN XL) 300 MG extended release tablet Take 300 mg by mouth every morning      apixaban (ELIQUIS) 5 MG TABS tablet Take 1 tablet by mouth 2 times daily 180 tablet 3    methocarbamol (ROBAXIN) 750 MG tablet Take 750 mg by mouth as needed      oxyCODONE (OXY-IR) 30 MG immediate release tablet Take 30 mg by mouth 3 times daily as needed for Pain.       azelastine (ASTELIN) 0.1 % nasal spray 1 spray by Nasal route 2 times daily 1 each 11    albuterol sulfate  (90 Base) MCG/ACT inhaler INHALE TWO PUFFS BY MOUTH EVERY 6 HOURS AS NEEDED FOR WHEEZING OR SHORTNESS OF BREATH 1 Inhaler 5    ARIPiprazole (ABILIFY) 15 MG tablet Take 7.5 mg by mouth daily       levothyroxine (SYNTHROID) 50 MCG tablet Take 50 mcg by mouth Daily      OXcarbazepine (TRILEPTAL) 300 MG tablet Take 300 mg by mouth in the morning, at noon, and at bedtime      Multiple Vitamins-Minerals (THERAPEUTIC MULTIVITAMIN-MINERALS) tablet Take 1 tablet by mouth daily      Acetaminophen 325 MG CAPS Take 650 mg by mouth      Probiotic Product (PROBIOTIC DAILY PO) Take 1 tablet by mouth daily       DULoxetine (CYMBALTA) 60 MG capsule Take 120 mg by mouth daily [facility] 30 capsule      Current Facility-Administered Medications   Medication Dose Route Frequency Provider Last Rate Last Admin    triamcinolone acetonide (KENALOG-40) injection 40 mg  40 mg IntraMUSCular Once [employer], DO         Review of Systems:  Constitutional: No unanticipated weight gain. There's been no change in energy level, sleep pattern, or activity level. No fevers, chills. Eyes: No visual changes or diplopia. No scleral icterus. ENT: No Headaches, hearing loss or vertigo. No mouth sores or sore throat. Cardiovascular: as reviewed in HPI  Respiratory: No cough or wheezing, no sputum production. No hemoptysis. Gastrointestinal: No abdominal pain, appetite loss, blood in stools. No change in bowel or bladder habits. Genitourinary: No dysuria, trouble voiding, or hematuria. Musculoskeletal:  No gait disturbance, no joint complaints. Integumentary: No rash or pruritis. Neurological: No headache, diplopia, change in muscle strength, numbness or tingling. Psychiatric: No anxiety or depression. Endocrine: No temperature intolerance. No excessive thirst, fluid intake, or urination. No tremor. Hematologic/Lymphatic: No abnormal bruising or bleeding, blood clots or swollen lymph nodes. Allergic/Immunologic: No nasal congestion or hives.     Physical Exam:   /84   Pulse 82   Ht 5' 2\" (1.575 m)   Wt 101 lb (45.8 kg)   LMP 08/11/1993 (Approximate)   SpO2 94%   BMI 18.47 kg/m²   Wt Readings from Last 3 Encounters:   09/02/22 101 lb (45.8 kg)   08/03/22 98 lb 12.8 oz (44.8 kg)   07/29/22 99 lb (44.9 kg)     Constitutional: She is oriented to person, place, and time. She appears thin. In no acute distress. Head: Normocephalic and atraumatic. Pupils equal and round. Neck: Neck supple. No JVP or carotid bruit appreciated. No mass and no thyromegaly present. No lymphadenopathy present. Cardiovascular: Normal rate. Normal heart sounds. Exam reveals no gallop and no friction rub. I-II/systolic murmur at the apex. Pulmonary/Chest: Effort normal and breath sounds normal. No respiratory distress. She has no wheezes, rhonchi or rales. Abdominal: Soft, non-tender. Bowel sounds are normal. She exhibits no organomegaly, mass or bruit. Extremities: No edema. No cyanosis or clubbing. Pulses are 2+ radial/carotid bilaterally. Neurological: No gross cranial nerve deficit. Coordination normal.   Skin: Skin is warm and dry. There is no rash or diaphoresis. Psychiatric: She has a normal mood and affect. Her speech is normal and behavior is normal.     Lab Review:   FLP:    Lab Results   Component Value Date/Time    TRIG 55 09/28/2021 06:29 AM    HDL 55 09/28/2021 06:29 AM    LDLCALC 72 09/28/2021 06:29 AM    LABVLDL 11 09/28/2021 06:29 AM     BUN/Creatinine:    Lab Results   Component Value Date/Time    BUN 12 06/06/2022 01:19 PM    CREATININE 0.6 06/06/2022 01:19 PM     EKG: Personally interpreted. 9/26/2021. Sinus tachycardia. Possible left atrial enlargement. Left axis deviation. LVH with repolarization abnormality. 9/2/22 Sinus rhythm. Incomplete right bundle branch block. Left anterior fascicular block. Echo: 9/27/2021. Personally interpreted. Overall left ventricular systolic function appears mild to moderately reduced. Ejection fraction is visually estimated to be 40-45% with diffuse hypokinesis. Mildly dilated left ventricle. Normal left ventricular wall thickness. The right ventricle is normal in size and function. The left atrium is mildly dilated. Severe mitral regurgitation. Mild aortic and tricuspid regurgitation. Estimated pulmonary artery systolic pressure is mildly elevated at 47 mmHg assuming a right atrial pressure of 8 mmHg. Chest CT: 9/26/2021. Moderate coronary artery calcification. New tiny embolus peripherally right middle lobe. Emphysema. Stress test: 10/18/21   Small size moderate severity fixed apical defect most consistent with artifact. no evidence of ischemia. Reduced LV systolic function, EF calculated 37%. ( please correlate with echo). Echo: 4/21/22  Left ventricular cavity size is normal. Ejection fraction is moderately reduced visually estimated to be 35-40%. There is diffuse hypokinesis. Grade II diastolic dysfunction with elevated LV filling pressures. Mild to moderate mitral regurgitation. The left atrium is mildly dilated. Moderate aortic regurgitation. The right ventricle is normal in size and function. Assessment/Plan:   1) Chronic systolic and diastolic heart failure. EF 35%. Pt appears euvolemic today. Continue with medical therapy including Entresto 49-51 mg BID, Jardiance 10 mg daily, Coreg 6.25 mg BID, and aldactone. Encouraged low sodium diet and monitor weights daily. Will repeat echocardiogram as she is on maximal medical therapy. Discussed ICD referral if EF does not improve with medical therapy. 2) Essential hypertension. Controlled. Goal BP <130/80. Continue medical therapy. 3) Mitral/aortic regurgitation. Mild to moderate per echo 4/2022. Will continue to monitor clinically. 4) History of PE. Currently on Eliquis 5 mg BID. 5) COPD/lung cancer. Chronic PAYTON that remains unchanged. Management per PCP and following with [facility]. 6) CAD. Based on coronary artery calcifications. Stress test showed no reversible ischemia. Continue statin therapy. 7) Nicotine Addiction. Encouraged smoking cessation but patient is in the contemplative stage. Follow up in 3-4 months     Thank you very much for allowing me to participate in the care of your patient. Please do not hesitate to contact me if you have any questions. Sincerely,  Prem Sebastian. Petrona Mclean, [address] [facility], [address] [place] [address], Spike Burnett [facility]  Ph: [phone]  Fax: [phone]    This note was scribed in the presence of Dr Petrona Mclean MD by Vladislav Veloz RN. Physician Attestation: The scribes documentation has been prepared under my direction and personally reviewed by me in its entirety. I confirm that the note above accurately reflects all work, treatment, procedures, and medical decision making performed by me. All portions of the note including but not limited to the chief complaint, history of present illness, physical exam, assessment and plan/medical decision making were personally reviewed, edited, and updated on the day of the visit.

## 2022-09-13 ENCOUNTER — TELEPHONE (OUTPATIENT)
Dept: CARDIOLOGY CLINIC | Age: 67
End: 2022-09-13

## 2022-09-13 NOTE — TELEPHONE ENCOUNTER
Discussed with Dr. Floyd Mccabe. UTI is more common with Jardiance than a yeast infection and he would like for her to remain on this medication. She can treat with OTC medications or be seen by her PCP. Please call to discuss.

## 2022-09-16 ENCOUNTER — OFFICE VISIT (OUTPATIENT)
Dept: INTERNAL MEDICINE CLINIC | Age: 67
End: 2022-09-16
Payer: COMMERCIAL

## 2022-09-16 VITALS
BODY MASS INDEX: 18.77 KG/M2 | DIASTOLIC BLOOD PRESSURE: 65 MMHG | OXYGEN SATURATION: 98 % | HEIGHT: 62 IN | SYSTOLIC BLOOD PRESSURE: 118 MMHG | WEIGHT: 102 LBS | HEART RATE: 79 BPM

## 2022-09-16 DIAGNOSIS — F11.90 CHRONIC, CONTINUOUS USE OF OPIOIDS: ICD-10-CM

## 2022-09-16 DIAGNOSIS — G89.29 CHRONIC NECK PAIN: ICD-10-CM

## 2022-09-16 DIAGNOSIS — R30.0 DYSURIA: ICD-10-CM

## 2022-09-16 DIAGNOSIS — G89.29 CHRONIC MIDLINE LOW BACK PAIN WITHOUT SCIATICA: Primary | ICD-10-CM

## 2022-09-16 DIAGNOSIS — M54.50 CHRONIC MIDLINE LOW BACK PAIN WITHOUT SCIATICA: Primary | ICD-10-CM

## 2022-09-16 DIAGNOSIS — M54.16 LUMBAR RADICULOPATHY: ICD-10-CM

## 2022-09-16 DIAGNOSIS — M62.830 BACK MUSCLE SPASM: ICD-10-CM

## 2022-09-16 DIAGNOSIS — Z79.899 MEDICAL MARIJUANA USE: ICD-10-CM

## 2022-09-16 DIAGNOSIS — M15.9 PRIMARY OSTEOARTHRITIS INVOLVING MULTIPLE JOINTS: ICD-10-CM

## 2022-09-16 DIAGNOSIS — M54.2 CHRONIC NECK PAIN: ICD-10-CM

## 2022-09-16 PROBLEM — M15.0 PRIMARY OSTEOARTHRITIS INVOLVING MULTIPLE JOINTS: Status: ACTIVE | Noted: 2020-01-23

## 2022-09-16 LAB
BACTERIA: ABNORMAL /HPF
BILIRUBIN URINE: NEGATIVE
BLOOD, URINE: NEGATIVE
CLARITY: CLEAR
COLOR: ABNORMAL
EPITHELIAL CELLS, UA: 0 /HPF (ref 0–5)
GLUCOSE URINE: 500 MG/DL
HYALINE CASTS: 0 /LPF (ref 0–8)
KETONES, URINE: NEGATIVE MG/DL
LEUKOCYTE ESTERASE, URINE: NEGATIVE
MICROSCOPIC EXAMINATION: ABNORMAL
NITRITE, URINE: NEGATIVE
PH UA: 6 (ref 5–8)
PROTEIN UA: NEGATIVE MG/DL
RBC UA: 2 /HPF (ref 0–4)
SPECIFIC GRAVITY UA: 1.02 (ref 1–1.03)
URINE TYPE: ABNORMAL
UROBILINOGEN, URINE: 0.2 E.U./DL
WBC UA: 0 /HPF (ref 0–5)

## 2022-09-16 PROCEDURE — G8420 CALC BMI NORM PARAMETERS: HCPCS | Performed by: INTERNAL MEDICINE

## 2022-09-16 PROCEDURE — 3017F COLORECTAL CA SCREEN DOC REV: CPT | Performed by: INTERNAL MEDICINE

## 2022-09-16 PROCEDURE — G8427 DOCREV CUR MEDS BY ELIG CLIN: HCPCS | Performed by: INTERNAL MEDICINE

## 2022-09-16 PROCEDURE — 99214 OFFICE O/P EST MOD 30 MIN: CPT | Performed by: INTERNAL MEDICINE

## 2022-09-16 PROCEDURE — G8399 PT W/DXA RESULTS DOCUMENT: HCPCS | Performed by: INTERNAL MEDICINE

## 2022-09-16 PROCEDURE — 1123F ACP DISCUSS/DSCN MKR DOCD: CPT | Performed by: INTERNAL MEDICINE

## 2022-09-16 PROCEDURE — 1090F PRES/ABSN URINE INCON ASSESS: CPT | Performed by: INTERNAL MEDICINE

## 2022-09-16 PROCEDURE — 4004F PT TOBACCO SCREEN RCVD TLK: CPT | Performed by: INTERNAL MEDICINE

## 2022-09-16 NOTE — PROGRESS NOTES
Chief Complaint   Patient presents with    Other     Reqeusts referral to pain management         HPI: Here requesting referral for pain management regarding her chronic low back and neck pain. No longer seeing UC Dr Jie Eldridge. Her rheumatologist, Dr Chloe Tovar, is \"old school\" and no longer able to meet the prescribing requirements for opioid medications. Note, after visit, when checking OAR[place], patient is also receiving marijuana. Also, requests evaluation for urinary frequency (chronic) with mild dysuria past few days. NO hematuria, fever, or flank pain. Medications reviewed and reconciled with what patient reports to be taking. /65   Pulse 79   Ht 5' 2\" (1.575 m)   Wt 102 lb (46.3 kg)   LMP 08/11/1993 (Approximate)   SpO2 98%   BMI 18.66 kg/m²     Physical Exam GENERAL: alert, oriented x4, frail elderly with multiple arthritic deformities      Vitals reviewed from intake /65   Pulse 79   Ht 5' 2\" (1.575 m)   Wt 102 lb (46.3 kg)   LMP 08/11/1993 (Approximate)   SpO2 98%   BMI 18.66 kg/m²     HEENT: normocephalic atraumatic clear conj/nares/op     NECK: supple without lymphadenopathy or thyromegaly, no bruit    COR: RRR no murmurs rubs or gallops    LUNGS: clear to auscultation with normal work of breathing    ABDOMEN: soft, nontender, normal bowel sounds, no masses or organomegaly noted    EXTREMITIES: warm, dry, well-perfused, no edema    DERM: no suspicious lesions, no rashes    NEURO: cranial nerves intact, normal speech and gait    SPINE: straight, supple, nontender without swelling. Indicates wide area of cervical and lumbar back as where she hurts. Palpable spasm over SI region on right. ASSESSMENT/PLAN: Pt received counseling and, if relevant, printed instructions for all symptoms listed in CC and HPI, as well as for all diagnoses listed below. REviewed imaging in chart.  Needs drug screen for pain referral, and advised may take some time to get appointment there. Advised I may be wiling to prescribe same opioid regimen in the interim, but would not recommend it long term. Furthermore, she would have to forego marijuana and provide clean screens, if I am to prescribe for her. 1. Chronic midline low back pain without sciatica  - DRUG SCREEN MULTI URINE; Future  - Urinalysis with Microscopic  [facility]  - [facility]UG SCREEN MULTI URINE    2. Chronic neck pain  - DRUG SCREEN MULTI URINE; Future  - Urinalysis with Microscopic  - Zaida Fonseca MD, Pain Management, [facility]  - DRUG SCREEN MULTI URINE    3. Lumbar radiculopathy  - DRUG SCREEN MULTI URINE; Future  - Urinalysis with Microscopic  - Zaida Fonseca MD, Pain Management, [facility]  - DRUG SCREEN MULTI URINE    4. Primary osteoarthritis involving multiple joints  - [facility]    5. Back muscle spasm    6. Dysuria  - Culture, Urine    Problem List Items Addressed This Visit       Lumbar radiculopathy    Relevant Orders    DRUG SCREEN MULTI URINE (Completed)    Urinalysis with Microscopic (Completed)    Zaida Fonseca MD, Pain Management, [facility]    Chronic low back pain - Primary    Relevant Orders    DRUG SCREEN MULTI URINE (Completed)    Urinalysis with Microscopic (Completed)    [facility]    Chronic neck pain    Relevant Orders    DRUG SCREEN MULTI URINE (Completed)    Urinalysis with Microscopic (Completed)    [facility]    Back muscle spasm    Primary osteoarthritis involving multiple joints    Relevant Orders    Zaida Fonseca MD, Pain [facility]     Other Visit Diagnoses       Dysuria        Relevant Orders    Culture, Urine (Completed)              No follow-ups on file. I have spent over 30 minutes with this patient and/or guardian. This included time spent on reviewing records, counseling and care coordination.

## 2022-09-17 LAB
AMPHETAMINE SCREEN, URINE: ABNORMAL
BARBITURATE SCREEN URINE: ABNORMAL
BENZODIAZEPINE SCREEN, URINE: ABNORMAL
CANNABINOID SCREEN URINE: POSITIVE
COCAINE METABOLITE SCREEN URINE: ABNORMAL
FENTANYL SCREEN, URINE: ABNORMAL
Lab: ABNORMAL
METHADONE SCREEN, URINE: ABNORMAL
OPIATE SCREEN URINE: ABNORMAL
OXYCODONE URINE: POSITIVE
PH UA: 5.5
PHENCYCLIDINE SCREEN URINE: ABNORMAL

## 2022-09-18 LAB — URINE CULTURE, ROUTINE: NORMAL

## 2022-09-21 ENCOUNTER — TELEPHONE (OUTPATIENT)
Dept: INTERNAL MEDICINE CLINIC | Age: 67
End: 2022-09-21

## 2022-09-21 PROBLEM — F11.90 CHRONIC, CONTINUOUS USE OF OPIOIDS: Status: ACTIVE | Noted: 2022-09-21

## 2022-09-21 PROBLEM — M80.00XG AGE-RELATED OSTEOPOROSIS WITH CURRENT PATHOLOGICAL FRACTURE WITH DELAYED HEALING: Status: ACTIVE | Noted: 2022-09-08

## 2022-09-21 NOTE — TELEPHONE ENCOUNTER
Pt called regarding labs performed on 9/16. Pt states she is suffering and would like to be advised of results as soon as possible. Please contact pt at [phone].

## 2022-10-21 ENCOUNTER — TELEPHONE (OUTPATIENT)
Dept: PULMONOLOGY | Age: 67
End: 2022-10-21

## 2022-10-21 DIAGNOSIS — J43.9 PULMONARY EMPHYSEMA, UNSPECIFIED EMPHYSEMA TYPE (HCC): ICD-10-CM

## 2022-10-21 NOTE — TELEPHONE ENCOUNTER
Patient called the office and is completely out of this medication. Please advise as soon as possible. Thank you.

## 2022-10-28 ENCOUNTER — TELEPHONE (OUTPATIENT)
Dept: PULMONOLOGY | Age: 67
End: 2022-10-28

## 2022-10-28 DIAGNOSIS — J30.1 ALLERGIC RHINITIS DUE TO POLLEN, UNSPECIFIED SEASONALITY: ICD-10-CM

## 2022-10-28 RX ORDER — AZELASTINE 1 MG/ML
1 SPRAY, METERED NASAL 2 TIMES DAILY
Qty: 1 EACH | Refills: 11 | Status: SHIPPED | OUTPATIENT
Start: 2022-10-28

## 2022-10-28 NOTE — TELEPHONE ENCOUNTER
León Falcon is going out of town and needs  azelastine (ASTELIN) 0.1 % nasal spray [[id number]]    Filled by Monday at [facility] on [address]. She states she does not use [employer] or see any messages in it.   Advised Dr Rocio Arce is not in the office and that I would send her request.

## 2022-10-31 ENCOUNTER — HOSPITAL ENCOUNTER (OUTPATIENT)
Dept: CT IMAGING | Age: 67
Discharge: HOME | End: 2022-10-31
Payer: COMMERCIAL

## 2022-10-31 DIAGNOSIS — C34.32 MALIGNANT NEOPLASM OF LOWER LOBE BRONCHUS, LEFT (HCC): ICD-10-CM

## 2022-10-31 PROCEDURE — 71250 CT THORAX DX C-: CPT

## 2022-11-11 ENCOUNTER — OFFICE VISIT (OUTPATIENT)
Dept: PULMONOLOGY | Age: 67
End: 2022-11-11
Payer: COMMERCIAL

## 2022-11-11 VITALS
HEIGHT: 62 IN | BODY MASS INDEX: 19.47 KG/M2 | TEMPERATURE: 98.4 F | DIASTOLIC BLOOD PRESSURE: 60 MMHG | RESPIRATION RATE: 14 BRPM | WEIGHT: 105.8 LBS | HEART RATE: 76 BPM | OXYGEN SATURATION: 94 % | SYSTOLIC BLOOD PRESSURE: 110 MMHG

## 2022-11-11 DIAGNOSIS — J43.9 PULMONARY EMPHYSEMA, UNSPECIFIED EMPHYSEMA TYPE (HCC): Primary | ICD-10-CM

## 2022-11-11 DIAGNOSIS — J44.9 COPD, MILD (HCC): ICD-10-CM

## 2022-11-11 DIAGNOSIS — J30.1 ALLERGIC RHINITIS DUE TO POLLEN, UNSPECIFIED SEASONALITY: ICD-10-CM

## 2022-11-11 PROCEDURE — G8399 PT W/DXA RESULTS DOCUMENT: HCPCS | Performed by: INTERNAL MEDICINE

## 2022-11-11 PROCEDURE — 3078F DIAST BP <80 MM HG: CPT | Performed by: INTERNAL MEDICINE

## 2022-11-11 PROCEDURE — 99214 OFFICE O/P EST MOD 30 MIN: CPT | Performed by: INTERNAL MEDICINE

## 2022-11-11 PROCEDURE — 4004F PT TOBACCO SCREEN RCVD TLK: CPT | Performed by: INTERNAL MEDICINE

## 2022-11-11 PROCEDURE — G8420 CALC BMI NORM PARAMETERS: HCPCS | Performed by: INTERNAL MEDICINE

## 2022-11-11 PROCEDURE — 3017F COLORECTAL CA SCREEN DOC REV: CPT | Performed by: INTERNAL MEDICINE

## 2022-11-11 PROCEDURE — 3074F SYST BP LT 130 MM HG: CPT | Performed by: INTERNAL MEDICINE

## 2022-11-11 PROCEDURE — G8484 FLU IMMUNIZE NO ADMIN: HCPCS | Performed by: INTERNAL MEDICINE

## 2022-11-11 PROCEDURE — 1123F ACP DISCUSS/DSCN MKR DOCD: CPT | Performed by: INTERNAL MEDICINE

## 2022-11-11 PROCEDURE — G8427 DOCREV CUR MEDS BY ELIG CLIN: HCPCS | Performed by: INTERNAL MEDICINE

## 2022-11-11 PROCEDURE — 1090F PRES/ABSN URINE INCON ASSESS: CPT | Performed by: INTERNAL MEDICINE

## 2022-11-11 PROCEDURE — 3023F SPIROM DOC REV: CPT | Performed by: INTERNAL MEDICINE

## 2022-11-11 RX ORDER — BUDESONIDE 0.5 MG/2ML
1 INHALANT ORAL 2 TIMES DAILY
Qty: 60 EACH | Refills: 3 | Status: SHIPPED | OUTPATIENT
Start: 2022-11-11

## 2022-11-11 RX ORDER — IPRATROPIUM BROMIDE AND ALBUTEROL SULFATE 2.5; .5 MG/3ML; MG/3ML
1 SOLUTION RESPIRATORY (INHALATION) EVERY 4 HOURS
Qty: 360 ML | Refills: 11 | Status: SHIPPED | OUTPATIENT
Start: 2022-11-11

## 2022-11-11 ASSESSMENT — COPD QUESTIONNAIRES
QUESTION8_ENERGYLEVEL: 0
QUESTION6_LEAVINGHOUSE: 1
QUESTION2_CHESTPHLEGM: 3
QUESTION7_SLEEPQUALITY: 0
QUESTION5_HOMEACTIVITIES: 2
QUESTION4_WALKINCLINE: 0

## 2022-11-11 NOTE — ASSESSMENT & PLAN NOTE
Continues to have significant symptoms with sinus congestion and drainage. Particularly worse with going to [place]. We discussed her use of Astelin, Flonase, ipratropium along with Bevespi and albuterol for COPD. In efforts to improve therapy and minimize medications, changing prescriptions over to nebulizer with DuoNebs and budesonide which will be delivered via mask for which she is to breathe through her sinuses. She expressed understanding for this. This will allow her to stop Flonase, nasal ipratropium, Bevespi. She will continue Astelin with a nebulized DuoNebs and Bevespi.

## 2022-11-11 NOTE — PROGRESS NOTES
REASON FOR CONSULTATION/CC:    Chief Complaint   Patient presents with    Follow-up        Consult at request of  Emir Peñaloza MD     PCP: Emri Peñaloza MD    HISTORY OF PRESENT ILLNESS: Mikhail Davis is a 69y.o. year old female with a history of copd who presents :           COPD  Bevespi and albuterol (rare)  Currently well controlled    allergic rhinitis       complains of sinus congestion vs   Astelin / Flonase  / ipratropium  nasal.                    Objective:   PHYSICAL EXAM:  Blood pressure 110/60, pulse 76, temperature 98.4 °F (36.9 °C), temperature source Infrared, resp. rate 14, height 5' 2\" (1.575 m), weight 105 lb 12.8 oz (48 kg), last menstrual period 08/11/1993, SpO2 94 %, not currently breastfeeding.'  Gen: No distress. ENT:   Resp: No accessory muscle use. No crackles. No wheezes. No rhonchi. CV: Regular rate. Regular rhythm. No murmur or rub. No edema. Skin: Warm, dry, normal texture and turgor. No nodule on exposed extremities. M/S: No cyanosis. No clubbing. No joint deformity. Psych: Oriented x 3. No anxiety. Awake. Alert. Intact judgement and insight. Good Mood / Affect. Memory appears in tact        Data Reviewed:        Assessment:         Lung cancer  CoPD    allergic rhinitis       Plan:      Problem List Items Addressed This Visit       COPD, mild ([address]      Addressed under sinus         Relevant Medications    Respiratory Therapy Supplies (NEBULIZER MASK ADULT) MISC    Nebulizers MISC    ipratropium-albuterol (DUONEB) 0.5-2.5 (3) MG/3ML SOLN nebulizer solution    budesonide (PULMICORT) 0.5 MG/2ML nebulizer suspension    Allergic rhinitis      Continues to have significant symptoms with sinus congestion and drainage. Particularly worse with going to [place]. We discussed her use of Astelin, Flonase, ipratropium along with Bevespi and albuterol for COPD.   In efforts to improve therapy and minimize medications, changing prescriptions over to nebulizer with DuoNebs and budesonide which will be delivered via mask for which she is to breathe through her sinuses. She expressed understanding for this. This will allow her to stop Flonase, nasal ipratropium, Bevespi. She will continue Astelin with a nebulized DuoNebs and Bevespi.          Relevant Medications    Respiratory Therapy Supplies (NEBULIZER MASK ADULT) MISC    Nebulizers MISC    ipratropium-albuterol (DUONEB) 0.5-2.5 (3) MG/3ML SOLN nebulizer solution    budesonide (PULMICORT) 0.5 MG/2ML nebulizer suspension     Other Visit Diagnoses       Pulmonary emphysema, unspecified emphysema type ([facility] [address]    -  Primary    Relevant Medications    Respiratory Therapy Supplies (NEBULIZER MASK ADULT) MISC    Nebulizers MISC    ipratropium-albuterol (DUONEB) 0.5-2.5 (3) MG/3ML SOLN nebulizer solution    budesonide (PULMICORT) 0.5 MG/2ML nebulizer suspension                 JAQUELINE MAGAÑA [id number] [facility]  [phone]

## 2022-12-28 RX ORDER — APIXABAN 5 MG/1
TABLET, FILM COATED ORAL
Qty: 30 TABLET | Refills: 0 | Status: SHIPPED | OUTPATIENT
Start: 2022-12-28 | End: 2023-01-23

## 2022-12-30 ENCOUNTER — HOSPITAL ENCOUNTER (OUTPATIENT)
Dept: NON INVASIVE DIAGNOSTICS | Age: 67
Discharge: HOME | End: 2022-12-30
Payer: COMMERCIAL

## 2022-12-30 DIAGNOSIS — I50.42 CHRONIC COMBINED SYSTOLIC AND DIASTOLIC CONGESTIVE HEART FAILURE (HCC): ICD-10-CM

## 2022-12-30 PROCEDURE — 93306 TTE W/DOPPLER COMPLETE: CPT

## 2023-01-05 ENCOUNTER — TELEPHONE (OUTPATIENT)
Dept: INTERNAL MEDICINE CLINIC | Age: 68
End: 2023-01-05

## 2023-01-05 NOTE — PROGRESS NOTES
[address]      Cardiology Consult    Rehana Harmon  0[id number]    January 6, 2023    Referring Physician: Sky Cardoza MD  Reason for Referral: CHF    CC: \"I'm doing alright. \"     HPI:  The patient is 69 y.o. female with a past medical history significant for hyperlipidemia, systolic heart failure, COPD, and mitral regurgitation who presents today for management of heart failure. She was initially 9/2021 while admitted for a PE. Her echo showed severe MR and an EF 40%. She followed with Jose Castro NP for outpatient management of heart failure. Her repeat echocardiogram showed her EF remains reduced at 35%. Today, she states overall she is doing well. She denies any new cardiac complaints. She has chronic PAYTON. She denies any chest pains or worsening shortness of breath. She reports compliance with her medications. She denies any abnormal bleeding or bruising. She follows with an endocrinologist for management of her diabetes. Patient denies exertional chest pain/pressure, dyspnea at rest, worsening PAYTON, PND, orthopnea, palpitations, lightheadedness, weight changes, changes in LE edema, and syncope.     Past Medical History:   Diagnosis Date    Anxiety     Chronic headaches     CT done 4/2016    Colon polyp     COPD, mild ([address]     PFT 8/08 a smoker no symptoms-no meds    DDD (degenerative disc disease)     Depression     Fibromyalgia     Frequent UTI     Full dentures     Hallux valgus     Hearing loss in left ear 07/29/2011    HSV (herpes simplex virus) anogenital infection     leg    Hyperlipidemia     Lung nodule 2019    2 LLL nodules    Malignant neoplasm of left lung (HCC)     Menopausal state 1994    Occipital neuralgia     Osteoarthritis     Scoliosis     neck and back brace intermittently, based on pain    Smoker     Spinal stenosis     cane, walker    Unexplained weight loss     Patient states over 30# weight loss over 6 months and pcp is aware     Past Surgical History:   Procedure Laterality Date    BLADDER SUSPENSION  1988    BRAIN ANEURYSM SURGERY  2015    anterior communicating clipped    BUNIONECTOMY Left 08/23/2016    [place]    CATARACT REMOVAL Bilateral 2008    CERVICAL DISCECTOMY  2010    CERVICAL LAMINECTOMY  2003    Anterior approach, C4-7    CERVICAL LAMINECTOMY N/A 11/18/2016    Posterior, C2 - C5    CHOLECYSTECTOMY, LAPAROSCOPIC  2013    COLONOSCOPY      DILATION AND CURETTAGE OF UTERUS      LUMBAR LAMINECTOMY  2009    x2    SINUS SURGERY      x2    SPINAL FUSION  2009, 2012    post T12-L5 fusion and redone    THORACOSCOPY Left 3/25/2019    Dr. Nadeem Valderrama. Alexander - thoracotomy w/takedown of adhesions, wedge excision LLL nodule#1, debulking LLL nodule #2, MSLND    TUBAL LIGATION      TUNNELED VENOUS PORT PLACEMENT Right 08/26/2019    Dr. Quentin prather     Family History   Problem Relation Age of Onset    Cancer Mother 43        breast    Cancer Brother 77        throat    Other Father         Macular Degeration     Social History     Tobacco Use    Smoking status: Some Days     Packs/day: 0.25     Years: 48.00     Pack years: 12.00     Types: Cigarettes     Last attempt to quit: 1/20/2019     Years since quitting: 3.9    Smokeless tobacco: Never    Tobacco comments:     started age 11, down to [address] [place] [address] [address] a day;   Vaping Use    Vaping Use: Former    Substances: Always   Substance Use Topics    Alcohol use: Not Currently    Drug use: Not Currently     Types: Marijuana (Weed)       Allergies   Allergen Reactions    Adhesive Tape Dermatitis     Blisters on skin under tape or medication patches    Ibuprofen Nausea Only    Naproxen Nausea Only    Nsaids Other (See Comments)     GI upset    Vicodin [Hydrocodone-Acetaminophen] Nausea Only     headache     Current Outpatient Medications   Medication Sig Dispense Refill    ELIQUIS 5 MG TABS tablet TAKE ONE TABLET BY MOUTH TWICE A DAY 30 tablet 0    Respiratory Therapy Supplies (NEBULIZER MASK ADULT) MISC 1 Device by Does not apply route once for 1 dose 1 each 0    Nebulizers MISC 1 Device by Does not apply route once for 1 dose 1 each 0    ipratropium-albuterol (DUONEB) 0.5-2.5 (3) MG/3ML SOLN nebulizer solution Inhale 3 mLs into the lungs every 4 hours 360 mL 11    budesonide (PULMICORT) 0.5 MG/2ML nebulizer suspension Take 2 mLs by nebulization 2 times daily 60 each 3    azelastine (ASTELIN) 0.1 % nasal spray 1 spray by Nasal route 2 times daily 1 each 11    CRANBERRY-VITAMIN C PO Take by mouth daily 25/200mg      CALCIUM-VITAMIN D PO Take by mouth daily 1200mg/25mcg      sacubitril-valsartan (ENTRESTO) 49-51 MG per tablet Take 1 tablet by mouth 2 times daily 180 tablet 3    carvedilol (COREG) 6.25 MG tablet Take 1 tablet by mouth 2 times daily (with meals) 180 tablet 3    empagliflozin (JARDIANCE) 10 MG tablet Take 1 tablet by mouth in the morning. 90 tablet 5    mirtazapine (REMERON) 15 MG tablet Take 15 mg by mouth nightly      gabapentin (NEURONTIN) 800 MG tablet Take 1 tablet by mouth in the morning and 1 tablet before bedtime. Do all this for 180 days. 180 tablet 1    furosemide (LASIX) 20 MG tablet Take 1 tablet by mouth as needed (worsening shortness of breath, weight gain) 60 tablet 3    spironolactone (ALDACTONE) 25 MG tablet Take 0.5 tablets by mouth daily 45 tablet 3    valACYclovir (VALTREX) 1 g tablet Take 1 tablet by mouth daily 90 tablet 3    atorvastatin (LIPITOR) 40 MG tablet Take 1 tablet by mouth daily 90 tablet 3    buPROPion (WELLBUTRIN XL) 300 MG extended release tablet Take 300 mg by mouth every morning      methocarbamol (ROBAXIN) 750 MG tablet Take 750 mg by mouth as needed      oxyCODONE (OXY-IR) 30 MG immediate release tablet Take 30 mg by mouth 3 times daily as needed for Pain.       albuterol sulfate  (90 Base) MCG/ACT inhaler INHALE TWO PUFFS BY MOUTH EVERY 6 HOURS AS NEEDED FOR WHEEZING OR SHORTNESS OF BREATH 1 Inhaler 5    ARIPiprazole (ABILIFY) 15 MG tablet Take 7.5 mg by mouth daily       levothyroxine (SYNTHROID) 50 MCG tablet Take 50 mcg by mouth Daily      OXcarbazepine (TRILEPTAL) 300 MG tablet Take 300 mg by mouth in the morning, at noon, and at bedtime      Multiple Vitamins-Minerals (THERAPEUTIC MULTIVITAMIN-MINERALS) tablet Take 1 tablet by mouth daily      Acetaminophen 325 MG CAPS Take 650 mg by mouth      Probiotic Product (PROBIOTIC DAILY PO) Take 1 tablet by mouth daily       DULoxetine (CYMBALTA) 60 MG capsule Take 120 mg by mouth daily [facility] 30 capsule      Current Facility-Administered Medications   Medication Dose Route Frequency Provider Last Rate Last Admin    triamcinolone acetonide (KENALOG-40) injection 40 mg  40 mg IntraMUSCular Once [employer], DO         Review of Systems:  Constitutional: No unanticipated weight gain. There's been no change in energy level, sleep pattern, or activity level. No fevers, chills. Eyes: No visual changes or diplopia. No scleral icterus. ENT: No Headaches, hearing loss or vertigo. No mouth sores or sore throat. Cardiovascular: as reviewed in HPI  Respiratory: No cough or wheezing, no sputum production. No hemoptysis. Gastrointestinal: No abdominal pain, appetite loss, blood in stools. No change in bowel or bladder habits. Genitourinary: No dysuria, trouble voiding, or hematuria. Musculoskeletal:  No gait disturbance, no joint complaints. Integumentary: No rash or pruritis. Neurological: No headache, diplopia, change in muscle strength, numbness or tingling. Psychiatric: No anxiety or depression. Endocrine: No temperature intolerance. No excessive thirst, fluid intake, or urination. No tremor. Hematologic/Lymphatic: No abnormal bruising or bleeding, blood clots or swollen lymph nodes. Allergic/Immunologic: No nasal congestion or hives.     Physical Exam:   /60   Pulse 82   Ht 4' 11\" (1.499 m)   Wt 99 lb (44.9 kg)   LMP 08/11/1993 (Approximate)   SpO2 98%   BMI 20.00 kg/m²   Wt Readings from Last 3 Encounters:   01/06/23 99 lb (44.9 kg) 11/11/22 105 lb 12.8 oz (48 kg)   09/16/22 102 lb (46.3 kg)     Constitutional: She is oriented to person, place, and time. She appears thin. In no acute distress. Head: Normocephalic and atraumatic. Pupils equal and round. Neck: Neck supple. No JVP or carotid bruit appreciated. No mass and no thyromegaly present. No lymphadenopathy present. Cardiovascular: Normal rate. Normal heart sounds. Exam reveals no gallop and no friction rub. I-II/systolic murmur at the apex. Pulmonary/Chest: Effort normal and breath sounds normal. No respiratory distress. She has no wheezes, rhonchi or rales. Abdominal: Soft, non-tender. Bowel sounds are normal. She exhibits no organomegaly, mass or bruit. Extremities: No edema. No cyanosis or clubbing. Pulses are 2+ radial/carotid bilaterally. Neurological: No gross cranial nerve deficit. Coordination normal.   Skin: Skin is warm and dry. There is no rash or diaphoresis. Psychiatric: She has a normal mood and affect. Her speech is normal and behavior is normal.     Lab Review:   FLP:    Lab Results   Component Value Date/Time    TRIG 55 09/28/2021 06:29 AM    HDL 55 09/28/2021 06:29 AM    LDLCALC 72 09/28/2021 06:29 AM    LABVLDL 11 09/28/2021 06:29 AM     BUN/Creatinine:    Lab Results   Component Value Date/Time    BUN 20 09/02/2022 03:03 PM    CREATININE 0.7 09/02/2022 03:03 PM     EKG: Personally interpreted. 9/26/2021. Sinus tachycardia. Possible left atrial enlargement. Left axis deviation. LVH with repolarization abnormality. 9/2/22 Sinus rhythm. Incomplete right bundle branch block. Left anterior fascicular block. Echo: 9/27/2021. Personally interpreted. Overall left ventricular systolic function appears mild to moderately reduced. Ejection fraction is visually estimated to be 40-45% with diffuse hypokinesis. Mildly dilated left ventricle. Normal left ventricular wall thickness. The right ventricle is normal in size and function.  The left atrium is mildly dilated. Severe mitral regurgitation. Mild aortic and tricuspid regurgitation. Estimated pulmonary artery systolic pressure is mildly elevated at 47 mmHg assuming a right atrial pressure of 8 mmHg. Chest CT: 9/26/2021. Moderate coronary artery calcification. New tiny embolus peripherally right middle lobe. Emphysema. Stress test: 10/18/21   Small size moderate severity fixed apical defect most consistent with artifact. no evidence of ischemia. Reduced LV systolic function, EF calculated 37%. ( please correlate with echo). Echo: 4/21/22  Left ventricular cavity size is normal. Ejection fraction is moderately reduced visually estimated to be 35-40%. There is diffuse hypokinesis. Grade II diastolic dysfunction with elevated LV filling pressures. Mild to moderate mitral regurgitation. The left atrium is mildly dilated. Moderate aortic regurgitation. The right ventricle is normal in size and function. Echo 12/30/22   Left ventricular cavity size is normal.  Normal left ventricular wall thickness. Ejection fraction is visually estimated to be 40-45%. There is mild diffuse hypokinesis. Grade II diastolic dysfunction with elevated LV filling pressures. Normal right ventricular size. Aortic valve leaflets appear thickened. Mild aortic regurgitation. Right ventricular systolic function is severely reduced. TAPSE= 1.2cm  No pericardial effusion noted. Assessment/Plan:   1) Chronic systolic and diastolic heart failure. EF 35%, improved EF 40-45% (12/2022). Pt appears euvolemic today. Continue with medical therapy including Entresto 49-51 mg BID, Jardiance 10 mg daily, Coreg 6.25 mg BID, and aldactone. Encouraged low sodium diet and monitor weights daily. 2) Essential hypertension. Controlled. Goal BP <130/80. Continue medical therapy. 3) Mitral/aortic regurgitation. Mild to moderate per echo 4/2022, repeat echo 12/2022 showed mild aortic regurgitation.  Will continue to monitor clinically and repeat echo periodically. 4) History of PE. Currently on Eliquis 5 mg BID. 5) COPD/lung cancer. Chronic PAYTON that remains unchanged. Management per PCP and following with [facility]. 6) CAD. Based on coronary artery calcifications. Stress test showed no reversible ischemia. Continue statin therapy with Lipitor 40 mg daily. 7) Nicotine Addiction. Encouraged smoking cessation but patient is in the contemplative stage. Follow up in 6 months     Thank you very much for allowing me to participate in the care of your patient. Please do not hesitate to contact me if you have any questions. Sincerely,  Calos Tran. Sandro Leo, [address] [address]  Ph: [phone]  Fax: [phone]    This note was scribed in the presence of Dr Sandro Leo MD by Vivi Esquivel, MIGUELANGEL. Physician Attestation: The scribes documentation has been prepared under my direction and personally reviewed by me in its entirety. I confirm that the note above accurately reflects all work, treatment, procedures, and medical decision making performed by me. All portions of the note including but not limited to the chief complaint, history of present illness, physical exam, assessment and plan/medical decision making were personally reviewed, edited, and updated on the day of the visit.

## 2023-01-05 NOTE — TELEPHONE ENCOUNTER
Pt would like a referral to a dermatologist dr Cotter January ph [phone] for  this is for the lestion on pt buttox

## 2023-01-06 ENCOUNTER — OFFICE VISIT (OUTPATIENT)
Dept: CARDIOLOGY CLINIC | Age: 68
End: 2023-01-06
Payer: COMMERCIAL

## 2023-01-06 VITALS
SYSTOLIC BLOOD PRESSURE: 114 MMHG | WEIGHT: 99 LBS | OXYGEN SATURATION: 98 % | BODY MASS INDEX: 19.96 KG/M2 | DIASTOLIC BLOOD PRESSURE: 60 MMHG | HEART RATE: 82 BPM | HEIGHT: 59 IN

## 2023-01-06 DIAGNOSIS — I10 ESSENTIAL HYPERTENSION: ICD-10-CM

## 2023-01-06 DIAGNOSIS — I35.1 NONRHEUMATIC AORTIC VALVE INSUFFICIENCY: ICD-10-CM

## 2023-01-06 DIAGNOSIS — Z86.711 HISTORY OF PULMONARY EMBOLISM: ICD-10-CM

## 2023-01-06 DIAGNOSIS — I34.0 NONRHEUMATIC MITRAL VALVE REGURGITATION: ICD-10-CM

## 2023-01-06 DIAGNOSIS — F17.219 CIGARETTE NICOTINE DEPENDENCE WITH NICOTINE-INDUCED DISORDER: ICD-10-CM

## 2023-01-06 DIAGNOSIS — I50.42 CHRONIC COMBINED SYSTOLIC AND DIASTOLIC CONGESTIVE HEART FAILURE (HCC): Primary | ICD-10-CM

## 2023-01-06 DIAGNOSIS — I25.10 CORONARY ARTERY DISEASE INVOLVING NATIVE CORONARY ARTERY OF NATIVE HEART WITHOUT ANGINA PECTORIS: ICD-10-CM

## 2023-01-06 PROCEDURE — 1090F PRES/ABSN URINE INCON ASSESS: CPT | Performed by: INTERNAL MEDICINE

## 2023-01-06 PROCEDURE — 3078F DIAST BP <80 MM HG: CPT | Performed by: INTERNAL MEDICINE

## 2023-01-06 PROCEDURE — 3017F COLORECTAL CA SCREEN DOC REV: CPT | Performed by: INTERNAL MEDICINE

## 2023-01-06 PROCEDURE — 99214 OFFICE O/P EST MOD 30 MIN: CPT | Performed by: INTERNAL MEDICINE

## 2023-01-06 PROCEDURE — 4004F PT TOBACCO SCREEN RCVD TLK: CPT | Performed by: INTERNAL MEDICINE

## 2023-01-06 PROCEDURE — 1123F ACP DISCUSS/DSCN MKR DOCD: CPT | Performed by: INTERNAL MEDICINE

## 2023-01-06 PROCEDURE — G8427 DOCREV CUR MEDS BY ELIG CLIN: HCPCS | Performed by: INTERNAL MEDICINE

## 2023-01-06 PROCEDURE — G8399 PT W/DXA RESULTS DOCUMENT: HCPCS | Performed by: INTERNAL MEDICINE

## 2023-01-06 PROCEDURE — G8420 CALC BMI NORM PARAMETERS: HCPCS | Performed by: INTERNAL MEDICINE

## 2023-01-06 PROCEDURE — 3074F SYST BP LT 130 MM HG: CPT | Performed by: INTERNAL MEDICINE

## 2023-01-06 PROCEDURE — G8484 FLU IMMUNIZE NO ADMIN: HCPCS | Performed by: INTERNAL MEDICINE

## 2023-01-09 ENCOUNTER — TELEPHONE (OUTPATIENT)
Dept: PULMONOLOGY | Age: 68
End: 2023-01-09

## 2023-01-09 NOTE — TELEPHONE ENCOUNTER
Patient called the office regarding her recent moist cough. She states during her last OV she was taken off of all her inhalers and put on a daily nebulizer solution. She is coughing up yellow mucous and is concerned about the moist cough. I did tell the patient that this is most probably a normal side effect of the nebulizer. She just wanted to make sure with Dr. Sobeida Aguilar and see if he suggests anything else.    Last OV: 11-

## 2023-01-19 ENCOUNTER — OFFICE VISIT (OUTPATIENT)
Dept: INTERNAL MEDICINE CLINIC | Age: 68
End: 2023-01-19

## 2023-01-19 VITALS
RESPIRATION RATE: 12 BRPM | OXYGEN SATURATION: 94 % | HEART RATE: 96 BPM | WEIGHT: 100.2 LBS | DIASTOLIC BLOOD PRESSURE: 69 MMHG | BODY MASS INDEX: 20.24 KG/M2 | SYSTOLIC BLOOD PRESSURE: 113 MMHG

## 2023-01-19 DIAGNOSIS — E03.9 ACQUIRED HYPOTHYROIDISM: ICD-10-CM

## 2023-01-19 DIAGNOSIS — I50.22 CHRONIC SYSTOLIC CONGESTIVE HEART FAILURE (HCC): ICD-10-CM

## 2023-01-19 DIAGNOSIS — I25.10 CORONARY ARTERY DISEASE INVOLVING NATIVE CORONARY ARTERY OF NATIVE HEART WITHOUT ANGINA PECTORIS: ICD-10-CM

## 2023-01-19 DIAGNOSIS — E44.0 MODERATE PROTEIN-CALORIE MALNUTRITION (HCC): ICD-10-CM

## 2023-01-19 DIAGNOSIS — L98.9 SKIN LESION: ICD-10-CM

## 2023-01-19 DIAGNOSIS — D50.9 IRON DEFICIENCY ANEMIA, UNSPECIFIED IRON DEFICIENCY ANEMIA TYPE: ICD-10-CM

## 2023-01-19 DIAGNOSIS — R63.6 UNDERWEIGHT: ICD-10-CM

## 2023-01-19 DIAGNOSIS — I10 ESSENTIAL HYPERTENSION: Primary | ICD-10-CM

## 2023-01-19 ASSESSMENT — PATIENT HEALTH QUESTIONNAIRE - PHQ9
SUM OF ALL RESPONSES TO PHQ9 QUESTIONS 1 & 2: 0
SUM OF ALL RESPONSES TO PHQ QUESTIONS 1-9: 0
1. LITTLE INTEREST OR PLEASURE IN DOING THINGS: 0
8. MOVING OR SPEAKING SO SLOWLY THAT OTHER PEOPLE COULD HAVE NOTICED. OR THE OPPOSITE, BEING SO FIGETY OR RESTLESS THAT YOU HAVE BEEN MOVING AROUND A LOT MORE THAN USUAL: 0
9. THOUGHTS THAT YOU WOULD BE BETTER OFF DEAD, OR OF HURTING YOURSELF: 0
10. IF YOU CHECKED OFF ANY PROBLEMS, HOW DIFFICULT HAVE THESE PROBLEMS MADE IT FOR YOU TO DO YOUR WORK, TAKE CARE OF THINGS AT HOME, OR GET ALONG WITH OTHER PEOPLE: 0
SUM OF ALL RESPONSES TO PHQ QUESTIONS 1-9: 11
6. FEELING BAD ABOUT YOURSELF - OR THAT YOU ARE A FAILURE OR HAVE LET YOURSELF OR YOUR FAMILY DOWN: 0
4. FEELING TIRED OR HAVING LITTLE ENERGY: 3
SUM OF ALL RESPONSES TO PHQ QUESTIONS 1-9: 11
SUM OF ALL RESPONSES TO PHQ QUESTIONS 1-9: 0
7. TROUBLE CONCENTRATING ON THINGS, SUCH AS READING THE NEWSPAPER OR WATCHING TELEVISION: 3
SUM OF ALL RESPONSES TO PHQ QUESTIONS 1-9: 0
2. FEELING DOWN, DEPRESSED OR HOPELESS: 0
SUM OF ALL RESPONSES TO PHQ QUESTIONS 1-9: 11
3. TROUBLE FALLING OR STAYING ASLEEP: 3
SUM OF ALL RESPONSES TO PHQ QUESTIONS 1-9: 0
SUM OF ALL RESPONSES TO PHQ QUESTIONS 1-9: 11
5. POOR APPETITE OR OVEREATING: 2

## 2023-01-19 ASSESSMENT — COLUMBIA-SUICIDE SEVERITY RATING SCALE - C-SSRS
6. HAVE YOU EVER DONE ANYTHING, STARTED TO DO ANYTHING, OR PREPARED TO DO ANYTHING TO END YOUR LIFE?: NO
1. WITHIN THE PAST MONTH, HAVE YOU WISHED YOU WERE DEAD OR WISHED YOU COULD GO TO SLEEP AND NOT WAKE UP?: NO
2. HAVE YOU ACTUALLY HAD ANY THOUGHTS OF KILLING YOURSELF?: NO

## 2023-01-19 NOTE — Clinical Note
Hello, can you please reevaluate her painful right buttock lesion and also, if you would recommend anything we can put on it until then? Insurance wouldn't cover calmoseptine. Let me know if you would rather have derm address but I thought gen surg based on location and possible pressure component would be better.

## 2023-01-19 NOTE — PROGRESS NOTES
Chief Complaint   Patient presents with    Lesion(s)     On bottom       HPI: Here for htn followup and management of multiple chronic conditions as per the active problems list on chart, which I reviewed and updated with the patient today. States doing well with no new concerns except if noted below. Also concerned about very painful skin lesion on buttock. Saw Dr Radha Salazar last August but was told healing would be difficult so nothing recommended and he wanted it kept dry. Pt has been trying to schedule with dermatology for this but needed referral.    Continues with pulm Dr Jessica Camacho on intensive copd regimen and feels she is doing ok. States insurance wouldn't cover calmoseptine, nor supplement shakes. I have reviewed the chart notes available from myself and other providers. I have reviewed and addressed all active problems and created or updated the problems list in detail, as needed. No problem-specific Assessment & Plan notes found for this encounter.       I have extensively reviewed and reconciled the medication list, discontinued medications not taking or no longer appropriate, and updated the active meds list      Lab Results   Component Value Date    LABA1C 5.2 01/21/2022    LABA1C 5.4 02/08/2019    LABMICR Not Indicated 09/16/2022    LABMICR YES 09/26/2021    LABMICR YES 03/20/2019       Lab Results   Component Value Date     09/02/2022     (L) 06/06/2022     01/21/2022    K 4.8 09/02/2022    K 4.0 06/06/2022    K 4.9 01/21/2022     09/02/2022    CL 96 (L) 06/06/2022     01/21/2022    CO2 27 09/02/2022    CO2 23 06/06/2022    CO2 24 01/21/2022    BUN 20 09/02/2022    BUN 12 06/06/2022    BUN 30 (H) 01/21/2022    CREATININE 0.7 09/02/2022    CREATININE 0.6 06/06/2022    CREATININE 0.8 01/21/2022    GLUCOSE 82 09/02/2022    GLUCOSE 77 06/06/2022    GLUCOSE 92 01/21/2022    CALCIUM 9.5 09/02/2022    CALCIUM 9.1 06/06/2022    CALCIUM 10.0 01/21/2022       Lab Results   Component Value Date    CHOL 138 09/28/2021    CHOL 174 02/02/2018    CHOL 175 02/08/2017    TRIG 55 09/28/2021    TRIG 78 02/02/2018    TRIG 78 02/08/2017    HDL 55 09/28/2021    HDL 55 02/02/2018    HDL 59 02/08/2017    LDLCALC 72 09/28/2021    LDLCALC 103 (H) 02/02/2018    LDLCALC 100 (H) 02/08/2017       Lab Results   Component Value Date    ALT 13 01/21/2022    ALT 13 09/26/2021    ALT 10 02/06/2019    AST 37 01/21/2022    AST 47 (H) 09/26/2021    AST 20 02/06/2019       Lab Results   Component Value Date    TSH 2.36 12/10/2015    TSH 2.11 03/21/2013    TSH 1.37 09/10/2012       Lab Results   Component Value Date    WBC 7.2 06/06/2022    WBC 8.2 09/28/2021    WBC 8.7 09/27/2021    HGB 11.9 (L) 06/06/2022    HGB 10.9 (L) 09/28/2021    HGB 10.5 (L) 09/27/2021    HCT 34.6 (L) 06/06/2022    HCT 32.1 (L) 09/28/2021    HCT 31.4 (L) 09/27/2021    .1 (H) 06/06/2022    .0 09/28/2021    .3 (H) 09/27/2021     06/06/2022     09/28/2021     09/27/2021       Lab Results   Component Value Date    INR 0.97 08/26/2019    INR 0.93 11/14/2016    INR 1.0 03/15/2012        No results found for: PSA     No results found for: LABURIC          /69   Pulse 96   Resp 12   Wt 100 lb 3.2 oz (45.5 kg)   LMP 08/11/1993 (Approximate)   SpO2 94%   BMI 20.24 kg/m²   Body mass index is 20.24 kg/m².  Physical Exam  Constitutional:       General: She is not in acute distress.     Comments: Frail with kyphoscoliotic spinal deformity, and cachexia--also appears pale   HENT:      Head: Normocephalic and atraumatic.      Nose:      Comments: masked  Eyes:      General: No scleral icterus.        Right eye: No discharge.         Left eye: No discharge.      Extraocular Movements: Extraocular movements intact.      Conjunctiva/sclera: Conjunctivae normal.      Pupils: Pupils are equal, round, and reactive to light.   Neck:      Thyroid: No thyromegaly.      Vascular: No JVD.      Trachea:  No tracheal deviation. Cardiovascular:      Rate and Rhythm: Normal rate and regular rhythm. Heart sounds: Normal heart sounds. No murmur heard. No friction rub. No gallop. Pulmonary:      Effort: Pulmonary effort is normal. No respiratory distress. Breath sounds: Wheezing present. No rales. Comments: Very diminished air exchange  Chest:      Chest wall: No tenderness. Abdominal:      General: Bowel sounds are normal. There is no distension. Palpations: Abdomen is soft. There is no mass. Tenderness: There is no abdominal tenderness. There is no guarding or rebound. Musculoskeletal:         General: No tenderness. Normal range of motion. Cervical back: Neck supple. Right lower leg: No edema. Left lower leg: No edema. Lymphadenopathy:      Cervical: No cervical adenopathy. Skin:     General: Skin is warm and dry. Coloration: Skin is not pale. Findings: No erythema or rash. Comments: 5 x 6 mm hyperkeratic skin lesion on right medial buttock in crease with some surrounding heaped up edges   Neurological:      Mental Status: She is alert and oriented to person, place, and time. Mental status is at baseline. Cranial Nerves: No cranial nerve deficit. Motor: No abnormal muscle tone. Coordination: Coordination normal.      Deep Tendon Reflexes: Reflexes are normal and symmetric. Reflexes normal.   Psychiatric:         Mood and Affect: Mood normal.         Behavior: Behavior normal.         Thought Content:  Thought content normal.         Judgment: Judgment normal.       ASSESSMENT AND PLANS:      Except as noted below, all chronic problems have been reviewed and are stable to continue medications or other therapy as previously documented in the patient's chart, with changes per orders or documentation below:        Assessment and Plan: Patient received counseling and, if relevant,printed instructions for all symptoms listed in CC and HPI, as well as for all diagnoses brought onto today's visit note below. Typical counseling includes, but is not limited to, non pharmacologic measures to manage listed symptoms and conditions; appropriate use, risks and benefits for all prescribed medications; potential interactions between medications both prescribed and OTC; diet; exercise; healthy behaviors; and goalsetting to improve health. Pt.or responsible party was involved in shared decision making and had opportunity to have all questions answered. 1. Essential hypertension  - Comprehensive Metabolic Panel; Future    2. Coronary artery disease involving native coronary artery of native heart without angina pectoris  - Comprehensive Metabolic Panel; Future  - LIPID PANEL; Future    3. Chronic systolic congestive heart failure (HCC)  - Comprehensive Metabolic Panel; Future    4. Acquired hypothyroidism  - TSH with Reflex to FT4; Future    5. Iron deficiency anemia, unspecified iron deficiency anemia type  - CBC with Auto Differential; Future    6. Skin lesion--painful with some component of pressure but appears secondary to underlying lesion? Pt states she sleeps on her side and is using eggcrate but doesn't help and lesion is not located where it contacts seating surfaces. Would recommend general surgeon to readdress with her (vs. Derm) and will send chart note to Dr Asuncion Garcia, requesting if he would recommend any kind of topical product and if he would reevaluate her asap. 7. Underweight  - Nutritional Supplements (ENSURE COMPLETE) LIQD; Take 1 Bottle by mouth in the morning and at bedtime  Dispense: 72 each; Refill: 5    8. Moderate protein-calorie malnutrition (HCC)--note patient has lost several inches of height making her BMI artificially high. Would benefit from supplements and insurance should cover  - Nutritional Supplements (ENSURE COMPLETE) LIQD; Take 1 Bottle by mouth in the morning and at bedtime  Dispense: 72 each;  Refill: 5            Problem List       Essential hypertension - Primary    Relevant Orders    Comprehensive Metabolic Panel    Underweight    Relevant Medications    Nutritional Supplements (ENSURE COMPLETE) LIQD    Acquired hypothyroidism    Relevant Medications    levothyroxine (SYNTHROID) 50 MCG tablet    Other Relevant Orders    TSH with Reflex to FT4    Coronary artery disease involving native coronary artery of native heart without angina pectoris    Relevant Medications    atorvastatin (LIPITOR) 40 MG tablet    spironolactone (ALDACTONE) 25 MG tablet    furosemide (LASIX) 20 MG tablet    sacubitril-valsartan (ENTRESTO) 49-51 MG per tablet    carvedilol (COREG) 6.25 MG tablet    ELIQUIS 5 MG TABS tablet    Other Relevant Orders    Comprehensive Metabolic Panel    LIPID PANEL    Chronic systolic congestive heart failure (HCC)    Relevant Medications    atorvastatin (LIPITOR) 40 MG tablet    spironolactone (ALDACTONE) 25 MG tablet    furosemide (LASIX) 20 MG tablet    carvedilol (COREG) 6.25 MG tablet    ELIQUIS 5 MG TABS tablet    Other Relevant Orders    Comprehensive Metabolic Panel    Moderate protein-calorie malnutrition (HCC)    Relevant Medications    Nutritional Supplements (ENSURE COMPLETE) LIQD     Orders Placed This Encounter   Procedures    Comprehensive Metabolic Panel     Standing Status:   Future     Standing Expiration Date:   1/19/2024    LIPID PANEL     Standing Status:   Future     Standing Expiration Date:   1/19/2024    TSH with Reflex to FT4     Standing Status:   Future     Standing Expiration Date:   1/19/2024    CBC with Auto Differential     Standing Status:   Future     Standing Expiration Date:   1/19/2024       I have reconciled the medications in chart with what patient reports to be taking, andreviewed action/ sideeffects and how to take any new medications. Patient/caregiver understands purpose and side effects. A complete  list of medications was provided in their after-visit summary.     Return for 7 months , f/u mult med extended. Time basedbilling: I spent over 40 minutes with this patient, and as is the nature of primary care and typical for my extended visits, over 50 percent of this visit was spent on counseling and coordination ofcare.

## 2023-01-20 ENCOUNTER — TELEPHONE (OUTPATIENT)
Dept: INTERNAL MEDICINE CLINIC | Age: 68
End: 2023-01-20

## 2023-01-20 NOTE — TELEPHONE ENCOUNTER
I called patient to follow up on her scheduling with pain management. She states she hasn't yet because she is looking for a facility that will take her insurance and that accepts her use of marijuana. I will close out this referral to  and wait for her decision to schedule. I offered to assist in any way that we could to help her with finding a pain management office.

## 2023-01-23 RX ORDER — APIXABAN 5 MG/1
TABLET, FILM COATED ORAL
Qty: 180 TABLET | Refills: 3 | Status: SHIPPED | OUTPATIENT
Start: 2023-01-23

## 2023-01-24 DIAGNOSIS — I50.22 CHRONIC SYSTOLIC CONGESTIVE HEART FAILURE (HCC): ICD-10-CM

## 2023-01-24 DIAGNOSIS — I10 ESSENTIAL HYPERTENSION: ICD-10-CM

## 2023-01-24 DIAGNOSIS — E03.9 ACQUIRED HYPOTHYROIDISM: ICD-10-CM

## 2023-01-24 DIAGNOSIS — I25.10 CORONARY ARTERY DISEASE INVOLVING NATIVE CORONARY ARTERY OF NATIVE HEART WITHOUT ANGINA PECTORIS: ICD-10-CM

## 2023-01-24 DIAGNOSIS — D50.9 IRON DEFICIENCY ANEMIA, UNSPECIFIED IRON DEFICIENCY ANEMIA TYPE: ICD-10-CM

## 2023-01-24 LAB
A/G RATIO: 1.9 (ref 1.1–2.2)
ALBUMIN SERPL-MCNC: 3.9 G/DL (ref 3.4–5)
ALP BLD-CCNC: 101 U/L (ref 40–129)
ALT SERPL-CCNC: 20 U/L (ref 10–40)
ANION GAP SERPL CALCULATED.3IONS-SCNC: 12 MMOL/L (ref 3–16)
AST SERPL-CCNC: 34 U/L (ref 15–37)
BASOPHILS ABSOLUTE: 0 K/UL (ref 0–0.2)
BASOPHILS RELATIVE PERCENT: 0.5 %
BILIRUB SERPL-MCNC: <0.2 MG/DL (ref 0–1)
BUN BLDV-MCNC: 21 MG/DL (ref 7–20)
CALCIUM SERPL-MCNC: 8.7 MG/DL (ref 8.3–10.6)
CHLORIDE BLD-SCNC: 105 MMOL/L (ref 99–110)
CHOLESTEROL, TOTAL: 159 MG/DL (ref 0–199)
CO2: 25 MMOL/L (ref 21–32)
CREAT SERPL-MCNC: 0.8 MG/DL (ref 0.6–1.2)
EOSINOPHILS ABSOLUTE: 0.1 K/UL (ref 0–0.6)
EOSINOPHILS RELATIVE PERCENT: 0.7 %
GFR SERPL CREATININE-BSD FRML MDRD: >60 ML/MIN/{1.73_M2}
GLUCOSE BLD-MCNC: 89 MG/DL (ref 70–99)
HCT VFR BLD CALC: 39.1 % (ref 36–48)
HDLC SERPL-MCNC: 69 MG/DL (ref 40–60)
HEMOGLOBIN: 13 G/DL (ref 12–16)
LDL CHOLESTEROL CALCULATED: 77 MG/DL
LYMPHOCYTES ABSOLUTE: 0.7 K/UL (ref 1–5.1)
LYMPHOCYTES RELATIVE PERCENT: 9.3 %
MCH RBC QN AUTO: 34.8 PG (ref 26–34)
MCHC RBC AUTO-ENTMCNC: 33.1 G/DL (ref 31–36)
MCV RBC AUTO: 105 FL (ref 80–100)
MONOCYTES ABSOLUTE: 0.6 K/UL (ref 0–1.3)
MONOCYTES RELATIVE PERCENT: 7.1 %
NEUTROPHILS ABSOLUTE: 6.5 K/UL (ref 1.7–7.7)
NEUTROPHILS RELATIVE PERCENT: 82.4 %
PDW BLD-RTO: 16 % (ref 12.4–15.4)
PLATELET # BLD: 369 K/UL (ref 135–450)
PMV BLD AUTO: 7 FL (ref 5–10.5)
POTASSIUM SERPL-SCNC: 4.7 MMOL/L (ref 3.5–5.1)
RBC # BLD: 3.73 M/UL (ref 4–5.2)
SODIUM BLD-SCNC: 142 MMOL/L (ref 136–145)
TOTAL PROTEIN: 6 G/DL (ref 6.4–8.2)
TRIGL SERPL-MCNC: 64 MG/DL (ref 0–150)
TSH REFLEX FT4: 1.5 UIU/ML (ref 0.27–4.2)
VLDLC SERPL CALC-MCNC: 13 MG/DL
WBC # BLD: 7.8 K/UL (ref 4–11)

## 2023-01-26 ENCOUNTER — OFFICE VISIT (OUTPATIENT)
Dept: SURGERY | Age: 68
End: 2023-01-26
Payer: COMMERCIAL

## 2023-01-26 VITALS — WEIGHT: 100 LBS | BODY MASS INDEX: 20.2 KG/M2

## 2023-01-26 DIAGNOSIS — L98.411 SKIN ULCER OF BUTTOCK, LIMITED TO BREAKDOWN OF SKIN (HCC): Primary | ICD-10-CM

## 2023-01-26 PROCEDURE — 4004F PT TOBACCO SCREEN RCVD TLK: CPT | Performed by: SURGERY

## 2023-01-26 PROCEDURE — 99213 OFFICE O/P EST LOW 20 MIN: CPT | Performed by: SURGERY

## 2023-01-26 PROCEDURE — G8420 CALC BMI NORM PARAMETERS: HCPCS | Performed by: SURGERY

## 2023-01-26 PROCEDURE — G8427 DOCREV CUR MEDS BY ELIG CLIN: HCPCS | Performed by: SURGERY

## 2023-01-26 PROCEDURE — 1090F PRES/ABSN URINE INCON ASSESS: CPT | Performed by: SURGERY

## 2023-01-26 PROCEDURE — G8399 PT W/DXA RESULTS DOCUMENT: HCPCS | Performed by: SURGERY

## 2023-01-26 PROCEDURE — G8484 FLU IMMUNIZE NO ADMIN: HCPCS | Performed by: SURGERY

## 2023-01-26 PROCEDURE — 1123F ACP DISCUSS/DSCN MKR DOCD: CPT | Performed by: SURGERY

## 2023-01-26 PROCEDURE — 3017F COLORECTAL CA SCREEN DOC REV: CPT | Performed by: SURGERY

## 2023-01-26 NOTE — PROGRESS NOTES
Subjective:      Patient ID: Rehana Harmon is a 69 y.o. female. HPI  Chief Complaint: buttock wound  Patient known from previous evaluation 6 months ago for buttock lesion. Thought to be early DTI and recommended off-loading. Never fully healed. However, has been using aquaphor recently and seems to be significantly improving. Bought egg crate and has been off-loading as best as possible        Past Medical History:   Diagnosis Date    Anxiety     Chronic headaches     CT done 4/2016    Colon polyp     COPD, mild ([address]     PFT 8/08 a smoker no symptoms-no meds    DDD (degenerative disc disease)     Depression     Fibromyalgia     Frequent UTI     Full dentures     Hallux valgus     Hearing loss in left ear 07/29/2011    HSV (herpes simplex virus) anogenital infection     leg    Hyperlipidemia     Lung nodule 2019    2 LLL nodules    Malignant neoplasm of left lung (HCC)     Menopausal state 1994    Occipital neuralgia     Osteoarthritis     Scoliosis     neck and back brace intermittently, based on pain    Smoker     Spinal stenosis     cane, walker    Unexplained weight loss     Patient states over 30# weight loss over 6 months and pcp is aware       Past Surgical History:   Procedure Laterality Date    [address] ANEURYSM SURGERY  2015    anterior communicating clipped    BUNIONECTOMY Left 08/23/2016    [place]    CATARACT REMOVAL Bilateral 2008    CERVICAL DISCECTOMY  2010    CERVICAL LAMINECTOMY  2003    Anterior approach, C4-7    CERVICAL LAMINECTOMY N/A 11/18/2016    Posterior, C2 - C5    CHOLECYSTECTOMY, LAPAROSCOPIC  2013    COLONOSCOPY      DILATION AND CURETTAGE OF UTERUS      LUMBAR LAMINECTOMY  2009    x2    SINUS SURGERY      x2    SPINAL FUSION  2009, 2012    post T12-L5 fusion and redone    THORACOSCOPY Left 3/25/2019    Dr. Luna Gambino. Alexander - thoracotomy w/takedown of adhesions, wedge excision LLL nodule#1, debulking LLL nodule #2, MSLND    TUBAL LIGATION      TUNNELED VENOUS PORT PLACEMENT Right 08/26/2019    Dr. Quentin prather       Current Outpatient Medications   Medication Sig Dispense Refill    ELIQUIS 5 MG TABS tablet TAKE ONE TABLET BY MOUTH TWICE A  tablet 3    Nutritional Supplements (ENSURE COMPLETE) LIQD Take 1 Bottle by mouth in the morning and at bedtime 72 each 5    ipratropium-albuterol (DUONEB) 0.5-2.5 (3) MG/3ML SOLN nebulizer solution Inhale 3 mLs into the lungs every 4 hours 360 mL 11    budesonide (PULMICORT) 0.5 MG/2ML nebulizer suspension Take 2 mLs by nebulization 2 times daily 60 each 3    azelastine (ASTELIN) 0.1 % nasal spray 1 spray by Nasal route 2 times daily 1 each 11    CRANBERRY-VITAMIN C PO Take by mouth daily 25/200mg      CALCIUM-VITAMIN D PO Take by mouth daily 1200mg/25mcg      sacubitril-valsartan (ENTRESTO) 49-51 MG per tablet Take 1 tablet by mouth 2 times daily 180 tablet 3    carvedilol (COREG) 6.25 MG tablet Take 1 tablet by mouth 2 times daily (with meals) 180 tablet 3    empagliflozin (JARDIANCE) 10 MG tablet Take 1 tablet by mouth in the morning. 90 tablet 5    mirtazapine (REMERON) 15 MG tablet Take 15 mg by mouth nightly      furosemide (LASIX) 20 MG tablet Take 1 tablet by mouth as needed (worsening shortness of breath, weight gain) 60 tablet 3    spironolactone (ALDACTONE) 25 MG tablet Take 0.5 tablets by mouth daily 45 tablet 3    valACYclovir (VALTREX) 1 g tablet Take 1 tablet by mouth daily 90 tablet 3    atorvastatin (LIPITOR) 40 MG tablet Take 1 tablet by mouth daily 90 tablet 3    buPROPion (WELLBUTRIN XL) 300 MG extended release tablet Take 300 mg by mouth every morning      methocarbamol (ROBAXIN) 750 MG tablet Take 750 mg by mouth as needed      oxyCODONE (OXY-IR) 30 MG immediate release tablet Take 30 mg by mouth 3 times daily as needed for Pain.       albuterol sulfate  (90 Base) MCG/ACT inhaler INHALE TWO PUFFS BY MOUTH EVERY 6 HOURS AS NEEDED FOR WHEEZING OR SHORTNESS OF BREATH 1 Inhaler 5 ARIPiprazole (ABILIFY) 15 MG tablet Take 7.5 mg by mouth daily       levothyroxine (SYNTHROID) 50 MCG tablet Take 50 mcg by mouth Daily      OXcarbazepine (TRILEPTAL) 300 MG tablet Take 300 mg by mouth in the morning, at noon, and at bedtime      Multiple Vitamins-Minerals (THERAPEUTIC MULTIVITAMIN-MINERALS) tablet Take 1 tablet by mouth daily      Acetaminophen 325 MG CAPS Take 650 mg by mouth      Probiotic Product (PROBIOTIC DAILY PO) Take 1 tablet by mouth daily       DULoxetine (CYMBALTA) 60 MG capsule Take 120 mg by mouth daily [facility] 30 capsule     Respiratory Therapy Supplies (NEBULIZER MASK ADULT) MISC 1 Device by Does not apply route once for 1 dose 1 each 0    Nebulizers MISC 1 Device by Does not apply route once for 1 dose 1 each 0    gabapentin (NEURONTIN) 800 MG tablet Take 1 tablet by mouth in the morning and 1 tablet before bedtime. Do all this for 180 days. 180 tablet 1     Current Facility-Administered Medications   Medication Dose Route Frequency Provider Last Rate Last Admin    triamcinolone acetonide (KENALOG-40) injection 40 mg  40 mg IntraMUSCular Once [employer], DO           Prior to Admission medications    Medication Sig Start Date End Date Taking?  Authorizing Provider   ELIQUIS 5 MG TABS tablet TAKE ONE TABLET BY MOUTH TWICE A DAY 1/23/23  Yes Julissa Pfeiffer MD   Nutritional Supplements (ENSURE COMPLETE) LIQD Take 1 Bottle by mouth in the morning and at bedtime 1/19/23  Yes Ta Zhao MD   ipratropium-albuterol (DUONEB) 0.5-2.5 (3) MG/3ML SOLN nebulizer solution Inhale 3 mLs into the lungs every 4 hours 11/11/22  Yes Gadiel Vance MD   budesonide (PULMICORT) 0.5 MG/2ML nebulizer suspension Take 2 mLs by nebulization 2 times daily 11/11/22  Yes Gadiel Vance MD   azelastine (ASTELIN) 0.1 % nasal spray 1 spray by Nasal route 2 times daily 10/28/22  Yes Gadiel Vance MD   CRANBERRY-VITAMIN C PO Take by mouth daily 25/200mg   Yes Historical Provider, MD CALCIUM-VITAMIN D PO Take by mouth daily 1200mg/25mcg   Yes Historical Provider, MD   sacubitril-valsartan (ENTRESTO) 49-51 MG per tablet Take 1 tablet by mouth 2 times daily 8/30/22  Yes Brittny Overton MD   carvedilol (COREG) 6.25 MG tablet Take 1 tablet by mouth 2 times daily (with meals) 8/30/22  Yes Brittny Overton MD   empagliflozin (JARDIANCE) 10 MG tablet Take 1 tablet by mouth in the morning. 7/29/22  Yes Brittny Overton MD   mirtazapine (REMERON) 15 MG tablet Take 15 mg by mouth nightly 7/22/22  Yes Historical Provider, MD   furosemide (LASIX) 20 MG tablet Take 1 tablet by mouth as needed (worsening shortness of breath, weight gain) 5/16/22  Yes Brittny Overton MD   spironolactone (ALDACTONE) 25 MG tablet Take 0.5 tablets by mouth daily 4/25/22  Yes HEATHER Reddy - CNP   valACYclovir (VALTREX) 1 g tablet Take 1 tablet by mouth daily 1/28/22  Yes Matthias Sosa MD   atorvastatin (LIPITOR) 40 MG tablet Take 1 tablet by mouth daily 1/21/22  Yes Matthias Sosa MD   buPROPion (WELLBUTRIN XL) 300 MG extended release tablet Take 300 mg by mouth every morning 11/17/21  Yes Historical Provider, MD   methocarbamol (ROBAXIN) 750 MG tablet Take 750 mg by mouth as needed   Yes Historical Provider, MD   oxyCODONE (OXY-IR) 30 MG immediate release tablet Take 30 mg by mouth 3 times daily as needed for Pain.    Yes Historical Provider, MD   albuterol sulfate  (90 Base) MCG/ACT inhaler INHALE TWO PUFFS BY MOUTH EVERY 6 HOURS AS NEEDED FOR WHEEZING OR SHORTNESS OF BREATH 8/25/21  Yes Ariel Nageotte, MD   ARIPiprazole (ABILIFY) 15 MG tablet Take 7.5 mg by mouth daily    Yes Historical Provider, MD   levothyroxine (SYNTHROID) 50 MCG tablet Take 50 mcg by mouth Daily   Yes Historical Provider, MD   OXcarbazepine (TRILEPTAL) 300 MG tablet Take 300 mg by mouth in the morning, at noon, and at bedtime 8/21/19  Yes Lalit Sepulveda MD   Multiple Vitamins-Minerals (THERAPEUTIC MULTIVITAMIN-MINERALS) tablet Take 1 tablet by mouth daily   Yes Historical Provider, MD   Acetaminophen 325 MG CAPS Take 650 mg by mouth   Yes Historical Provider, MD   Probiotic Product (PROBIOTIC DAILY PO) Take 1 tablet by mouth daily    Yes Historical Provider, MD   DULoxetine (CYMBALTA) 60 MG capsule Take 120 mg by mouth daily Tyler Schaefer 2/25/15  Yes Giorgi Saeed MD   Respiratory Therapy Supplies (NEBULIZER MASK ADULT) MISC 1 Device by Does not apply route once for 1 dose 22  Katty Fairchild MD   Nebulizers MISC 1 Device by Does not apply route once for 1 dose 22  Katty Fairchild MD   gabapentin (NEURONTIN) 800 MG tablet Take 1 tablet by mouth in the morning and 1 tablet before bedtime. Do all this for 180 days. 22  Margarita Warren MD         Allergies   Allergen Reactions    Adhesive Tape Dermatitis     Blisters on skin under tape or medication patches    Ibuprofen Nausea Only    Naproxen Nausea Only    Nsaids Other (See Comments)     GI upset    Vicodin [Hydrocodone-Acetaminophen] Nausea Only     headache       Social History     Socioeconomic History    Marital status: Single     Spouse name: Not on file    Number of children: 4    Years of education: Not on file    Highest education level: Not on file   Occupational History    Occupation: disabled   Tobacco Use    Smoking status: Some Days     Packs/day: 0.25     Years: 48.00     Pack years: 12.00     Types: Cigarettes     Last attempt to quit: 2019     Years since quittin.0    Smokeless tobacco: Never    Tobacco comments:     started age 11, down to [address] [place] [address] [address] a day;   Vaping Use    Vaping Use: Former    Substances: Always   Substance and Sexual Activity    Alcohol use: Not Currently    Drug use: Not Currently     Types: Marijuana Valdene Jim)    Sexual activity: Never   Other Topics Concern    Not on file   Social History Narrative    Exercise:  never. Living will:  no,   additional information provided. Lives alone.     Seatbelt use: Always. Social Determinants of Health     Financial Resource Strain: Not on file   Food Insecurity: Not on file   Transportation Needs: Not on file   Physical Activity: Not on file   Stress: Not on file   Social Connections: Not on file   Intimate Partner Violence: Not on file   Housing Stability: Not on file       Family History   Problem Relation Age of Onset    Cancer Mother 43        breast    Cancer Brother 77        throat    Other Father         Macular Degeration       Review of Systems   Skin:  Positive for wound. All other systems reviewed and are negative. Objective:   Physical Exam  Vitals reviewed. Exam conducted with a chaperone present. Constitutional:       General: She is not in acute distress. Appearance: She is well-developed. She is not diaphoretic. HENT:      Head: Normocephalic and atraumatic. Right Ear: External ear normal.      Left Ear: External ear normal.      Nose: Nose normal.   Eyes:      General: No scleral icterus. Conjunctiva/sclera: Conjunctivae normal.   Pulmonary:      Effort: Pulmonary effort is normal. No respiratory distress. Abdominal:      General: There is no distension. Palpations: Abdomen is soft. Tenderness: There is no abdominal tenderness. Musculoskeletal:         General: Normal range of motion. Cervical back: Normal range of motion and neck supple. Skin:     General: Skin is warm and dry. Findings: No erythema. Neurological:      Mental Status: She is alert and oriented to person, place, and time. Psychiatric:         Behavior: Behavior normal.         Thought Content: Thought content normal.         Judgment: Judgment normal.       Assessment:       Diagnosis Orders   1.  Skin ulcer of buttock, limited to breakdown of skin ([facility] [address]                Plan:      Chronic skin thickening due to DTI vs underlying cyst.    Continue aquaphor as seems to be significantly improving  F/U 3-4 weeks for re-eval.  If not fully resolved then consider excision  Continue off-loading        Etelvina Fermin MD

## 2023-01-28 DIAGNOSIS — B00.9 HERPES SIMPLEX: ICD-10-CM

## 2023-01-30 ENCOUNTER — OFFICE VISIT (OUTPATIENT)
Dept: INTERNAL MEDICINE CLINIC | Age: 68
End: 2023-01-30
Payer: COMMERCIAL

## 2023-01-30 VITALS
BODY MASS INDEX: 20.72 KG/M2 | RESPIRATION RATE: 12 BRPM | DIASTOLIC BLOOD PRESSURE: 63 MMHG | WEIGHT: 102.6 LBS | SYSTOLIC BLOOD PRESSURE: 105 MMHG | OXYGEN SATURATION: 95 % | HEART RATE: 101 BPM

## 2023-01-30 VITALS — BODY MASS INDEX: 20.72 KG/M2 | HEIGHT: 59 IN

## 2023-01-30 DIAGNOSIS — E78.00 HYPERCHOLESTEREMIA: ICD-10-CM

## 2023-01-30 DIAGNOSIS — E03.9 ACQUIRED HYPOTHYROIDISM: ICD-10-CM

## 2023-01-30 DIAGNOSIS — H91.90 HEARING LOSS, UNSPECIFIED HEARING LOSS TYPE, UNSPECIFIED LATERALITY: ICD-10-CM

## 2023-01-30 DIAGNOSIS — G89.29 CHRONIC BACK PAIN, UNSPECIFIED BACK LOCATION, UNSPECIFIED BACK PAIN LATERALITY: ICD-10-CM

## 2023-01-30 DIAGNOSIS — I10 ESSENTIAL HYPERTENSION: ICD-10-CM

## 2023-01-30 DIAGNOSIS — I50.22 CHRONIC SYSTOLIC CONGESTIVE HEART FAILURE (HCC): ICD-10-CM

## 2023-01-30 DIAGNOSIS — J96.12 CHRONIC RESPIRATORY FAILURE WITH HYPOXIA AND HYPERCAPNIA (HCC): ICD-10-CM

## 2023-01-30 DIAGNOSIS — J44.9 COPD, MILD (HCC): Primary | ICD-10-CM

## 2023-01-30 DIAGNOSIS — J96.11 CHRONIC RESPIRATORY FAILURE WITH HYPOXIA AND HYPERCAPNIA (HCC): ICD-10-CM

## 2023-01-30 DIAGNOSIS — F17.210 CIGARETTE NICOTINE DEPENDENCE WITHOUT COMPLICATION: ICD-10-CM

## 2023-01-30 DIAGNOSIS — M41.30 THORACOGENIC SCOLIOSIS, UNSPECIFIED SPINAL REGION: ICD-10-CM

## 2023-01-30 DIAGNOSIS — M80.00XG AGE-RELATED OSTEOPOROSIS WITH CURRENT PATHOLOGICAL FRACTURE WITH DELAYED HEALING: ICD-10-CM

## 2023-01-30 DIAGNOSIS — Z00.00 MEDICARE ANNUAL WELLNESS VISIT, SUBSEQUENT: Primary | ICD-10-CM

## 2023-01-30 DIAGNOSIS — M62.830 BACK MUSCLE SPASM: ICD-10-CM

## 2023-01-30 DIAGNOSIS — M79.7 FIBROMYALGIA: ICD-10-CM

## 2023-01-30 DIAGNOSIS — M54.9 CHRONIC BACK PAIN, UNSPECIFIED BACK LOCATION, UNSPECIFIED BACK PAIN LATERALITY: ICD-10-CM

## 2023-01-30 PROCEDURE — 1123F ACP DISCUSS/DSCN MKR DOCD: CPT | Performed by: INTERNAL MEDICINE

## 2023-01-30 PROCEDURE — G8484 FLU IMMUNIZE NO ADMIN: HCPCS | Performed by: INTERNAL MEDICINE

## 2023-01-30 PROCEDURE — G0439 PPPS, SUBSEQ VISIT: HCPCS | Performed by: INTERNAL MEDICINE

## 2023-01-30 PROCEDURE — 99214 OFFICE O/P EST MOD 30 MIN: CPT | Performed by: INTERNAL MEDICINE

## 2023-01-30 PROCEDURE — 3078F DIAST BP <80 MM HG: CPT | Performed by: INTERNAL MEDICINE

## 2023-01-30 PROCEDURE — 3017F COLORECTAL CA SCREEN DOC REV: CPT | Performed by: INTERNAL MEDICINE

## 2023-01-30 PROCEDURE — 3074F SYST BP LT 130 MM HG: CPT | Performed by: INTERNAL MEDICINE

## 2023-01-30 PROCEDURE — 4004F PT TOBACCO SCREEN RCVD TLK: CPT | Performed by: INTERNAL MEDICINE

## 2023-01-30 PROCEDURE — 99406 BEHAV CHNG SMOKING 3-10 MIN: CPT | Performed by: INTERNAL MEDICINE

## 2023-01-30 PROCEDURE — G8420 CALC BMI NORM PARAMETERS: HCPCS | Performed by: INTERNAL MEDICINE

## 2023-01-30 PROCEDURE — G8399 PT W/DXA RESULTS DOCUMENT: HCPCS | Performed by: INTERNAL MEDICINE

## 2023-01-30 PROCEDURE — 3023F SPIROM DOC REV: CPT | Performed by: INTERNAL MEDICINE

## 2023-01-30 PROCEDURE — G8427 DOCREV CUR MEDS BY ELIG CLIN: HCPCS | Performed by: INTERNAL MEDICINE

## 2023-01-30 PROCEDURE — 1090F PRES/ABSN URINE INCON ASSESS: CPT | Performed by: INTERNAL MEDICINE

## 2023-01-30 RX ORDER — GABAPENTIN 800 MG/1
800 TABLET ORAL 2 TIMES DAILY
Qty: 180 TABLET | Refills: 1 | Status: SHIPPED | OUTPATIENT
Start: 2023-01-30 | End: 2023-07-29

## 2023-01-30 RX ORDER — VALACYCLOVIR HYDROCHLORIDE 1 G/1
TABLET, FILM COATED ORAL
Qty: 84 TABLET | Refills: 5 | Status: SHIPPED | OUTPATIENT
Start: 2023-01-30

## 2023-01-30 RX ORDER — ATORVASTATIN CALCIUM 40 MG/1
40 TABLET, FILM COATED ORAL DAILY
Qty: 90 TABLET | Refills: 3 | Status: SHIPPED | OUTPATIENT
Start: 2023-01-30

## 2023-01-30 RX ORDER — TIZANIDINE 2 MG/1
2 TABLET ORAL 3 TIMES DAILY PRN
Qty: 60 TABLET | Refills: 3 | Status: SHIPPED | OUTPATIENT
Start: 2023-01-30

## 2023-01-30 ASSESSMENT — PATIENT HEALTH QUESTIONNAIRE - PHQ9
SUM OF ALL RESPONSES TO PHQ QUESTIONS 1-9: 0
6. FEELING BAD ABOUT YOURSELF - OR THAT YOU ARE A FAILURE OR HAVE LET YOURSELF OR YOUR FAMILY DOWN: 0
3. TROUBLE FALLING OR STAYING ASLEEP: 0
9. THOUGHTS THAT YOU WOULD BE BETTER OFF DEAD, OR OF HURTING YOURSELF: 0
SUM OF ALL RESPONSES TO PHQ QUESTIONS 1-9: 0
4. FEELING TIRED OR HAVING LITTLE ENERGY: 0
SUM OF ALL RESPONSES TO PHQ QUESTIONS 1-9: 0
SUM OF ALL RESPONSES TO PHQ QUESTIONS 1-9: 0
7. TROUBLE CONCENTRATING ON THINGS, SUCH AS READING THE NEWSPAPER OR WATCHING TELEVISION: 0
2. FEELING DOWN, DEPRESSED OR HOPELESS: 0
5. POOR APPETITE OR OVEREATING: 0
10. IF YOU CHECKED OFF ANY PROBLEMS, HOW DIFFICULT HAVE THESE PROBLEMS MADE IT FOR YOU TO DO YOUR WORK, TAKE CARE OF THINGS AT HOME, OR GET ALONG WITH OTHER PEOPLE: 0
8. MOVING OR SPEAKING SO SLOWLY THAT OTHER PEOPLE COULD HAVE NOTICED. OR THE OPPOSITE, BEING SO FIGETY OR RESTLESS THAT YOU HAVE BEEN MOVING AROUND A LOT MORE THAN USUAL: 0

## 2023-01-30 ASSESSMENT — LIFESTYLE VARIABLES
HOW OFTEN DO YOU HAVE A DRINK CONTAINING ALCOHOL: NEVER
HOW MANY STANDARD DRINKS CONTAINING ALCOHOL DO YOU HAVE ON A TYPICAL DAY: PATIENT DOES NOT DRINK

## 2023-01-30 NOTE — PROGRESS NOTES
Medicare Annual Wellness Visit    Herminio Whipple is here for E and M visit but also due for AWV to be performed by LPN. Assessment & Plan  AWV   Recommendations for Preventive Services Due: see orders and patient instructions/AVS.  Recommended screening schedule for the next 5-10 years is provided to the patient in written form: see Patient Instructions/AVS.     No follow-ups on file. Subjective       Patient's complete Health Risk Assessment and screening values have been reviewed and are found in Flowsheets. The following problems were reviewed today and where indicated follow up appointments were made and/or referrals ordered. Positive Risk Factor Screenings with Interventions:               Opioid Risk: (High risk score ?55) Opioid risk score: 75    Last PDMP [place] as Reviewed:  Review User Review Instant Review Result   Sera Hurtado 9/21/2022  5:27 PM @   Reviewed PDMP [1]     Last Controlled Substance Monitoring Documentation      [address] Office Visit from 12/16/2016 in [address] [place] [address] The Prescription Monitoring Report for this patient was reviewed today. filed at 12/16/2016 1510   Periodic Controlled Substance Monitoring No signs of potential drug abuse or diversion identified. filed at 12/16/2016 1510                   Hearing Screen:  Do you or your family notice any trouble with your hearing that hasn't been managed with hearing aids?: (!) Yes    Interventions:  Referred to Audiology        Tobacco Use:  Tobacco Use: High Risk    Smoking Tobacco Use: Some Days    Smokeless Tobacco Use: Never    Passive Exposure: Not on file     E-cigarette/Vaping       Questions Responses    E-cigarette/Vaping Use Former User    Start Date     Passive Exposure     Quit Date     Counseling Given Yes    Comments Juul          Interventions:  Patient comments: will try to cut back    Tobacco Use Counseling: Patient was counseled on tobacco cessation.  Based upon patient's motivation to change her behavior, the following plan was agreed upon: gradual reduction of tobacco use and willpower. Educational materials regarding tobacco cessation were provided. Provider spent 5 minutes counseling patient.                     Objective   Vitals:    01/30/23 1512   Height: 4' 11\" (1.499 m)      Body mass index is 20.72 kg/m².             Allergies   Allergen Reactions    Adhesive Tape Dermatitis     Blisters on skin under tape or medication patches    Ibuprofen Nausea Only    Naproxen Nausea Only    Nsaids Other (See Comments)     GI upset    Vicodin [Hydrocodone-Acetaminophen] Nausea Only     headache     Prior to Visit Medications    Medication Sig Taking? Authorizing Provider   valACYclovir (VALTREX) 1 g tablet TAKE ONE TABLET BY MOUTH DAILY  Eli Dalton MD   atorvastatin (LIPITOR) 40 MG tablet Take 1 tablet by mouth daily  Eli Dalton MD   gabapentin (NEURONTIN) 800 MG tablet Take 1 tablet by mouth 2 times daily for 180 days.  Eli Dalton MD   tiZANidine (ZANAFLEX) 2 MG tablet Take 1 tablet by mouth 3 times daily as needed (muscle spasm)  Eli Dalton MD   ELIQUIS 5 MG TABS tablet TAKE ONE TABLET BY MOUTH TWICE A DAY  Roderick Alonzo MD   Nutritional Supplements (ENSURE COMPLETE) LIQD Take 1 Bottle by mouth in the morning and at bedtime  Eli Dalton MD   Respiratory Therapy Supplies (NEBULIZER MASK ADULT) MISC 1 Device by Does not apply route once for 1 dose  Tiburcio Darnell MD   Nebulizers MISC 1 Device by Does not apply route once for 1 dose  Tiburcio Darnell MD   ipratropium-albuterol (DUONEB) 0.5-2.5 (3) MG/3ML SOLN nebulizer solution Inhale 3 mLs into the lungs every 4 hours  Tiburcio Darnell MD   budesonide (PULMICORT) 0.5 MG/2ML nebulizer suspension Take 2 mLs by nebulization 2 times daily  Tiburcio Darnell MD   azelastine (ASTELIN) 0.1 % nasal spray 1 spray by Nasal route 2 times daily  Tiburcio Darnell MD   CRANBERRY-VITAMIN C PO  Take by mouth daily 25/200mg  Historical Provider, MD   CALCIUM-VITAMIN D PO Take by mouth daily 1200mg/25mcg  Historical Provider, MD   sacubitril-valsartan (ENTRESTO) 49-51 MG per tablet Take 1 tablet by mouth 2 times daily  Roderick Alonzo MD   carvedilol (COREG) 6.25 MG tablet Take 1 tablet by mouth 2 times daily (with meals)  Roderick Alonzo MD   empagliflozin (JARDIANCE) 10 MG tablet Take 1 tablet by mouth in the morning.  Roderick Alonzo MD   mirtazapine (REMERON) 15 MG tablet Take 15 mg by mouth nightly  Historical Provider, MD   furosemide (LASIX) 20 MG tablet Take 1 tablet by mouth as needed (worsening shortness of breath, weight gain)  Roderick Alonzo MD   spironolactone (ALDACTONE) 25 MG tablet Take 0.5 tablets by mouth daily  HEATHER Franklin - CNP   buPROPion (WELLBUTRIN XL) 300 MG extended release tablet Take 300 mg by mouth every morning  Historical Provider, MD   oxyCODONE (OXY-IR) 30 MG immediate release tablet Take 30 mg by mouth 3 times daily as needed for Pain.  Historical Provider, MD   albuterol sulfate  (90 Base) MCG/ACT inhaler INHALE TWO PUFFS BY MOUTH EVERY 6 HOURS AS NEEDED FOR WHEEZING OR SHORTNESS OF BREATH  Tiburcio Darnell MD   ARIPiprazole (ABILIFY) 15 MG tablet Take 7.5 mg by mouth daily   Historical Provider, MD   levothyroxine (SYNTHROID) 50 MCG tablet Take 50 mcg by mouth Daily  Historical Provider, MD   OXcarbazepine (TRILEPTAL) 300 MG tablet Take 300 mg by mouth in the morning, at noon, and at bedtime  Jose Larry MD   Multiple Vitamins-Minerals (THERAPEUTIC MULTIVITAMIN-MINERALS) tablet Take 1 tablet by mouth daily  Historical Provider, MD   Acetaminophen 325 MG CAPS Take 650 mg by mouth  Historical Provider, MD   Probiotic Product (PROBIOTIC DAILY PO) Take 1 tablet by mouth daily   Historical Provider, MD   DULoxetine (CYMBALTA) 60 MG capsule Take 120 mg by mouth daily Tyler Larry MD       [facility] (Including outside providers/suppliers  regularly involved in providing care):   Patient Care Team:  Jim Rueda MD as PCP - General (Internal Medicine)  Jim Rueda MD as PCP - REHABILITATION [facility]  [address] as Consulting Physician (Rheumatology)  Dulce Hui MD as Consulting Physician (Neurosurgery)  Peter Ramos MD as Consulting Physician (Physical Medicine and Rehab)     Reviewed and updated this visit:  Tobacco  Med Hx  Surg Hx  Soc Hx  Fam Hx        I, Casey Randle LPN, 0/50/6799, performed the documented evaluation under the direct supervision of the attending physician. This encounter was performed under myMathew MDs, direct supervision, 1/30/2023.    Medicare Annual Wellness Visit

## 2023-01-30 NOTE — TELEPHONE ENCOUNTER
Requested Prescriptions     Pending Prescriptions Disp Refills    valACYclovir (VALTREX) 1 g tablet [Pharmacy Med Name: VALACYCLOVIR HCL 1 GRAM TABLET] 84 tablet      Sig: TAKE ONE TABLET BY MOUTH DAILY     Patient requesting a medication refill.     Next office visit: 1/30/2023

## 2023-01-30 NOTE — PATIENT INSTRUCTIONS
Personalized Preventive Plan for Tabitha Finch - 1/30/2023  Medicare offers a range of preventive health benefits. Some of the tests and screenings are paid in full while other may be subject to a deductible, co-insurance, and/or copay. Some of these benefits include a comprehensive review of your medical history including lifestyle, illnesses that may run in your family, and various assessments and screenings as appropriate. After reviewing your medical record and screening and assessments performed today your provider may have ordered immunizations, labs, imaging, and/or referrals for you. A list of these orders (if applicable) as well as your Preventive Care list are included within your After Visit Summary for your review. Other Preventive Recommendations:    A preventive eye exam performed by an eye specialist is recommended every 1-2 years to screen for glaucoma; cataracts, macular degeneration, and other eye disorders. A preventive dental visit is recommended every 6 months. Try to get at least 150 minutes of exercise per week or 10,000 steps per day on a pedometer . Order or download the FREE \"Exercise & Physical Activity: Your Everyday Guide\" from The [employer]. Call 4-753.904.8158 or search The [employer] online. You need 5238-2589 mg of calcium and 8032-8285 IU of vitamin D per day. It is possible to meet your calcium requirement with diet alone, but a vitamin D supplement is usually necessary to meet this goal.  When exposed to the sun, use a sunscreen that protects against both UVA and UVB radiation with an SPF of 30 or greater. Reapply every 2 to 3 hours or after sweating, drying off with a towel, or swimming. Always wear a seat belt when traveling in a car. Always wear a helmet when riding a bicycle or motorcycle. Advance Directives: Care Instructions  Overview  An advance directive is a legal way to state your wishes at the end of your life.  It tells your family and your doctor what to do if you can't say what you want. There are two main types of advance directives. You can change them any time your wishes change. Living will. This form tells your family and your doctor your wishes about life support and other treatment. The form is also called a declaration. Medical power of . This form lets you name a person to make treatment decisions for you when you can't speak for yourself. This person is called a health care agent (health care proxy, health care surrogate). The form is also called a durable power of  for health care. If you do not have an advance directive, decisions about your medical care may be made by a family member, or by a doctor or a  who doesn't know you. It may help to think of an advance directive as a gift to the people who care for you. If you have one, they won't have to make tough decisions by themselves. For more information, including forms for your state, see the [address] [facility] website (www.caringinfo.org/planning/advance-directives/). Follow-up care is a key part of your treatment and safety. Be sure to make and go to all appointments, and call your doctor if you are having problems. It's also a good idea to know your test results and keep a list of the medicines you take. What should you include in an advance directive? Many states have a unique advance directive form. (It may ask you to address specific issues.) Or you might use a universal form that's approved by many states. If your form doesn't tell you what to address, it may be hard to know what to include in your advance directive. Use the questions below to help you get started. Who do you want to make decisions about your medical care if you are not able to? What life-support measures do you want if you have a serious illness that gets worse over time or can't be cured? What are you most afraid of that might happen?  (Maybe you're afraid of having pain, losing your independence, or being kept alive by machines.)  Where would you prefer to die? (Your home? A hospital? A nursing home?)  Do you want to donate your organs when you die? Do you want certain Yazdanism practices performed before you die? When should you call for help? Be sure to contact your doctor if you have any questions. Where can you learn more? Go to http://www.brock.com/ and enter R264 to learn more about \"Advance Directives: Care Instructions. \"  Current as of: June 16, 2022               Content Version: 13.5  © 1636-5380 [employer], [employer]. Care instructions adapted under license by [facility] ([facility]). If you have questions about a medical condition or this instruction, always ask your healthcare professional. [address] any warranty or liability for your use of this information. Personalized Preventive Plan for Naun Exon - 1/30/2023  Medicare offers a range of preventive health benefits. Some of the tests and screenings are paid in full while other may be subject to a deductible, co-insurance, and/or copay. Some of these benefits include a comprehensive review of your medical history including lifestyle, illnesses that may run in your family, and various assessments and screenings as appropriate. After reviewing your medical record and screening and assessments performed today your provider may have ordered immunizations, labs, imaging, and/or referrals for you. A list of these orders (if applicable) as well as your Preventive Care list are included within your After Visit Summary for your review. Other Preventive Recommendations:    A preventive eye exam performed by an eye specialist is recommended every 1-2 years to screen for glaucoma; cataracts, macular degeneration, and other eye disorders. A preventive dental visit is recommended every 6 months.   Try to get at least 150 minutes of exercise per week or 10,000 steps per day on a pedometer . Order or download the FREE \"Exercise & Physical Activity: Your Everyday Guide\" from [employer]. Call 5-821.167.6155 or search [employer] online. You need 8035-9732 mg of calcium and 5808-2426 IU of vitamin D per day. It is possible to meet your calcium requirement with diet alone, but a vitamin D supplement is usually necessary to meet this goal.  When exposed to the sun, use a sunscreen that protects against both UVA and UVB radiation with an SPF of 30 or greater. Reapply every 2 to 3 hours or after sweating, drying off with a towel, or swimming. Always wear a seat belt when traveling in a car. Always wear a helmet when riding a bicycle or motorcycle.

## 2023-01-30 NOTE — PROGRESS NOTES
Chief Complaint   Patient presents with    Hypertension       HPI: Here for htn followup and management of multiple chronic conditions as per the active problems list on chart, which I reviewed and updated with the patient today. States doing well with no new concerns except if noted below. Her chronic back pain is bothering her a lot, but not changed. States just got a couple of injections in her spine which are helping, but was told back was \"tight as a drum\" and she isn't getting any relief from methocarbamol. I have reviewed the chart notes available from myself and other providers. I have reviewed and addressed all active problems and created or updated the problems list in detail, as needed. Also reviewed labs from 1/24 all normal or near normal.    No problem-specific Assessment & Plan notes found for this encounter.       I have extensively reviewed and reconciled the medication list, discontinued medications not taking or no longer appropriate, and updated the active meds list      Lab Results   Component Value Date    LABA1C 5.2 01/21/2022    LABA1C 5.4 02/08/2019    LABMICR Not Indicated 09/16/2022    LABMICR YES 09/26/2021    LABMICR YES 03/20/2019       Lab Results   Component Value Date     01/24/2023     09/02/2022     (L) 06/06/2022    K 4.7 01/24/2023    K 4.8 09/02/2022    K 4.0 06/06/2022     01/24/2023     09/02/2022    CL 96 (L) 06/06/2022    CO2 25 01/24/2023    CO2 27 09/02/2022    CO2 23 06/06/2022    BUN 21 (H) 01/24/2023    BUN 20 09/02/2022    BUN 12 06/06/2022    CREATININE 0.8 01/24/2023    CREATININE 0.7 09/02/2022    CREATININE 0.6 06/06/2022    GLUCOSE 89 01/24/2023    GLUCOSE 82 09/02/2022    GLUCOSE 77 06/06/2022    CALCIUM 8.7 01/24/2023    CALCIUM 9.5 09/02/2022    CALCIUM 9.1 06/06/2022       Lab Results   Component Value Date    CHOL 159 01/24/2023    CHOL 138 09/28/2021    CHOL 174 02/02/2018    TRIG 64 01/24/2023    TRIG 55 09/28/2021    TRIG 78 02/02/2018    HDL 69 (H) 01/24/2023    HDL 55 09/28/2021    HDL 55 02/02/2018    LDLCALC 77 01/24/2023    LDLCALC 72 09/28/2021    LDLCALC 103 (H) 02/02/2018       Lab Results   Component Value Date    ALT 20 01/24/2023    ALT 13 01/21/2022    ALT 13 09/26/2021    AST 34 01/24/2023    AST 37 01/21/2022    AST 47 (H) 09/26/2021       Lab Results   Component Value Date    TSH 2.36 12/10/2015    TSH 2.11 03/21/2013    TSH 1.37 09/10/2012       Lab Results   Component Value Date    WBC 7.8 01/24/2023    WBC 7.2 06/06/2022    WBC 8.2 09/28/2021    HGB 13.0 01/24/2023    HGB 11.9 (L) 06/06/2022    HGB 10.9 (L) 09/28/2021    HCT 39.1 01/24/2023    HCT 34.6 (L) 06/06/2022    HCT 32.1 (L) 09/28/2021    .0 (H) 01/24/2023    .1 (H) 06/06/2022    .0 09/28/2021     01/24/2023     06/06/2022     09/28/2021       Lab Results   Component Value Date    INR 0.97 08/26/2019    INR 0.93 11/14/2016    INR 1.0 03/15/2012        No results found for: PSA     No results found for: LABURIC          /63   Pulse (!) 101   Resp 12   Wt 102 lb 9.6 oz (46.5 kg)   LMP 08/11/1993 (Approximate)   SpO2 95%   BMI 20.72 kg/m²   Body mass index is 20.72 kg/m². Physical Exam  Constitutional:       General: She is not in acute distress. Comments: Kyphoscoliotic spine and moves cautiously, appears pale   HENT:      Head: Normocephalic and atraumatic. Nose:      Comments: masked  Eyes:      General: No scleral icterus. Right eye: No discharge. Left eye: No discharge. Extraocular Movements: Extraocular movements intact. Conjunctiva/sclera: Conjunctivae normal.      Pupils: Pupils are equal, round, and reactive to light. Neck:      Thyroid: No thyromegaly. Vascular: No JVD. Trachea: No tracheal deviation. Cardiovascular:      Rate and Rhythm: Normal rate and regular rhythm. Heart sounds: Normal heart sounds. No murmur heard.     No friction rub. No gallop. Pulmonary:      Effort: Pulmonary effort is normal. No respiratory distress. Breath sounds: Normal breath sounds. No wheezing or rales. Chest:      Chest wall: No tenderness. Abdominal:      General: Bowel sounds are normal. There is no distension. Palpations: Abdomen is soft. There is no mass. Tenderness: There is no abdominal tenderness. There is no guarding or rebound. Musculoskeletal:         General: No tenderness. Normal range of motion. Cervical back: Neck supple. Right lower leg: No edema. Left lower leg: No edema. Comments: Tortuous spine causing thoracic deformity   Lymphadenopathy:      Cervical: No cervical adenopathy. Skin:     General: Skin is warm and dry. Coloration: Skin is not pale. Findings: No erythema or rash. Neurological:      General: No focal deficit present. Mental Status: She is alert and oriented to person, place, and time. Mental status is at baseline. Cranial Nerves: No cranial nerve deficit. Motor: No abnormal muscle tone. Coordination: Coordination normal.      Deep Tendon Reflexes: Reflexes are normal and symmetric. Reflexes normal.   Psychiatric:         Mood and Affect: Mood normal.         Behavior: Behavior normal.         Thought Content: Thought content normal.         Judgment: Judgment normal.       ASSESSMENT AND PLANS:      Except as noted below, all chronic problems have been reviewed and are stable to continue medications or other therapy as previously documented in the patient's chart, with changes per orders or documentation below:        Assessment and Plan: Patient received counseling and, if relevant,printed instructions for all symptoms listed in CC and HPI, as well as for all diagnoses brought onto today's visit note below.  Typical counseling includes, but is not limited to, non pharmacologic measures to manage listed symptoms and conditions; appropriate use, risks and benefits for all prescribed medications; potential interactions between medications both prescribed and OTC; diet; exercise; healthy behaviors; and goalsetting to improve health. Pt.or responsible party was involved in shared decision making and had opportunity to have all questions answered. 1. COPD, mild ([facility]    2. Essential hypertension--bp now low on CHF meds    3. Age-related osteoporosis with current pathological fracture with delayed healing    4. Acquired hypothyroidism    5. Hypercholesteremia  - atorvastatin (LIPITOR) 40 MG tablet; Take 1 tablet by mouth daily  Dispense: 90 tablet; Refill: 3    6. Fibromyalgia  - gabapentin (NEURONTIN) 800 MG tablet; Take 1 tablet by mouth 2 times daily for 180 days. Dispense: 180 tablet; Refill: 1    7. Thoracogenic scoliosis, unspecified spinal region    8. Chronic back pain, unspecified back location, unspecified back pain laterality    9. Back muscle spasm--will try replacing methocarbamol with tizanidine  - tiZANidine (ZANAFLEX) 2 MG tablet; Take 1 tablet by mouth 3 times daily as needed (muscle spasm)  Dispense: 60 tablet;  Refill: 3          Problem List       COPD, mild ([facility] - Primary    Relevant Medications    albuterol sulfate  (90 Base) MCG/ACT inhaler    triamcinolone acetonide (KENALOG-40) injection 40 mg    azelastine (ASTELIN) 0.1 % nasal spray    ipratropium-albuterol (DUONEB) 0.5-2.5 (3) MG/3ML SOLN nebulizer solution    budesonide (PULMICORT) 0.5 MG/2ML nebulizer suspension    Scoliosis, thoracogenic    Hypercholesteremia    Relevant Medications    spironolactone (ALDACTONE) 25 MG tablet    furosemide (LASIX) 20 MG tablet    sacubitril-valsartan (ENTRESTO) 49-51 MG per tablet    carvedilol (COREG) 6.25 MG tablet    ELIQUIS 5 MG TABS tablet    atorvastatin (LIPITOR) 40 MG tablet    Essential hypertension    Back muscle spasm    Relevant Medications    tiZANidine (ZANAFLEX) 2 MG tablet    Age-related osteoporosis with current pathological fracture with delayed healing    Relevant Medications    Acetaminophen 325 MG CAPS    oxyCODONE (OXY-IR) 30 MG immediate release tablet    tiZANidine (ZANAFLEX) 2 MG tablet    Acquired hypothyroidism    Relevant Medications    levothyroxine (SYNTHROID) 50 MCG tablet     No orders of the defined types were placed in this encounter. I have reconciled the medications in chart with what patient reports to be taking, andreviewed action/ sideeffects and how to take any new medications. Patient/caregiver understands purpose and side effects. A complete  list of medications was provided in their after-visit summary. Return in about 6 months (around 7/30/2023) for f/u mult med extended. Time basedbilling: I spent over 30 minutes with this patient, and as is the nature of primary care and typical for my extended visits, over 50 percent of this visit was spent on counseling and coordination ofcare.

## 2023-02-16 DIAGNOSIS — J30.1 ALLERGIC RHINITIS DUE TO POLLEN, UNSPECIFIED SEASONALITY: ICD-10-CM

## 2023-02-16 RX ORDER — FLUTICASONE PROPIONATE 50 MCG
SPRAY, SUSPENSION (ML) NASAL
OUTPATIENT
Start: 2023-02-16

## 2023-02-21 DIAGNOSIS — J30.1 ALLERGIC RHINITIS DUE TO POLLEN, UNSPECIFIED SEASONALITY: ICD-10-CM

## 2023-02-21 RX ORDER — FLUTICASONE PROPIONATE 50 MCG
SPRAY, SUSPENSION (ML) NASAL
Qty: 16 G | Refills: 0 | Status: SHIPPED | OUTPATIENT
Start: 2023-02-21

## 2023-02-23 ENCOUNTER — OFFICE VISIT (OUTPATIENT)
Dept: SURGERY | Age: 68
End: 2023-02-23
Payer: MEDICARE

## 2023-02-23 VITALS — WEIGHT: 102 LBS | BODY MASS INDEX: 20.6 KG/M2

## 2023-02-23 DIAGNOSIS — L98.411 SKIN ULCER OF BUTTOCK, LIMITED TO BREAKDOWN OF SKIN (HCC): Primary | ICD-10-CM

## 2023-02-23 PROCEDURE — 1090F PRES/ABSN URINE INCON ASSESS: CPT | Performed by: SURGERY

## 2023-02-23 PROCEDURE — 4004F PT TOBACCO SCREEN RCVD TLK: CPT | Performed by: SURGERY

## 2023-02-23 PROCEDURE — G8399 PT W/DXA RESULTS DOCUMENT: HCPCS | Performed by: SURGERY

## 2023-02-23 PROCEDURE — G8420 CALC BMI NORM PARAMETERS: HCPCS | Performed by: SURGERY

## 2023-02-23 PROCEDURE — 99214 OFFICE O/P EST MOD 30 MIN: CPT | Performed by: SURGERY

## 2023-02-23 PROCEDURE — 3017F COLORECTAL CA SCREEN DOC REV: CPT | Performed by: SURGERY

## 2023-02-23 PROCEDURE — G8484 FLU IMMUNIZE NO ADMIN: HCPCS | Performed by: SURGERY

## 2023-02-23 PROCEDURE — G8427 DOCREV CUR MEDS BY ELIG CLIN: HCPCS | Performed by: SURGERY

## 2023-02-23 PROCEDURE — 1123F ACP DISCUSS/DSCN MKR DOCD: CPT | Performed by: SURGERY

## 2023-02-23 NOTE — PATIENT INSTRUCTIONS
Your surgery will be scheduled for 2/27/23  You will need to arrive at 7:25am, your surgery will start at 8:40am  STOP YOUR YOKO Patiño.  Please be sure to bring your I.D. and insurance card with you the day of your surgery, you will check in on the first floor  Nothing to eat/drink after midnight, you will need someone to drive you to and from, they may wait in our surgery area  Any questions/concerns you may call [facility] [phone]

## 2023-02-23 NOTE — PROGRESS NOTES
Subjective:      Patient ID: Jasiel Bush is a 69 y.o. female. HPI  Chief Complaint: tailbone pain  Patient here for F/U perirectal ulcer/lesion. Denies improvement with aquaphor. Continues to have pain. Denies drainage. On Eliquis for history PE. Follows with Dr. Cherie Rosado for CHF, MR. Plan excision perirectal lesion        Past Medical History:   Diagnosis Date    Anxiety     Chronic headaches     CT done 4/2016    Colon polyp     COPD, mild ([address]     PFT 8/08 a smoker no symptoms-no meds    DDD (degenerative disc disease)     Depression     Fibromyalgia     Frequent UTI     Full dentures     Hallux valgus     Hearing loss in left ear 07/29/2011    HSV (herpes simplex virus) anogenital infection     leg    Hyperlipidemia     Lung nodule 2019    2 LLL nodules    Malignant neoplasm of left lung (HCC)     Menopausal state 1994    Occipital neuralgia     Osteoarthritis     Scoliosis     neck and back brace intermittently, based on pain    Smoker     Spinal stenosis     cane, walker    Unexplained weight loss     Patient states over 30# weight loss over 6 months and pcp is aware       Past Surgical History:   Procedure Laterality Date    [address] ANEURYSM SURGERY  2015    anterior communicating clipped    BUNIONECTOMY Left 08/23/2016    Ty    CATARACT REMOVAL Bilateral 2008    CERVICAL DISCECTOMY  2010    CERVICAL LAMINECTOMY  2003    Anterior approach, C4-7    CERVICAL LAMINECTOMY N/A 11/18/2016    Posterior, C2 - C5    CHOLECYSTECTOMY, LAPAROSCOPIC  2013    COLONOSCOPY      DILATION AND CURETTAGE OF UTERUS      LUMBAR LAMINECTOMY  2009    x2    SINUS SURGERY      x2    SPINAL FUSION  2009, 2012    post T12-L5 fusion and redone    THORACOSCOPY Left 3/25/2019    Dr. Karlene Hernandez. [facility] - thoracotomy w/takedown of adhesions, wedge excision LLL nodule#1, debulking LLL nodule #2, MSLND    TUBAL LIGATION      TUNNELED VENOUS PORT PLACEMENT Right 08/26/2019    Dr. Marty Solano port       Current Outpatient Medications   Medication Sig Dispense Refill    fluticasone (FLONASE) 50 MCG/ACT nasal spray SPRAY TWO SPRAYS IN EACH NOSTRIL ONCE DAILY 16 g 0    valACYclovir (VALTREX) 1 g tablet TAKE ONE TABLET BY MOUTH DAILY 84 tablet 5    atorvastatin (LIPITOR) 40 MG tablet Take 1 tablet by mouth daily 90 tablet 3    gabapentin (NEURONTIN) 800 MG tablet Take 1 tablet by mouth 2 times daily for 180 days. 180 tablet 1    tiZANidine (ZANAFLEX) 2 MG tablet Take 1 tablet by mouth 3 times daily as needed (muscle spasm) 60 tablet 3    ELIQUIS 5 MG TABS tablet TAKE ONE TABLET BY MOUTH TWICE A  tablet 3    Nutritional Supplements (ENSURE COMPLETE) LIQD Take 1 Bottle by mouth in the morning and at bedtime 72 each 5    ipratropium-albuterol (DUONEB) 0.5-2.5 (3) MG/3ML SOLN nebulizer solution Inhale 3 mLs into the lungs every 4 hours 360 mL 11    budesonide (PULMICORT) 0.5 MG/2ML nebulizer suspension Take 2 mLs by nebulization 2 times daily 60 each 3    azelastine (ASTELIN) 0.1 % nasal spray 1 spray by Nasal route 2 times daily 1 each 11    CRANBERRY-VITAMIN C PO Take by mouth daily 25/200mg      CALCIUM-VITAMIN D PO Take by mouth daily 1200mg/25mcg      sacubitril-valsartan (ENTRESTO) 49-51 MG per tablet Take 1 tablet by mouth 2 times daily 180 tablet 3    carvedilol (COREG) 6.25 MG tablet Take 1 tablet by mouth 2 times daily (with meals) 180 tablet 3    empagliflozin (JARDIANCE) 10 MG tablet Take 1 tablet by mouth in the morning. 90 tablet 5    mirtazapine (REMERON) 15 MG tablet Take 15 mg by mouth nightly      furosemide (LASIX) 20 MG tablet Take 1 tablet by mouth as needed (worsening shortness of breath, weight gain) 60 tablet 3    spironolactone (ALDACTONE) 25 MG tablet Take 0.5 tablets by mouth daily 45 tablet 3    buPROPion (WELLBUTRIN XL) 300 MG extended release tablet Take 300 mg by mouth every morning      oxyCODONE (OXY-IR) 30 MG immediate release tablet Take 30 mg by mouth 3 times daily as needed  for Pain. albuterol sulfate  (90 Base) MCG/ACT inhaler INHALE TWO PUFFS BY MOUTH EVERY 6 HOURS AS NEEDED FOR WHEEZING OR SHORTNESS OF BREATH 1 Inhaler 5    ARIPiprazole (ABILIFY) 15 MG tablet Take 7.5 mg by mouth daily       levothyroxine (SYNTHROID) 50 MCG tablet Take 50 mcg by mouth Daily      OXcarbazepine (TRILEPTAL) 300 MG tablet Take 300 mg by mouth in the morning, at noon, and at bedtime      Multiple Vitamins-Minerals (THERAPEUTIC MULTIVITAMIN-MINERALS) tablet Take 1 tablet by mouth daily      Acetaminophen 325 MG CAPS Take 650 mg by mouth      Probiotic Product (PROBIOTIC DAILY PO) Take 1 tablet by mouth daily       DULoxetine (CYMBALTA) 60 MG capsule Take 120 mg by mouth daily [facility] 30 capsule     Respiratory Therapy Supplies (NEBULIZER MASK ADULT) MISC 1 Device by Does not apply route once for 1 dose 1 each 0    Nebulizers MISC 1 Device by Does not apply route once for 1 dose 1 each 0     Current Facility-Administered Medications   Medication Dose Route Frequency Provider Last Rate Last Admin    triamcinolone acetonide (KENALOG-40) injection 40 mg  40 mg IntraMUSCular Once [employer], DO           Prior to Admission medications    Medication Sig Start Date End Date Taking? Authorizing Provider   fluticasone (FLONASE) 50 MCG/ACT nasal spray SPRAY TWO SPRAYS IN EACH NOSTRIL ONCE DAILY 2/21/23  Yes Kashif Pandya MD   valACYclovir (VALTREX) 1 g tablet TAKE ONE TABLET BY MOUTH DAILY 1/30/23  Yes Kashif Pandya MD   atorvastatin (LIPITOR) 40 MG tablet Take 1 tablet by mouth daily 1/30/23  Yes Kashif Pandya MD   gabapentin (NEURONTIN) 800 MG tablet Take 1 tablet by mouth 2 times daily for 180 days.  1/30/23 7/29/23 Yes Kashif Pandya MD   tiZANidine (ZANAFLEX) 2 MG tablet Take 1 tablet by mouth 3 times daily as needed (muscle spasm) 1/30/23  Yes Kashif Pandya MD   ELIQUIS 5 MG TABS tablet TAKE ONE TABLET BY MOUTH TWICE A DAY 1/23/23  Yes Hai Santiago MD   Nutritional Supplements (ENSURE COMPLETE) LIQD Take 1 Bottle by mouth in the morning and at bedtime 1/19/23  Yes Gaby Tiwari MD   ipratropium-albuterol (DUONEB) 0.5-2.5 (3) MG/3ML SOLN nebulizer solution Inhale 3 mLs into the lungs every 4 hours 11/11/22  Yes Robert Tompkins MD   budesonide (PULMICORT) 0.5 MG/2ML nebulizer suspension Take 2 mLs by nebulization 2 times daily 11/11/22  Yes Robert Tompkins MD   azelastine (ASTELIN) 0.1 % nasal spray 1 spray by Nasal route 2 times daily 10/28/22  Yes Robert Tompkins MD   CRANBERRY-VITAMIN C PO Take by mouth daily 25/200mg   Yes Historical Provider, MD   CALCIUM-VITAMIN D PO Take by mouth daily 1200mg/25mcg   Yes Historical Provider, MD   sacubitril-valsartan (ENTRESTO) 49-51 MG per tablet Take 1 tablet by mouth 2 times daily 8/30/22  Yes Moody Scales MD   carvedilol (COREG) 6.25 MG tablet Take 1 tablet by mouth 2 times daily (with meals) 8/30/22  Yes Moody Scales MD   empagliflozin (JARDIANCE) 10 MG tablet Take 1 tablet by mouth in the morning. 7/29/22  Yes Moody Scales MD   mirtazapine (REMERON) 15 MG tablet Take 15 mg by mouth nightly 7/22/22  Yes Historical Provider, MD   furosemide (LASIX) 20 MG tablet Take 1 tablet by mouth as needed (worsening shortness of breath, weight gain) 5/16/22  Yes Moody Scales MD   spironolactone (ALDACTONE) 25 MG tablet Take 0.5 tablets by mouth daily 4/25/22  Yes HEATHER Ag - CNP   buPROPion (WELLBUTRIN XL) 300 MG extended release tablet Take 300 mg by mouth every morning 11/17/21  Yes Historical Provider, MD   oxyCODONE (OXY-IR) 30 MG immediate release tablet Take 30 mg by mouth 3 times daily as needed for Pain.    Yes Historical Provider, MD   albuterol sulfate  (90 Base) MCG/ACT inhaler INHALE TWO PUFFS BY MOUTH EVERY 6 HOURS AS NEEDED FOR WHEEZING OR SHORTNESS OF BREATH 8/25/21  Yes Robert Tompkins MD   ARIPiprazole (ABILIFY) 15 MG tablet Take 7.5 mg by mouth daily    Yes Historical Provider, MD   levothyroxine (SYNTHROID) 50 MCG tablet Take 50 mcg by mouth Daily   Yes Historical Provider, MD   OXcarbazepine (TRILEPTAL) 300 MG tablet Take 300 mg by mouth in the morning, at noon, and at bedtime 19  Yes Jc Pollack MD   Multiple Vitamins-Minerals (THERAPEUTIC MULTIVITAMIN-MINERALS) tablet Take 1 tablet by mouth daily   Yes Historical Provider, MD   Acetaminophen 325 MG CAPS Take 650 mg by mouth   Yes Historical Provider, MD   Probiotic Product (PROBIOTIC DAILY PO) Take 1 tablet by mouth daily    Yes Historical Provider, MD   DULoxetine (CYMBALTA) 60 MG capsule Take 120 mg by mouth daily [facility] 2/25/15  Yes Jc Pollack MD   Respiratory Therapy Supplies (NEBULIZER MASK ADULT) MISC 1 Device by Does not apply route once for 1 dose 22  Rosa Jimenez MD   Nebulizers MISC 1 Device by Does not apply route once for 1 dose 22  Rosa Jimenez MD         Allergies   Allergen Reactions    Adhesive Tape Dermatitis     Blisters on skin under tape or medication patches    Ibuprofen Nausea Only    Naproxen Nausea Only    Nsaids Other (See Comments)     GI upset    Vicodin [Hydrocodone-Acetaminophen] Nausea Only     headache       Social History     Socioeconomic History    Marital status: Single     Spouse name: Not on file    Number of children: 4    Years of education: Not on file    Highest education level: Not on file   Occupational History    Occupation: disabled   Tobacco Use    Smoking status: Some Days     Packs/day: 0.25     Years: 48.00     Pack years: 12.00     Types: Cigarettes     Last attempt to quit: 2019     Years since quittin.0    Smokeless tobacco: Never    Tobacco comments:     started age 11, down to [address] [place] [address] [address] a day;   Vaping Use    Vaping Use: Former    Substances: Always   Substance and Sexual Activity    Alcohol use: Not Currently    Drug use: Not Currently     Types: Marijuana Ryne Jones)    Sexual activity: Not Currently   Other Topics Concern    Not on file   Social History Narrative    Exercise:  never. Living will:  no,   additional information provided. Lives alone. Seatbelt use: Always. Social Determinants of Health     Financial Resource Strain: Not on file   Food Insecurity: Not on file   Transportation Needs: Not on file   Physical Activity: Insufficiently Active    Days of Exercise per Week: 4 days    Minutes of Exercise per Session: 10 min   Stress: Not on file   Social Connections: Not on file   Intimate Partner Violence: Not on file   Housing Stability: Not on file       Family History   Problem Relation Age of Onset    Cancer Mother 43        breast    Cancer Brother 77        throat    Other Father         Macular Degeration       Review of Systems   Constitutional: Negative. Skin:  Positive for wound. All other systems reviewed and are negative. Objective:   Physical Exam  Vitals reviewed. Constitutional:       General: She is not in acute distress. Appearance: She is well-developed. She is not diaphoretic. HENT:      Head: Normocephalic and atraumatic. Right Ear: External ear normal.      Left Ear: External ear normal.      Nose: Nose normal.   Eyes:      General: No scleral icterus. Conjunctiva/sclera: Conjunctivae normal.   Cardiovascular:      Rate and Rhythm: Normal rate and regular rhythm. Heart sounds: Normal heart sounds. Pulmonary:      Effort: Pulmonary effort is normal. No respiratory distress. Breath sounds: Normal breath sounds. No wheezing. Abdominal:      General: There is no distension. Palpations: Abdomen is soft. Tenderness: There is no abdominal tenderness. Musculoskeletal:         General: Normal range of motion. Cervical back: Normal range of motion and neck supple. Skin:     General: Skin is warm and dry. Findings: No erythema. Neurological:      Mental Status: She is alert and oriented to person, place, and time.    Psychiatric: Behavior: Behavior normal.         Thought Content: Thought content normal.         Judgment: Judgment normal.       Assessment:       Diagnosis Orders   1. Skin ulcer of buttock, limited to breakdown of skin [facility])                Plan:      Plan excision right superior gluteal cleft cyst/lesions  The risks, benefits and alternatives to the planned procedure were discussed. Patient expressed an understanding and is willing to proceed. Spoke to biggest risks being poor wound healing given location and lack of fatty tissue underlying.   Hold Eliquis 48 hours prior        Sima Cody MD

## 2023-02-24 ENCOUNTER — ANESTHESIA EVENT (OUTPATIENT)
Dept: OPERATING ROOM | Age: 68
End: 2023-02-24
Payer: MEDICARE

## 2023-02-24 NOTE — PROGRESS NOTES
C-diff Questionnaire:     * Admitted with diarrhea? [] YES    [x]  NO     *Prior history of C-Diff. In last 3 months? [] YES    [x]  NO     *Antibiotic use in the past 6-8 weeks? [x]  NO    []  YES      If yes, which: REASON_________________     *Prior hospitalization or nursing home in the last month? []  YES    [x]  NO     SAFETY FIRST. .call before you fall    [address]  time__7am 2/27/23_____Surgery time__ 840    Do not eat or drink anything after 12:00 midnight prior to your surgery. This includes water chewing gum, mints and ice chips- the Day of Surgery. You may brush your teeth and gargle the morning of your surgery, but do not swallow the water     Please see your family doctor/pediatrician for a history and physical and/or questions concerning medications. Bring any test results/reports from your physicians office. If you are under the care of a heart doctor or specialist doctor, please be aware that you may be asked to them for clearance    You may be asked to stop blood thinners such as Coumadin, Plavix, Fragmin, Lovenox, etc., or any anti-inflammatories such as:  Aspirin, Ibuprofen, Advil, Naproxen prior to your surgery. We also ask that you stop any OTC medications such as fish oil, vitamin E, glucosamine, garlic, Multivitamins, COQ 10, etc.    We ask that you do not smoke 24 hours prior to surgery  We ask that you do not  drink any alcoholic beverages 24 hours prior to surgery     You must make arrangements for a responsible adult to take you home after your surgery. For your safety you will not be allowed to leave alone or drive yourself home. Your surgery will be cancelled if you do not have a ride home. Also for your safety, it is strongly suggested that someone stay with you the first 24 hours after your surgery.      A parent or legal guardian must accompany a child scheduled for surgery and plan to stay at the hospital until the child is discharged. Please do not bring other children with you. For your comfort, please wear simple loose fitting clothing to the hospital.  Please do not bring valuables. Do not wear any make-up or nail polish on your fingers or toes. For your safety, please do not wear any jewelry or body piercing's on the day of surgery. All jewelry must be removed. If you have dentures, they will be removed before going to operating room. For your convenience, we will provide you with a container. If you wear contact lenses or glasses, they will be removed, please bring a case for them. If you have a living will and a durable power of  for healthcare, please bring in a copy. As part of our patient safety program to minimize surgical site infections, we ask you to do the following:    Please notify your surgeon if you develop any illness between         now and the day of your surgery. This includes a cough, cold, fever, sore throat, nausea,         or vomiting, and diarrhea, etc.   Please notify your surgeon if you experience dizziness, shortness         of breath or blurred vision between now and the time of your surgery. Do not shave your operative site 96 hours prior to surgery. For face and neck surgery, men may use an electric razor 48 hours   prior to surgery. You may shower the night before surgery or the morning of   your surgery with an antibacterial soap. You will need to bring a photo ID and insurance card     If you use a C-pap or Bi-pap machine, please bring your machine with you to the hospital     Our goal is to provide you with excellent care, therefore, visitors will be limited to so that we may focus on providing this care for you. Please contact your surgeon office, if you have any further questions.                  [facility] phone number:  [address] PAT fax number:  [phone]    Please note these are generalized instructions for all surgical cases, you may be provided with more specific instructions according to your surgery.

## 2023-02-27 ENCOUNTER — ANESTHESIA (OUTPATIENT)
Dept: OPERATING ROOM | Age: 68
End: 2023-02-27
Payer: MEDICARE

## 2023-02-27 ENCOUNTER — HOSPITAL ENCOUNTER (OUTPATIENT)
Age: 68
Setting detail: OUTPATIENT SURGERY
Discharge: HOME | End: 2023-02-27
Attending: SURGERY | Admitting: SURGERY
Payer: MEDICARE

## 2023-02-27 VITALS
WEIGHT: 106 LBS | HEART RATE: 74 BPM | RESPIRATION RATE: 18 BRPM | HEIGHT: 59 IN | OXYGEN SATURATION: 97 % | DIASTOLIC BLOOD PRESSURE: 68 MMHG | TEMPERATURE: 97.7 F | BODY MASS INDEX: 21.37 KG/M2 | SYSTOLIC BLOOD PRESSURE: 141 MMHG

## 2023-02-27 DIAGNOSIS — L98.411 SKIN ULCER OF BUTTOCK, LIMITED TO BREAKDOWN OF SKIN (HCC): ICD-10-CM

## 2023-02-27 PROCEDURE — 3600000012 HC SURGERY LEVEL 2 ADDTL 15MIN: Performed by: SURGERY

## 2023-02-27 PROCEDURE — 2500000003 HC RX 250 WO HCPCS: Performed by: SURGERY

## 2023-02-27 PROCEDURE — 11401 EXC TR-EXT B9+MARG 0.6-1 CM: CPT | Performed by: SURGERY

## 2023-02-27 PROCEDURE — 7100000000 HC PACU RECOVERY - FIRST 15 MIN: Performed by: SURGERY

## 2023-02-27 PROCEDURE — 3700000000 HC ANESTHESIA ATTENDED CARE: Performed by: SURGERY

## 2023-02-27 PROCEDURE — 12031 INTMD RPR S/A/T/EXT 2.5 CM/<: CPT | Performed by: SURGERY

## 2023-02-27 PROCEDURE — 7100000010 HC PHASE II RECOVERY - FIRST 15 MIN: Performed by: SURGERY

## 2023-02-27 PROCEDURE — 2500000003 HC RX 250 WO HCPCS

## 2023-02-27 PROCEDURE — 88305 TISSUE EXAM BY PATHOLOGIST: CPT

## 2023-02-27 PROCEDURE — A4217 STERILE WATER/SALINE, 500 ML: HCPCS | Performed by: SURGERY

## 2023-02-27 PROCEDURE — 2709999900 HC NON-CHARGEABLE SUPPLY: Performed by: SURGERY

## 2023-02-27 PROCEDURE — 3700000001 HC ADD 15 MINUTES (ANESTHESIA): Performed by: SURGERY

## 2023-02-27 PROCEDURE — 2580000003 HC RX 258

## 2023-02-27 PROCEDURE — 7100000001 HC PACU RECOVERY - ADDTL 15 MIN: Performed by: SURGERY

## 2023-02-27 PROCEDURE — 2580000003 HC RX 258: Performed by: SURGERY

## 2023-02-27 PROCEDURE — 7100000011 HC PHASE II RECOVERY - ADDTL 15 MIN: Performed by: SURGERY

## 2023-02-27 PROCEDURE — 6360000002 HC RX W HCPCS

## 2023-02-27 PROCEDURE — 3600000002 HC SURGERY LEVEL 2 BASE: Performed by: SURGERY

## 2023-02-27 RX ORDER — SODIUM CHLORIDE 0.9 % (FLUSH) 0.9 %
5-40 SYRINGE (ML) INJECTION PRN
Status: DISCONTINUED | OUTPATIENT
Start: 2023-02-27 | End: 2023-02-27 | Stop reason: HOSPADM

## 2023-02-27 RX ORDER — MAGNESIUM HYDROXIDE 1200 MG/15ML
LIQUID ORAL CONTINUOUS PRN
Status: COMPLETED | OUTPATIENT
Start: 2023-02-27 | End: 2023-02-27

## 2023-02-27 RX ORDER — BUPIVACAINE HYDROCHLORIDE 5 MG/ML
INJECTION, SOLUTION EPIDURAL; INTRACAUDAL
Status: COMPLETED | OUTPATIENT
Start: 2023-02-27 | End: 2023-02-27

## 2023-02-27 RX ORDER — SODIUM CHLORIDE 9 MG/ML
INJECTION, SOLUTION INTRAVENOUS CONTINUOUS PRN
Status: DISCONTINUED | OUTPATIENT
Start: 2023-02-27 | End: 2023-02-27 | Stop reason: SDUPTHER

## 2023-02-27 RX ORDER — FENTANYL CITRATE 50 UG/ML
INJECTION, SOLUTION INTRAMUSCULAR; INTRAVENOUS PRN
Status: DISCONTINUED | OUTPATIENT
Start: 2023-02-27 | End: 2023-02-27 | Stop reason: SDUPTHER

## 2023-02-27 RX ORDER — FENTANYL CITRATE 50 UG/ML
25 INJECTION, SOLUTION INTRAMUSCULAR; INTRAVENOUS EVERY 5 MIN PRN
Status: DISCONTINUED | OUTPATIENT
Start: 2023-02-27 | End: 2023-02-27 | Stop reason: HOSPADM

## 2023-02-27 RX ORDER — SODIUM CHLORIDE 9 MG/ML
INJECTION, SOLUTION INTRAVENOUS PRN
Status: DISCONTINUED | OUTPATIENT
Start: 2023-02-27 | End: 2023-02-27 | Stop reason: HOSPADM

## 2023-02-27 RX ORDER — SODIUM CHLORIDE 0.9 % (FLUSH) 0.9 %
5-40 SYRINGE (ML) INJECTION EVERY 12 HOURS SCHEDULED
Status: DISCONTINUED | OUTPATIENT
Start: 2023-02-27 | End: 2023-02-27 | Stop reason: HOSPADM

## 2023-02-27 RX ORDER — PROPOFOL 10 MG/ML
INJECTION, EMULSION INTRAVENOUS PRN
Status: DISCONTINUED | OUTPATIENT
Start: 2023-02-27 | End: 2023-02-27 | Stop reason: SDUPTHER

## 2023-02-27 RX ORDER — LIDOCAINE HYDROCHLORIDE AND EPINEPHRINE 10; 10 MG/ML; UG/ML
INJECTION, SOLUTION INFILTRATION; PERINEURAL
Status: COMPLETED | OUTPATIENT
Start: 2023-02-27 | End: 2023-02-27

## 2023-02-27 RX ORDER — ONDANSETRON 2 MG/ML
4 INJECTION INTRAMUSCULAR; INTRAVENOUS
Status: DISCONTINUED | OUTPATIENT
Start: 2023-02-27 | End: 2023-02-27 | Stop reason: HOSPADM

## 2023-02-27 RX ORDER — TRAMADOL HYDROCHLORIDE 50 MG/1
50 TABLET ORAL EVERY 6 HOURS PRN
Qty: 12 TABLET | Refills: 0 | Status: SHIPPED | OUTPATIENT
Start: 2023-02-27 | End: 2023-03-02

## 2023-02-27 RX ORDER — PROPOFOL 10 MG/ML
INJECTION, EMULSION INTRAVENOUS CONTINUOUS PRN
Status: DISCONTINUED | OUTPATIENT
Start: 2023-02-27 | End: 2023-02-27 | Stop reason: SDUPTHER

## 2023-02-27 RX ORDER — LIDOCAINE HYDROCHLORIDE 20 MG/ML
INJECTION, SOLUTION EPIDURAL; INFILTRATION; INTRACAUDAL; PERINEURAL PRN
Status: DISCONTINUED | OUTPATIENT
Start: 2023-02-27 | End: 2023-02-27 | Stop reason: SDUPTHER

## 2023-02-27 RX ORDER — CEFAZOLIN SODIUM 1 G/3ML
INJECTION, POWDER, FOR SOLUTION INTRAMUSCULAR; INTRAVENOUS PRN
Status: DISCONTINUED | OUTPATIENT
Start: 2023-02-27 | End: 2023-02-27 | Stop reason: SDUPTHER

## 2023-02-27 RX ADMIN — PROPOFOL 120 MCG/KG/MIN: 10 INJECTION, EMULSION INTRAVENOUS at 08:27

## 2023-02-27 RX ADMIN — LIDOCAINE HYDROCHLORIDE 50 MG: 20 INJECTION, SOLUTION EPIDURAL; INFILTRATION; INTRACAUDAL; PERINEURAL at 08:27

## 2023-02-27 RX ADMIN — FENTANYL CITRATE 25 MCG: 50 INJECTION INTRAMUSCULAR; INTRAVENOUS at 08:31

## 2023-02-27 RX ADMIN — SODIUM CHLORIDE: 9 INJECTION, SOLUTION INTRAVENOUS at 08:21

## 2023-02-27 RX ADMIN — FENTANYL CITRATE 25 MCG: 50 INJECTION INTRAMUSCULAR; INTRAVENOUS at 08:27

## 2023-02-27 RX ADMIN — FENTANYL CITRATE 25 MCG: 50 INJECTION INTRAMUSCULAR; INTRAVENOUS at 08:39

## 2023-02-27 RX ADMIN — CEFAZOLIN SODIUM 2 G: 1 INJECTION, POWDER, FOR SOLUTION INTRAMUSCULAR; INTRAVENOUS at 08:30

## 2023-02-27 RX ADMIN — PROPOFOL 60 MG: 10 INJECTION, EMULSION INTRAVENOUS at 08:27

## 2023-02-27 ASSESSMENT — PAIN - FUNCTIONAL ASSESSMENT: PAIN_FUNCTIONAL_ASSESSMENT: 0-10

## 2023-02-27 ASSESSMENT — PAIN SCALES - WONG BAKER: WONGBAKER_NUMERICALRESPONSE: 0

## 2023-02-27 NOTE — PROGRESS NOTES
PACU Transfer Note    Vitals:    02/27/23 0910   BP: 123/61   Pulse: 77   Resp: 14   Temp: 96.9 °F (36.1 °C)   SpO2: 95%       In: 600 [I.V.:600]  Out: -     Pain assessment:  none  Pain Level: 0    Report given to Receiving unit RN.    2/27/2023 9:12 AM      Electronically signed by Afsaneh Gallardo RN on 2/27/2023 at 9:12 AM

## 2023-02-27 NOTE — ANESTHESIA POSTPROCEDURE EVALUATION
Department of Anesthesiology  Postprocedure Note    Patient: Ayde Garcia  MRN: [id number]  YOB: 1955  Date of evaluation: 2/27/2023      Procedure Summary     Date: 02/27/23 Room / Location: [address]    Anesthesia Start: 5609 Anesthesia Stop: 0848    Procedure: EXCISION OF PERIRECTAL LESION (Anus) Diagnosis:       Skin ulcer of buttock, limited to breakdown of skin ([address]      (SKIN ULCER OF BUTTOCK, LIMITED TO BREAKDOWN OF SKIN)    Surgeons: Carlos Malagon MD Responsible Provider: Edith Narayan MD    Anesthesia Type: MAC ASA Status: 3          Anesthesia Type: No value filed.     Nahun Phase I: Nahun Score: 10    Nahun Phase II: Nahun Score: 10      Anesthesia Post Evaluation    Patient location during evaluation: PACU  Patient participation: complete - patient participated  Level of consciousness: awake and alert  Airway patency: patent  Nausea & Vomiting: no nausea and no vomiting  Complications: no  Cardiovascular status: hemodynamically stable  Respiratory status: acceptable  Hydration status: stable

## 2023-02-27 NOTE — PROGRESS NOTES
Patient admitted to PACU # 4 from OR at 0845   post [address] per Dr. Chetna Allen. Attached to PACU monitoring system and report received from anesthesia provider. Patient was reported to be hemodynamically stable during procedure. Patient drowsy on admission and denied pain.      Electronically signed by Jamir Ray RN on 2/27/2023 at 8:49 AM

## 2023-02-27 NOTE — PROGRESS NOTES
Tolerating oral intake and being up in chair. Sedation instructions reviewed with friend. Verbalize understanding. No complaints.

## 2023-02-27 NOTE — PROGRESS NOTES
Assisted up to chair. Discharge instructions, verbal and written, given to patient. Verbalize understanding. Filled script from [facility] given to patient.

## 2023-02-27 NOTE — DISCHARGE INSTRUCTIONS
Smith Wilkinson MD     General and Vascular Surgery   Princess Larose MD     [address] [facility] [address]     [address] [facility] [address], [address]  Ting Boswell CNP    Phone: [phone]           Fax: [phone]                                                                 POST-OPERATIVE INSTRUCTIONS FOR SKIN LESION EXCISION    Call the office to schedule your post-operative appointment with your surgeon for two (2) weeks. You will have a water occlusive dressing covering your incisions. Remove on Wednesday    You may shower. Wash incisions gently, and pat them dry. Do not rub your incisions. Do not soak incisions in tub baths, hot tubs or pools    General guidelines for activity:  Avoid strenuous activity or lifting anything heavier than 15 pounds for one week. It is OK to be up walking around; walking up and down stairs is also OK. Do what is comfortable: stop and rest when you feel tired. Drink plenty of fluids and stay on a bland diet for 2-3 days after surgery. Do NOT drive for 3 days and while taking your narcotic pain medicine. Watch for signs of infection:   Fever over 100.5°   Excessive warmth or bright redness around your incisions  Leakage of bloody or cloudy fluid from you incisions    You will have pain medicine ordered. Take as directed. If you experience constipation  Increase your water intake, and activity; walking is best.  A stool softener or mild laxative may be necessary if you still have not had a bowel  movement ; call the office for further instructions.

## 2023-02-27 NOTE — BRIEF OP NOTE
Brief Postoperative Note      Patient: Ayde Garcia  YOB: 1955  MRN: [id number]    Date of Procedure: 2/27/2023    Pre-Op Diagnosis: SKIN ULCER OF BUTTOCK, LIMITED TO BREAKDOWN OF SKIN    Post-Op Diagnosis: Same       Procedure(s):  EXCISION OF PERIRECTAL LESION    Surgeon(s):  Carlos Malagon MD    Assistant:  Surgical Assistant: Nolan Maharaj; Daniela Giordano;  Adina Marks    Anesthesia: Monitor Anesthesia Care    Estimated Blood Loss (mL): Minimal    Complications: None    Specimens:   ID Type Source Tests Collected by Time Destination   A : A) perirectal lesion Tissue Tissue SURGICAL PATHOLOGY Carlos Malagon MD 2/27/2023 3346        Implants:  * No implants in log *      Drains: * No LDAs found *    Findings: 3 x 1 cm elliptical incision    Electronically signed by Carlos Malagon MD on 2/27/2023 at 8:39 AM

## 2023-02-27 NOTE — H&P
Update History & Physical    The patient's History and Physical of February 27, 2023 was reviewed with the patient and I examined the patient. There was no change. Plan excision perirectal lesion. The surgical site was confirmed by the patient and me. Plan: The risks, benefits, expected outcome, and alternative to the recommended procedure have been discussed with the patient. Patient understands and wants to proceed with the procedure.      Electronically signed by Min Duenas MD on 2/27/2023 at 8:14 AM

## 2023-02-27 NOTE — OP NOTE
[address] [facility]                                OPERATIVE REPORT    PATIENT NAME: Radha Graves                     :        1955  MED REC NO:   [id number]                          ROOM:  ACCOUNT NO:   [de-identified]                           ADMIT DATE: 2023  PROVIDER:     Jennifer Ty MD    DATE OF PROCEDURE:  2023    PREOPERATIVE DIAGNOSIS:  Skin lesion in the right gluteal cleft. POSTOPERATIVE DIAGNOSIS:  Skin lesion in the right gluteal cleft. OPERATION PERFORMED:  Excision of skin lesion in the right gluteal  cleft. SURGEON:  Jennifer Ty MD    ANESTHESIA:  MAC with local anesthetic. ASA:  Class III. ANTIBIOTICS:  Ancef 2 gm IV. DVT PROPHYLAXIS:  Bilateral pneumatic compression devices. ESTIMATED BLOOD LOSS:  Minimal.    SPECIMEN:  Right gluteal cleft lesion. INDICATIONS:  The patient is a 59-year-old female who has had a painful  lesion in the right superior gluteal cleft. It is hard to tell what the  etiology is. We monitored this with conservative care. It improved  somewhat with Aquaphor but it failed to resolve. As a result, we  recommended excision of this in the operating room. Risks of bleeding,  infection, anesthesia, risk of poor wound healing given the location was  discussed at length and she was agreeable to proceed. OPERATIVE PROCEDURE:  The patient was brought to the operating suite,  placed in the right lateral decubitus position on the operating room  table. All pressure points were adequately padded. The buttocks were  taped gently apart and then the area prepped and draped in the usual  sterile fashion and time-out performed. After injecting local  anesthetic, a 3 x 1.5 cm elliptical incision was made over top of this 1  cm lesion in the right superior gluteal space.   We carried this down to  subcutaneous tissue and it was excised with cautery. Hemostasis was  reassured. The wound was closed with 3-0 Vicryl in the deep layer as  well as 4-0 nylon to reapproximate the skin. Dressings were applied. The specimen was sent to pathology for permanent specimen. The patient  tolerated the procedure well. She was taken to PACU in stable  condition. All counts were correct at the end of the case.         Mark Jo MD    D: 02/27/2023 9:10:36       T: 02/27/2023 15:32:34     [email or web address]  Job#: [id number]     Doc#: [id number]    CC:  Elissa Roberts MD

## 2023-02-27 NOTE — ANESTHESIA PRE PROCEDURE
Department of Anesthesiology  Preprocedure Note       Name:  Elena Bates   Age:  69 y.o.  :  1955                                          MRN:  [id number]         Date:  2023      Surgeon: Peter Mendoza):  Chet Phoenix, MD    Procedure: Procedure(s):  EXCISION OF PERIRECTAL LESION    Medications prior to admission:   Prior to Admission medications    Medication Sig Start Date End Date Taking? Authorizing Provider   fluticasone [facility]) 50 MCG/ACT nasal spray SPRAY TWO SPRAYS IN EACH NOSTRIL ONCE DAILY 23   Naresh Yap MD   valACYclovir (VALTREX) 1 g tablet TAKE ONE TABLET BY MOUTH DAILY 23   Naresh Yap MD   atorvastatin (LIPITOR) 40 MG tablet Take 1 tablet by mouth daily 23   Naresh Yap MD   gabapentin (NEURONTIN) 800 MG tablet Take 1 tablet by mouth 2 times daily for 180 days.  23  Naresh Yap MD   tiZANidine (ZANAFLEX) 2 MG tablet Take 1 tablet by mouth 3 times daily as needed (muscle spasm) 23   Naresh Yap MD   ELIQUIS 5 MG TABS tablet TAKE ONE TABLET BY MOUTH TWICE A DAY 23   Rebeca Andres MD   Nutritional Supplements (ENSURE COMPLETE) LIQD Take 1 Bottle by mouth in the morning and at bedtime 23   Naresh Yap MD   budesonide (PULMICORT) 0.5 MG/2ML nebulizer suspension Take 2 mLs by nebulization 2 times daily 22   Tata Peterson MD   azelastine (ASTELIN) 0.1 % nasal spray 1 spray by Nasal route 2 times daily 10/28/22   Tata Peterson MD   CRANBERRY-VITAMIN C PO Take by mouth daily 25/200mg    Historical Provider, MD   CALCIUM-VITAMIN D PO Take by mouth daily 1200mg/25mcg    Historical Provider, MD   sacubitril-valsartan (ENTRESTO) 49-51 MG per tablet Take 1 tablet by mouth 2 times daily 22   Rebeca Andres MD   carvedilol (COREG) 6.25 MG tablet Take 1 tablet by mouth 2 times daily (with meals) 22   Rebeca Andres MD   empagliflozin (JARDIANCE) 10 MG tablet Take 1 tablet by mouth in the morning. 7/29/22   Basil Mar MD   mirtazapine (REMERON) 15 MG tablet Take 15 mg by mouth nightly 7/22/22   Historical Provider, MD   furosemide (LASIX) 20 MG tablet Take 1 tablet by mouth as needed (worsening shortness of breath, weight gain) 5/16/22   Basil Mar MD   spironolactone (ALDACTONE) 25 MG tablet Take 0.5 tablets by mouth daily 4/25/22   Criselda Calzada APRN - CNP   buPROPion (WELLBUTRIN XL) 300 MG extended release tablet Take 300 mg by mouth every morning 11/17/21   Historical Provider, MD   albuterol sulfate  (90 Base) MCG/ACT inhaler INHALE TWO PUFFS BY MOUTH EVERY 6 HOURS AS NEEDED FOR WHEEZING OR SHORTNESS OF BREATH 8/25/21   Rylie Shin MD   ARIPiprazole (ABILIFY) 15 MG tablet Take 7.5 mg by mouth daily     Historical Provider, MD   levothyroxine (SYNTHROID) 50 MCG tablet Take 50 mcg by mouth Daily    Historical Provider, MD   OXcarbazepine (TRILEPTAL) 300 MG tablet Take 300 mg by mouth in the morning, at noon, and at bedtime 8/21/19   Keli Newberry MD   Multiple Vitamins-Minerals (THERAPEUTIC MULTIVITAMIN-MINERALS) tablet Take 1 tablet by mouth daily    Historical Provider, MD   Acetaminophen 325 MG CAPS Take 650 mg by mouth as needed    Historical Provider, MD   Probiotic Product (PROBIOTIC DAILY PO) Take 1 tablet by mouth daily     Historical Provider, MD   DULoxetine (CYMBALTA) 60 MG capsule Take 120 mg by mouth daily Tyler Schaefer 2/25/15   Keli Newberry MD       Current medications:    No current facility-administered medications for this encounter. Allergies:     Allergies   Allergen Reactions    Adhesive Tape Dermatitis     Blisters on skin under tape or medication patches    Ibuprofen Nausea Only    Naproxen Nausea Only    Nsaids Other (See Comments)     GI upset    Vicodin [Hydrocodone-Acetaminophen] Nausea Only     headache       Problem List:    Patient Active Problem List   Diagnosis Code    COPD, mild (HCC) J44.9    Scoliosis, thoracogenic M41.30  Hypercholesteremia E78.00    Disc disorder of cervical region M50.90    Anxiety F41.9    Stress incontinence N39.3    Lumbar radiculopathy M54.16    Spinal stenosis, lumbar M48.061    Major depression, recurrent- Hx of multiple OD attempts F33.9    Colon polyp K63.5    Hearing loss in left ear H91.92    Osteopenia- DXA -1.9 wrist (8/11) M85.80    Allergic rhinitis J30.9    Frequent falls R29.6    Balance disorder R26.89    Bilateral occipital neuralgia M54.81    History of cerebral aneurysm repair 2015 anterior communicating clipped Z98.890, Z86.79    Cervical stenosis of spine M48.02    S/P thoracotomy Z98.890    Malignant neoplasm of lower lobe of left lung (HCC)- incomplete resection due to vascular proximity 4/2019; XRT, chemo, immunotherapy C34.32    Radiculopathy, cervical region M54.12    Underweight R63.6    Radiation-induced esophagitis K20.80, T66. Spero Sessions Medical marijuana use Z79.899    Non-small cell lung cancer ([facility] C34.90    Acquired hypothyroidism E03.9    History of colon polyps Z86.010    Primary osteoarthritis involving multiple joints M15.9    Common bile duct dilation K83.8    Nicotine dependence F17.200    Chronic prescription opiate use Z79.891    Demand ischemia ([facility]) I24.8    Coronary artery disease involving native coronary artery of native heart without angina pectoris I25.10    Chronic respiratory failure with hypoxia and hypercapnia (HCC) J96.11, J96.12    Chronic systolic congestive heart failure (HCC) I50.22    History of pulmonary embolus (PE) Z86.711    Carpal tunnel syndrome, left G56.02    Demyelinating disorder (HCC) G37.9    Moderate protein-calorie malnutrition (HCC) E44.0    Claw hand of left upper extremity M21.512    Abnormal EMG R94.131    Spinal stenosis of cervicothoracic region M48.03    Essential hypertension I10    Generalized anxiety disorder F41.1    Mild episode of recurrent major depressive disorder ([facility] [facility] F33.0    Chronic low back pain M54.50, G89.29    Chronic neck pain M54.2, G89.29    Back muscle spasm M62.830    Age-related osteoporosis with current pathological fracture with delayed healing M80.00XG    Chronic, continuous use of opioids F11.90       Past Medical History:        Diagnosis Date    Anxiety     Chronic headaches     CT done 4/2016    Colon polyp     COPD, mild ([address]     PFT 8/08 a smoker no symptoms-no meds    DDD (degenerative disc disease)     Depression     Fibromyalgia     Frequent UTI     Full dentures     Hallux valgus     Hearing loss in left ear 07/29/2011    HSV (herpes simplex virus) anogenital infection     leg    Hyperlipidemia     Lung nodule 2019    2 LLL nodules    Malignant neoplasm of left lung (HCC)     Menopausal state 1994    Occipital neuralgia     Osteoarthritis     Scoliosis     neck and back brace intermittently, based on pain    Smoker     Spinal stenosis     cane, walker    Unexplained weight loss     Patient states over 30# weight loss over 6 months and pcp is aware       Past Surgical History:        Procedure Laterality Date    BLADDER SUSPENSION  1988    BRAIN ANEURYSM SURGERY  2015    anterior communicating clipped    BUNIONECTOMY Left 08/23/2016    [place]    CATARACT REMOVAL Bilateral 2008    CERVICAL DISCECTOMY  2010    CERVICAL LAMINECTOMY  2003    Anterior approach, C4-7    CERVICAL LAMINECTOMY N/A 11/18/2016    Posterior, C2 - C5    CHOLECYSTECTOMY, LAPAROSCOPIC  2013    COLONOSCOPY      DILATION AND CURETTAGE OF UTERUS      LUMBAR LAMINECTOMY  2009    x2    SINUS SURGERY      x2    SPINAL FUSION  2009, 2012    post T12-L5 fusion and redone    THORACOSCOPY Left 3/25/2019    Dr. Blayne Maxwell. Alexander - thoracotomy w/takedown of adhesions, wedge excision LLL nodule#1, debulking LLL nodule #2, MSLND    TUBAL LIGATION      TUNNELED VENOUS PORT PLACEMENT Right 08/26/2019    Dr. Deirdre Hammer port       Social History:    Social History Tobacco Use    Smoking status: Some Days     Packs/day: 0.25     Years: 48.00     Pack years: 12.00     Types: Cigarettes     Last attempt to quit: 2019     Years since quittin.1    Smokeless tobacco: Never    Tobacco comments:     started age 11, down to [address] a day;   Substance Use Topics    Alcohol use: Not Currently                                Ready to quit: Not Answered  Counseling given: Not Answered  Tobacco comments: started age 11, down to [address] a day;      Vital Signs (Current):   Vitals:    23 0925 23 0724   Weight: 102 lb (46.3 kg) 106 lb (48.1 kg)   Height: 4' 11\" (1.499 m)                                               BP Readings from Last 3 Encounters:   23 105/63   23 113/69   23 114/60       NPO Status: Time of last liquid consumption:                         Time of last solid consumption:                         Date of last liquid consumption: 23                        Date of last solid food consumption: 23    BMI:   Wt Readings from Last 3 Encounters:   23 106 lb (48.1 kg)   23 102 lb (46.3 kg)   23 102 lb 9.6 oz (46.5 kg)     Body mass index is 21.41 kg/m².     CBC:   Lab Results   Component Value Date/Time    WBC 7.8 2023 11:11 AM    RBC 3.73 2023 11:11 AM    HGB 13.0 2023 11:11 AM    HCT 39.1 2023 11:11 AM    .0 2023 11:11 AM    RDW 16.0 2023 11:11 AM     2023 11:11 AM       CMP:   Lab Results   Component Value Date/Time     2023 11:11 AM    K 4.7 2023 11:11 AM    K 4.1 2021 09:38 AM     2023 11:11 AM    CO2 25 2023 11:11 AM    BUN 21 2023 11:11 AM    CREATININE 0.8 2023 11:11 AM    GFRAA >60 2022 03:03 PM    GFRAA >60 2013 03:35 PM    AGRATIO 1.9 2023 11:11 AM    LABGLOM >60 2023 11:11 AM    GLUCOSE 89 2023 11:11 AM    GLUCOSE 78 03/15/2012 04:20 PM    PROT 6.0 01/24/2023 11:11 AM    PROT 6.7 03/21/2013 03:35 PM    CALCIUM 8.7 01/24/2023 11:11 AM    BILITOT <0.2 01/24/2023 11:11 AM    ALKPHOS 101 01/24/2023 11:11 AM    AST 34 01/24/2023 11:11 AM    ALT 20 01/24/2023 11:11 AM       POC Tests: No results for input(s): POCGLU, POCNA, POCK, POCCL, POCBUN, POCHEMO, POCHCT in the last 72 hours.    Coags:   Lab Results   Component Value Date/Time    PROTIME 11.1 08/26/2019 10:54 AM    PROTIME 13.1 03/15/2012 04:20 PM    INR 0.97 08/26/2019 10:54 AM    APTT 66.2 09/27/2021 09:50 AM       HCG (If Applicable): No results found for: PREGTESTUR, PREGSERUM, HCG, HCGQUANT     ABGs:   Lab Results   Component Value Date/Time    PHART 7.428 03/20/2019 08:10 AM    PO2ART 73.9 03/20/2019 08:10 AM    UVS6AYI 36.6 03/20/2019 08:10 AM    ZXV0RBK 23.8 03/20/2019 08:10 AM    BEART 0.2 03/20/2019 08:10 AM    Q6VEMXKS 97.0 03/20/2019 08:10 AM        Type & Screen (If Applicable):  Lab Results   Component Value Date    LABABO O 03/15/2012    LABRH Positive 03/15/2012       Drug/Infectious Status (If Applicable):  No results found for: HIV, HEPCAB    COVID-19 Screening (If Applicable):   Lab Results   Component Value Date/Time    COVID19 Not Detected 09/26/2021 04:20 PM           Anesthesia Evaluation  Patient summary reviewed no history of anesthetic complications:   Airway: Mallampati: II  TM distance: >3 FB   Neck ROM: full  Mouth opening: > = 3 FB   Dental:    (+) upper dentures and lower dentures      Pulmonary: breath sounds clear to auscultation  (+) COPD:      (-) asthma, recent URI and sleep apnea                           Cardiovascular:    (+) hypertension:, CAD:, CHF:, hyperlipidemia      ECG reviewed  Rhythm: regular  Rate: normal  Echocardiogram reviewed               ROS comment: Summary   Left ventricular cavity size is normal.   Normal left ventricular wall thickness.   Ejection fraction is visually estimated to be 40-45%.   There is mild diffuse hypokinesis.   Grade II diastolic  dysfunction with elevated LV filling pressures. Normal right ventricular size. Right ventricular systolic function is severely reduced. TAPSE= 1.2cm   No pericardial effusion noted. Signature      ------------------------------------------------------------------   Electronically signed by Annette Major MD   (Interpreting physician) on 12/30/2022 at 07:24 PM     Neuro/Psych:   (+) neuromuscular disease:, headaches:, psychiatric history:depression/anxiety    (-) seizures            ROS comment:  cerebral aneurysm repair 2015  GI/Hepatic/Renal:        (-) GERD, PUD, hepatitis, liver disease and no renal disease       Endo/Other:    (+) hypothyroidism: arthritis:., malignancy/cancer. (-) diabetes mellitus               Abdominal:             Vascular:   + PE. Other Findings:           Anesthesia Plan      MAC     ASA 3       Induction: intravenous. Anesthetic plan and risks discussed with patient. Plan discussed with CRNA. This pre-anesthesia assessment may be used as a history and physical.    DOS STAFF ADDENDUM:    Pt seen and examined, chart reviewed (including anesthesia, drug and allergy history). No interval changes to history and physical examination. Anesthetic plan, risks, benefits, alternatives, and personnel involved discussed with patient. Patient verbalized an understanding and agrees to proceed.       Mabel Martínez MD  February 27, 2023  7:29 AM      Mabel Martínez MD   2/27/2023

## 2023-03-13 ENCOUNTER — OFFICE VISIT (OUTPATIENT)
Dept: PULMONOLOGY | Age: 68
End: 2023-03-13
Payer: MEDICARE

## 2023-03-13 VITALS
BODY MASS INDEX: 21.17 KG/M2 | DIASTOLIC BLOOD PRESSURE: 70 MMHG | WEIGHT: 105 LBS | HEIGHT: 59 IN | HEART RATE: 84 BPM | SYSTOLIC BLOOD PRESSURE: 110 MMHG | OXYGEN SATURATION: 96 % | TEMPERATURE: 97.1 F | RESPIRATION RATE: 16 BRPM

## 2023-03-13 DIAGNOSIS — J30.1 ALLERGIC RHINITIS DUE TO POLLEN, UNSPECIFIED SEASONALITY: ICD-10-CM

## 2023-03-13 DIAGNOSIS — J44.9 COPD, MILD (HCC): ICD-10-CM

## 2023-03-13 PROCEDURE — 4004F PT TOBACCO SCREEN RCVD TLK: CPT | Performed by: INTERNAL MEDICINE

## 2023-03-13 PROCEDURE — G8484 FLU IMMUNIZE NO ADMIN: HCPCS | Performed by: INTERNAL MEDICINE

## 2023-03-13 PROCEDURE — 3078F DIAST BP <80 MM HG: CPT | Performed by: INTERNAL MEDICINE

## 2023-03-13 PROCEDURE — 99214 OFFICE O/P EST MOD 30 MIN: CPT | Performed by: INTERNAL MEDICINE

## 2023-03-13 PROCEDURE — 1123F ACP DISCUSS/DSCN MKR DOCD: CPT | Performed by: INTERNAL MEDICINE

## 2023-03-13 PROCEDURE — G8420 CALC BMI NORM PARAMETERS: HCPCS | Performed by: INTERNAL MEDICINE

## 2023-03-13 PROCEDURE — G8427 DOCREV CUR MEDS BY ELIG CLIN: HCPCS | Performed by: INTERNAL MEDICINE

## 2023-03-13 PROCEDURE — G8399 PT W/DXA RESULTS DOCUMENT: HCPCS | Performed by: INTERNAL MEDICINE

## 2023-03-13 PROCEDURE — 1090F PRES/ABSN URINE INCON ASSESS: CPT | Performed by: INTERNAL MEDICINE

## 2023-03-13 PROCEDURE — 3023F SPIROM DOC REV: CPT | Performed by: INTERNAL MEDICINE

## 2023-03-13 PROCEDURE — 3074F SYST BP LT 130 MM HG: CPT | Performed by: INTERNAL MEDICINE

## 2023-03-13 PROCEDURE — 3017F COLORECTAL CA SCREEN DOC REV: CPT | Performed by: INTERNAL MEDICINE

## 2023-03-13 ASSESSMENT — COPD QUESTIONNAIRES
QUESTION1_COUGHFREQUENCY: 4
QUESTION3_CHESTTIGHTNESS: 0
QUESTION5_HOMEACTIVITIES: 0
QUESTION2_CHESTPHLEGM: 5
CAT_TOTALSCORE: 23
QUESTION4_WALKINCLINE: 3
QUESTION6_LEAVINGHOUSE: 5
QUESTION7_SLEEPQUALITY: 5
QUESTION8_ENERGYLEVEL: 1

## 2023-03-13 NOTE — Clinical Note
See note but I don't think I can improve her sinus control more than current. Tried nebulized medication without success. Returned back to Astelin, Flonase , ipratropium . Offered referral to Allergy and or ENT. She has seen them in the past without success.     Sherren Settler

## 2023-03-13 NOTE — PROGRESS NOTES
REASON FOR CONSULTATION/CC:    Chief Complaint   Patient presents with    COPD        Consult at request of  Mikaela Godfrey MD     PCP: Mikaela Godfrey MD    HISTORY OF PRESENT ILLNESS: Herminio Whipple is a 69y.o. year old female with a history of copd who presents :       History:  She has a long hx of issues with allergic rhinitis. She has seen allergy and used Allergen immunotherapy injections and hx of sinus surgery x 2 without success. suboptimal control with Astelin / Flonase  / ipratropium. Tried nebulized medications without success. Current hx           COPD  She changed to nebulizer but did not like secondary to side effects coughing. Changed back to Bevespi and albuterol       allergic rhinitis  Astelin / Flonase  / ipratropium  nasal.       Will having sinus phlegm . She saw allergy in the 80's, received shots, no benefit. She has seen ENT, allergy and pulmonary. Hx devicated sep                Objective:   PHYSICAL EXAM:  Blood pressure 110/70, pulse 84, temperature 97.1 °F (36.2 °C), temperature source Infrared, resp. rate 16, height 4' 11\" (1.499 m), weight 105 lb (47.6 kg), last menstrual period 08/11/1993, SpO2 96 %, not currently breastfeeding.'  Gen: No distress. ENT:   Resp: No accessory muscle use. No crackles. No wheezes. No rhonchi. CV: Regular rate. Regular rhythm. No murmur or rub. No edema. Skin: Warm, dry, normal texture and turgor. No nodule on exposed extremities. M/S: No cyanosis. No clubbing. No joint deformity. Psych: Oriented x 3. No anxiety. Awake. Alert. Intact judgement and insight. Good Mood / Affect. Memory appears in tact        Data Reviewed:        Assessment:         Lung cancer  CoPD    allergic rhinitis,   hx Allergen immunotherapy injections in 80's without success  Hx sinus surgery x 2, deviated septum.       Plan:      Problem List Items Addressed This Visit       COPD, mild ([address]      Interestingly, she did not like the nebulizer secondary to increasing coughing. Returned to [employer] and albuterol   Mostly controlled. Allergic rhinitis      She tried to change to nebulizer with mask to help allergic rhinitis and control COPD. See COPD but not able to use secondary to increased cough. She has a long hx of issues with allergic rhinitis. She has seen allergy and used Allergen immunotherapy injections and hx of sinus surgery x 2 without success. suboptimal control with Astelin / Flonase  / ipratropium. Tried nebulized medications without success. We discussed referral back allergy or ENT. With past hx of lack of success, she is not excited about this idea. It is not clear can improve her control more.      While she may have improved control with biologics, this will not be approved for this indication without hx of nasal polyps                     JAQUELINE MAGAÑA [id number] [facility]  [phone]

## 2023-03-14 NOTE — ASSESSMENT & PLAN NOTE
She tried to change to nebulizer with mask to help allergic rhinitis and control COPD. See COPD but not able to use secondary to increased cough. She has a long hx of issues with allergic rhinitis. She has seen allergy and used Allergen immunotherapy injections and hx of sinus surgery x 2 without success. suboptimal control with Astelin / Flonase  / ipratropium. Tried nebulized medications without success. We discussed referral back allergy or ENT. With past hx of lack of success, she is not excited about this idea. It is not clear can improve her control more.      While she may have improved control with biologics, this will not be approved for this indication without hx of nasal polyps

## 2023-03-14 NOTE — ASSESSMENT & PLAN NOTE
Interestingly, she did not like the nebulizer secondary to increasing coughing. Returned to [employer] and albuterol   Mostly controlled.

## 2023-03-16 ENCOUNTER — OFFICE VISIT (OUTPATIENT)
Dept: SURGERY | Age: 68
End: 2023-03-16

## 2023-03-16 VITALS — BODY MASS INDEX: 21.21 KG/M2 | WEIGHT: 105 LBS

## 2023-03-16 DIAGNOSIS — L98.411 SKIN ULCER OF BUTTOCK, LIMITED TO BREAKDOWN OF SKIN (HCC): Primary | ICD-10-CM

## 2023-03-16 PROCEDURE — 99024 POSTOP FOLLOW-UP VISIT: CPT | Performed by: SURGERY

## 2023-03-16 NOTE — PROGRESS NOTES
Subjective:      Patient ID: Claudean Sager is a 69 y.o. female. HPI  S/p excision perirectal lesion. Doing well. Some soreness but acute pain preop resolved. Minimal serous drainage. Afebrile. Path  Perirectal lesion, excision:      - Epidermal reparative change and dermal scar, negative for        malignancy/dysplasia. Review of Systems    Objective:   Physical Exam  Shallow wound dehiscence. Granulating well. No cellulitis  Assessment:       Diagnosis Orders   1. Skin ulcer of buttock, limited to breakdown of skin ([address]                Plan:      Discussed path  Wound will have to heal secondarily.   Keep covered with dry gauze PRN  F/U 2 weeks for wound check        Tianna Lux MD

## 2023-03-23 ENCOUNTER — OFFICE VISIT (OUTPATIENT)
Dept: SURGERY | Age: 68
End: 2023-03-23

## 2023-03-23 ENCOUNTER — TELEPHONE (OUTPATIENT)
Dept: SURGERY | Age: 68
End: 2023-03-23

## 2023-03-23 VITALS — BODY MASS INDEX: 21.21 KG/M2 | WEIGHT: 105 LBS

## 2023-03-23 DIAGNOSIS — L98.411 SKIN ULCER OF BUTTOCK, LIMITED TO BREAKDOWN OF SKIN (HCC): Primary | ICD-10-CM

## 2023-03-23 PROCEDURE — 99024 POSTOP FOLLOW-UP VISIT: CPT | Performed by: SURGERY

## 2023-03-23 NOTE — TELEPHONE ENCOUNTER
PT would like to have home care set up for her. She needs to have a service that excepts [facility].

## 2023-03-23 NOTE — PROGRESS NOTES
Subjective:      Patient ID: Kayla Cooper is a 69 y.o. female. HPI  S/p excision right gluteal cleft lesion 3.5 weeks ago. Here for wound check. C/o serous drainage. Review of Systems    Objective:   Physical Exam  3 x 1.5 cm wound with small amount slough on periphery. About 1 cm depth  Assessment:       Diagnosis Orders   1.  Skin ulcer of buttock, limited to breakdown of skin ([address]                Plan:      Santyl to wound for enzymatic debridement  Pack with 2x2 gauze and cover with dry gauze  F/U 2 weeks for wound check        Smith Wilkinson MD

## 2023-03-24 ENCOUNTER — HOME HEALTH/ HOSPICE FACE TO FACE ENCOUNTER (OUTPATIENT)
Dept: SURGERY | Age: 68
End: 2023-03-24

## 2023-03-24 ENCOUNTER — TELEPHONE (OUTPATIENT)
Dept: SURGERY | Age: 68
End: 2023-03-24

## 2023-03-24 DIAGNOSIS — L98.411 SKIN ULCER OF BUTTOCK, LIMITED TO BREAKDOWN OF SKIN (HCC): Primary | ICD-10-CM

## 2023-03-24 NOTE — TELEPHONE ENCOUNTER
Placed referral for home health to [facility], they will come out 3x a week to dress wound starting Monday.

## 2023-03-24 NOTE — TELEPHONE ENCOUNTER
Pharmacy called regarding Santyl. Gave measurements of wound. Gave probable duration of 3 month treatment. She switched 30 grams to 90 grams just in case. Just FYI. Thanks.

## 2023-03-28 ENCOUNTER — APPOINTMENT (OUTPATIENT)
Dept: GENERAL RADIOLOGY | Age: 68
End: 2023-03-28
Payer: MEDICARE

## 2023-03-28 ENCOUNTER — TELEPHONE (OUTPATIENT)
Dept: INTERNAL MEDICINE CLINIC | Age: 68
End: 2023-03-28

## 2023-03-28 ENCOUNTER — HOSPITAL ENCOUNTER (EMERGENCY)
Age: 68
Discharge: HOME | End: 2023-03-28
Attending: EMERGENCY MEDICINE
Payer: MEDICARE

## 2023-03-28 ENCOUNTER — APPOINTMENT (OUTPATIENT)
Dept: CT IMAGING | Age: 68
End: 2023-03-28
Payer: MEDICARE

## 2023-03-28 VITALS
HEART RATE: 89 BPM | TEMPERATURE: 98.1 F | SYSTOLIC BLOOD PRESSURE: 149 MMHG | OXYGEN SATURATION: 94 % | WEIGHT: 106.53 LBS | HEIGHT: 59 IN | DIASTOLIC BLOOD PRESSURE: 96 MMHG | BODY MASS INDEX: 21.48 KG/M2 | RESPIRATION RATE: 18 BRPM

## 2023-03-28 DIAGNOSIS — S43.401A SPRAIN OF RIGHT SHOULDER, UNSPECIFIED SHOULDER SPRAIN TYPE, INITIAL ENCOUNTER: ICD-10-CM

## 2023-03-28 DIAGNOSIS — S00.81XA FOREHEAD ABRASION, INITIAL ENCOUNTER: ICD-10-CM

## 2023-03-28 DIAGNOSIS — S00.93XA CONTUSION OF HEAD, UNSPECIFIED PART OF HEAD, INITIAL ENCOUNTER: Primary | ICD-10-CM

## 2023-03-28 DIAGNOSIS — S16.1XXA ACUTE CERVICAL MYOFASCIAL STRAIN, INITIAL ENCOUNTER: ICD-10-CM

## 2023-03-28 PROCEDURE — 73080 X-RAY EXAM OF ELBOW: CPT

## 2023-03-28 PROCEDURE — 70450 CT HEAD/BRAIN W/O DYE: CPT

## 2023-03-28 PROCEDURE — 6360000002 HC RX W HCPCS: Performed by: EMERGENCY MEDICINE

## 2023-03-28 PROCEDURE — 72125 CT NECK SPINE W/O DYE: CPT

## 2023-03-28 PROCEDURE — 72100 X-RAY EXAM L-S SPINE 2/3 VWS: CPT

## 2023-03-28 PROCEDURE — 90715 TDAP VACCINE 7 YRS/> IM: CPT | Performed by: EMERGENCY MEDICINE

## 2023-03-28 PROCEDURE — 90471 IMMUNIZATION ADMIN: CPT | Performed by: EMERGENCY MEDICINE

## 2023-03-28 PROCEDURE — 73030 X-RAY EXAM OF SHOULDER: CPT

## 2023-03-28 PROCEDURE — 72072 X-RAY EXAM THORAC SPINE 3VWS: CPT

## 2023-03-28 PROCEDURE — 99284 EMERGENCY DEPT VISIT MOD MDM: CPT

## 2023-03-28 RX ADMIN — TETANUS TOXOID, REDUCED DIPHTHERIA TOXOID AND ACELLULAR PERTUSSIS VACCINE, ADSORBED 0.5 ML: 5; 2.5; 8; 8; 2.5 SUSPENSION INTRAMUSCULAR at 16:03

## 2023-03-28 ASSESSMENT — PAIN DESCRIPTION - LOCATION: LOCATION: ARM;BACK

## 2023-03-28 ASSESSMENT — PAIN - FUNCTIONAL ASSESSMENT: PAIN_FUNCTIONAL_ASSESSMENT: 0-10

## 2023-03-28 ASSESSMENT — PAIN SCALES - GENERAL: PAINLEVEL_OUTOF10: 6

## 2023-03-28 NOTE — ED PROVIDER NOTES
180 days. LEVOTHYROXINE (SYNTHROID) 50 MCG TABLET    Take 50 mcg by mouth Daily    MIRTAZAPINE (REMERON) 15 MG TABLET    Take 15 mg by mouth nightly    MULTIPLE VITAMINS-MINERALS (THERAPEUTIC MULTIVITAMIN-MINERALS) TABLET    Take 1 tablet by mouth daily    NUTRITIONAL SUPPLEMENTS (ENSURE COMPLETE) LIQD    Take 1 Bottle by mouth in the morning and at bedtime    OXCARBAZEPINE (TRILEPTAL) 300 MG TABLET    Take 300 mg by mouth in the morning, at noon, and at bedtime    PROBIOTIC PRODUCT (PROBIOTIC DAILY PO)    Take 1 tablet by mouth daily     SACUBITRIL-VALSARTAN (ENTRESTO) 49-51 MG PER TABLET    Take 1 tablet by mouth 2 times daily    SPIRONOLACTONE (ALDACTONE) 25 MG TABLET    Take 0.5 tablets by mouth daily    TIZANIDINE (ZANAFLEX) 2 MG TABLET    Take 1 tablet by mouth 3 times daily as needed (muscle spasm)    VALACYCLOVIR (VALTREX) 1 G TABLET    TAKE ONE TABLET BY MOUTH DAILY       ALLERGIES     Adhesive tape, Ibuprofen, Naproxen, Nsaids, and Vicodin [hydrocodone-acetaminophen]    FAMILY HISTORY       Family History   Problem Relation Age of Onset    Cancer Mother 43        breast    Cancer Brother 77        throat    Other Father         Macular Degeration          SOCIAL HISTORY       Social History     Socioeconomic History    Marital status: Single    Number of children: 4   Occupational History    Occupation: disabled   Tobacco Use    Smoking status: Some Days     Packs/day: 0.25     Years: 48.00     Pack years: 12.00     Types: Cigarettes     Last attempt to quit: 2019     Years since quittin.1    Smokeless tobacco: Never    Tobacco comments:     started age 11, down to [address] [place] [address] [address] a day;   Vaping Use    Vaping Use: Former    Substances: Always   Substance and Sexual Activity    Alcohol use: Not Currently    Drug use: Not Currently     Types: Marijuana Renard Duarte)    Sexual activity: Not Currently   Social History Narrative    Exercise:  never. Living will:  no,   additional information provided. Lives alone.

## 2023-03-28 NOTE — TELEPHONE ENCOUNTER
Patient calling to inform Dr. Auburn Scheuermann that she fell last night and hit her. I advised her with advise from Dr. Auburn Scheuermann that she should go to the ER to be evaluated.

## 2023-04-06 ENCOUNTER — OFFICE VISIT (OUTPATIENT)
Dept: SURGERY | Age: 68
End: 2023-04-06

## 2023-04-06 VITALS — BODY MASS INDEX: 21.41 KG/M2 | WEIGHT: 106 LBS

## 2023-04-06 DIAGNOSIS — L98.411 SKIN ULCER OF BUTTOCK, LIMITED TO BREAKDOWN OF SKIN (HCC): Primary | ICD-10-CM

## 2023-04-06 PROCEDURE — 99024 POSTOP FOLLOW-UP VISIT: CPT | Performed by: SURGERY

## 2023-04-06 NOTE — PROGRESS NOTES
Subjective:      Patient ID: Surinder Ortega is a 69 y.o. female. HPI  Wound check after dehiscence. Doing well. Pain and drainage continue to improve. Daily dressing changes with santyl  Review of Systems    Objective:   Physical Exam  2 cm x 1 cm wound with minimal depth, improving exudate  Assessment:       Diagnosis Orders   1.  Skin ulcer of buttock, limited to breakdown of skin ([address]                Plan:      Healing appropriately  Continue local wound care  F/u 2 weeks        Tricia Enriquez MD

## 2023-04-17 RX ORDER — SPIRONOLACTONE 25 MG/1
TABLET ORAL
Qty: 45 TABLET | Refills: 3 | Status: SHIPPED | OUTPATIENT
Start: 2023-04-17

## 2023-04-17 NOTE — TELEPHONE ENCOUNTER
Last OV:2023  Last Labs:2023  Last Refills:2023  Next Appt:2023  Last EK2022     spironolactone (ALDACTONE) 25 MG tablet [[id number]]     Order Details  Dose: 12.5 mg Route: Oral Frequency: DAILY   Dispense Quantity: 45 tablet

## 2023-04-25 ENCOUNTER — TELEPHONE (OUTPATIENT)
Dept: INTERNAL MEDICINE CLINIC | Age: 68
End: 2023-04-25

## 2023-04-25 NOTE — TELEPHONE ENCOUNTER
[address] [facility] care nurse Kelly Thapa calling to report patient symptoms of a UTI burning frequency and incontinence. She will collect and send a stat order for urinalysis with micro. Per verbal order from Dr. Nikita Rosa.

## 2023-04-26 RX ORDER — NITROFURANTOIN 25; 75 MG/1; MG/1
100 CAPSULE ORAL 2 TIMES DAILY
Qty: 14 CAPSULE | Refills: 0 | Status: SHIPPED | OUTPATIENT
Start: 2023-04-26 | End: 2023-05-03

## 2023-04-26 NOTE — PROGRESS NOTES
Received results of UA suggesting UTI. Rx for antibiotic sent. Reqeusted staff to notify patient. I should see her in 3 weeks for recheck to confirm resolution.

## 2023-04-27 ENCOUNTER — OFFICE VISIT (OUTPATIENT)
Dept: SURGERY | Age: 68
End: 2023-04-27

## 2023-04-27 VITALS — WEIGHT: 106 LBS | BODY MASS INDEX: 21.41 KG/M2

## 2023-04-27 DIAGNOSIS — L98.411 SKIN ULCER OF BUTTOCK, LIMITED TO BREAKDOWN OF SKIN (HCC): Primary | ICD-10-CM

## 2023-04-27 PROCEDURE — 99024 POSTOP FOLLOW-UP VISIT: CPT | Performed by: SURGERY

## 2023-04-27 NOTE — PROGRESS NOTES
Subjective:      Patient ID: Ian Aguirre is a 69 y.o. female. HPI  F/U excision buttock lesion. Continues to pack wound and using santyl. Pain resolved. Minimal drainage    Review of Systems    Objective:   Physical Exam  Wound healing well 2x 1 cm without depth  Granulating throughout  Assessment:       Diagnosis Orders   1.  Skin ulcer of buttock, limited to breakdown of skin (HCC)                Plan:      D/C packing   Dry gauze to area PRN drainage  Return PRN        Kostas Iyer MD

## 2023-04-28 ENCOUNTER — TELEPHONE (OUTPATIENT)
Dept: SURGERY | Age: 68
End: 2023-04-28

## 2023-04-28 NOTE — TELEPHONE ENCOUNTER
OK for discharge, packing is no longer required for wound, just dry gauze until healed.  Spoke with Daniela Mobley

## 2023-05-19 ENCOUNTER — HOSPITAL ENCOUNTER (OUTPATIENT)
Dept: CT IMAGING | Age: 68
Discharge: HOME | End: 2023-05-19
Payer: MEDICARE

## 2023-05-19 DIAGNOSIS — R05.9 COUGH, UNSPECIFIED TYPE: ICD-10-CM

## 2023-05-19 DIAGNOSIS — C34.32 MALIGNANT NEOPLASM OF LOWER LOBE BRONCHUS, LEFT (HCC): ICD-10-CM

## 2023-05-19 PROCEDURE — 71250 CT THORAX DX C-: CPT

## 2023-06-28 ENCOUNTER — HOSPITAL ENCOUNTER (OUTPATIENT)
Dept: WOMENS IMAGING | Age: 68
Discharge: HOME | End: 2023-06-28
Payer: MEDICARE

## 2023-06-28 DIAGNOSIS — Z12.31 VISIT FOR SCREENING MAMMOGRAM: ICD-10-CM

## 2023-06-28 PROCEDURE — 77063 BREAST TOMOSYNTHESIS BI: CPT

## 2023-07-06 ENCOUNTER — TELEPHONE (OUTPATIENT)
Dept: CARDIOLOGY CLINIC | Age: 68
End: 2023-07-06

## 2023-07-06 DIAGNOSIS — J43.9 PULMONARY EMPHYSEMA, UNSPECIFIED EMPHYSEMA TYPE (HCC): ICD-10-CM

## 2023-07-06 RX ORDER — FUROSEMIDE 20 MG/1
20 TABLET ORAL DAILY PRN
Qty: 30 TABLET | Refills: 5
Start: 2023-07-06

## 2023-07-06 RX ORDER — FUROSEMIDE 20 MG/1
20 TABLET ORAL AS NEEDED
Qty: 30 TABLET | Refills: 5 | Status: SHIPPED | OUTPATIENT
Start: 2023-07-06 | End: 2023-07-06

## 2023-07-06 RX ORDER — ALBUTEROL SULFATE 90 UG/1
AEROSOL, METERED RESPIRATORY (INHALATION)
Qty: 18 G | Refills: 11 | Status: SHIPPED | OUTPATIENT
Start: 2023-07-06

## 2023-07-06 NOTE — TELEPHONE ENCOUNTER
Called and spoke with Kailyn Peterson, she denies any worsening shortness of breath but states her weight is up 6 lbs the past 2 days. She was referring to the Lasix and is not taking Lisinopril since she has been on the HUNDSHAGEN. Instructed to take the Lasix for a few days and then resume as needed. Call with worsening symptoms. Patient verbalized an understanding.

## 2023-07-06 NOTE — TELEPHONE ENCOUNTER
David Vega called in this morning, she states she has gained 5 pounds in 4 days. She states she wants to see if she needs to start back taking lisinopril. She can be reached at [phone].

## 2023-07-13 NOTE — PROGRESS NOTES
distress. Head: Normocephalic and atraumatic. Pupils equal and round. Neck: Neck supple. No JVP or carotid bruit appreciated. No mass and no thyromegaly present. No lymphadenopathy present. Cardiovascular: Normal rate. Normal heart sounds. Exam reveals no gallop and no friction rub. I-II/systolic murmur at the apex. Pulmonary/Chest: Effort normal and breath sounds normal. No respiratory distress. She has no wheezes, rhonchi or rales. Abdominal: Soft, non-tender. Bowel sounds are normal. She exhibits no organomegaly, mass or bruit. Extremities: No edema. No cyanosis or clubbing. Pulses are 2+ radial/carotid bilaterally. Neurological: No gross cranial nerve deficit. Coordination normal.   Skin: Skin is warm and dry. There is no rash or diaphoresis. Psychiatric: She has a normal mood and affect. Her speech is normal and behavior is normal.     Lab Review:   FLP:    Lab Results   Component Value Date/Time    TRIG 64 01/24/2023 11:11 AM    HDL 69 01/24/2023 11:11 AM    LDLCALC 77 01/24/2023 11:11 AM    LABVLDL 13 01/24/2023 11:11 AM     BUN/Creatinine:    Lab Results   Component Value Date/Time    BUN 21 01/24/2023 11:11 AM    CREATININE 0.8 01/24/2023 11:11 AM     EKG: Personally interpreted. 9/26/2021. Sinus tachycardia. Possible left atrial enlargement. Left axis deviation. LVH with repolarization abnormality. 9/2/22 Sinus rhythm. Incomplete right bundle branch block. Left anterior fascicular block. Echo: 9/27/2021. Personally interpreted. Overall left ventricular systolic function appears mild to moderately reduced. Ejection fraction is visually estimated to be 40-45% with diffuse hypokinesis. Mildly dilated left ventricle. Normal left ventricular wall thickness. The right ventricle is normal in size and function. The left atrium is mildly dilated. Severe mitral regurgitation. Mild aortic and tricuspid regurgitation.  Estimated pulmonary artery systolic pressure is mildly elevated at 47 mmHg

## 2023-07-14 ENCOUNTER — OFFICE VISIT (OUTPATIENT)
Dept: CARDIOLOGY CLINIC | Age: 68
End: 2023-07-14
Payer: MEDICARE

## 2023-07-14 VITALS
OXYGEN SATURATION: 93 % | HEIGHT: 59 IN | SYSTOLIC BLOOD PRESSURE: 132 MMHG | WEIGHT: 107 LBS | BODY MASS INDEX: 21.57 KG/M2 | DIASTOLIC BLOOD PRESSURE: 80 MMHG | HEART RATE: 94 BPM

## 2023-07-14 DIAGNOSIS — I10 ESSENTIAL HYPERTENSION: ICD-10-CM

## 2023-07-14 DIAGNOSIS — I35.1 NONRHEUMATIC AORTIC VALVE INSUFFICIENCY: ICD-10-CM

## 2023-07-14 DIAGNOSIS — I50.42 CHRONIC COMBINED SYSTOLIC AND DIASTOLIC CONGESTIVE HEART FAILURE (HCC): ICD-10-CM

## 2023-07-14 DIAGNOSIS — I25.10 CORONARY ARTERY DISEASE INVOLVING NATIVE CORONARY ARTERY OF NATIVE HEART WITHOUT ANGINA PECTORIS: ICD-10-CM

## 2023-07-14 DIAGNOSIS — I50.42 CHRONIC COMBINED SYSTOLIC AND DIASTOLIC CONGESTIVE HEART FAILURE (HCC): Primary | ICD-10-CM

## 2023-07-14 DIAGNOSIS — Z86.711 HISTORY OF PULMONARY EMBOLISM: ICD-10-CM

## 2023-07-14 DIAGNOSIS — I34.0 NONRHEUMATIC MITRAL VALVE REGURGITATION: ICD-10-CM

## 2023-07-14 LAB
ANION GAP SERPL CALCULATED.3IONS-SCNC: 13 MMOL/L (ref 3–16)
BUN SERPL-MCNC: 17 MG/DL (ref 7–20)
CALCIUM SERPL-MCNC: 9.3 MG/DL (ref 8.3–10.6)
CHLORIDE SERPL-SCNC: 97 MMOL/L (ref 99–110)
CO2 SERPL-SCNC: 27 MMOL/L (ref 21–32)
CREAT SERPL-MCNC: 0.9 MG/DL (ref 0.6–1.2)
GFR SERPLBLD CREATININE-BSD FMLA CKD-EPI: >60 ML/MIN/{1.73_M2}
GLUCOSE SERPL-MCNC: 86 MG/DL (ref 70–99)
POTASSIUM SERPL-SCNC: 3.5 MMOL/L (ref 3.5–5.1)
SODIUM SERPL-SCNC: 137 MMOL/L (ref 136–145)

## 2023-07-14 PROCEDURE — G8420 CALC BMI NORM PARAMETERS: HCPCS | Performed by: INTERNAL MEDICINE

## 2023-07-14 PROCEDURE — G8427 DOCREV CUR MEDS BY ELIG CLIN: HCPCS | Performed by: INTERNAL MEDICINE

## 2023-07-14 PROCEDURE — 4004F PT TOBACCO SCREEN RCVD TLK: CPT | Performed by: INTERNAL MEDICINE

## 2023-07-14 PROCEDURE — 99214 OFFICE O/P EST MOD 30 MIN: CPT | Performed by: INTERNAL MEDICINE

## 2023-07-14 PROCEDURE — 1090F PRES/ABSN URINE INCON ASSESS: CPT | Performed by: INTERNAL MEDICINE

## 2023-07-14 PROCEDURE — 3075F SYST BP GE 130 - 139MM HG: CPT | Performed by: INTERNAL MEDICINE

## 2023-07-14 PROCEDURE — 1123F ACP DISCUSS/DSCN MKR DOCD: CPT | Performed by: INTERNAL MEDICINE

## 2023-07-14 PROCEDURE — 3079F DIAST BP 80-89 MM HG: CPT | Performed by: INTERNAL MEDICINE

## 2023-07-14 PROCEDURE — 3017F COLORECTAL CA SCREEN DOC REV: CPT | Performed by: INTERNAL MEDICINE

## 2023-07-14 PROCEDURE — G8399 PT W/DXA RESULTS DOCUMENT: HCPCS | Performed by: INTERNAL MEDICINE

## 2023-07-21 DIAGNOSIS — J30.1 ALLERGIC RHINITIS DUE TO POLLEN, UNSPECIFIED SEASONALITY: ICD-10-CM

## 2023-07-24 RX ORDER — IPRATROPIUM BROMIDE 21 UG/1
SPRAY, METERED NASAL
Qty: 30 ML | Refills: 5 | Status: SHIPPED | OUTPATIENT
Start: 2023-07-24

## 2023-08-03 DIAGNOSIS — M79.7 FIBROMYALGIA: ICD-10-CM

## 2023-08-04 RX ORDER — GABAPENTIN 800 MG/1
TABLET ORAL
Qty: 180 TABLET | Refills: 1 | OUTPATIENT
Start: 2023-08-04

## 2023-08-08 DIAGNOSIS — M79.7 FIBROMYALGIA: ICD-10-CM

## 2023-08-09 RX ORDER — GABAPENTIN 800 MG/1
TABLET ORAL
Qty: 180 TABLET | Refills: 1 | OUTPATIENT
Start: 2023-08-09

## 2023-08-10 DIAGNOSIS — M79.7 FIBROMYALGIA: ICD-10-CM

## 2023-08-10 RX ORDER — GABAPENTIN 800 MG/1
TABLET ORAL
Qty: 180 TABLET | Refills: 1 | OUTPATIENT
Start: 2023-08-10

## 2023-08-10 RX ORDER — GABAPENTIN 800 MG/1
800 TABLET ORAL 2 TIMES DAILY
Qty: 180 TABLET | Refills: 1 | OUTPATIENT
Start: 2023-08-10 | End: 2024-02-06

## 2023-08-21 DIAGNOSIS — M79.7 FIBROMYALGIA: ICD-10-CM

## 2023-08-21 RX ORDER — GABAPENTIN 800 MG/1
800 TABLET ORAL 2 TIMES DAILY
Qty: 180 TABLET | Refills: 1 | Status: SHIPPED | OUTPATIENT
Start: 2023-08-21 | End: 2024-02-17

## 2023-08-23 RX ORDER — EMPAGLIFLOZIN 10 MG/1
TABLET, FILM COATED ORAL
Qty: 90 TABLET | Refills: 5 | Status: SHIPPED | OUTPATIENT
Start: 2023-08-23

## 2023-08-23 RX ORDER — SACUBITRIL AND VALSARTAN 49; 51 MG/1; MG/1
TABLET, FILM COATED ORAL
Qty: 180 TABLET | Refills: 3 | Status: SHIPPED | OUTPATIENT
Start: 2023-08-23

## 2023-08-23 RX ORDER — CARVEDILOL 6.25 MG/1
TABLET ORAL
Qty: 180 TABLET | Refills: 3 | Status: SHIPPED | OUTPATIENT
Start: 2023-08-23

## 2023-08-23 NOTE — TELEPHONE ENCOUNTER
Requested Prescriptions     Pending Prescriptions Disp Refills    JARDIANCE 10 MG tablet [Pharmacy Med Name: Nemo Calkin 10 MG TABLET] 90 tablet 5     Sig: TAKE ONE TABLET BY MOUTH EVERY MORNING    ENTRESTO 49-51 MG per tablet [Pharmacy Med Name: ENTRESTO 49 MG-51 MG TABLET] 180 tablet 3     Sig: TAKE ONE TABLET BY MOUTH TWICE A DAY    carvedilol (COREG) 6.25 MG tablet [Pharmacy Med Name: CARVEDILOL 6.25 MG TABLET] 180 tablet 3     Sig: TAKE ONE TABLET BY MOUTH TWICE A DAY WITH MEALS          Number: 90 day supply     Refills: 3    Last Office Visit: 7/14/2023     Next Office Visit: Visit date not found 6 month follow up

## 2023-08-29 ENCOUNTER — OFFICE VISIT (OUTPATIENT)
Dept: INTERNAL MEDICINE CLINIC | Age: 68
End: 2023-08-29
Payer: MEDICARE

## 2023-08-29 VITALS
OXYGEN SATURATION: 95 % | SYSTOLIC BLOOD PRESSURE: 139 MMHG | HEART RATE: 82 BPM | DIASTOLIC BLOOD PRESSURE: 75 MMHG | RESPIRATION RATE: 12 BRPM | BODY MASS INDEX: 22.22 KG/M2 | WEIGHT: 110 LBS

## 2023-08-29 DIAGNOSIS — M54.2 CHRONIC NECK PAIN: ICD-10-CM

## 2023-08-29 DIAGNOSIS — F11.90 CHRONIC, CONTINUOUS USE OF OPIOIDS: ICD-10-CM

## 2023-08-29 DIAGNOSIS — M41.30 THORACOGENIC SCOLIOSIS, UNSPECIFIED SPINAL REGION: ICD-10-CM

## 2023-08-29 DIAGNOSIS — G89.29 CHRONIC NECK PAIN: ICD-10-CM

## 2023-08-29 DIAGNOSIS — I50.22 CHRONIC SYSTOLIC CONGESTIVE HEART FAILURE (HCC): ICD-10-CM

## 2023-08-29 DIAGNOSIS — J44.9 COPD, MILD (HCC): ICD-10-CM

## 2023-08-29 DIAGNOSIS — M54.50 CHRONIC MIDLINE LOW BACK PAIN WITHOUT SCIATICA: ICD-10-CM

## 2023-08-29 DIAGNOSIS — I10 ESSENTIAL HYPERTENSION: ICD-10-CM

## 2023-08-29 DIAGNOSIS — C34.32 MALIGNANT NEOPLASM OF LOWER LOBE OF LEFT LUNG (HCC): ICD-10-CM

## 2023-08-29 DIAGNOSIS — C34.90 NON-SMALL CELL LUNG CANCER, UNSPECIFIED LATERALITY (HCC): ICD-10-CM

## 2023-08-29 DIAGNOSIS — F33.0 MILD EPISODE OF RECURRENT MAJOR DEPRESSIVE DISORDER (HCC): ICD-10-CM

## 2023-08-29 DIAGNOSIS — E03.9 ACQUIRED HYPOTHYROIDISM: ICD-10-CM

## 2023-08-29 DIAGNOSIS — F17.218 CIGARETTE NICOTINE DEPENDENCE WITH OTHER NICOTINE-INDUCED DISORDER: ICD-10-CM

## 2023-08-29 DIAGNOSIS — R29.6 FREQUENT FALLS: ICD-10-CM

## 2023-08-29 DIAGNOSIS — E44.0 MODERATE PROTEIN-CALORIE MALNUTRITION (HCC): ICD-10-CM

## 2023-08-29 DIAGNOSIS — E78.00 HYPERCHOLESTEREMIA: ICD-10-CM

## 2023-08-29 DIAGNOSIS — G37.9 DEMYELINATING DISORDER (HCC): Primary | ICD-10-CM

## 2023-08-29 DIAGNOSIS — G89.29 CHRONIC MIDLINE LOW BACK PAIN WITHOUT SCIATICA: ICD-10-CM

## 2023-08-29 DIAGNOSIS — Z79.899 MEDICAL MARIJUANA USE: ICD-10-CM

## 2023-08-29 DIAGNOSIS — M80.00XG AGE-RELATED OSTEOPOROSIS WITH CURRENT PATHOLOGICAL FRACTURE WITH DELAYED HEALING: ICD-10-CM

## 2023-08-29 PROBLEM — M62.830 BACK MUSCLE SPASM: Status: RESOLVED | Noted: 2022-09-16 | Resolved: 2023-08-29

## 2023-08-29 PROBLEM — R63.6 UNDERWEIGHT: Status: RESOLVED | Noted: 2017-07-05 | Resolved: 2023-08-29

## 2023-08-29 PROBLEM — Z79.891 CHRONIC PRESCRIPTION OPIATE USE: Status: RESOLVED | Noted: 2020-01-23 | Resolved: 2023-08-29

## 2023-08-29 PROCEDURE — G8427 DOCREV CUR MEDS BY ELIG CLIN: HCPCS | Performed by: INTERNAL MEDICINE

## 2023-08-29 PROCEDURE — 3023F SPIROM DOC REV: CPT | Performed by: INTERNAL MEDICINE

## 2023-08-29 PROCEDURE — G8399 PT W/DXA RESULTS DOCUMENT: HCPCS | Performed by: INTERNAL MEDICINE

## 2023-08-29 PROCEDURE — G8420 CALC BMI NORM PARAMETERS: HCPCS | Performed by: INTERNAL MEDICINE

## 2023-08-29 PROCEDURE — 99407 BEHAV CHNG SMOKING > 10 MIN: CPT | Performed by: INTERNAL MEDICINE

## 2023-08-29 PROCEDURE — 1123F ACP DISCUSS/DSCN MKR DOCD: CPT | Performed by: INTERNAL MEDICINE

## 2023-08-29 PROCEDURE — 1090F PRES/ABSN URINE INCON ASSESS: CPT | Performed by: INTERNAL MEDICINE

## 2023-08-29 PROCEDURE — 3078F DIAST BP <80 MM HG: CPT | Performed by: INTERNAL MEDICINE

## 2023-08-29 PROCEDURE — 99215 OFFICE O/P EST HI 40 MIN: CPT | Performed by: INTERNAL MEDICINE

## 2023-08-29 PROCEDURE — 4004F PT TOBACCO SCREEN RCVD TLK: CPT | Performed by: INTERNAL MEDICINE

## 2023-08-29 PROCEDURE — 3074F SYST BP LT 130 MM HG: CPT | Performed by: INTERNAL MEDICINE

## 2023-08-29 PROCEDURE — 3017F COLORECTAL CA SCREEN DOC REV: CPT | Performed by: INTERNAL MEDICINE

## 2023-08-29 RX ORDER — OXYCODONE AND ACETAMINOPHEN 7.5; 325 MG/1; MG/1
TABLET ORAL
COMMUNITY
Start: 2023-08-16

## 2023-08-29 RX ORDER — TIZANIDINE 4 MG/1
TABLET ORAL
COMMUNITY
Start: 2023-08-14

## 2023-08-29 RX ORDER — TRAZODONE HYDROCHLORIDE 50 MG/1
TABLET ORAL
COMMUNITY
Start: 2023-08-13

## 2023-08-29 RX ORDER — MODAFINIL 100 MG/1
TABLET ORAL
COMMUNITY
Start: 2023-08-17

## 2023-08-29 RX ORDER — GLYCOPYRROLATE AND FORMOTEROL FUMARATE 9; 4.8 UG/1; UG/1
AEROSOL, METERED RESPIRATORY (INHALATION)
COMMUNITY
Start: 2023-07-05

## 2023-08-29 SDOH — ECONOMIC STABILITY: FOOD INSECURITY: WITHIN THE PAST 12 MONTHS, THE FOOD YOU BOUGHT JUST DIDN'T LAST AND YOU DIDN'T HAVE MONEY TO GET MORE.: NEVER TRUE

## 2023-08-29 SDOH — ECONOMIC STABILITY: INCOME INSECURITY: HOW HARD IS IT FOR YOU TO PAY FOR THE VERY BASICS LIKE FOOD, HOUSING, MEDICAL CARE, AND HEATING?: NOT HARD AT ALL

## 2023-08-29 SDOH — ECONOMIC STABILITY: HOUSING INSECURITY
IN THE LAST 12 MONTHS, WAS THERE A TIME WHEN YOU DID NOT HAVE A STEADY PLACE TO SLEEP OR SLEPT IN A SHELTER (INCLUDING NOW)?: NO

## 2023-08-29 SDOH — ECONOMIC STABILITY: FOOD INSECURITY: WITHIN THE PAST 12 MONTHS, YOU WORRIED THAT YOUR FOOD WOULD RUN OUT BEFORE YOU GOT MONEY TO BUY MORE.: NEVER TRUE

## 2023-09-06 DIAGNOSIS — M80.00XG AGE-RELATED OSTEOPOROSIS WITH CURRENT PATHOLOGICAL FRACTURE WITH DELAYED HEALING: Primary | ICD-10-CM

## 2023-09-06 RX ORDER — SODIUM CHLORIDE 9 MG/ML
INJECTION, SOLUTION INTRAVENOUS CONTINUOUS
Status: CANCELLED | OUTPATIENT
Start: 2023-09-06

## 2023-09-06 RX ORDER — SODIUM CHLORIDE 0.9 % (FLUSH) 0.9 %
5-40 SYRINGE (ML) INJECTION PRN
Status: CANCELLED | OUTPATIENT
Start: 2023-09-06

## 2023-09-06 RX ORDER — EPINEPHRINE 1 MG/ML
0.3 INJECTION, SOLUTION, CONCENTRATE INTRAVENOUS PRN
Status: CANCELLED | OUTPATIENT
Start: 2023-09-06

## 2023-09-06 RX ORDER — ALBUTEROL SULFATE 90 UG/1
4 AEROSOL, METERED RESPIRATORY (INHALATION) PRN
Status: CANCELLED | OUTPATIENT
Start: 2023-09-06

## 2023-09-06 RX ORDER — ONDANSETRON 2 MG/ML
8 INJECTION INTRAMUSCULAR; INTRAVENOUS
Status: CANCELLED | OUTPATIENT
Start: 2023-09-06

## 2023-09-06 RX ORDER — ZOLEDRONIC ACID 5 MG/100ML
5 INJECTION, SOLUTION INTRAVENOUS ONCE
Status: CANCELLED | OUTPATIENT
Start: 2023-09-06 | End: 2023-09-06

## 2023-09-06 RX ORDER — SODIUM CHLORIDE 9 MG/ML
5-250 INJECTION, SOLUTION INTRAVENOUS PRN
Status: CANCELLED | OUTPATIENT
Start: 2023-09-06

## 2023-09-06 RX ORDER — DIPHENHYDRAMINE HYDROCHLORIDE 50 MG/ML
50 INJECTION INTRAMUSCULAR; INTRAVENOUS
Status: CANCELLED | OUTPATIENT
Start: 2023-09-06

## 2023-09-12 ENCOUNTER — TELEPHONE (OUTPATIENT)
Dept: INTERNAL MEDICINE CLINIC | Age: 68
End: 2023-09-12

## 2023-09-12 NOTE — TELEPHONE ENCOUNTER
Patient stopped by the office to ask about the reclast order I told her someone from the [facility] would reach out to her to schedule.

## 2023-09-25 ENCOUNTER — OFFICE VISIT (OUTPATIENT)
Dept: PULMONOLOGY | Age: 68
End: 2023-09-25
Payer: MEDICARE

## 2023-09-25 VITALS
OXYGEN SATURATION: 88 % | HEIGHT: 59 IN | BODY MASS INDEX: 22.09 KG/M2 | WEIGHT: 109.6 LBS | SYSTOLIC BLOOD PRESSURE: 145 MMHG | DIASTOLIC BLOOD PRESSURE: 78 MMHG | HEART RATE: 74 BPM

## 2023-09-25 DIAGNOSIS — J44.9 COPD, MILD (HCC): ICD-10-CM

## 2023-09-25 DIAGNOSIS — J30.1 ALLERGIC RHINITIS DUE TO POLLEN, UNSPECIFIED SEASONALITY: ICD-10-CM

## 2023-09-25 PROCEDURE — 3078F DIAST BP <80 MM HG: CPT | Performed by: INTERNAL MEDICINE

## 2023-09-25 PROCEDURE — G8399 PT W/DXA RESULTS DOCUMENT: HCPCS | Performed by: INTERNAL MEDICINE

## 2023-09-25 PROCEDURE — 1090F PRES/ABSN URINE INCON ASSESS: CPT | Performed by: INTERNAL MEDICINE

## 2023-09-25 PROCEDURE — 1123F ACP DISCUSS/DSCN MKR DOCD: CPT | Performed by: INTERNAL MEDICINE

## 2023-09-25 PROCEDURE — 99214 OFFICE O/P EST MOD 30 MIN: CPT | Performed by: INTERNAL MEDICINE

## 2023-09-25 PROCEDURE — 3017F COLORECTAL CA SCREEN DOC REV: CPT | Performed by: INTERNAL MEDICINE

## 2023-09-25 PROCEDURE — G8428 CUR MEDS NOT DOCUMENT: HCPCS | Performed by: INTERNAL MEDICINE

## 2023-09-25 PROCEDURE — G8420 CALC BMI NORM PARAMETERS: HCPCS | Performed by: INTERNAL MEDICINE

## 2023-09-25 PROCEDURE — 3023F SPIROM DOC REV: CPT | Performed by: INTERNAL MEDICINE

## 2023-09-25 PROCEDURE — 1036F TOBACCO NON-USER: CPT | Performed by: INTERNAL MEDICINE

## 2023-09-25 PROCEDURE — 3074F SYST BP LT 130 MM HG: CPT | Performed by: INTERNAL MEDICINE

## 2023-09-25 ASSESSMENT — COPD QUESTIONNAIRES
QUESTION5_HOMEACTIVITIES: 4
QUESTION7_SLEEPQUALITY: 1
QUESTION6_LEAVINGHOUSE: 3
QUESTION1_COUGHFREQUENCY: 4
QUESTION8_ENERGYLEVEL: 2
CAT_TOTALSCORE: 24
QUESTION4_WALKINCLINE: 4
QUESTION3_CHESTTIGHTNESS: 3
QUESTION2_CHESTPHLEGM: 3

## 2023-09-25 NOTE — PROGRESS NOTES
REASON FOR CONSULTATION/CC:    Chief Complaint   Patient presents with    COPD    Follow-up        Consult at request of  Kristan Essex, MD     PCP: Kristan Essex, MD    HISTORY OF PRESENT ILLNESS: Krystin Juarez is a 77y.o. year old female with a history of copd who presents :       History:  She has a long hx of issues with allergic rhinitis. She has seen allergy and used Allergen immunotherapy injections and hx of sinus surgery x 2 without success. suboptimal control with Astelin / Flonase  / ipratropium. Tried nebulized medications without success. Current hx           COPD    Recent mild exacerbation after illness. Using albuterol more. Bevespi   having more dyspnea on exertion . Discussed Bevespi vs Trelegy vs Breztri. allergic rhinitis  Astelin / Flonase  / ipratropium  nasal.   Still having a lot of issues. Objective:   PHYSICAL EXAM:  Blood pressure (!) 145/78, pulse 74, height 4' 11\" (1.499 m), weight 109 lb 9.6 oz (49.7 kg), last menstrual period 08/11/1993, SpO2 (!) 88 %, not currently breastfeeding.'  Gen: No distress. ENT:   Resp: No accessory muscle use. No crackles. No wheezes. No rhonchi. CV: Regular rate. Regular rhythm. No murmur or rub. No edema. Skin: Warm, dry, normal texture and turgor. No nodule on exposed extremities. M/S: No cyanosis. No clubbing. No joint deformity. Psych: Oriented x 3. No anxiety. Awake. Alert. Intact judgement and insight. Good Mood / Affect. Memory appears in tact        Data Reviewed:        Assessment:         Lung cancer  CoPD    allergic rhinitis,   hx Allergen immunotherapy injections in 80's without success  Hx sinus surgery x 2, deviated septum. Plan:      Problem List Items Addressed This Visit       COPD, mild ([address])     Uncontrolled with increased issues with shortness of breath. using Bevespi. Discussed Trelegy vs Breztri. Sample of Breztri given.           Allergic rhinitis      Continues

## 2023-09-26 NOTE — ASSESSMENT & PLAN NOTE
Continues to be uncontrolled despite Astelin, Flonase , ipratropium. Not interested in referral to ENT / Allergy.

## 2023-09-26 NOTE — ASSESSMENT & PLAN NOTE
Uncontrolled with increased issues with shortness of breath. using Bevespi. Discussed Trelegy vs Breztri. Sample of Breztri given.

## 2023-10-03 ENCOUNTER — HOSPITAL ENCOUNTER (OUTPATIENT)
Dept: INFUSION THERAPY | Age: 68
Setting detail: INFUSION SERIES
Discharge: HOME | End: 2023-10-03
Payer: MEDICARE

## 2023-10-03 VITALS
DIASTOLIC BLOOD PRESSURE: 64 MMHG | OXYGEN SATURATION: 95 % | RESPIRATION RATE: 16 BRPM | SYSTOLIC BLOOD PRESSURE: 114 MMHG | HEART RATE: 86 BPM | TEMPERATURE: 98.2 F

## 2023-10-03 DIAGNOSIS — M80.00XG AGE-RELATED OSTEOPOROSIS WITH CURRENT PATHOLOGICAL FRACTURE WITH DELAYED HEALING: Primary | ICD-10-CM

## 2023-10-03 PROCEDURE — 2580000003 HC RX 258: Performed by: INTERNAL MEDICINE

## 2023-10-03 PROCEDURE — 96365 THER/PROPH/DIAG IV INF INIT: CPT

## 2023-10-03 PROCEDURE — 6360000002 HC RX W HCPCS: Performed by: INTERNAL MEDICINE

## 2023-10-03 RX ORDER — ZOLEDRONIC ACID 5 MG/100ML
5 INJECTION, SOLUTION INTRAVENOUS ONCE
Status: COMPLETED | OUTPATIENT
Start: 2023-10-03 | End: 2023-10-03

## 2023-10-03 RX ORDER — DIPHENHYDRAMINE HYDROCHLORIDE 50 MG/ML
50 INJECTION INTRAMUSCULAR; INTRAVENOUS
Status: CANCELLED | OUTPATIENT
Start: 2024-10-01

## 2023-10-03 RX ORDER — SODIUM CHLORIDE 9 MG/ML
5-250 INJECTION, SOLUTION INTRAVENOUS PRN
Status: CANCELLED | OUTPATIENT
Start: 2024-10-01

## 2023-10-03 RX ORDER — SODIUM CHLORIDE 0.9 % (FLUSH) 0.9 %
5-40 SYRINGE (ML) INJECTION PRN
Status: DISCONTINUED | OUTPATIENT
Start: 2023-10-03 | End: 2023-10-03

## 2023-10-03 RX ORDER — SODIUM CHLORIDE 0.9 % (FLUSH) 0.9 %
5-40 SYRINGE (ML) INJECTION PRN
Status: CANCELLED | OUTPATIENT
Start: 2024-10-01

## 2023-10-03 RX ORDER — ZOLEDRONIC ACID 5 MG/100ML
5 INJECTION, SOLUTION INTRAVENOUS ONCE
Status: CANCELLED | OUTPATIENT
Start: 2024-10-01 | End: 2024-10-01

## 2023-10-03 RX ORDER — ALBUTEROL SULFATE 90 UG/1
4 AEROSOL, METERED RESPIRATORY (INHALATION) PRN
Status: CANCELLED | OUTPATIENT
Start: 2024-10-01

## 2023-10-03 RX ORDER — EPINEPHRINE 1 MG/ML
0.3 INJECTION, SOLUTION, CONCENTRATE INTRAVENOUS PRN
Status: CANCELLED | OUTPATIENT
Start: 2024-10-01

## 2023-10-03 RX ORDER — ONDANSETRON 2 MG/ML
8 INJECTION INTRAMUSCULAR; INTRAVENOUS
Status: CANCELLED | OUTPATIENT
Start: 2024-10-01

## 2023-10-03 RX ORDER — SODIUM CHLORIDE 9 MG/ML
INJECTION, SOLUTION INTRAVENOUS CONTINUOUS
Status: CANCELLED | OUTPATIENT
Start: 2024-10-01

## 2023-10-03 RX ADMIN — SODIUM CHLORIDE, PRESERVATIVE FREE 10 ML: 5 INJECTION INTRAVENOUS at 14:26

## 2023-10-03 RX ADMIN — SODIUM CHLORIDE, PRESERVATIVE FREE 10 ML: 5 INJECTION INTRAVENOUS at 13:32

## 2023-10-03 RX ADMIN — ZOLEDRONIC ACID 5 MG: 5 INJECTION, SOLUTION INTRAVENOUS at 13:34

## 2023-10-03 NOTE — DISCHARGE INSTRUCTIONS
Outpatient Infusion Discharge Instructions  [facility]  [address], [address] [place] [address] Drive  Telephone: [phone]      FAX [phone]    NAME:  Callie Lopez OF BIRTH:  1955  MEDICAL RECORD NUMBER:  [id number]  DATE:  October 3, 2023    Reason for Outpatient Infusion Visit: Reclast Infusion    If you develop any these symptoms please contact you Doctor    [] Nausea and/or vomiting not relieved with medication   [x] Swelling, redness, and/or bleeding at injection or IV site    [x] Fever or chills  [x] Rash or itching   [x] Shortness of breath  [x] Please review After Visit Summary (AVS) information on: Reclast and Osteoporosis    [x] Other: Call MD if you experience any jaw pain                              Next Reclast Infusion due after October 3, 2024    Outpatient [address] [facility]: Should you experience any significant changes in your health or have questions about your care please contact the [address] [place] [address] at [address] [place] [address] 8:00 am - 4:00 pm.  If you need help outside these hours and cannot wait until we are again available, contact your Primary Care Physician or go to the hospital emergency room.        Electronically signed by Aneudy Villasenor RN on 10/3/2023 at 2:04 PM

## 2023-10-27 RX ORDER — GLYCOPYRROLATE AND FORMOTEROL FUMARATE 9; 4.8 UG/1; UG/1
2 AEROSOL, METERED RESPIRATORY (INHALATION) 2 TIMES DAILY
Qty: 32.1 G | Refills: 11 | Status: SHIPPED | OUTPATIENT
Start: 2023-10-27

## 2023-10-27 NOTE — TELEPHONE ENCOUNTER
Levy Joaquin wants to make sure we're working on her Bevespi refill. I advised her I see the request here. She said if she doesn't call and tell us, it takes 5 days to get refilled. Can we process this today please? Thank you!

## 2023-12-01 ENCOUNTER — TELEPHONE (OUTPATIENT)
Dept: INTERNAL MEDICINE CLINIC | Age: 68
End: 2023-12-01

## 2023-12-01 NOTE — TELEPHONE ENCOUNTER
I called patient regarding MRI result diagnosis of right Mastoiditis she states she was just called yesterday about these results that were done 10/12/23. She states that Dr. Gila Martínez office told her they would notify you. So she has NOT been treated yet at this time.     She is having a procedure on 12/6/23 with pain management so I scheduled f/u here to discuss on 12/18/23

## 2023-12-30 DIAGNOSIS — E78.00 HYPERCHOLESTEREMIA: ICD-10-CM

## 2024-01-02 RX ORDER — ATORVASTATIN CALCIUM 40 MG/1
40 TABLET, FILM COATED ORAL DAILY
Qty: 90 TABLET | Refills: 0 | Status: SHIPPED | OUTPATIENT
Start: 2024-01-02

## 2024-01-05 NOTE — TELEPHONE ENCOUNTER
Requested Prescriptions     Pending Prescriptions Disp Refills    apixaban (ELIQUIS) 5 MG TABS tablet 60 tablet 5     Sig: Take 1 tablet by mouth 2 times daily          Number: 60    Refills: 2    Last Office Visit: 7/14/2023     Next Office Visit: None scheduled     Last Refill: 01/23/2023    Last Labs:  07/14/2023

## 2024-01-26 NOTE — PROGRESS NOTES
kg/m²   Wt Readings from Last 3 Encounters:   02/02/24 47.4 kg (104 lb 9.6 oz)   09/25/23 49.7 kg (109 lb 9.6 oz)   08/29/23 49.9 kg (110 lb)   Constitutional: She is oriented to person, place, and time. She appears thin. In no acute distress.   Head: Normocephalic and atraumatic. Pupils equal and round.  Neck: Neck supple. No JVP or carotid bruit appreciated. No mass and no thyromegaly present. No lymphadenopathy present.  Cardiovascular: Normal rate. Normal heart sounds. Exam reveals no gallop and no friction rub. I-II/systolic murmur at the apex.   Pulmonary/Chest: Effort normal and breath sounds normal. No respiratory distress. She has no wheezes, rhonchi or rales.   Abdominal: Soft, non-tender. Bowel sounds are normal. She exhibits no organomegaly, mass or bruit.   Extremities: No edema. No cyanosis or clubbing. Pulses are 2+ radial/carotid bilaterally.  Neurological: No gross cranial nerve deficit. Coordination normal.   Skin: Skin is warm and dry. There is no rash or diaphoresis.   Psychiatric: She has a normal mood and affect. Her speech is normal and behavior is normal.     Lab Review:   FLP:    Lab Results   Component Value Date/Time    TRIG 64 01/24/2023 11:11 AM    HDL 69 01/24/2023 11:11 AM    LDLCALC 77 01/24/2023 11:11 AM     BUN/Creatinine:    Lab Results   Component Value Date/Time    BUN 13 09/12/2023 12:44 PM    CREATININE 0.9 09/12/2023 12:44 PM     EKG: Personally interpreted. 9/26/2021. Sinus tachycardia. Possible left atrial enlargement. Left axis deviation. LVH with repolarization abnormality.  9/2/22 Sinus rhythm. Incomplete right bundle branch block. Left anterior fascicular block.   2/2/2024: Sinus Rhythm. Left axis deviation. Incomplete RBBB.     Echo: 9/27/2021.  Personally interpreted.  Overall left ventricular systolic function appears mild to moderately reduced. Ejection fraction is visually estimated to be 40-45% with diffuse hypokinesis. Mildly dilated left ventricle. Normal left

## 2024-01-27 DIAGNOSIS — B00.9 HERPES SIMPLEX: ICD-10-CM

## 2024-01-29 RX ORDER — APIXABAN 5 MG/1
5 TABLET, FILM COATED ORAL 2 TIMES DAILY
Qty: 90 TABLET | Refills: 3 | Status: SHIPPED | OUTPATIENT
Start: 2024-01-29

## 2024-01-29 RX ORDER — VALACYCLOVIR HYDROCHLORIDE 1 G/1
TABLET, FILM COATED ORAL
Qty: 81 TABLET | Refills: 0 | Status: SHIPPED | OUTPATIENT
Start: 2024-01-29

## 2024-01-29 NOTE — TELEPHONE ENCOUNTER
Last OV:2023  Last Labs:2023  Last Refills:2024  Next Appt:2023  Last EK2022   Wartpeel Counseling:  I discussed with the patient the risks of Wartpeel including but not limited to erythema, scaling, itching, weeping, crusting, and pain.

## 2024-01-31 DIAGNOSIS — J30.1 ALLERGIC RHINITIS DUE TO POLLEN, UNSPECIFIED SEASONALITY: ICD-10-CM

## 2024-02-01 RX ORDER — AZELASTINE HYDROCHLORIDE 137 UG/1
SPRAY, METERED NASAL
Qty: 30 ML | Refills: 5 | Status: SHIPPED | OUTPATIENT
Start: 2024-02-01

## 2024-02-02 ENCOUNTER — OFFICE VISIT (OUTPATIENT)
Dept: CARDIOLOGY CLINIC | Age: 69
End: 2024-02-02
Payer: COMMERCIAL

## 2024-02-02 VITALS
OXYGEN SATURATION: 95 % | SYSTOLIC BLOOD PRESSURE: 138 MMHG | BODY MASS INDEX: 21.09 KG/M2 | DIASTOLIC BLOOD PRESSURE: 82 MMHG | HEART RATE: 74 BPM | WEIGHT: 104.6 LBS | HEIGHT: 59 IN

## 2024-02-02 DIAGNOSIS — I50.42 CHRONIC COMBINED SYSTOLIC AND DIASTOLIC CONGESTIVE HEART FAILURE (HCC): ICD-10-CM

## 2024-02-02 DIAGNOSIS — I50.42 CHRONIC COMBINED SYSTOLIC AND DIASTOLIC CONGESTIVE HEART FAILURE (HCC): Primary | ICD-10-CM

## 2024-02-02 DIAGNOSIS — I25.10 CORONARY ARTERY DISEASE INVOLVING NATIVE CORONARY ARTERY OF NATIVE HEART WITHOUT ANGINA PECTORIS: ICD-10-CM

## 2024-02-02 DIAGNOSIS — I10 ESSENTIAL HYPERTENSION: ICD-10-CM

## 2024-02-02 DIAGNOSIS — I34.0 NONRHEUMATIC MITRAL VALVE REGURGITATION: ICD-10-CM

## 2024-02-02 DIAGNOSIS — Z86.711 HISTORY OF PULMONARY EMBOLISM: ICD-10-CM

## 2024-02-02 LAB
ANION GAP SERPL CALCULATED.3IONS-SCNC: 10 MMOL/L (ref 3–16)
BUN SERPL-MCNC: 20 MG/DL (ref 7–20)
CALCIUM SERPL-MCNC: 9.1 MG/DL (ref 8.3–10.6)
CHLORIDE SERPL-SCNC: 102 MMOL/L (ref 99–110)
CO2 SERPL-SCNC: 28 MMOL/L (ref 21–32)
CREAT SERPL-MCNC: 0.7 MG/DL (ref 0.6–1.2)
GFR SERPLBLD CREATININE-BSD FMLA CKD-EPI: >60 ML/MIN/{1.73_M2}
GLUCOSE SERPL-MCNC: 87 MG/DL (ref 70–99)
POTASSIUM SERPL-SCNC: 4.2 MMOL/L (ref 3.5–5.1)
SODIUM SERPL-SCNC: 140 MMOL/L (ref 136–145)

## 2024-02-02 PROCEDURE — 99214 OFFICE O/P EST MOD 30 MIN: CPT | Performed by: INTERNAL MEDICINE

## 2024-02-02 PROCEDURE — 1123F ACP DISCUSS/DSCN MKR DOCD: CPT | Performed by: INTERNAL MEDICINE

## 2024-02-02 PROCEDURE — G8484 FLU IMMUNIZE NO ADMIN: HCPCS | Performed by: INTERNAL MEDICINE

## 2024-02-02 PROCEDURE — 3079F DIAST BP 80-89 MM HG: CPT | Performed by: INTERNAL MEDICINE

## 2024-02-02 PROCEDURE — 3075F SYST BP GE 130 - 139MM HG: CPT | Performed by: INTERNAL MEDICINE

## 2024-02-02 PROCEDURE — G8399 PT W/DXA RESULTS DOCUMENT: HCPCS | Performed by: INTERNAL MEDICINE

## 2024-02-02 PROCEDURE — 3017F COLORECTAL CA SCREEN DOC REV: CPT | Performed by: INTERNAL MEDICINE

## 2024-02-02 PROCEDURE — 93000 ELECTROCARDIOGRAM COMPLETE: CPT | Performed by: INTERNAL MEDICINE

## 2024-02-02 PROCEDURE — G8427 DOCREV CUR MEDS BY ELIG CLIN: HCPCS | Performed by: INTERNAL MEDICINE

## 2024-02-02 PROCEDURE — G8420 CALC BMI NORM PARAMETERS: HCPCS | Performed by: INTERNAL MEDICINE

## 2024-02-02 PROCEDURE — 1036F TOBACCO NON-USER: CPT | Performed by: INTERNAL MEDICINE

## 2024-02-02 PROCEDURE — 1090F PRES/ABSN URINE INCON ASSESS: CPT | Performed by: INTERNAL MEDICINE

## 2024-02-16 DIAGNOSIS — M79.7 FIBROMYALGIA: ICD-10-CM

## 2024-02-16 RX ORDER — GABAPENTIN 800 MG/1
800 TABLET ORAL 2 TIMES DAILY
Qty: 60 TABLET | Refills: 0 | Status: SHIPPED | OUTPATIENT
Start: 2024-02-16 | End: 2024-03-17

## 2024-02-29 ENCOUNTER — OFFICE VISIT (OUTPATIENT)
Dept: INTERNAL MEDICINE CLINIC | Age: 69
End: 2024-02-29
Payer: COMMERCIAL

## 2024-02-29 VITALS
SYSTOLIC BLOOD PRESSURE: 112 MMHG | BODY MASS INDEX: 20.16 KG/M2 | OXYGEN SATURATION: 95 % | DIASTOLIC BLOOD PRESSURE: 68 MMHG | RESPIRATION RATE: 12 BRPM | HEIGHT: 59 IN | WEIGHT: 100 LBS | HEART RATE: 86 BPM

## 2024-02-29 DIAGNOSIS — G37.9 DEMYELINATING DISORDER (HCC): ICD-10-CM

## 2024-02-29 DIAGNOSIS — J44.9 COPD, MILD (HCC): ICD-10-CM

## 2024-02-29 DIAGNOSIS — Z00.00 MEDICARE ANNUAL WELLNESS VISIT, SUBSEQUENT: Primary | ICD-10-CM

## 2024-02-29 DIAGNOSIS — C34.90 NON-SMALL CELL LUNG CANCER, UNSPECIFIED LATERALITY (HCC): Primary | ICD-10-CM

## 2024-02-29 DIAGNOSIS — M80.00XG AGE-RELATED OSTEOPOROSIS WITH CURRENT PATHOLOGICAL FRACTURE WITH DELAYED HEALING: ICD-10-CM

## 2024-02-29 DIAGNOSIS — E44.0 MODERATE PROTEIN-CALORIE MALNUTRITION (HCC): ICD-10-CM

## 2024-02-29 DIAGNOSIS — J96.11 CHRONIC RESPIRATORY FAILURE WITH HYPOXIA AND HYPERCAPNIA (HCC): ICD-10-CM

## 2024-02-29 DIAGNOSIS — Z71.89 ACP (ADVANCE CARE PLANNING): ICD-10-CM

## 2024-02-29 DIAGNOSIS — L89.152 PRESSURE INJURY OF SACRAL REGION, STAGE 2 (HCC): ICD-10-CM

## 2024-02-29 DIAGNOSIS — E43 UNSPECIFIED SEVERE PROTEIN-CALORIE MALNUTRITION (HCC): ICD-10-CM

## 2024-02-29 DIAGNOSIS — J96.12 CHRONIC RESPIRATORY FAILURE WITH HYPOXIA AND HYPERCAPNIA (HCC): ICD-10-CM

## 2024-02-29 DIAGNOSIS — M40.14 OTHER SECONDARY KYPHOSIS, THORACIC REGION: ICD-10-CM

## 2024-02-29 PROCEDURE — G8399 PT W/DXA RESULTS DOCUMENT: HCPCS | Performed by: INTERNAL MEDICINE

## 2024-02-29 PROCEDURE — 1090F PRES/ABSN URINE INCON ASSESS: CPT | Performed by: INTERNAL MEDICINE

## 2024-02-29 PROCEDURE — G0439 PPPS, SUBSEQ VISIT: HCPCS | Performed by: INTERNAL MEDICINE

## 2024-02-29 PROCEDURE — 99214 OFFICE O/P EST MOD 30 MIN: CPT | Performed by: INTERNAL MEDICINE

## 2024-02-29 PROCEDURE — 3078F DIAST BP <80 MM HG: CPT | Performed by: INTERNAL MEDICINE

## 2024-02-29 PROCEDURE — G8420 CALC BMI NORM PARAMETERS: HCPCS | Performed by: INTERNAL MEDICINE

## 2024-02-29 PROCEDURE — 1036F TOBACCO NON-USER: CPT | Performed by: INTERNAL MEDICINE

## 2024-02-29 PROCEDURE — 99497 ADVNCD CARE PLAN 30 MIN: CPT | Performed by: INTERNAL MEDICINE

## 2024-02-29 PROCEDURE — 1123F ACP DISCUSS/DSCN MKR DOCD: CPT | Performed by: INTERNAL MEDICINE

## 2024-02-29 PROCEDURE — 3074F SYST BP LT 130 MM HG: CPT | Performed by: INTERNAL MEDICINE

## 2024-02-29 PROCEDURE — 3023F SPIROM DOC REV: CPT | Performed by: INTERNAL MEDICINE

## 2024-02-29 PROCEDURE — G8484 FLU IMMUNIZE NO ADMIN: HCPCS | Performed by: INTERNAL MEDICINE

## 2024-02-29 PROCEDURE — 3017F COLORECTAL CA SCREEN DOC REV: CPT | Performed by: INTERNAL MEDICINE

## 2024-02-29 PROCEDURE — G8427 DOCREV CUR MEDS BY ELIG CLIN: HCPCS | Performed by: INTERNAL MEDICINE

## 2024-02-29 RX ORDER — MODAFINIL 200 MG/1
200 TABLET ORAL DAILY
COMMUNITY
Start: 2024-02-01

## 2024-02-29 RX ORDER — ANORECTAL OINTMENT 15.7; .44; 24; 20.6 G/100G; G/100G; G/100G; G/100G
OINTMENT TOPICAL 4 TIMES DAILY PRN
Qty: 113 G | Refills: 4 | Status: SHIPPED | OUTPATIENT
Start: 2024-02-29

## 2024-02-29 ASSESSMENT — PATIENT HEALTH QUESTIONNAIRE - PHQ9
8. MOVING OR SPEAKING SO SLOWLY THAT OTHER PEOPLE COULD HAVE NOTICED. OR THE OPPOSITE, BEING SO FIGETY OR RESTLESS THAT YOU HAVE BEEN MOVING AROUND A LOT MORE THAN USUAL: 0
9. THOUGHTS THAT YOU WOULD BE BETTER OFF DEAD, OR OF HURTING YOURSELF: 0
7. TROUBLE CONCENTRATING ON THINGS, SUCH AS READING THE NEWSPAPER OR WATCHING TELEVISION: 0
6. FEELING BAD ABOUT YOURSELF - OR THAT YOU ARE A FAILURE OR HAVE LET YOURSELF OR YOUR FAMILY DOWN: 0
SUM OF ALL RESPONSES TO PHQ QUESTIONS 1-9: 0
10. IF YOU CHECKED OFF ANY PROBLEMS, HOW DIFFICULT HAVE THESE PROBLEMS MADE IT FOR YOU TO DO YOUR WORK, TAKE CARE OF THINGS AT HOME, OR GET ALONG WITH OTHER PEOPLE: 0
5. POOR APPETITE OR OVEREATING: 0
SUM OF ALL RESPONSES TO PHQ9 QUESTIONS 1 & 2: 0
SUM OF ALL RESPONSES TO PHQ QUESTIONS 1-9: 0
SUM OF ALL RESPONSES TO PHQ QUESTIONS 1-9: 0
2. FEELING DOWN, DEPRESSED OR HOPELESS: 0
SUM OF ALL RESPONSES TO PHQ QUESTIONS 1-9: 0
4. FEELING TIRED OR HAVING LITTLE ENERGY: 0
1. LITTLE INTEREST OR PLEASURE IN DOING THINGS: 0
3. TROUBLE FALLING OR STAYING ASLEEP: 0

## 2024-02-29 NOTE — PATIENT INSTRUCTIONS
on Aging online.  You need 8758-9197 mg of calcium and 8844-5605 IU of vitamin D per day. It is possible to meet your calcium requirement with diet alone, but a vitamin D supplement is usually necessary to meet this goal.  When exposed to the sun, use a sunscreen that protects against both UVA and UVB radiation with an SPF of 30 or greater. Reapply every 2 to 3 hours or after sweating, drying off with a towel, or swimming.  Always wear a seat belt when traveling in a car. Always wear a helmet when riding a bicycle or motorcycle.

## 2024-02-29 NOTE — PROGRESS NOTES
Chief Complaint   Patient presents with    Hypertension       HPI: Here for htn followup and management of multiple chronic conditions as per the active problems list on chart, which I reviewed and updated with the patient today. States doing well with no new concerns except if noted below.    Overall doing about the same. Poor appetite but tries to make herself eat, CIB in the morning but doesn't like shakes. Breathing limits exertion but is unchanged.    Still has sore at coccyx which hasn't healed but also isn't worse. Is using cushions, and sleeps on her side habitually.    I have reviewed the chart notes available from myself and other providers. I have reviewed and addressed all active problems and created or updated the problems list in detail, as needed.    No problem-specific Assessment & Plan notes found for this encounter.      I have extensively reviewed and reconciled the medication list, discontinued medications not taking or no longer appropriate, and updated the active meds list      Lab Results   Component Value Date    LABA1C 5.2 01/21/2022    LABA1C 5.4 02/08/2019       Lab Results   Component Value Date     02/02/2024     (L) 09/12/2023     07/14/2023    K 4.2 02/02/2024    K 4.6 09/12/2023    K 3.5 07/14/2023     02/02/2024    CL 98 (L) 09/12/2023    CL 97 (L) 07/14/2023    CO2 28 02/02/2024    CO2 25 09/12/2023    CO2 27 07/14/2023    BUN 20 02/02/2024    BUN 13 09/12/2023    BUN 17 07/14/2023    CREATININE 0.7 02/02/2024    CREATININE 0.9 09/12/2023    CREATININE 0.9 07/14/2023    GLUCOSE 87 02/02/2024    GLUCOSE 97 09/12/2023    GLUCOSE 86 07/14/2023    CALCIUM 9.1 02/02/2024    CALCIUM 9.3 09/12/2023    CALCIUM 9.3 07/14/2023       Lab Results   Component Value Date    CHOL 159 01/24/2023    CHOL 138 09/28/2021    CHOL 174 02/02/2018    TRIG 64 01/24/2023    TRIG 55 09/28/2021    TRIG 78 02/02/2018    HDL 69 (H) 01/24/2023    HDL 55 09/28/2021    HDL 55

## 2024-02-29 NOTE — PROGRESS NOTES
Medicare Annual Wellness Visit    Meghan Carpenter is here for scheduled E and M visit but noted overdue for AWV so added on with LPN    Assessment & Plan  AWV    Recommendations for Preventive Services Due: see orders and patient instructions/AVS.  Recommended screening schedule for the next 5-10 years is provided to the patient in written form: see Patient Instructions/AVS.     No follow-ups on file.     Subjective       Patient's complete Health Risk Assessment and screening values have been reviewed and are found in Flowsheets. The following problems were reviewed today and where indicated follow up appointments were made and/or referrals ordered.    No Positive Risk Factors identified today.               Controlled Medication Review:      Today's Pain Level: No data recorded     Opioid Risk: (High risk score ?55) Opioid risk score: 70      Last PDMP [place] as Reviewed:  Review User Review Instant Review Result   LISEON, ETELVINA RICARDO 9/21/2022  5:27 PM @   Reviewed PDMP [1]     Last Controlled Substance Monitoring Documentation      Flowsheet Row [facility] Visit [facility] 12/16/2016 in [facility]   [facility] The Prescription Monitoring Report for this patient was reviewed today. filed at 12/16/2016 1510   Periodic Controlled Substance Monitoring No signs of potential drug abuse or diversion identified. filed at 12/16/2016 1510                              Objective   There were no vitals filed for this visit.   There is no height or weight on file to calculate BMI.             Allergies   Allergen Reactions    Adhesive Tape Dermatitis     Blisters on skin under tape or medication patches    Ibuprofen Nausea Only    Naproxen Nausea Only    Nsaids Other (See Comments)     GI upset    Vicodin [Hydrocodone-Acetaminophen] Nausea Only     headache    Acetaminophen     Heparin      Prior to Visit Medications    Medication Sig Taking? Authorizing Provider   modafinil (PROVIGIL) 200 MG tablet Take 1 tablet by

## 2024-02-29 NOTE — PATIENT INSTRUCTIONS

## 2024-03-06 DIAGNOSIS — M80.00XG AGE-RELATED OSTEOPOROSIS WITH CURRENT PATHOLOGICAL FRACTURE WITH DELAYED HEALING: ICD-10-CM

## 2024-03-06 DIAGNOSIS — E43 UNSPECIFIED SEVERE PROTEIN-CALORIE MALNUTRITION (HCC): ICD-10-CM

## 2024-03-06 DIAGNOSIS — E44.0 MODERATE PROTEIN-CALORIE MALNUTRITION (HCC): ICD-10-CM

## 2024-03-06 LAB
25(OH)D3 SERPL-MCNC: 54.1 NG/ML
ALBUMIN SERPL-MCNC: 4.2 G/DL (ref 3.4–5)
ALBUMIN/GLOB SERPL: 2.2 {RATIO} (ref 1.1–2.2)
ALP SERPL-CCNC: 108 U/L (ref 40–129)
ALT SERPL-CCNC: 17 U/L (ref 10–40)
ANION GAP SERPL CALCULATED.3IONS-SCNC: 12 MMOL/L (ref 3–16)
AST SERPL-CCNC: 33 U/L (ref 15–37)
BASOPHILS # BLD: 0 K/UL (ref 0–0.2)
BASOPHILS NFR BLD: 0.5 %
BILIRUB SERPL-MCNC: 0.3 MG/DL (ref 0–1)
BUN SERPL-MCNC: 19 MG/DL (ref 7–20)
CALCIUM SERPL-MCNC: 9.2 MG/DL (ref 8.3–10.6)
CHLORIDE SERPL-SCNC: 102 MMOL/L (ref 99–110)
CHOLEST SERPL-MCNC: 131 MG/DL (ref 0–199)
CO2 SERPL-SCNC: 25 MMOL/L (ref 21–32)
CREAT SERPL-MCNC: 0.9 MG/DL (ref 0.6–1.2)
DEPRECATED RDW RBC AUTO: 13.4 % (ref 12.4–15.4)
EOSINOPHIL # BLD: 0.2 K/UL (ref 0–0.6)
EOSINOPHIL NFR BLD: 2.1 %
FOLATE SERPL-MCNC: >20 NG/ML (ref 4.78–24.2)
GFR SERPLBLD CREATININE-BSD FMLA CKD-EPI: >60 ML/MIN/{1.73_M2}
GLUCOSE SERPL-MCNC: 109 MG/DL (ref 70–99)
HCT VFR BLD AUTO: 39.5 % (ref 36–48)
HDLC SERPL-MCNC: 56 MG/DL (ref 40–60)
HGB BLD-MCNC: 13.9 G/DL (ref 12–16)
LDLC SERPL CALC-MCNC: 63 MG/DL
LYMPHOCYTES # BLD: 1 K/UL (ref 1–5.1)
LYMPHOCYTES NFR BLD: 12.1 %
MCH RBC QN AUTO: 36.6 PG (ref 26–34)
MCHC RBC AUTO-ENTMCNC: 35.1 G/DL (ref 31–36)
MCV RBC AUTO: 104.4 FL (ref 80–100)
MONOCYTES # BLD: 0.7 K/UL (ref 0–1.3)
MONOCYTES NFR BLD: 8 %
NEUTROPHILS # BLD: 6.6 K/UL (ref 1.7–7.7)
NEUTROPHILS NFR BLD: 77.3 %
PLATELET # BLD AUTO: 325 K/UL (ref 135–450)
PMV BLD AUTO: 7.7 FL (ref 5–10.5)
POTASSIUM SERPL-SCNC: 4.2 MMOL/L (ref 3.5–5.1)
PROT SERPL-MCNC: 6.1 G/DL (ref 6.4–8.2)
RBC # BLD AUTO: 3.78 M/UL (ref 4–5.2)
SODIUM SERPL-SCNC: 139 MMOL/L (ref 136–145)
TRIGL SERPL-MCNC: 62 MG/DL (ref 0–150)
VIT B12 SERPL-MCNC: 848 PG/ML (ref 211–911)
VLDLC SERPL CALC-MCNC: 12 MG/DL
WBC # BLD AUTO: 8.5 K/UL (ref 4–11)

## 2024-03-19 DIAGNOSIS — M79.7 FIBROMYALGIA: ICD-10-CM

## 2024-03-19 RX ORDER — GABAPENTIN 800 MG/1
800 TABLET ORAL 2 TIMES DAILY
Qty: 60 TABLET | Refills: 0 | Status: SHIPPED | OUTPATIENT
Start: 2024-03-19 | End: 2024-04-18

## 2024-04-03 DIAGNOSIS — E78.00 HYPERCHOLESTEREMIA: ICD-10-CM

## 2024-04-03 RX ORDER — ATORVASTATIN CALCIUM 40 MG/1
40 TABLET, FILM COATED ORAL DAILY
Qty: 90 TABLET | Refills: 2 | Status: SHIPPED | OUTPATIENT
Start: 2024-04-03

## 2024-04-09 ENCOUNTER — OFFICE VISIT (OUTPATIENT)
Dept: PULMONOLOGY | Age: 69
End: 2024-04-09
Payer: MEDICARE

## 2024-04-09 VITALS
SYSTOLIC BLOOD PRESSURE: 142 MMHG | DIASTOLIC BLOOD PRESSURE: 76 MMHG | WEIGHT: 102.8 LBS | TEMPERATURE: 97.8 F | HEIGHT: 59 IN | OXYGEN SATURATION: 94 % | HEART RATE: 74 BPM | BODY MASS INDEX: 20.72 KG/M2 | RESPIRATION RATE: 18 BRPM

## 2024-04-09 DIAGNOSIS — J44.9 COPD, MILD (HCC): Primary | ICD-10-CM

## 2024-04-09 DIAGNOSIS — J30.1 ALLERGIC RHINITIS DUE TO POLLEN, UNSPECIFIED SEASONALITY: ICD-10-CM

## 2024-04-09 PROCEDURE — 3017F COLORECTAL CA SCREEN DOC REV: CPT | Performed by: INTERNAL MEDICINE

## 2024-04-09 PROCEDURE — 1090F PRES/ABSN URINE INCON ASSESS: CPT | Performed by: INTERNAL MEDICINE

## 2024-04-09 PROCEDURE — G8420 CALC BMI NORM PARAMETERS: HCPCS | Performed by: INTERNAL MEDICINE

## 2024-04-09 PROCEDURE — 4004F PT TOBACCO SCREEN RCVD TLK: CPT | Performed by: INTERNAL MEDICINE

## 2024-04-09 PROCEDURE — G8399 PT W/DXA RESULTS DOCUMENT: HCPCS | Performed by: INTERNAL MEDICINE

## 2024-04-09 PROCEDURE — 3077F SYST BP >= 140 MM HG: CPT | Performed by: INTERNAL MEDICINE

## 2024-04-09 PROCEDURE — 3023F SPIROM DOC REV: CPT | Performed by: INTERNAL MEDICINE

## 2024-04-09 PROCEDURE — G8427 DOCREV CUR MEDS BY ELIG CLIN: HCPCS | Performed by: INTERNAL MEDICINE

## 2024-04-09 PROCEDURE — 1123F ACP DISCUSS/DSCN MKR DOCD: CPT | Performed by: INTERNAL MEDICINE

## 2024-04-09 PROCEDURE — 3078F DIAST BP <80 MM HG: CPT | Performed by: INTERNAL MEDICINE

## 2024-04-09 PROCEDURE — 99214 OFFICE O/P EST MOD 30 MIN: CPT | Performed by: INTERNAL MEDICINE

## 2024-04-09 ASSESSMENT — COPD QUESTIONNAIRES
QUESTION7_SLEEPQUALITY: 4
CAT_TOTALSCORE: 23
QUESTION4_WALKINCLINE: 5
QUESTION2_CHESTPHLEGM: 3
QUESTION5_HOMEACTIVITIES: 2
QUESTION8_ENERGYLEVEL: 1
QUESTION1_COUGHFREQUENCY: 2
QUESTION6_LEAVINGHOUSE: 5
QUESTION3_CHESTTIGHTNESS: 1

## 2024-04-09 NOTE — PROGRESS NOTES
REASON FOR CONSULTATION/CC:    Chief Complaint   Patient presents with    Follow-up     emphysema    COPD        Consult at request of  Eli Dalton MD     PCP: Eli Dalton MD    HISTORY OF PRESENT ILLNESS: Meghan Carpenter is a 68 y.o. year old female with a history of copd who presents :       History:  She has a long hx of issues with allergic rhinitis.  She has seen allergy and used Allergen immunotherapy injections and hx of sinus surgery x 2 without success.  suboptimal control with Astelin / Flonase  / ipratropium.  Tried nebulized medications without success.        Current hx           COPD  Using Bevespi .   Used trial of Breztri.  Did not complete trial as she accidentally discarded medication.  albuterol prn.       allergic rhinitis  Astelin / Flonase  / ipratropium  nasal.   Still having a lot of issues.  Congestion.                         Objective:   PHYSICAL EXAM:  Blood pressure (!) 142/76, pulse 74, temperature 97.8 °F (36.6 °C), resp. rate 18, height 1.499 m (4' 11\"), weight 46.6 kg (102 lb 12.8 oz), last menstrual period 08/11/1993, SpO2 94 %, not currently breastfeeding.'  Gen: No distress.    ENT:   Resp: No accessory muscle use. No crackles. No wheezes. No rhonchi.    CV: Regular rate. Regular rhythm. No murmur or rub. No edema.   Skin: Warm, dry, normal texture and turgor. No nodule on exposed extremities.   M/S: No cyanosis. No clubbing. No joint deformity.  Psych: Oriented x 3. No anxiety.  Awake. Alert. Intact judgement and insight. Good Mood / Affect.  Memory appears in tact        Data Reviewed:        Assessment:         Lung cancer  CoPD    allergic rhinitis,   hx Allergen immunotherapy injections in 80's without success  Hx sinus surgery x 2, deviated septum.      Plan:      Problem List Items Addressed This Visit       COPD, mild (HCC) - Primary     Symptoms currently controlled on Bevespi.  She was given a sample of Breztri but only use this for 3 days then accidentally

## 2024-04-09 NOTE — ASSESSMENT & PLAN NOTE
She continues to be uncontrolled despite using Astelin, Flonase, ipratropium.  We discussed her sinus congestion and using sinus rinses.  Ultimately recommending NeViky Sinugator.  The patient expressed understanding for all.

## 2024-04-09 NOTE — ASSESSMENT & PLAN NOTE
Symptoms currently controlled on Bevespi.  She was given a sample of Breztri but only use this for 3 days then accidentally discarded it.  Using albuterol as needed.

## 2024-04-17 DIAGNOSIS — B00.9 HERPES SIMPLEX: ICD-10-CM

## 2024-04-17 RX ORDER — VALACYCLOVIR HYDROCHLORIDE 1 G/1
1000 TABLET, FILM COATED ORAL DAILY
Qty: 81 TABLET | Refills: 0 | Status: SHIPPED | OUTPATIENT
Start: 2024-04-17

## 2024-04-19 ENCOUNTER — TELEPHONE (OUTPATIENT)
Dept: INTERNAL MEDICINE CLINIC | Age: 69
End: 2024-04-19

## 2024-04-19 DIAGNOSIS — M79.7 FIBROMYALGIA: ICD-10-CM

## 2024-04-19 NOTE — TELEPHONE ENCOUNTER
----- Message from Ligia Day sent at 4/18/2024  3:22 PM EDT -----  Regarding: ECC Message to Provider  ECC Message to Provider    Relationship to Patient: Third Party      Additional Information The nurse want to leave her phone number so if ever that the Primary care provider of the patient needs an assistance for medical need they don't need to call back  [phone]  --------------------------------------------------------------------------------------------------------------------------    Call Back Information: OK to leave message on voicemail  Preferred Call Back Number: Phone [phone]

## 2024-04-22 RX ORDER — GABAPENTIN 800 MG/1
800 TABLET ORAL 2 TIMES DAILY
Qty: 60 TABLET | Refills: 0 | Status: SHIPPED | OUTPATIENT
Start: 2024-04-22 | End: 2024-05-22

## 2024-04-26 RX ORDER — SPIRONOLACTONE 25 MG/1
12.5 TABLET ORAL DAILY
Qty: 45 TABLET | Refills: 3 | Status: SHIPPED | OUTPATIENT
Start: 2024-04-26

## 2024-05-08 ENCOUNTER — HOSPITAL ENCOUNTER (OUTPATIENT)
Dept: CT IMAGING | Age: 69
Discharge: HOME | End: 2024-05-08
Attending: INTERNAL MEDICINE
Payer: MEDICARE

## 2024-05-08 DIAGNOSIS — C34.32 MALIGNANT NEOPLASM OF LOWER LOBE BRONCHUS, LEFT (HCC): ICD-10-CM

## 2024-05-08 PROCEDURE — 71250 CT THORAX DX C-: CPT

## 2024-05-21 DIAGNOSIS — M79.7 FIBROMYALGIA: ICD-10-CM

## 2024-05-22 RX ORDER — GABAPENTIN 800 MG/1
800 TABLET ORAL 2 TIMES DAILY
Qty: 60 TABLET | Refills: 0 | Status: SHIPPED | OUTPATIENT
Start: 2024-05-22 | End: 2024-06-21

## 2024-05-30 ENCOUNTER — TELEPHONE (OUTPATIENT)
Dept: INTERNAL MEDICINE CLINIC | Age: 69
End: 2024-05-30

## 2024-05-30 NOTE — TELEPHONE ENCOUNTER
Pt called needs to speak to ue about an eye surgery the dr coelho wants the Pt to have?    Please advise

## 2024-05-30 NOTE — TELEPHONE ENCOUNTER
I spoke with the patient about this.  She is coming for an appointment to speak with Dr. Hassan: results.

## 2024-06-19 DIAGNOSIS — M79.7 FIBROMYALGIA: ICD-10-CM

## 2024-06-19 RX ORDER — GABAPENTIN 800 MG/1
800 TABLET ORAL 2 TIMES DAILY
Qty: 60 TABLET | Refills: 0 | Status: SHIPPED | OUTPATIENT
Start: 2024-06-19 | End: 2024-07-19

## 2024-07-09 NOTE — PROGRESS NOTES
falls    12. Medical marijuana use--see above    13. Hypercholesteremia    14. Chronic neck pain    15. Chronic midline low back pain without sciatica    16. Chronic systolic congestive heart failure ([address])    17. Acquired hypothyroidism    18.  Essential hypertension--initially elevated today but pt reports adequate control            Problem List       COPD, mild (HCC)    Relevant Medications    triamcinolone acetonide (KENALOG-40) injection 40 mg    azelastine (ASTELIN) 0.1 % nasal spray    fluticasone (FLONASE) 50 MCG/ACT nasal spray    albuterol sulfate HFA (PROVENTIL;VENTOLIN;PROAIR) 108 (90 Base) MCG/ACT inhaler    ipratropium (ATROVENT) 0.03 % nasal spray    BEVESPI AEROSPHERE 9-4.8 MCG/ACT AERO    Scoliosis, thoracogenic    Hypercholesteremia    Relevant Medications    ELIQUIS 5 MG TABS tablet    atorvastatin (LIPITOR) 40 MG tablet    spironolactone (ALDACTONE) 25 MG tablet    furosemide (LASIX) 20 MG tablet    ENTRESTO 49-51 MG per tablet    carvedilol (COREG) 6.25 MG tablet    Essential hypertension    Mild episode of recurrent major depressive disorder (HCC)    Relevant Medications    DULoxetine (CYMBALTA) 60 MG capsule    OXcarbazepine (TRILEPTAL) 300 MG tablet    ARIPiprazole (ABILIFY) 15 MG tablet    buPROPion (WELLBUTRIN XL) 300 MG extended release tablet    mirtazapine (REMERON) 15 MG tablet    gabapentin (NEURONTIN) 800 MG tablet    traZODone (DESYREL) 50 MG tablet    Chronic low back pain    Relevant Medications    DULoxetine (CYMBALTA) 60 MG capsule    Acetaminophen 325 MG CAPS    OXcarbazepine (TRILEPTAL) 300 MG tablet    triamcinolone acetonide (KENALOG-40) injection 40 mg    buPROPion (WELLBUTRIN XL) 300 MG extended release tablet    mirtazapine (REMERON) 15 MG tablet    tiZANidine (ZANAFLEX) 2 MG tablet    gabapentin (NEURONTIN) 800 MG tablet    traZODone (DESYREL) 50 MG tablet    tiZANidine (ZANAFLEX) 4 MG tablet    oxyCODONE-acetaminophen (PERCOCET) 7.5-325 MG per tablet    Chronic neck pain none

## 2024-07-12 DIAGNOSIS — J30.1 ALLERGIC RHINITIS DUE TO POLLEN, UNSPECIFIED SEASONALITY: ICD-10-CM

## 2024-07-14 RX ORDER — FLUTICASONE PROPIONATE 50 MCG
SPRAY, SUSPENSION (ML) NASAL
Qty: 16 G | Refills: 11 | Status: SHIPPED | OUTPATIENT
Start: 2024-07-14

## 2024-07-19 ENCOUNTER — HOSPITAL ENCOUNTER (OUTPATIENT)
Dept: WOMENS IMAGING | Age: 69
Discharge: HOME | End: 2024-07-19
Payer: MEDICARE

## 2024-07-19 DIAGNOSIS — Z12.31 BREAST CANCER SCREENING BY MAMMOGRAM: ICD-10-CM

## 2024-07-19 PROCEDURE — 77063 BREAST TOMOSYNTHESIS BI: CPT

## 2024-07-22 DIAGNOSIS — M79.7 FIBROMYALGIA: ICD-10-CM

## 2024-07-22 RX ORDER — GABAPENTIN 800 MG/1
800 TABLET ORAL 2 TIMES DAILY
Qty: 60 TABLET | Refills: 0 | Status: SHIPPED | OUTPATIENT
Start: 2024-07-22 | End: 2024-08-21

## 2024-07-25 NOTE — PROGRESS NOTES
[facility]      Cardiology Consult    Meghan Carpenter  1955    August 2, 2024    Referring Physician: Eli Dalton MD  Reason for Referral: CHF    CC: \"Nothing new\"     HPI:  The patient is 69 y.o. female with a past medical history significant for hyperlipidemia, systolic heart failure, COPD, and mitral regurgitation who presents today for management of heart failure. She was initially 9/2021 while admitted for a PE. Her echo showed severe MR and an EF 40%.  With medical therapy, mitral regurgitation appeared much less significant however EF remained reduced. Today she presents for follow up and states that overall she is feeling well. She denies any new sounding cardiac complaints. She denies any chest pains or worsening shortness of breath. She has chronic PAYTON that essentially remains unchanged. She reports medication compliance and is tolerating. She denies any abnormal bleeding or bruising. She denies exertional chest pain/pressure, dyspnea at rest, worsening PAYTON, PND, orthopnea, palpitations, lightheadedness, weight changes, changes in LE edema, and syncope.    Past Medical History:   Diagnosis Date    Anxiety     Chronic headaches     CT done 4/2016    Colon polyp     COPD, mild (HCC)     PFT 8/08 a smoker no symptoms-no meds    DDD (degenerative disc disease)     Depression     Fibromyalgia     Frequent UTI     Full dentures     Hallux valgus     Hearing loss in left ear 07/29/2011    HSV (herpes simplex virus) anogenital infection     leg    Hyperlipidemia     Lung nodule 2019    2 LLL nodules    Malignant neoplasm of left lung (HCC)     Menopausal state 1994    Occipital neuralgia     Osteoarthritis     Scoliosis     neck and back brace intermittently, based on pain    Smoker     Spinal stenosis     cane, walker    Unexplained weight loss     Patient states over 30# weight loss over 6 months and pcp is aware     Past Surgical History:   Procedure Laterality Date    BLADDER

## 2024-08-02 ENCOUNTER — OFFICE VISIT (OUTPATIENT)
Dept: CARDIOLOGY CLINIC | Age: 69
End: 2024-08-02

## 2024-08-02 VITALS
OXYGEN SATURATION: 94 % | HEIGHT: 59 IN | SYSTOLIC BLOOD PRESSURE: 112 MMHG | HEART RATE: 88 BPM | WEIGHT: 101 LBS | BODY MASS INDEX: 20.36 KG/M2 | DIASTOLIC BLOOD PRESSURE: 66 MMHG

## 2024-08-02 DIAGNOSIS — I10 ESSENTIAL HYPERTENSION: ICD-10-CM

## 2024-08-02 DIAGNOSIS — I34.0 NONRHEUMATIC MITRAL VALVE REGURGITATION: ICD-10-CM

## 2024-08-02 DIAGNOSIS — Z86.711 HISTORY OF PULMONARY EMBOLISM: ICD-10-CM

## 2024-08-02 DIAGNOSIS — I50.42 CHRONIC COMBINED SYSTOLIC AND DIASTOLIC CONGESTIVE HEART FAILURE (HCC): Primary | ICD-10-CM

## 2024-08-22 ENCOUNTER — OFFICE VISIT (OUTPATIENT)
Dept: INTERNAL MEDICINE CLINIC | Age: 69
End: 2024-08-22

## 2024-08-22 VITALS
DIASTOLIC BLOOD PRESSURE: 82 MMHG | SYSTOLIC BLOOD PRESSURE: 122 MMHG | BODY MASS INDEX: 19.86 KG/M2 | RESPIRATION RATE: 12 BRPM | OXYGEN SATURATION: 98 % | WEIGHT: 98.4 LBS | HEART RATE: 92 BPM

## 2024-08-22 DIAGNOSIS — I50.22 CHRONIC SYSTOLIC CONGESTIVE HEART FAILURE (HCC): ICD-10-CM

## 2024-08-22 DIAGNOSIS — E78.00 HYPERCHOLESTEREMIA: ICD-10-CM

## 2024-08-22 DIAGNOSIS — E03.9 ACQUIRED HYPOTHYROIDISM: ICD-10-CM

## 2024-08-22 DIAGNOSIS — I10 ESSENTIAL HYPERTENSION: Primary | ICD-10-CM

## 2024-08-22 DIAGNOSIS — M79.7 FIBROMYALGIA: ICD-10-CM

## 2024-08-22 DIAGNOSIS — M54.50 CHRONIC MIDLINE LOW BACK PAIN, UNSPECIFIED WHETHER SCIATICA PRESENT: ICD-10-CM

## 2024-08-22 DIAGNOSIS — C34.90 NON-SMALL CELL LUNG CANCER, UNSPECIFIED LATERALITY (HCC): ICD-10-CM

## 2024-08-22 DIAGNOSIS — J44.9 COPD, MILD (HCC): ICD-10-CM

## 2024-08-22 DIAGNOSIS — G89.29 CHRONIC MIDLINE LOW BACK PAIN, UNSPECIFIED WHETHER SCIATICA PRESENT: ICD-10-CM

## 2024-08-22 RX ORDER — GABAPENTIN 800 MG/1
800 TABLET ORAL 2 TIMES DAILY
Qty: 60 TABLET | Refills: 5 | Status: SHIPPED | OUTPATIENT
Start: 2024-08-22 | End: 2025-02-18

## 2024-08-22 NOTE — PROGRESS NOTES
oxyCODONE-acetaminophen (PERCOCET) 7.5-325 MG per tablet  08/16/23  --     Associated Diagnoses:  --     Probiotic Product (PROBIOTIC DAILY PO)  --  --     Associated Diagnoses:  --     spironolactone (ALDACTONE) 25 MG tablet  04/26/24  --     Take 0.5 tablets by mouth daily     Associated Diagnoses:  --     tiZANidine (ZANAFLEX) 4 MG tablet  08/14/23  --     Associated Diagnoses:  --     traZODone (DESYREL) 50 MG tablet  08/13/23  --     Associated Diagnoses:  --     triamcinolone acetonide (KENALOG-40) injection 40 mg  11/05/21  --     40 mg, IntraMUSCular, ONCE, On Fri 11/5/21 at 1630, For 1 dose     Associated Diagnoses:  Right knee pain, unspecified chronicity     valACYclovir (VALTREX) 1 g tablet  04/17/24  --     TAKE 1 TABLET BY MOUTH DAILY     Associated Diagnoses:  Herpes simplex                 1. Essential hypertension--controlled    2. Chronic systolic congestive heart failure (HCC)--on entresto, limits exertion tolerance    3. Fibromyalgia  - gabapentin (NEURONTIN) 800 MG tablet; Take 1 tablet by mouth 2 times daily for 180 days.  Dispense: 60 tablet; Refill: 5    4. Non-small cell lung cancer, unspecified laterality (HCC)--reviewed May CT per Dr Paul ([facility])            Problem List             Diagnosed    Non-small cell lung cancer (HCC)     Relevant Medications    Acetaminophen 325 MG CAPS    OXcarbazepine (TRILEPTAL) 300 MG tablet    triamcinolone acetonide (KENALOG-40) injection 40 mg    oxyCODONE-acetaminophen (PERCOCET) 7.5-325 MG per tablet    gabapentin (NEURONTIN) 800 MG tablet    Chronic systolic congestive heart failure (HCC)     Relevant Medications    furosemide (LASIX) 20 MG tablet    carvedilol (COREG) 6.25 MG tablet    ELIQUIS 5 MG TABS tablet    atorvastatin (LIPITOR) 40 MG tablet    spironolactone (ALDACTONE) 25 MG tablet    Essential hypertension - Primary     Relevant Medications    furosemide (LASIX) 20 MG tablet    ENTRESTO 49-51 MG per tablet    carvedilol (COREG) 6.25 MG  tablet    ELIQUIS 5 MG TABS tablet    atorvastatin (LIPITOR) 40 MG tablet    spironolactone (ALDACTONE) 25 MG tablet     * No order type specified *    I have reconciled the medications in chart with what patient reports to be taking, andreviewed action/ sideeffects and how to take any new medications.  Patient/caregiver understands purpose and side effects.  A complete  list of medications was provided in their after-visit summary.    Return in about 6 months (around 2/22/2025) for f/u mult med extended.    Time basedbilling: I spent over 40 minutes with this patient, and as is the nature of primary care and typical for my extended visits, over 50 percent of this visit was spent on counseling and coordination ofcare.

## 2024-09-09 RX ORDER — SACUBITRIL AND VALSARTAN 49; 51 MG/1; MG/1
1 TABLET, FILM COATED ORAL 2 TIMES DAILY
Qty: 90 TABLET | Refills: 3 | Status: SHIPPED | OUTPATIENT
Start: 2024-09-09

## 2024-09-12 RX ORDER — SACUBITRIL AND VALSARTAN 49; 51 MG/1; MG/1
1 TABLET, FILM COATED ORAL 2 TIMES DAILY
Qty: 90 TABLET | Refills: 3 | OUTPATIENT
Start: 2024-09-12

## 2024-09-12 RX ORDER — CARVEDILOL 6.25 MG/1
TABLET ORAL
Qty: 180 TABLET | Refills: 3 | Status: SHIPPED | OUTPATIENT
Start: 2024-09-12

## 2024-10-03 RX ORDER — ALBUTEROL SULFATE 90 UG/1
4 INHALANT RESPIRATORY (INHALATION) PRN
Status: CANCELLED | OUTPATIENT
Start: 2024-10-03

## 2024-10-03 RX ORDER — ONDANSETRON 2 MG/ML
8 INJECTION INTRAMUSCULAR; INTRAVENOUS
Status: CANCELLED | OUTPATIENT
Start: 2024-10-03

## 2024-10-03 RX ORDER — SODIUM CHLORIDE 9 MG/ML
INJECTION, SOLUTION INTRAVENOUS CONTINUOUS
Status: CANCELLED | OUTPATIENT
Start: 2024-10-03

## 2024-10-03 RX ORDER — EPINEPHRINE 1 MG/ML
0.3 INJECTION, SOLUTION, CONCENTRATE INTRAVENOUS PRN
Status: CANCELLED | OUTPATIENT
Start: 2024-10-03

## 2024-10-03 RX ORDER — DIPHENHYDRAMINE HYDROCHLORIDE 50 MG/ML
50 INJECTION INTRAMUSCULAR; INTRAVENOUS
Status: CANCELLED | OUTPATIENT
Start: 2024-10-03

## 2024-10-10 RX ORDER — APIXABAN 5 MG/1
5 TABLET, FILM COATED ORAL 2 TIMES DAILY
Qty: 180 TABLET | Refills: 3 | Status: SHIPPED | OUTPATIENT
Start: 2024-10-10

## 2024-10-15 DIAGNOSIS — J30.1 ALLERGIC RHINITIS DUE TO POLLEN, UNSPECIFIED SEASONALITY: ICD-10-CM

## 2024-10-16 RX ORDER — IPRATROPIUM BROMIDE 21 UG/1
SPRAY, METERED NASAL
Qty: 30 ML | Refills: 5 | Status: SHIPPED | OUTPATIENT
Start: 2024-10-16

## 2024-10-18 DIAGNOSIS — B00.9 HERPES SIMPLEX: ICD-10-CM

## 2024-10-18 DIAGNOSIS — J30.1 ALLERGIC RHINITIS DUE TO POLLEN, UNSPECIFIED SEASONALITY: ICD-10-CM

## 2024-10-18 RX ORDER — VALACYCLOVIR HYDROCHLORIDE 1 G/1
1000 TABLET, FILM COATED ORAL DAILY
Qty: 81 TABLET | Refills: 0 | Status: SHIPPED | OUTPATIENT
Start: 2024-10-18

## 2024-10-21 RX ORDER — IPRATROPIUM BROMIDE 21 UG/1
SPRAY, METERED NASAL
Qty: 30 ML | Refills: 5 | OUTPATIENT
Start: 2024-10-21

## 2024-10-24 ENCOUNTER — TELEPHONE (OUTPATIENT)
Dept: INTERNAL MEDICINE CLINIC | Age: 69
End: 2024-10-24

## 2024-10-24 NOTE — TELEPHONE ENCOUNTER
Pt called and would like to talk with the medical team bry=out a lesion on pt butt ox. Could this Pt be scheduled to another general surgeon. The original surgeon moved away ?

## 2024-10-24 NOTE — TELEPHONE ENCOUNTER
I returned patient call she states she has a recurrent lesion on buttock that had been removed by Dr. Edmondson with [facility] General surgery department in 2023. Dr. Edmondson is no longer with [facility], I told her she could call to schedule with any surgeon in the practice and to call back if they require a new referral.

## 2024-10-30 RX ORDER — SACUBITRIL AND VALSARTAN 49; 51 MG/1; MG/1
1 TABLET, FILM COATED ORAL 2 TIMES DAILY
Qty: 180 TABLET | Refills: 3 | Status: SHIPPED | OUTPATIENT
Start: 2024-10-30

## 2024-10-30 NOTE — TELEPHONE ENCOUNTER
Last OV: 8/2/24  Next OV: 2/4/25  Last refill: 9/9/24 #90 3 R/F  Most recent Labs: 5/8/24  Last EKG (if needed): 2/2/24

## 2024-11-04 ENCOUNTER — OFFICE VISIT (OUTPATIENT)
Dept: SURGERY | Age: 69
End: 2024-11-04

## 2024-11-04 ENCOUNTER — PREP FOR PROCEDURE (OUTPATIENT)
Dept: SURGERY | Age: 69
End: 2024-11-04

## 2024-11-04 VITALS
SYSTOLIC BLOOD PRESSURE: 136 MMHG | HEART RATE: 84 BPM | DIASTOLIC BLOOD PRESSURE: 81 MMHG | HEIGHT: 59 IN | WEIGHT: 98 LBS | BODY MASS INDEX: 19.76 KG/M2

## 2024-11-04 DIAGNOSIS — L02.31 ABSCESS OF BUTTOCK, RIGHT: ICD-10-CM

## 2024-11-04 DIAGNOSIS — L98.411 SKIN ULCER OF BUTTOCK, LIMITED TO BREAKDOWN OF SKIN (HCC): Primary | ICD-10-CM

## 2024-11-04 PROCEDURE — NBSRV NON-BILLABLE SERVICE: Performed by: SURGERY

## 2024-11-04 NOTE — PATIENT INSTRUCTIONS
GENERAL SURGERY  Pre-operative Information    Your surgeon is Dr. Yudy MD    Your procedure is scheduled at:      [facility]  [address]  [place], [place] [address]    Date: 11/15/24    Arrival time: 725a    Surgery time: 855a    ? Check in for surgery on the first floor of the main hospital ?    Planned anesthesia: Outpatient with IV sedation (MAC)      A responsible adult (18+ years) must be present at the time of check in and remain until discharge.  You will not be able to drive home after surgery or for the next 24 hours, due to anesthesia.    Please arrange a ride to and from the hospital.     Nothing to eat or drink after midnight the night before surgery  No tobacco or nicotine after midnight prior to surgery  On the morning of surgery, only take the medications you were instructed to take with a sip of water.   Please refrain from taking all vitamins and/or nutritional supplements for 48 hours prior to your procedure.   If you take a blood thinner (Coumadin, Plavix, Xaretlo, Eliquis stop 11/11/24, and over the counter fish oil (Omega 3)), you may be asked to stop this medication up to 5 days prior to surgery, if this was not addressed, please contact the office for clarification.    If you take an oral or injectable diabetes / weight loss medication (GLP-1 agonist), listed below, you will need to stop taking it before surgery.   For medications taken daily, do not take for at least 1 day before surgery   For medications taken weekly, do not take for at least 7 days before surgery  Semaglutide (OZEMPIC, WEGOVY, RYBELSUS)  Dulaglutide (TRULICITY)  Liraglutide (VICTOZA)  Tiezepatide (MOUNJARO)  Orlistat (ANTOINE / ZENICAL)  Phentermine (ADIPEX) / Topiramate (QSYMIA)    If you were asked to get a History and Physical (H&P) from your primary doctor (PCP) prior to surgery, please make these arrangements within 30 days of the procedure.    You must bring your photo ID and insurance card with

## 2024-11-05 PROBLEM — L02.31 ABSCESS OF BUTTOCK, RIGHT: Status: ACTIVE | Noted: 2024-11-04

## 2024-11-05 RX ORDER — SODIUM CHLORIDE 0.9 % (FLUSH) 0.9 %
5-40 SYRINGE (ML) INJECTION PRN
Status: CANCELLED | OUTPATIENT
Start: 2024-11-05

## 2024-11-05 RX ORDER — SODIUM CHLORIDE 0.9 % (FLUSH) 0.9 %
5-40 SYRINGE (ML) INJECTION EVERY 12 HOURS SCHEDULED
Status: CANCELLED | OUTPATIENT
Start: 2024-11-05

## 2024-11-05 RX ORDER — SODIUM CHLORIDE 9 MG/ML
INJECTION, SOLUTION INTRAVENOUS PRN
Status: CANCELLED | OUTPATIENT
Start: 2024-11-05

## 2024-11-05 NOTE — PROGRESS NOTES
[facility] PRE-OPERATIVE INSTRUCTIONS    Day of Procedure:  11/15/24              Arrival time:   0725             Surgery time: 0855    Take the following medications with a sip of water:  Follow your MD/Surgeons pre-procedure instructions regarding your medications     Do not eat or drink anything after 12:00 midnight prior to your surgery.  This includes water chewing gum, mints and ice chips.   You may brush your teeth and gargle the morning of your surgery, but do not swallow the water     Please see your family doctor/pediatrician for a history and physical and/or concerning medications.   Bring any test results/reports from your physicians office.   If you are under the care of a heart doctor or specialist doctor, please be aware that you may be asked to them for clearance    You may be asked to stop blood thinners such as Coumadin, Plavix, Fragmin, Lovenox, etc., or any anti-inflammatories such as:  Aspirin, Ibuprofen, Advil, Naproxen prior to your surgery.    We also ask that you stop any OTC medications such as fish oil, vitamin E, glucosamine, garlic, Multivitamins, COQ 10, etc. MAY TAKE TYLENOL-STOP ELIQUIS AS DIRECTED    We ask that you do not smoke 24 hours prior to surgery NO MARIJAUNA  We ask that you do not  drink any alcoholic beverages 24 hours prior to surgery     You must make arrangements for a responsible adult to take you home after your surgery.    For your safety you will not be allowed to leave alone or drive yourself home.  Your surgery will be cancelled if you do not have a ride home.     Also for your safety, you must have someone stay with you the first 24 hours after your surgery.     A parent or legal guardian must accompany a child scheduled for surgery and plan to stay at the hospital until the child is discharged.    Please do not bring other children with you.    For your comfort, please wear simple loose fitting clothing to the hospital.  Please do not bring

## 2024-11-05 NOTE — PROGRESS NOTES
Follow Up Prior to Surgery    DOS: 11/15/24  :1955    Allison Lavon:  ADDENDUM PATIENT NO LONGER BRINGING PATIENT NEIGHBOR KYRIE BRINGING PATIENT AND TAKING HOME AND STAYING WITH PATIENT AT HOME AFTER HER SURGERY                                  Surgeon's Name: LOLA

## 2024-11-07 NOTE — PROGRESS NOTES
Meghan Carpenter (:  1955) is a 69 y.o. female,Established patient, here for evaluation of the following chief complaint(s):  Surgical Consult (Right buttock lesion)         Assessment & Plan  Skin ulcer of buttock, limited to breakdown of skin (HCC)            Repeat excision    The risks, benefits and alternatives to the planned procedure were discussed. Patient expressed an understanding and is willing to proceed.         Subjective   HPI  Postop from removal of painful skin lesion on left buttock with Dr. Edmondson in . States the area never really healed and remains painful with drainage. Would like to proceed wit repeat excision. We discussed possibility of ongoing issues and she is ready to move ahead. The risks, benefits and alternatives to the planned procedure were discussed. Patient expressed an understanding and is willing to proceed.    Review of Systems       Objective   Physical Exam       Electronically signed by IRENE DAVILA MD on 2024 at 1:55 PM        An electronic signature was used to authenticate this note.    --IRENE DAVILA MD

## 2024-11-11 RX ORDER — EMPAGLIFLOZIN 10 MG/1
TABLET, FILM COATED ORAL
Qty: 84 TABLET | Refills: 0 | Status: SHIPPED | OUTPATIENT
Start: 2024-11-11

## 2024-11-11 NOTE — TELEPHONE ENCOUNTER
Last OV: 24 - Cheri  Next OV: 25 - Cheri  Last Labs: 3/6/24  Last EK24   Last Filled:    Disp Refills Start End    JARDIANCE 10 MG tablet 90 tablet 5 2023 --    Sig: TAKE ONE TABLET BY MOUTH EVERY MORNING    Sent to pharmacy as: Jardiance 10 MG Oral Tablet (empagliflozin)    Cosign for Ordering: Accepted by Roderick Alonzo MD on 2023  2:09 PM    E-Prescribing Status: Receipt confirmed by pharmacy (2023  2:25 PM EDT)

## 2024-11-14 ENCOUNTER — ANESTHESIA EVENT (OUTPATIENT)
Dept: OPERATING ROOM | Age: 69
End: 2024-11-14
Payer: MEDICARE

## 2024-11-15 ENCOUNTER — HOSPITAL ENCOUNTER (OUTPATIENT)
Age: 69
Setting detail: OUTPATIENT SURGERY
Discharge: HOME | End: 2024-11-15
Attending: SURGERY | Admitting: SURGERY
Payer: MEDICARE

## 2024-11-15 ENCOUNTER — ANESTHESIA (OUTPATIENT)
Dept: OPERATING ROOM | Age: 69
End: 2024-11-15
Payer: MEDICARE

## 2024-11-15 VITALS
OXYGEN SATURATION: 95 % | HEIGHT: 59 IN | WEIGHT: 96.6 LBS | TEMPERATURE: 97.6 F | BODY MASS INDEX: 19.48 KG/M2 | HEART RATE: 81 BPM | DIASTOLIC BLOOD PRESSURE: 76 MMHG | SYSTOLIC BLOOD PRESSURE: 142 MMHG | RESPIRATION RATE: 18 BRPM

## 2024-11-15 DIAGNOSIS — L02.31 ABSCESS OF BUTTOCK, RIGHT: ICD-10-CM

## 2024-11-15 DIAGNOSIS — L98.411 SKIN ULCER OF BUTTOCK, LIMITED TO BREAKDOWN OF SKIN (HCC): Primary | ICD-10-CM

## 2024-11-15 PROCEDURE — 3600000002 HC SURGERY LEVEL 2 BASE: Performed by: SURGERY

## 2024-11-15 PROCEDURE — 2580000003 HC RX 258: Performed by: ANESTHESIOLOGY

## 2024-11-15 PROCEDURE — 11402 EXC TR-EXT B9+MARG 1.1-2 CM: CPT | Performed by: SURGERY

## 2024-11-15 PROCEDURE — A4217 STERILE WATER/SALINE, 500 ML: HCPCS | Performed by: SURGERY

## 2024-11-15 PROCEDURE — 2580000003 HC RX 258: Performed by: SURGERY

## 2024-11-15 PROCEDURE — 7100000000 HC PACU RECOVERY - FIRST 15 MIN: Performed by: SURGERY

## 2024-11-15 PROCEDURE — 3700000000 HC ANESTHESIA ATTENDED CARE: Performed by: SURGERY

## 2024-11-15 PROCEDURE — 6360000002 HC RX W HCPCS

## 2024-11-15 PROCEDURE — 3700000001 HC ADD 15 MINUTES (ANESTHESIA): Performed by: SURGERY

## 2024-11-15 PROCEDURE — 7100000011 HC PHASE II RECOVERY - ADDTL 15 MIN: Performed by: SURGERY

## 2024-11-15 PROCEDURE — 6360000002 HC RX W HCPCS: Performed by: SURGERY

## 2024-11-15 PROCEDURE — 2500000003 HC RX 250 WO HCPCS: Performed by: SURGERY

## 2024-11-15 PROCEDURE — 3600000012 HC SURGERY LEVEL 2 ADDTL 15MIN: Performed by: SURGERY

## 2024-11-15 PROCEDURE — 2500000003 HC RX 250 WO HCPCS

## 2024-11-15 PROCEDURE — 2709999900 HC NON-CHARGEABLE SUPPLY: Performed by: SURGERY

## 2024-11-15 PROCEDURE — 7100000010 HC PHASE II RECOVERY - FIRST 15 MIN: Performed by: SURGERY

## 2024-11-15 PROCEDURE — 7100000001 HC PACU RECOVERY - ADDTL 15 MIN: Performed by: SURGERY

## 2024-11-15 PROCEDURE — 88304 TISSUE EXAM BY PATHOLOGIST: CPT

## 2024-11-15 RX ORDER — FENTANYL CITRATE 0.05 MG/ML
50 INJECTION, SOLUTION INTRAMUSCULAR; INTRAVENOUS EVERY 5 MIN PRN
Status: DISCONTINUED | OUTPATIENT
Start: 2024-11-15 | End: 2024-11-15 | Stop reason: HOSPADM

## 2024-11-15 RX ORDER — TRAMADOL HYDROCHLORIDE 50 MG/1
50 TABLET ORAL EVERY 6 HOURS PRN
Qty: 12 TABLET | Refills: 0 | Status: SHIPPED | OUTPATIENT
Start: 2024-11-15 | End: 2024-11-18

## 2024-11-15 RX ORDER — OXYCODONE HYDROCHLORIDE 5 MG/1
5 TABLET ORAL
Status: DISCONTINUED | OUTPATIENT
Start: 2024-11-15 | End: 2024-11-15 | Stop reason: HOSPADM

## 2024-11-15 RX ORDER — MAGNESIUM HYDROXIDE 1200 MG/15ML
LIQUID ORAL CONTINUOUS PRN
Status: COMPLETED | OUTPATIENT
Start: 2024-11-15 | End: 2024-11-15

## 2024-11-15 RX ORDER — LIDOCAINE HYDROCHLORIDE 20 MG/ML
INJECTION, SOLUTION EPIDURAL; INFILTRATION; INTRACAUDAL; PERINEURAL
Status: DISCONTINUED | OUTPATIENT
Start: 2024-11-15 | End: 2024-11-15 | Stop reason: SDUPTHER

## 2024-11-15 RX ORDER — SODIUM CHLORIDE 0.9 % (FLUSH) 0.9 %
5-40 SYRINGE (ML) INJECTION EVERY 12 HOURS SCHEDULED
Status: DISCONTINUED | OUTPATIENT
Start: 2024-11-15 | End: 2024-11-15 | Stop reason: HOSPADM

## 2024-11-15 RX ORDER — FENTANYL CITRATE 0.05 MG/ML
25 INJECTION, SOLUTION INTRAMUSCULAR; INTRAVENOUS EVERY 5 MIN PRN
Status: DISCONTINUED | OUTPATIENT
Start: 2024-11-15 | End: 2024-11-15 | Stop reason: HOSPADM

## 2024-11-15 RX ORDER — PROPOFOL 10 MG/ML
INJECTION, EMULSION INTRAVENOUS
Status: DISCONTINUED | OUTPATIENT
Start: 2024-11-15 | End: 2024-11-15 | Stop reason: SDUPTHER

## 2024-11-15 RX ORDER — SODIUM CHLORIDE 0.9 % (FLUSH) 0.9 %
5-40 SYRINGE (ML) INJECTION EVERY 12 HOURS SCHEDULED
Status: DISCONTINUED | OUTPATIENT
Start: 2024-11-15 | End: 2024-11-15

## 2024-11-15 RX ORDER — ONDANSETRON 2 MG/ML
4 INJECTION INTRAMUSCULAR; INTRAVENOUS
Status: DISCONTINUED | OUTPATIENT
Start: 2024-11-15 | End: 2024-11-15 | Stop reason: HOSPADM

## 2024-11-15 RX ORDER — FENTANYL CITRATE 50 UG/ML
INJECTION, SOLUTION INTRAMUSCULAR; INTRAVENOUS
Status: DISCONTINUED | OUTPATIENT
Start: 2024-11-15 | End: 2024-11-15 | Stop reason: SDUPTHER

## 2024-11-15 RX ORDER — SODIUM CHLORIDE 0.9 % (FLUSH) 0.9 %
5-40 SYRINGE (ML) INJECTION PRN
Status: DISCONTINUED | OUTPATIENT
Start: 2024-11-15 | End: 2024-11-15

## 2024-11-15 RX ORDER — SODIUM CHLORIDE 0.9 % (FLUSH) 0.9 %
5-40 SYRINGE (ML) INJECTION PRN
Status: DISCONTINUED | OUTPATIENT
Start: 2024-11-15 | End: 2024-11-15 | Stop reason: HOSPADM

## 2024-11-15 RX ORDER — SODIUM CHLORIDE 9 MG/ML
INJECTION, SOLUTION INTRAVENOUS PRN
Status: DISCONTINUED | OUTPATIENT
Start: 2024-11-15 | End: 2024-11-15

## 2024-11-15 RX ORDER — SODIUM CHLORIDE 9 MG/ML
INJECTION, SOLUTION INTRAVENOUS PRN
Status: DISCONTINUED | OUTPATIENT
Start: 2024-11-15 | End: 2024-11-15 | Stop reason: HOSPADM

## 2024-11-15 RX ADMIN — PROPOFOL 50 MG: 10 INJECTION, EMULSION INTRAVENOUS at 09:36

## 2024-11-15 RX ADMIN — SODIUM CHLORIDE: 9 INJECTION, SOLUTION INTRAVENOUS at 09:29

## 2024-11-15 RX ADMIN — SODIUM CHLORIDE 2000 MG: 900 INJECTION INTRAVENOUS at 09:38

## 2024-11-15 RX ADMIN — FENTANYL CITRATE 25 MCG: 50 INJECTION INTRAMUSCULAR; INTRAVENOUS at 09:42

## 2024-11-15 RX ADMIN — FENTANYL CITRATE 25 MCG: 50 INJECTION INTRAMUSCULAR; INTRAVENOUS at 09:29

## 2024-11-15 RX ADMIN — LIDOCAINE HYDROCHLORIDE 60 MG: 20 INJECTION, SOLUTION EPIDURAL; INFILTRATION; INTRACAUDAL; PERINEURAL at 09:36

## 2024-11-15 RX ADMIN — PROPOFOL 120 MCG/KG/MIN: 10 INJECTION, EMULSION INTRAVENOUS at 09:37

## 2024-11-15 ASSESSMENT — PAIN - FUNCTIONAL ASSESSMENT
PAIN_FUNCTIONAL_ASSESSMENT: NONE - DENIES PAIN
PAIN_FUNCTIONAL_ASSESSMENT: NONE - DENIES PAIN
PAIN_FUNCTIONAL_ASSESSMENT: 0-10
PAIN_FUNCTIONAL_ASSESSMENT: NONE - DENIES PAIN

## 2024-11-15 ASSESSMENT — LIFESTYLE VARIABLES: SMOKING_STATUS: 1

## 2024-11-15 ASSESSMENT — PAIN DESCRIPTION - DESCRIPTORS: DESCRIPTORS: ACHING

## 2024-11-15 NOTE — ANESTHESIA POSTPROCEDURE EVALUATION
Department of Anesthesiology  Postprocedure Note    Patient: Meghan Carpenter  MRN: [id number]  YOB: 1955  Date of evaluation: 11/15/2024    Procedure Summary       Date: 11/15/24 Room / Location: [address]    Anesthesia Start: 0929 Anesthesia Stop: 1010    Procedure: RIGHT BUTTOCK SKIN LESION REMOVAL (Right) Diagnosis:       Abscess of buttock, right      (Abscess of buttock, right [L02.31])    Surgeons: Nghia Jimenes MD Responsible Provider: Shari Rice MD    Anesthesia Type: MAC ASA Status: 3            Anesthesia Type: MAC    Nahun Phase I: Nahun Score: 9    Nahun Phase II: Nahun Score: 10    Anesthesia Post Evaluation    Patient location during evaluation: PACU  Patient participation: complete - patient participated  Level of consciousness: awake and alert  Airway patency: patent  Nausea & Vomiting: no nausea and no vomiting  Cardiovascular status: hemodynamically stable  Respiratory status: acceptable  Hydration status: stable  Pain management: adequate    No notable events documented.

## 2024-11-15 NOTE — DISCHARGE INSTRUCTIONS
Follow up in 2-3 weeks  Call [phone] for an appointment    Ok to shower in AM  No Driving for today. OK to drive starting 11/16 if you are not taking any pain medication.

## 2024-11-15 NOTE — H&P
PATIENT NAME: Meghan Carpenter   YOB: 1955    ADMISSION DATE: 11/15/2024  7:27 AM      TODAY'S DATE: 11/15/2024    CHIEF COMPLAINT:  non healing wound      HPI  Postop from removal of painful skin lesion on left buttock with Dr. Edmondson in 2/23. States the area never really healed and remains painful with drainage. Would like to proceed wit repeat excision. We discussed possibility of ongoing issues and she is ready to move ahead. The risks, benefits and alternatives to the planned procedure were discussed. Patient expressed an understanding and is willing to proceed.    Past Medical History:        Diagnosis Date    Anxiety     CHF (congestive heart failure) (HCC)     Chronic headaches     CT done 4/2016    Colon polyp     COPD, mild (HCC)     PFT 8/08 a smoker no symptoms-no meds    DDD (degenerative disc disease)     Depression     Fibromyalgia     Frequent UTI     Full dentures     Hallux valgus     Hearing loss in left ear 07/29/2011    HSV (herpes simplex virus) anogenital infection     leg    Hyperlipidemia     Lung nodule 2019    2 LLL nodules    Malignant neoplasm of left lung (HCC)     Menopausal state 1994    Neuropathy     Occipital neuralgia     Osteoarthritis     Scoliosis     neck and back brace intermittently, based on pain    Smoker     Spinal stenosis     cane, walker    Thyroid disease     Unexplained weight loss     Patient states over 30# weight loss over 6 months and pcp is aware       Past Surgical History:        Procedure Laterality Date    BLADDER SUSPENSION  1988    BRAIN ANEURYSM SURGERY  2015    anterior communicating clipped    BUNIONECTOMY Left 08/23/2016    Ty    CATARACT REMOVAL Bilateral 2008    CERVICAL DISCECTOMY  2010    CERVICAL LAMINECTOMY  2003    Anterior approach, C4-7    CERVICAL LAMINECTOMY N/A 11/18/2016    Posterior, C2 - C5    CHOLECYSTECTOMY, LAPAROSCOPIC  2013    COLONOSCOPY      DILATION AND CURETTAGE OF UTERUS      LUMBAR LAMINECTOMY  2009    x2     PUFFS BY MOUTH EVERY 6 HOURS AS NEEDED FOR WHEEZING OR SHORTNESS OF BREATH  CALCIUM-VITAMIN D PO, Take 1 tablet by mouth daily 1200mg/25mcg  buPROPion (WELLBUTRIN XL) 300 MG extended release tablet, Take 1 tablet by mouth every morning  ARIPiprazole (ABILIFY) 15 MG tablet, Take 0.5 tablets by mouth daily  levothyroxine (SYNTHROID) 50 MCG tablet, Take 1 tablet by mouth Daily  OXcarbazepine (TRILEPTAL) 300 MG tablet, Take 0.5 tablets by mouth 2 times daily  Multiple Vitamins-Minerals (THERAPEUTIC MULTIVITAMIN-MINERALS) tablet, Take 1 tablet by mouth daily  Acetaminophen 325 MG CAPS, Take 650 mg by mouth as needed  Probiotic Product (PROBIOTIC DAILY PO), Take 1 tablet by mouth daily   DULoxetine (CYMBALTA) 60 MG capsule, Take 2 capsules by mouth daily [facility]  valACYclovir (VALTREX) 1 g tablet, TAKE 1 TABLET BY MOUTH DAILY  apixaban (ELIQUIS) 5 MG TABS tablet, TAKE 1 TABLET BY MOUTH TWICE A DAY  menthol-zinc oxide (CALMOSEPTINE) 0.44-20.6 % OINT ointment, Apply topically 4 times daily as needed (apply with each bathroom break) Max 30 ml per day. (Patient not taking: Reported on 11/15/2024)    Allergies:  Adhesive tape, Ibuprofen, Naproxen, Nsaids, Vicodin [hydrocodone-acetaminophen], and Hydrocodone    Social History:   TOBACCO:   reports that she has been smoking cigarettes. She started smoking about 49 years ago. She has a 12 pack-year smoking history. She has been exposed to tobacco smoke. She has never used smokeless tobacco.  ETOH:   reports that she does not currently use alcohol.  DRUGS:   reports current drug use. Drug: Marijuana (Weed).      Family History:   Reviewed and non contributory to the current clinical condition    REVIEW OF SYSTEMS:    CONSTITUTIONAL:  negative  HEENT:  negative  CARDIOVASCULAR:  negative  GASTROINTESTINAL:  negative  GENITOURINARY:  negative  HEMATOLOGIC/LYMPHATIC:  negative  ENDOCRINE:  negative  All other systems negative    PHYSICAL EXAM:    VITALS:  BP (!) 154/83   Pulse

## 2024-11-15 NOTE — BRIEF OP NOTE
Brief Postoperative Note      Patient: Meghan Carpenter  YOB: 1955  MRN: [id number]    Date of Procedure: 11/15/2024    Preop: non healing ulcer right buttock    Post-Op Diagnosis: Same       Procedure: excision non healing ulcer right buttock, 2 cm    Surgeon(s):  Irene Davila MD    Assistant:  Surgical Assistant: Sam Way Nekia    Anesthesia: Monitor Anesthesia Care    Estimated Blood Loss (mL): less than 50     Complications: None    Specimens:   ID Type Source Tests Collected by Time Destination   A : A- Skin Ulcer Right Buttock Tissue Tissue SURGICAL PATHOLOGY Irene Davila MD 11/15/2024 0949        Implants:  * No implants in log *      Drains: * No LDAs found *    Findings:  Infection Present At Time Of Surgery (PATOS) (choose all levels that have infection present):  No infection present  Other Findings: no complications    Electronically signed by IRENE DAVILA MD on 11/15/2024 at 10:07 AM

## 2024-11-15 NOTE — PROGRESS NOTES
Pt arrived to PACU from OR. Pt asleep on room air, awakens easily.  Right buttock incision CRYSTAL with surgical glue noted. Pt denies c/o pain.

## 2024-11-15 NOTE — ANESTHESIA PRE PROCEDURE
Department of Anesthesiology  Preprocedure Note       Name:  Meghan Carpenter   Age:  69 y.o.  :  1955                                          MRN:  [id number]         Date:  11/15/2024      Surgeon: Surgeon(s):  Nghia Jimenes MD    Procedure: Procedure(s):  RIGHT BUTTOCK SKIN LESION REMOVAL    Medications prior to admission:   Prior to Admission medications    Medication Sig Start Date End Date Taking? Authorizing Provider   empagliflozin (JARDIANCE) 10 MG tablet TAKE ONE TABLET BY MOUTH EVERY MORNING 24   Roderick Alonzo MD   ENTRESTO 49-51 MG per tablet TAKE 1 TABLET BY MOUTH 2 TIMES A DAY 10/30/24   Roderick Alonzo MD   valACYclovir (VALTREX) 1 g tablet TAKE 1 TABLET BY MOUTH DAILY 10/18/24   Eli Dalton MD   ipratropium (ATROVENT) 0.03 % nasal spray SPRAY 2 SPRAYS IN EACH NOSTRIL TWICE A DAY 10/16/24   Tiburcio Darnell MD   apixaban (ELIQUIS) 5 MG TABS tablet TAKE 1 TABLET BY MOUTH TWICE A DAY 10/10/24   Roderick Alonzo MD   carvedilol (COREG) 6.25 MG tablet TAKE 1 TABLET BY MOUTH TWICE A DAY WITH A MEAL 24   Roderick Alonzo MD   gabapentin (NEURONTIN) 800 MG tablet Take 1 tablet by mouth 2 times daily for 180 days. 24  Eli Dalton MD   fluticasone (FLONASE) 50 MCG/ACT nasal spray SPRAY TWO SPRAYS IN EACH NOSTRIL ONCE DAILY 24   Tiburcio Darnell MD   spironolactone (ALDACTONE) 25 MG tablet Take 0.5 tablets by mouth daily 24   Roderick Alonzo MD   atorvastatin (LIPITOR) 40 MG tablet TAKE 1 TABLET BY MOUTH DAILY  Patient taking differently: Take 1 tablet by mouth at bedtime 4/3/24   Eli Dalton MD   menthol-zinc oxide (CALMOSEPTINE) 0.44-20.6 % OINT ointment Apply topically 4 times daily as needed (apply with each bathroom break) Max 30 ml per day. 24   Eli Dalton MD   Azelastine HCl 137 MCG/SPRAY SOLN SPRAY ONE SPRAY IN EACH NOSTRIL TWICE DAILY  Patient taking differently: 1 spray by Each Nostril route at bedtime

## 2024-11-15 NOTE — OP NOTE
[facility]          [address] [place] [address]                            OPERATIVE REPORT      PATIENT NAME: JOSEF ROA               : 1955  MED REC NO: [id number]                      ROOM: OR  ACCOUNT NO: [id number]                       ADMIT DATE: 11/15/2024  PROVIDER: Nghia Davila MD      DATE OF PROCEDURE:  11/15/2024    SURGEON:  Nghia Davila MD    PREOPERATIVE DIAGNOSIS:  Nonhealing ulcer of the right buttock.    POSTOPERATIVE DIAGNOSIS:  Nonhealing ulcer of the right buttock.    PROCEDURE:  Excision of nonhealing ulcer, right buttock 2 cm.    SPECIMEN:  Skin lesion.    ESTIMATED BLOOD LOSS:  Less than 50 mL.    COMPLICATIONS:  None.    DISPOSITION:  To Recovery in stable condition.    INDICATIONS:  The patient is a 69-year-old female who approximately 18 months ago underwent excision of a nonhealing ulcer.  The location is the right gluteal crease overlying the sacrum and this may represent a pressure ulcer type wound.  That incision never fully healed and despite ongoing wound care, she reports ongoing skin separation, hyperkeratotic reaction, and pain.  The risks, benefits, and alternatives of repeat excision were reviewed, and she agreed to proceed.    DESCRIPTION OF PROCEDURE:  The patient was brought to the operating room, placed lateral with the left side up and the buttocks prepped and draped in a sterile fashion.  Sedation was delivered and then local anesthetic infused around a 2 cm wide raised hyperkeratotic wound.  A 4.5 x 2 cm elliptical incision was then made around the lesion to achieve a gross margin.  Dissection was carried through the skin into the subcutaneous tissue and using cautery, the lesion was removed.  Hemostasis was now achieved and the wound closed with 3-0 and 4-0 Vicryl.  Dermabond was applied and the patient was transferred to recovery in good condition.          NGHIA DAVILA  MD      D:  11/15/2024 10:26:36     T:  11/15/2024 15:04:21     DOLORES/CHANG  Job #:  [id number]     Doc#:  [id number]

## 2024-12-02 ENCOUNTER — OFFICE VISIT (OUTPATIENT)
Dept: SURGERY | Age: 69
End: 2024-12-02

## 2024-12-02 VITALS — BODY MASS INDEX: 19.35 KG/M2 | WEIGHT: 96 LBS | HEIGHT: 59 IN

## 2024-12-02 DIAGNOSIS — L98.411 SKIN ULCER OF BUTTOCK, LIMITED TO BREAKDOWN OF SKIN (HCC): Primary | ICD-10-CM

## 2024-12-04 DIAGNOSIS — M80.00XG AGE-RELATED OSTEOPOROSIS WITH CURRENT PATHOLOGICAL FRACTURE WITH DELAYED HEALING: Primary | ICD-10-CM

## 2024-12-04 RX ORDER — SODIUM CHLORIDE 9 MG/ML
INJECTION, SOLUTION INTRAVENOUS CONTINUOUS
OUTPATIENT
Start: 2024-12-04

## 2024-12-04 RX ORDER — SODIUM CHLORIDE 0.9 % (FLUSH) 0.9 %
5-40 SYRINGE (ML) INJECTION PRN
OUTPATIENT
Start: 2024-12-04

## 2024-12-04 RX ORDER — ACETAMINOPHEN 325 MG/1
650 TABLET ORAL
OUTPATIENT
Start: 2024-12-04

## 2024-12-04 RX ORDER — ONDANSETRON 2 MG/ML
8 INJECTION INTRAMUSCULAR; INTRAVENOUS
OUTPATIENT
Start: 2024-12-04

## 2024-12-04 RX ORDER — ZOLEDRONIC ACID 0.05 MG/ML
5 INJECTION, SOLUTION INTRAVENOUS ONCE
OUTPATIENT
Start: 2024-12-04 | End: 2024-12-04

## 2024-12-04 RX ORDER — EPINEPHRINE 1 MG/ML
0.3 INJECTION, SOLUTION, CONCENTRATE INTRAVENOUS PRN
OUTPATIENT
Start: 2024-12-04

## 2024-12-04 RX ORDER — DIPHENHYDRAMINE HYDROCHLORIDE 50 MG/ML
50 INJECTION INTRAMUSCULAR; INTRAVENOUS
OUTPATIENT
Start: 2024-12-04

## 2024-12-04 RX ORDER — SODIUM CHLORIDE 9 MG/ML
5-250 INJECTION, SOLUTION INTRAVENOUS PRN
OUTPATIENT
Start: 2024-12-04

## 2024-12-04 RX ORDER — HYDROCORTISONE SODIUM SUCCINATE 100 MG/2ML
100 INJECTION INTRAMUSCULAR; INTRAVENOUS
OUTPATIENT
Start: 2024-12-04

## 2024-12-04 RX ORDER — ALBUTEROL SULFATE 90 UG/1
4 INHALANT RESPIRATORY (INHALATION) PRN
OUTPATIENT
Start: 2024-12-04

## 2024-12-04 RX ORDER — HEPARIN SODIUM (PORCINE) LOCK FLUSH IV SOLN 100 UNIT/ML 100 UNIT/ML
500 SOLUTION INTRAVENOUS PRN
OUTPATIENT
Start: 2024-12-04

## 2024-12-08 DIAGNOSIS — E78.00 HYPERCHOLESTEREMIA: ICD-10-CM

## 2024-12-09 ENCOUNTER — OFFICE VISIT (OUTPATIENT)
Dept: SURGERY | Age: 69
End: 2024-12-09

## 2024-12-09 VITALS — HEIGHT: 59 IN | BODY MASS INDEX: 19.35 KG/M2 | WEIGHT: 96 LBS

## 2024-12-09 DIAGNOSIS — L98.411 SKIN ULCER OF BUTTOCK, LIMITED TO BREAKDOWN OF SKIN (HCC): Primary | ICD-10-CM

## 2024-12-09 PROCEDURE — 99024 POSTOP FOLLOW-UP VISIT: CPT | Performed by: SURGERY

## 2024-12-09 RX ORDER — ATORVASTATIN CALCIUM 40 MG/1
40 TABLET, FILM COATED ORAL DAILY
Qty: 34 TABLET | Refills: 3 | Status: SHIPPED | OUTPATIENT
Start: 2024-12-09

## 2024-12-09 NOTE — PROGRESS NOTES
Meghan Carpenter (:  1955) is a 69 y.o. female,Established patient, here for evaluation of the following chief complaint(s):  Post-Op Check (Site check)         Assessment & Plan  Skin ulcer of buttock, limited to breakdown of skin (HCC)     Follow up next week                Subjective   HPI  Stable. Wound is open but clear. No infection. Will check next week for possible debridement  Review of Systems       Objective   Physical Exam       Electronically signed by IRENE DAVILA MD on 2024 at 3:35 PM        An electronic signature was used to authenticate this note.    --IRENE DAVILA MD

## 2024-12-16 ENCOUNTER — OFFICE VISIT (OUTPATIENT)
Dept: SURGERY | Age: 69
End: 2024-12-16

## 2024-12-16 VITALS — BODY MASS INDEX: 19.35 KG/M2 | HEIGHT: 59 IN | WEIGHT: 96 LBS

## 2024-12-16 DIAGNOSIS — L98.411 SKIN ULCER OF BUTTOCK, LIMITED TO BREAKDOWN OF SKIN (HCC): Primary | ICD-10-CM

## 2024-12-16 PROCEDURE — 99024 POSTOP FOLLOW-UP VISIT: CPT | Performed by: SURGERY

## 2024-12-16 NOTE — PROGRESS NOTES
Meghan Carpenter (:  1955) is a 69 y.o. female,Established patient, here for evaluation of the following chief complaint(s):  Post-Op Check (Site check)         Assessment & Plan  Skin ulcer of buttock, limited to breakdown of skin (HCC)            Follow up in 2-3 weeks       Subjective   HPI  About the same. Maybe slight improvement in pain. Ulcerated area is stable. Follow up in 2-3 weeks for probable debridement.    Review of Systems       Objective   Physical Exam       Electronically signed by IRENE DAVILA MD on 2024 at 12:20 PM        An electronic signature was used to authenticate this note.    --IRENE DAVILA MD

## 2025-01-07 ENCOUNTER — OFFICE VISIT (OUTPATIENT)
Dept: SURGERY | Age: 70
End: 2025-01-07

## 2025-01-07 VITALS — DIASTOLIC BLOOD PRESSURE: 93 MMHG | HEART RATE: 93 BPM | SYSTOLIC BLOOD PRESSURE: 163 MMHG

## 2025-01-07 DIAGNOSIS — L98.411 SKIN ULCER OF BUTTOCK, LIMITED TO BREAKDOWN OF SKIN (HCC): Primary | ICD-10-CM

## 2025-01-07 PROCEDURE — 99024 POSTOP FOLLOW-UP VISIT: CPT | Performed by: SURGERY

## 2025-01-07 NOTE — PROGRESS NOTES
Meghan Carpenter (:  1955) is a 69 y.o. female,Established patient, here for evaluation of the following chief complaint(s):  Wound Check (Skin ulcer of buttock )         Assessment & Plan  Skin ulcer of buttock, limited to breakdown of skin (HCC)            Follow up in 1-2 weeks       Subjective   HPI  Improving overall. Wound is filling in. Superficial slough removed today with healthy granulation noted. Follow up in 1-2 weeks  Review of Systems       Objective   Physical Exam             An electronic signature was used to authenticate this note.    --IRENE DAVILA MD

## 2025-01-11 DIAGNOSIS — B00.9 HERPES SIMPLEX: ICD-10-CM

## 2025-01-13 RX ORDER — VALACYCLOVIR HYDROCHLORIDE 1 G/1
1000 TABLET, FILM COATED ORAL DAILY
Qty: 81 TABLET | Refills: 0 | Status: SHIPPED | OUTPATIENT
Start: 2025-01-13

## 2025-01-14 ENCOUNTER — HOSPITAL ENCOUNTER (OUTPATIENT)
Dept: INFUSION THERAPY | Age: 70
Setting detail: INFUSION SERIES
Discharge: HOME | End: 2025-01-14
Payer: MEDICARE

## 2025-01-14 ENCOUNTER — OFFICE VISIT (OUTPATIENT)
Dept: PULMONOLOGY | Age: 70
End: 2025-01-14
Payer: MEDICARE

## 2025-01-14 VITALS
DIASTOLIC BLOOD PRESSURE: 86 MMHG | HEIGHT: 59 IN | RESPIRATION RATE: 18 BRPM | TEMPERATURE: 97.6 F | BODY MASS INDEX: 19.56 KG/M2 | WEIGHT: 97 LBS | SYSTOLIC BLOOD PRESSURE: 118 MMHG | HEART RATE: 106 BPM | OXYGEN SATURATION: 93 %

## 2025-01-14 VITALS
DIASTOLIC BLOOD PRESSURE: 85 MMHG | SYSTOLIC BLOOD PRESSURE: 116 MMHG | OXYGEN SATURATION: 100 % | HEART RATE: 76 BPM | RESPIRATION RATE: 17 BRPM | TEMPERATURE: 97.6 F

## 2025-01-14 DIAGNOSIS — C34.90 NON-SMALL CELL LUNG CANCER, UNSPECIFIED LATERALITY (HCC): ICD-10-CM

## 2025-01-14 DIAGNOSIS — M80.00XG AGE-RELATED OSTEOPOROSIS WITH CURRENT PATHOLOGICAL FRACTURE WITH DELAYED HEALING: Primary | ICD-10-CM

## 2025-01-14 DIAGNOSIS — J44.9 COPD, MILD (HCC): Primary | ICD-10-CM

## 2025-01-14 DIAGNOSIS — J30.1 ALLERGIC RHINITIS DUE TO POLLEN, UNSPECIFIED SEASONALITY: ICD-10-CM

## 2025-01-14 LAB
ANION GAP SERPL CALCULATED.3IONS-SCNC: 12 MMOL/L (ref 3–16)
BUN SERPL-MCNC: 14 MG/DL (ref 7–20)
CALCIUM SERPL-MCNC: 9.3 MG/DL (ref 8.3–10.6)
CHLORIDE SERPL-SCNC: 93 MMOL/L (ref 99–110)
CO2 SERPL-SCNC: 24 MMOL/L (ref 21–32)
CREAT SERPL-MCNC: 0.7 MG/DL (ref 0.6–1.2)
GFR SERPLBLD CREATININE-BSD FMLA CKD-EPI: >90 ML/MIN/{1.73_M2}
GLUCOSE SERPL-MCNC: 103 MG/DL (ref 70–99)
POTASSIUM SERPL-SCNC: 4.2 MMOL/L (ref 3.5–5.1)
SODIUM SERPL-SCNC: 129 MMOL/L (ref 136–145)

## 2025-01-14 PROCEDURE — G8399 PT W/DXA RESULTS DOCUMENT: HCPCS | Performed by: INTERNAL MEDICINE

## 2025-01-14 PROCEDURE — G8420 CALC BMI NORM PARAMETERS: HCPCS | Performed by: INTERNAL MEDICINE

## 2025-01-14 PROCEDURE — 80048 BASIC METABOLIC PNL TOTAL CA: CPT

## 2025-01-14 PROCEDURE — 99214 OFFICE O/P EST MOD 30 MIN: CPT | Performed by: INTERNAL MEDICINE

## 2025-01-14 PROCEDURE — 2500000003 HC RX 250 WO HCPCS: Performed by: INTERNAL MEDICINE

## 2025-01-14 PROCEDURE — 3074F SYST BP LT 130 MM HG: CPT | Performed by: INTERNAL MEDICINE

## 2025-01-14 PROCEDURE — G8427 DOCREV CUR MEDS BY ELIG CLIN: HCPCS | Performed by: INTERNAL MEDICINE

## 2025-01-14 PROCEDURE — 96360 HYDRATION IV INFUSION INIT: CPT

## 2025-01-14 PROCEDURE — 3017F COLORECTAL CA SCREEN DOC REV: CPT | Performed by: INTERNAL MEDICINE

## 2025-01-14 PROCEDURE — 3023F SPIROM DOC REV: CPT | Performed by: INTERNAL MEDICINE

## 2025-01-14 PROCEDURE — 3079F DIAST BP 80-89 MM HG: CPT | Performed by: INTERNAL MEDICINE

## 2025-01-14 PROCEDURE — 6360000002 HC RX W HCPCS: Performed by: INTERNAL MEDICINE

## 2025-01-14 PROCEDURE — 1123F ACP DISCUSS/DSCN MKR DOCD: CPT | Performed by: INTERNAL MEDICINE

## 2025-01-14 PROCEDURE — 1159F MED LIST DOCD IN RCRD: CPT | Performed by: INTERNAL MEDICINE

## 2025-01-14 PROCEDURE — 1090F PRES/ABSN URINE INCON ASSESS: CPT | Performed by: INTERNAL MEDICINE

## 2025-01-14 PROCEDURE — 4004F PT TOBACCO SCREEN RCVD TLK: CPT | Performed by: INTERNAL MEDICINE

## 2025-01-14 RX ORDER — ZOLEDRONIC ACID 0.05 MG/ML
5 INJECTION, SOLUTION INTRAVENOUS ONCE
Status: COMPLETED | OUTPATIENT
Start: 2025-01-14 | End: 2025-01-14

## 2025-01-14 RX ORDER — SODIUM CHLORIDE 0.9 % (FLUSH) 0.9 %
5-40 SYRINGE (ML) INJECTION PRN
Status: CANCELLED | OUTPATIENT
Start: 2026-01-13

## 2025-01-14 RX ORDER — SODIUM CHLORIDE 9 MG/ML
5-250 INJECTION, SOLUTION INTRAVENOUS PRN
Status: CANCELLED | OUTPATIENT
Start: 2026-01-13

## 2025-01-14 RX ORDER — HEPARIN SODIUM (PORCINE) LOCK FLUSH IV SOLN 100 UNIT/ML 100 UNIT/ML
500 SOLUTION INTRAVENOUS PRN
Status: CANCELLED | OUTPATIENT
Start: 2026-01-13

## 2025-01-14 RX ORDER — ACETAMINOPHEN 325 MG/1
650 TABLET ORAL
Status: CANCELLED | OUTPATIENT
Start: 2026-01-13

## 2025-01-14 RX ORDER — ONDANSETRON 2 MG/ML
8 INJECTION INTRAMUSCULAR; INTRAVENOUS
Status: CANCELLED | OUTPATIENT
Start: 2026-01-13

## 2025-01-14 RX ORDER — SODIUM CHLORIDE 9 MG/ML
INJECTION, SOLUTION INTRAVENOUS CONTINUOUS
Status: CANCELLED | OUTPATIENT
Start: 2026-01-13

## 2025-01-14 RX ORDER — DIPHENHYDRAMINE HYDROCHLORIDE 50 MG/ML
50 INJECTION INTRAMUSCULAR; INTRAVENOUS
Status: CANCELLED | OUTPATIENT
Start: 2026-01-13

## 2025-01-14 RX ORDER — ALBUTEROL SULFATE 90 UG/1
4 INHALANT RESPIRATORY (INHALATION) PRN
Status: CANCELLED | OUTPATIENT
Start: 2026-01-13

## 2025-01-14 RX ORDER — SODIUM CHLORIDE 0.9 % (FLUSH) 0.9 %
5-40 SYRINGE (ML) INJECTION PRN
Status: DISCONTINUED | OUTPATIENT
Start: 2025-01-14 | End: 2025-01-15 | Stop reason: HOSPADM

## 2025-01-14 RX ORDER — EPINEPHRINE 1 MG/ML
0.3 INJECTION, SOLUTION, CONCENTRATE INTRAVENOUS PRN
Status: CANCELLED | OUTPATIENT
Start: 2026-01-13

## 2025-01-14 RX ORDER — ZOLEDRONIC ACID 0.05 MG/ML
5 INJECTION, SOLUTION INTRAVENOUS ONCE
Status: CANCELLED | OUTPATIENT
Start: 2026-01-13 | End: 2026-01-13

## 2025-01-14 RX ORDER — HYDROCORTISONE SODIUM SUCCINATE 100 MG/2ML
100 INJECTION INTRAMUSCULAR; INTRAVENOUS
Status: CANCELLED | OUTPATIENT
Start: 2026-01-13

## 2025-01-14 RX ADMIN — SODIUM CHLORIDE, PRESERVATIVE FREE 10 ML: 5 INJECTION INTRAVENOUS at 14:08

## 2025-01-14 RX ADMIN — SODIUM CHLORIDE, PRESERVATIVE FREE 10 ML: 5 INJECTION INTRAVENOUS at 14:41

## 2025-01-14 RX ADMIN — ZOLEDRONIC ACID 5 MG: 5 INJECTION INTRAVENOUS at 14:09

## 2025-01-14 NOTE — ASSESSMENT & PLAN NOTE
She continues to be uncontrolled despite using Astelin, Flonase, ipratropium.  She purchased the [employer] Sinugator.  She was having issues with using this device.  We discussed different methods on how to use the device.

## 2025-01-14 NOTE — PROGRESS NOTES
Outpatient Infusion Center   [facility]    Reclast Infusion    NAME:  Meghan Carpenter  YOB: 1955  MEDICAL RECORD NUMBER:  [id number]  DATE:  1/14/2025    Patient arrived to Outpatient Infusion Center   [] per wheelchair   [x] ambulatory     Is this the patient's first Reclast Infusion? No     Date of last Reclast Infusion? October 3, 2023.     Did the patient experience any adverse reactions to Reclast? No    Any recent oral or dental surgery? No    Any recent active fever, infections and/or illnesses? No    Patient has history of pathological fracture? No    Patient has had a recent fracture due to trauma or injury? No    Patient has had a recent orthopedic surgery or procedure done? No    Approximate date of last Dexa scan? 6/27/23      Patient has had at least 24 oz. of fluids today without caffeine? Yes      /85   Pulse 76   Temp 97.6 °F (36.4 °C) (Oral)   Resp 17   LMP 08/11/1993 (Approximate)   SpO2 100%     Labs   BMP:   Lab Results   Component Value Date/Time     01/14/2025 01:25 PM    K 4.2 01/14/2025 01:25 PM    K 4.1 09/26/2021 09:38 AM    CL 93 01/14/2025 01:25 PM    CO2 24 01/14/2025 01:25 PM    BUN 14 01/14/2025 01:25 PM    CREATININE 0.7 01/14/2025 01:25 PM    CALCIUM 9.3 01/14/2025 01:25 PM    GFRAA >60 09/02/2022 03:03 PM    GFRAA >60 03/21/2013 03:35 PM    LABGLOM >90 01/14/2025 01:25 PM    LABGLOM >60 03/06/2024 10:14 AM    GLUCOSE 103 01/14/2025 01:25 PM    GLUCOSE 78 03/15/2012 04:20 PM       Creatinine Clearance calculated by [facility]ault: 51    Administered Reclast via: [x] Peripheral access    [] PICC access    [] Port access    Reclast 5 mg given IVPB over 31 minutes.      Response to treatment:  Well tolerated by patient.      Electronically signed by Martha Gamez RN on 1/14/2025 at 2:43 PM

## 2025-01-14 NOTE — DISCHARGE INSTRUCTIONS
Outpatient Infusion Discharge Instructions  [facility]  [address] [place], [place] [address]  Telephone: [phone]      FAX [phone]    NAME:  Meghan Crapenter  YOB: 1955  MEDICAL RECORD NUMBER:  [id number]  DATE:  1/14/24    Reason for Outpatient Infusion Visit: Reclast    If you develop any these symptoms please contact you Doctor    [x] Nausea and/or vomiting not relieved with medication   [x] Swelling, redness, and/or bleeding at injection or IV site    [x] Fever or chills  [x] Rash or itching   [x] Shortness of breath  [x] Please review After Visit Summary (AVS) information on    [] Other      Outpatient Infusion Center Information: Should you experience any significant changes in your health or have questions about your care please contact the [facility] at [phone] MONDAY - FRIDAY 8:00 am - 4:00 pm.  If you need help outside these hours and cannot wait until we are again available, contact your Primary Care Physician or go to the hospital emergency room.       Electronically signed by Martha Gamez RN on 1/14/2025 at 2:16 PM

## 2025-01-14 NOTE — PROGRESS NOTES
to use the device.         Non-small cell lung cancer (HCC)     Following with oncology                          JAQUELINE Zamudio [facility]  [phone]

## 2025-01-21 RX ORDER — GLYCOPYRROLATE AND FORMOTEROL FUMARATE 9; 4.8 UG/1; UG/1
2 AEROSOL, METERED RESPIRATORY (INHALATION) 2 TIMES DAILY
Qty: 32.1 G | Refills: 11 | Status: SHIPPED | OUTPATIENT
Start: 2025-01-21

## 2025-01-21 NOTE — TELEPHONE ENCOUNTER
Last appt: 1/14/2025    Next appt: Visit date not found    Medication matches medication on Epic list

## 2025-01-23 ENCOUNTER — OFFICE VISIT (OUTPATIENT)
Dept: SURGERY | Age: 70
End: 2025-01-23

## 2025-01-23 VITALS — WEIGHT: 97 LBS | HEIGHT: 59 IN | BODY MASS INDEX: 19.56 KG/M2

## 2025-01-23 DIAGNOSIS — L98.411 SKIN ULCER OF BUTTOCK, LIMITED TO BREAKDOWN OF SKIN (HCC): Primary | ICD-10-CM

## 2025-01-23 PROCEDURE — 99024 POSTOP FOLLOW-UP VISIT: CPT | Performed by: SURGERY

## 2025-01-24 NOTE — PROGRESS NOTES
Meghan Carpenter (:  1955) is a 69 y.o. female,Established patient, here for evaluation of the following chief complaint(s):  Post-Op Check (Wound check)         Assessment & Plan  Skin ulcer of buttock, limited to breakdown of skin (HCC)     Follow up in two weeks                Subjective   HPI  Doing well overall. Wound is closing but slough again noted. Removed with a curette today, no complications. Follow up in two weeks.    Review of Systems       Objective   Physical Exam       Electronically signed by IRENE DAVILA MD on 2025 at 3:24 PM        An electronic signature was used to authenticate this note.    --IRENE DAVILA MD

## 2025-01-28 ENCOUNTER — OFFICE VISIT (OUTPATIENT)
Dept: INTERNAL MEDICINE CLINIC | Age: 70
End: 2025-01-28

## 2025-01-28 ENCOUNTER — OFFICE VISIT (OUTPATIENT)
Dept: INTERNAL MEDICINE CLINIC | Age: 70
End: 2025-01-28
Payer: MEDICARE

## 2025-01-28 VITALS
WEIGHT: 93.25 LBS | BODY MASS INDEX: 18.8 KG/M2 | HEART RATE: 88 BPM | OXYGEN SATURATION: 97 % | DIASTOLIC BLOOD PRESSURE: 77 MMHG | SYSTOLIC BLOOD PRESSURE: 122 MMHG | HEIGHT: 59 IN

## 2025-01-28 DIAGNOSIS — I50.22 CHRONIC SYSTOLIC CONGESTIVE HEART FAILURE (HCC): ICD-10-CM

## 2025-01-28 DIAGNOSIS — L89.152 PRESSURE INJURY OF SACRAL REGION, STAGE 2 (HCC): ICD-10-CM

## 2025-01-28 DIAGNOSIS — G37.9 DEMYELINATING DISORDER (HCC): ICD-10-CM

## 2025-01-28 DIAGNOSIS — E44.0 MODERATE PROTEIN-CALORIE MALNUTRITION: ICD-10-CM

## 2025-01-28 DIAGNOSIS — E87.1 HYPONATREMIA: ICD-10-CM

## 2025-01-28 DIAGNOSIS — M80.00XG AGE-RELATED OSTEOPOROSIS WITH CURRENT PATHOLOGICAL FRACTURE WITH DELAYED HEALING: ICD-10-CM

## 2025-01-28 DIAGNOSIS — J20.9 ACUTE BRONCHITIS, UNSPECIFIED ORGANISM: Primary | ICD-10-CM

## 2025-01-28 DIAGNOSIS — J44.9 COPD, MILD (HCC): ICD-10-CM

## 2025-01-28 DIAGNOSIS — R63.4 WEIGHT LOSS: ICD-10-CM

## 2025-01-28 DIAGNOSIS — C34.32 MALIGNANT NEOPLASM OF LOWER LOBE OF LEFT LUNG (HCC): ICD-10-CM

## 2025-01-28 DIAGNOSIS — F17.210 CIGARETTE NICOTINE DEPENDENCE WITHOUT COMPLICATION: ICD-10-CM

## 2025-01-28 DIAGNOSIS — Z00.00 MEDICARE ANNUAL WELLNESS VISIT, SUBSEQUENT: Primary | ICD-10-CM

## 2025-01-28 PROBLEM — L02.31 ABSCESS OF BUTTOCK, RIGHT: Status: RESOLVED | Noted: 2024-11-04 | Resolved: 2025-01-28

## 2025-01-28 PROCEDURE — 1159F MED LIST DOCD IN RCRD: CPT | Performed by: INTERNAL MEDICINE

## 2025-01-28 PROCEDURE — 1123F ACP DISCUSS/DSCN MKR DOCD: CPT | Performed by: INTERNAL MEDICINE

## 2025-01-28 PROCEDURE — G0439 PPPS, SUBSEQ VISIT: HCPCS | Performed by: INTERNAL MEDICINE

## 2025-01-28 PROCEDURE — 3017F COLORECTAL CA SCREEN DOC REV: CPT | Performed by: INTERNAL MEDICINE

## 2025-01-28 ASSESSMENT — PATIENT HEALTH QUESTIONNAIRE - PHQ9
3. TROUBLE FALLING OR STAYING ASLEEP: NOT AT ALL
SUM OF ALL RESPONSES TO PHQ9 QUESTIONS 1 & 2: 0
9. THOUGHTS THAT YOU WOULD BE BETTER OFF DEAD, OR OF HURTING YOURSELF: NOT AT ALL
4. FEELING TIRED OR HAVING LITTLE ENERGY: NOT AT ALL
SUM OF ALL RESPONSES TO PHQ QUESTIONS 1-9: 0
5. POOR APPETITE OR OVEREATING: NOT AT ALL
SUM OF ALL RESPONSES TO PHQ QUESTIONS 1-9: 0
SUM OF ALL RESPONSES TO PHQ QUESTIONS 1-9: 0
10. IF YOU CHECKED OFF ANY PROBLEMS, HOW DIFFICULT HAVE THESE PROBLEMS MADE IT FOR YOU TO DO YOUR WORK, TAKE CARE OF THINGS AT HOME, OR GET ALONG WITH OTHER PEOPLE: NOT DIFFICULT AT ALL
SUM OF ALL RESPONSES TO PHQ QUESTIONS 1-9: 0
8. MOVING OR SPEAKING SO SLOWLY THAT OTHER PEOPLE COULD HAVE NOTICED. OR THE OPPOSITE, BEING SO FIGETY OR RESTLESS THAT YOU HAVE BEEN MOVING AROUND A LOT MORE THAN USUAL: NOT AT ALL
2. FEELING DOWN, DEPRESSED OR HOPELESS: NOT AT ALL
6. FEELING BAD ABOUT YOURSELF - OR THAT YOU ARE A FAILURE OR HAVE LET YOURSELF OR YOUR FAMILY DOWN: NOT AT ALL
7. TROUBLE CONCENTRATING ON THINGS, SUCH AS READING THE NEWSPAPER OR WATCHING TELEVISION: NOT AT ALL
1. LITTLE INTEREST OR PLEASURE IN DOING THINGS: NOT AT ALL

## 2025-01-28 ASSESSMENT — LIFESTYLE VARIABLES: HOW MANY STANDARD DRINKS CONTAINING ALCOHOL DO YOU HAVE ON A TYPICAL DAY: PATIENT DOES NOT DRINK

## 2025-01-28 NOTE — PROGRESS NOTES
Chief Complaint   Patient presents with    6 Month Follow-Up       HPI: Here for followup and management of multiple chronic conditions as per the active problems list on chart, which I reviewed and updated with the patient today. States doing well with no new concerns except if noted below.    Also review of recent labs.     Reviewed weight chart as well. BMI at 20.06 today.    Also c/o 3-4 days of cough producing yellow phlegm, but no fever or change in breathing.     I have reviewed the chart notes available from myself and other providers. I have reviewed and addressed all active problems and created or updated the problems list in detail, as needed.    No problem-specific Assessment & Plan notes found for this encounter.      I have extensively reviewed and reconciled the medication list, discontinued medications not taking or no longer appropriate, and updated the active meds list        Lab Results   Component Value Date    LABA1C 5.2 01/21/2022    LABA1C 5.4 02/08/2019       Lab Results   Component Value Date     01/28/2025     (L) 01/14/2025     03/06/2024    K 4.1 01/28/2025    K 4.2 01/14/2025    K 4.2 03/06/2024     01/28/2025    CL 93 (L) 01/14/2025     03/06/2024    CO2 28 01/28/2025    CO2 24 01/14/2025    CO2 25 03/06/2024    BUN 22 (H) 01/28/2025    BUN 14 01/14/2025    BUN 19 03/06/2024    CREATININE 0.8 01/28/2025    CREATININE 0.7 01/14/2025    CREATININE 0.9 03/06/2024    GLUCOSE 88 01/28/2025    GLUCOSE 103 (H) 01/14/2025    GLUCOSE 109 (H) 03/06/2024    CALCIUM 9.9 01/28/2025    CALCIUM 9.3 01/14/2025    CALCIUM 9.2 03/06/2024       Lab Results   Component Value Date    CHOL 131 03/06/2024    CHOL 159 01/24/2023    CHOL 138 09/28/2021    TRIG 62 03/06/2024    TRIG 64 01/24/2023    TRIG 55 09/28/2021    HDL 56 03/06/2024    HDL 69 (H) 01/24/2023    HDL 55 09/28/2021       Lab Results   Component Value Date    ALT 17 03/06/2024    ALT 20 01/24/2023    ALT 13

## 2025-01-28 NOTE — PATIENT INSTRUCTIONS

## 2025-01-28 NOTE — PROGRESS NOTES
Never      Tobacco comments: started age 13, down to 7cig a day;     Interventions:  See AVS for additional education material                      Objective   There were no vitals filed for this visit.   There is no height or weight on file to calculate BMI.                    Allergies   Allergen Reactions    Adhesive Tape Dermatitis     Blisters on skin under tape or medication patches    Ibuprofen Nausea Only    Naproxen Nausea Only    Nsaids Other (See Comments)     GI upset    Vicodin [Hydrocodone-Acetaminophen] Nausea Only     headache    Hydrocodone Headaches and Nausea Only     Prior to Visit Medications    Medication Sig Taking? Authorizing Provider   BEVESPI AEROSPHERE 9-4.8 MCG/ACT AERO INHALE 2 PUFFS BY MOUTH TWICE A DAY  Tiburcio Darnell MD   valACYclovir (VALTREX) 1 g tablet TAKE 1 TABLET BY MOUTH DAILY  Eli Dalton MD   atorvastatin (LIPITOR) 40 MG tablet TAKE 1 TABLET BY MOUTH DAILY  Eli Dalton MD   empagliflozin (JARDIANCE) 10 MG tablet TAKE ONE TABLET BY MOUTH EVERY MORNING  Roderick Alonzo MD   ENTRESTO 49-51 MG per tablet TAKE 1 TABLET BY MOUTH 2 TIMES A DAY  Roderick Alonzo MD   ipratropium (ATROVENT) 0.03 % nasal spray SPRAY 2 SPRAYS IN EACH NOSTRIL TWICE A DAY  Tiburcio Darnell MD   apixaban (ELIQUIS) 5 MG TABS tablet TAKE 1 TABLET BY MOUTH TWICE A DAY  Roderick Alonzo MD   carvedilol (COREG) 6.25 MG tablet TAKE 1 TABLET BY MOUTH TWICE A DAY WITH A MEAL  Roderick Alonzo MD   gabapentin (NEURONTIN) 800 MG tablet Take 1 tablet by mouth 2 times daily for 180 days.  Eli Dalton MD   fluticasone (FLONASE) 50 MCG/ACT nasal spray SPRAY TWO SPRAYS IN EACH NOSTRIL ONCE DAILY  Tiburcio Darnell MD   spironolactone (ALDACTONE) 25 MG tablet Take 0.5 tablets by mouth daily  Roderick Alonzo MD   menthol-zinc oxide (CALMOSEPTINE) 0.44-20.6 % OINT ointment Apply topically 4 times daily as needed (apply with each bathroom break) Max 30 ml per day.  Lekson, Eli L,

## 2025-01-28 NOTE — PROGRESS NOTES
Chief Complaint   Patient presents with    6 Month Follow-Up       HPI:     Medications reviewed and reconciled with what patient reports to be taking.    /77   Pulse (!) 101   Ht 1.486 m (4' 10.5\")   Wt 42.3 kg (93 lb 4 oz)   LMP 08/11/1993 (Approximate)   SpO2 97%   BMI 19.16 kg/m²     Physical Exam    ASSESSMENT/PLAN: Pt received counseling and, if relevant, printed instructions for all symptoms listed in CC and HPI, as well as for all diagnoses listed below.    There are no diagnoses linked to this encounter.    Problem List Items Addressed This Visit    None        No follow-ups on file.

## 2025-01-29 LAB
ALBUMIN SERPL-MCNC: 4 G/DL (ref 3.4–5)
ANION GAP SERPL CALCULATED.3IONS-SCNC: 10 MMOL/L (ref 3–16)
BUN SERPL-MCNC: 22 MG/DL (ref 7–20)
CALCIUM SERPL-MCNC: 9.9 MG/DL (ref 8.3–10.6)
CHLORIDE SERPL-SCNC: 104 MMOL/L (ref 99–110)
CHLORIDE UR-SCNC: 117 MMOL/L
CO2 SERPL-SCNC: 28 MMOL/L (ref 21–32)
CREAT SERPL-MCNC: 0.8 MG/DL (ref 0.6–1.2)
GFR SERPLBLD CREATININE-BSD FMLA CKD-EPI: 78 ML/MIN/{1.73_M2}
GLUCOSE SERPL-MCNC: 88 MG/DL (ref 70–99)
OSMOLALITY SERPL: 306 MOSM/KG (ref 280–301)
OSMOLALITY UR: 743 MOSM/KG (ref 390–1070)
PHOSPHATE SERPL-MCNC: 3.7 MG/DL (ref 2.5–4.9)
POTASSIUM SERPL-SCNC: 4.1 MMOL/L (ref 3.5–5.1)
POTASSIUM UR-SCNC: 60.4 MMOL/L
SODIUM SERPL-SCNC: 142 MMOL/L (ref 136–145)
SODIUM UR-SCNC: 99 MMOL/L

## 2025-02-03 ENCOUNTER — OFFICE VISIT (OUTPATIENT)
Dept: SURGERY | Age: 70
End: 2025-02-03

## 2025-02-03 VITALS — BODY MASS INDEX: 18.75 KG/M2 | HEIGHT: 59 IN | WEIGHT: 93 LBS

## 2025-02-03 DIAGNOSIS — L98.411 SKIN ULCER OF BUTTOCK, LIMITED TO BREAKDOWN OF SKIN (HCC): Primary | ICD-10-CM

## 2025-02-03 PROCEDURE — 99024 POSTOP FOLLOW-UP VISIT: CPT | Performed by: SURGERY

## 2025-02-04 ENCOUNTER — OFFICE VISIT (OUTPATIENT)
Dept: CARDIOLOGY CLINIC | Age: 70
End: 2025-02-04
Payer: MEDICARE

## 2025-02-04 VITALS
WEIGHT: 96 LBS | HEIGHT: 58 IN | OXYGEN SATURATION: 97 % | HEART RATE: 93 BPM | DIASTOLIC BLOOD PRESSURE: 68 MMHG | SYSTOLIC BLOOD PRESSURE: 120 MMHG | BODY MASS INDEX: 20.15 KG/M2

## 2025-02-04 DIAGNOSIS — I34.0 NONRHEUMATIC MITRAL VALVE REGURGITATION: ICD-10-CM

## 2025-02-04 DIAGNOSIS — Z86.711 HISTORY OF PULMONARY EMBOLISM: ICD-10-CM

## 2025-02-04 DIAGNOSIS — I50.42 CHRONIC COMBINED SYSTOLIC AND DIASTOLIC CONGESTIVE HEART FAILURE (HCC): Primary | ICD-10-CM

## 2025-02-04 DIAGNOSIS — I25.10 CORONARY ARTERY DISEASE INVOLVING NATIVE CORONARY ARTERY OF NATIVE HEART WITHOUT ANGINA PECTORIS: ICD-10-CM

## 2025-02-04 DIAGNOSIS — I10 ESSENTIAL HYPERTENSION: ICD-10-CM

## 2025-02-04 PROCEDURE — G8427 DOCREV CUR MEDS BY ELIG CLIN: HCPCS | Performed by: INTERNAL MEDICINE

## 2025-02-04 PROCEDURE — 93000 ELECTROCARDIOGRAM COMPLETE: CPT | Performed by: INTERNAL MEDICINE

## 2025-02-04 PROCEDURE — 1123F ACP DISCUSS/DSCN MKR DOCD: CPT | Performed by: INTERNAL MEDICINE

## 2025-02-04 PROCEDURE — 1159F MED LIST DOCD IN RCRD: CPT | Performed by: INTERNAL MEDICINE

## 2025-02-04 PROCEDURE — 1090F PRES/ABSN URINE INCON ASSESS: CPT | Performed by: INTERNAL MEDICINE

## 2025-02-04 PROCEDURE — G8399 PT W/DXA RESULTS DOCUMENT: HCPCS | Performed by: INTERNAL MEDICINE

## 2025-02-04 PROCEDURE — 3078F DIAST BP <80 MM HG: CPT | Performed by: INTERNAL MEDICINE

## 2025-02-04 PROCEDURE — 3017F COLORECTAL CA SCREEN DOC REV: CPT | Performed by: INTERNAL MEDICINE

## 2025-02-04 PROCEDURE — 99214 OFFICE O/P EST MOD 30 MIN: CPT | Performed by: INTERNAL MEDICINE

## 2025-02-04 PROCEDURE — 4004F PT TOBACCO SCREEN RCVD TLK: CPT | Performed by: INTERNAL MEDICINE

## 2025-02-04 PROCEDURE — 3074F SYST BP LT 130 MM HG: CPT | Performed by: INTERNAL MEDICINE

## 2025-02-04 PROCEDURE — G8420 CALC BMI NORM PARAMETERS: HCPCS | Performed by: INTERNAL MEDICINE

## 2025-02-04 RX ORDER — EMPAGLIFLOZIN 10 MG/1
10 TABLET, FILM COATED ORAL EVERY MORNING
Qty: 90 TABLET | Refills: 3 | Status: SHIPPED | OUTPATIENT
Start: 2025-02-04

## 2025-02-04 NOTE — PROGRESS NOTES
Meghan Carpenter (:  1955) is a 69 y.o. female,Established patient, here for evaluation of the following chief complaint(s):  Post-Op Check (Wound check)         Assessment & Plan  Skin ulcer of buttock, limited to breakdown of skin (HCC)     Follow up in two weeks              Subjective   HPI  Doing well overall. Minimal pain but has not resolved. Wound is improving. Less depth. Slough removed today. Follow up in two weeks.    Review of Systems       Objective   Physical Exam       Electronically signed by IRENE DAVILA MD on 2025 at 5:46 PM        An electronic signature was used to authenticate this note.    --IRENE DAVILA MD

## 2025-02-04 NOTE — TELEPHONE ENCOUNTER
Last OV: 8/2/24  Next OV: 2/4/25  Last refill: 11/11/24 #84  Most recent Labs: 1/28/25  Last EKG (if needed): 2/2/24

## 2025-02-04 NOTE — PROGRESS NOTES
[facility]      Cardiology Consult    Meghan Carpenter  1955    February 4, 2025    Referring Physician: Eli Dalton MD  Reason for Referral: CHF    CC: \"I have no concerns.\"     HPI:  The patient is 69 y.o. female with a past medical history significant for hyperlipidemia, systolic heart failure, COPD, and mitral regurgitation who presents today for management of heart failure. She was initially 9/2021 while admitted for a PE. Her echo showed severe MR and an EF 40%.  With medical therapy, mitral regurgitation appeared much less significant however EF remained reduced. Today, the patient reports she is doing well with no specific cardiac complaints since our last visit. She has an aid help at her home twice weekly. She has chronic PAYTON but denies any acute changes.  Patient denies exertional chest pain/pressure, dyspnea at rest, PND, orthopnea, palpitations, lightheadedness, weight changes, changes in LE edema, near syncope and syncope. The patient admits to medical therapy compliance and feels she is tolerating. She denies abnormal bruising or bleeding on OAC.     Past Medical History:   Diagnosis Date    Anxiety     CHF (congestive heart failure) (HCC)     Chronic headaches     CT done 4/2016    Colon polyp     COPD, mild (HCC)     PFT 8/08 a smoker no symptoms-no meds    DDD (degenerative disc disease)     Depression     Fibromyalgia     Frequent UTI     Full dentures     Hallux valgus     Hearing loss in left ear 07/29/2011    HSV (herpes simplex virus) anogenital infection     leg    Hyperlipidemia     Lung nodule 2019    2 LLL nodules    Malignant neoplasm of left lung (HCC)     Menopausal state 1994    Neuropathy     Occipital neuralgia     Osteoarthritis     Scoliosis     neck and back brace intermittently, based on pain    Smoker     Spinal stenosis     cane, walker    Thyroid disease     Unexplained weight loss     Patient states over 30# weight loss over 6 months and pcp is 
Jardiance 10 mg daily, Coreg 6.25 mg BID, and Aldactone 25 mg daily. Kidney function normal (3/6/24). Will repeat lab work if not completed prior to next OV. Encouraged low sodium diet and monitor weights daily. Advised to call with new/worsening symptoms.      2) Essential hypertension. Controlled. Goal BP <130/80. Continue medical therapy.    3) Mitral/aortic regurgitation. Mild to moderate per echo 4/2022, repeat echo 12/2022 showed mild aortic regurgitation. Will continue to monitor clinically and repeat echo periodically. Advised to call with new/worsening symptoms.     4) History of PE. Currently on Eliquis 5 mg BID.      5) COPD/lung cancer. Chronic PAYTON that remains unchanged. Management per PCP and following with [facility].     6) CAD. Based on coronary artery calcifications. Denies angina. Stress test showed no reversible ischemia. LDL 63 (3/6/24). Continue statin therapy with Lipitor 40 mg daily.      7) Nicotine Addiction. Encouraged smoking cessation.    Follow up in 6 months     Thank you very much for allowing me to participate in the care of your patient. Please do not hesitate to contact me if you have any questions.    Sincerely,  Roderick Alonzo MD      [facility]  [address], [address]   [place] [address]  Ph: [phone]  Fax: [phone]    Physician Attestation: The scribes documentation has been prepared under my direction and personally reviewed by me in its entirety.   I confirm that the note above accurately reflects all work, treatment, procedures, and medical decision making performed by me.   All portions of the note including but not limited to the chief complaint, history of present illness, physical exam, assessment and plan/medical decision making were personally reviewed, edited, and updated on the day of the visit.

## 2025-02-17 ENCOUNTER — OFFICE VISIT (OUTPATIENT)
Dept: SURGERY | Age: 70
End: 2025-02-17

## 2025-02-17 VITALS — WEIGHT: 96 LBS | BODY MASS INDEX: 20.15 KG/M2 | HEIGHT: 58 IN

## 2025-02-17 DIAGNOSIS — L98.411 SKIN ULCER OF BUTTOCK, LIMITED TO BREAKDOWN OF SKIN (HCC): Primary | ICD-10-CM

## 2025-02-17 PROCEDURE — 99024 POSTOP FOLLOW-UP VISIT: CPT | Performed by: SURGERY

## 2025-02-17 RX ORDER — LIDOCAINE HYDROCHLORIDE 20 MG/ML
5 INJECTION, SOLUTION INFILTRATION; PERINEURAL ONCE
Status: COMPLETED | OUTPATIENT
Start: 2025-02-17 | End: 2025-02-17

## 2025-02-17 RX ADMIN — LIDOCAINE HYDROCHLORIDE 5 ML: 20 INJECTION, SOLUTION INFILTRATION; PERINEURAL at 12:11

## 2025-02-17 NOTE — PROGRESS NOTES
Meghan Carpenter (:  1955) is a 69 y.o. female,Established patient, here for evaluation of the following chief complaint(s):  Post-Op Check (Post op)         Assessment & Plan  Skin ulcer of buttock, limited to breakdown of skin (HCC)       Orders:    lidocaine 2 % injection 5 mL    Follow up in two weeks       Subjective   HPI  Continues to slowly improve. Slough again removed without difficulty. No complications. Follow up in two weeks.    Review of Systems       Objective   Physical Exam       Electronically signed by IRENE DAVILA MD on 2025 at 3:04 PM        An electronic signature was used to authenticate this note.    --IRENE DAVILA MD

## 2025-03-01 DIAGNOSIS — M79.7 FIBROMYALGIA: ICD-10-CM

## 2025-03-03 ENCOUNTER — OFFICE VISIT (OUTPATIENT)
Dept: SURGERY | Age: 70
End: 2025-03-03

## 2025-03-03 VITALS — SYSTOLIC BLOOD PRESSURE: 120 MMHG | WEIGHT: 96 LBS | BODY MASS INDEX: 20.07 KG/M2 | DIASTOLIC BLOOD PRESSURE: 70 MMHG

## 2025-03-03 DIAGNOSIS — L98.411 SKIN ULCER OF BUTTOCK, LIMITED TO BREAKDOWN OF SKIN (HCC): Primary | ICD-10-CM

## 2025-03-03 PROCEDURE — 99024 POSTOP FOLLOW-UP VISIT: CPT | Performed by: SURGERY

## 2025-03-03 RX ORDER — GABAPENTIN 800 MG/1
800 TABLET ORAL 2 TIMES DAILY
Qty: 60 TABLET | Refills: 0 | Status: SHIPPED | OUTPATIENT
Start: 2025-03-03 | End: 2025-04-02

## 2025-03-03 NOTE — PROGRESS NOTES
Meghan Carpenter (:  1955) is a 69 y.o. female,Established patient, here for evaluation of the following chief complaint(s):  Post-Op Check (S/P Excision of nonhealing ulcer, right buttock 2 cm. 2024 - some blood )         Assessment & Plan  Skin ulcer of buttock, limited to breakdown of skin (HCC)            Follow up in two weeks       Subjective   HPI  Doing better overall but still with some pain. Ulcer is closing with minimal depth today. Superficial slough removed with curette today. Follow up in two weeks.    Review of Systems       Objective   Physical Exam       Electronically signed by IRENE DAVILA MD on 3/3/2025 at 11:57 AM        An electronic signature was used to authenticate this note.    --IRENE DAVILA MD

## 2025-03-12 DIAGNOSIS — B00.9 HERPES SIMPLEX: ICD-10-CM

## 2025-03-12 RX ORDER — VALACYCLOVIR HYDROCHLORIDE 1 G/1
1000 TABLET, FILM COATED ORAL DAILY
Qty: 81 TABLET | Refills: 0 | Status: SHIPPED | OUTPATIENT
Start: 2025-03-12

## 2025-03-21 ENCOUNTER — HOSPITAL ENCOUNTER (OUTPATIENT)
Age: 70
Discharge: HOME | End: 2025-03-23
Attending: INTERNAL MEDICINE
Payer: MEDICARE

## 2025-03-21 DIAGNOSIS — I34.0 NONRHEUMATIC MITRAL VALVE REGURGITATION: ICD-10-CM

## 2025-03-21 DIAGNOSIS — I50.42 CHRONIC COMBINED SYSTOLIC AND DIASTOLIC CONGESTIVE HEART FAILURE (HCC): ICD-10-CM

## 2025-03-21 LAB
ECHO AO ROOT DIAM: 2.7 CM
ECHO AR MAX VEL PISA: 4.9 M/S
ECHO AV AREA PEAK VELOCITY: 2.1 CM2
ECHO AV AREA VTI: 2.2 CM2
ECHO AV MEAN GRADIENT: 5 MMHG
ECHO AV MEAN VELOCITY: 1 M/S
ECHO AV PEAK GRADIENT: 8 MMHG
ECHO AV PEAK VELOCITY: 1.4 M/S
ECHO AV REGURGITANT PHT: 370 MS
ECHO AV VELOCITY RATIO: 0.71
ECHO AV VTI: 29.6 CM
ECHO EST RA PRESSURE: 8 MMHG
ECHO LA AREA 2C: 21.4 CM2
ECHO LA AREA 4C: 16.5 CM2
ECHO LA MAJOR AXIS: 5.2 CM
ECHO LA MINOR AXIS: 5 CM
ECHO LA VOL BP: 56 ML (ref 22–52)
ECHO LA VOL MOD A2C: 72 ML (ref 22–52)
ECHO LA VOL MOD A4C: 42 ML (ref 22–52)
ECHO LV E' LATERAL VELOCITY: 7.28 CM/S
ECHO LV E' SEPTAL VELOCITY: 4.51 CM/S
ECHO LV EDV 3D: 91 ML
ECHO LV EF PHYSICIAN: 55 %
ECHO LV EJECTION FRACTION 3D: 56 %
ECHO LV ESV 3D: 40 ML
ECHO LV FRACTIONAL SHORTENING: 41 % (ref 28–44)
ECHO LV INTERNAL DIMENSION DIASTOLIC: 4.9 CM (ref 3.9–5.3)
ECHO LV INTERNAL DIMENSION SYSTOLIC: 2.9 CM
ECHO LV IVSD: 0.9 CM (ref 0.6–0.9)
ECHO LV MASS 2D: 131.2 G (ref 67–162)
ECHO LV MASS 3D: 93 G
ECHO LV POSTERIOR WALL DIASTOLIC: 0.7 CM (ref 0.6–0.9)
ECHO LV RELATIVE WALL THICKNESS RATIO: 0.29
ECHO LVOT AREA: 3.1 CM2
ECHO LVOT AV VTI INDEX: 0.68
ECHO LVOT DIAM: 2 CM
ECHO LVOT MEAN GRADIENT: 2 MMHG
ECHO LVOT PEAK GRADIENT: 4 MMHG
ECHO LVOT PEAK VELOCITY: 1 M/S
ECHO LVOT SV: 63.1 ML
ECHO LVOT VTI: 20.1 CM
ECHO MV A VELOCITY: 0.85 M/S
ECHO MV E DECELERATION TIME (DT): 148 MS
ECHO MV E VELOCITY: 0.91 M/S
ECHO MV E/A RATIO: 1.07
ECHO MV E/E' LATERAL: 12.5
ECHO MV E/E' RATIO (AVERAGED): 16.34
ECHO MV E/E' SEPTAL: 20.18
ECHO MV REGURGITANT PEAK GRADIENT: 92 MMHG
ECHO MV REGURGITANT PEAK VELOCITY: 4.8 M/S
ECHO MV REGURGITANT VTIA: 173 CM
ECHO PV MAX VELOCITY: 1 M/S
ECHO PV MEAN GRADIENT: 2 MMHG
ECHO PV MEAN VELOCITY: 0.7 M/S
ECHO PV PEAK GRADIENT: 4 MMHG
ECHO PV VTI: 19.7 CM
ECHO RA AREA 4C: 10 CM2
ECHO RA VOLUME: 19 ML
ECHO RIGHT VENTRICULAR SYSTOLIC PRESSURE (RVSP): 49 MMHG
ECHO RV BASAL DIMENSION: 3.6 CM
ECHO RV FREE WALL PEAK S': 9.4 CM/S
ECHO RV MID DIMENSION: 2.3 CM
ECHO RV TAPSE: 2.2 CM (ref 1.7–?)
ECHO TV REGURGITANT MAX VELOCITY: 3.19 M/S
ECHO TV REGURGITANT PEAK GRADIENT: 41 MMHG

## 2025-03-21 PROCEDURE — 93306 TTE W/DOPPLER COMPLETE: CPT | Performed by: INTERNAL MEDICINE

## 2025-03-21 PROCEDURE — 93306 TTE W/DOPPLER COMPLETE: CPT

## 2025-03-21 PROCEDURE — 76376 3D RENDER W/INTRP POSTPROCES: CPT | Performed by: INTERNAL MEDICINE

## 2025-03-24 ENCOUNTER — OFFICE VISIT (OUTPATIENT)
Dept: SURGERY | Age: 70
End: 2025-03-24

## 2025-03-24 ENCOUNTER — RESULTS FOLLOW-UP (OUTPATIENT)
Age: 70
End: 2025-03-24

## 2025-03-24 VITALS — WEIGHT: 96 LBS | HEIGHT: 58 IN | BODY MASS INDEX: 20.15 KG/M2

## 2025-03-24 DIAGNOSIS — L98.411 SKIN ULCER OF BUTTOCK, LIMITED TO BREAKDOWN OF SKIN (HCC): Primary | ICD-10-CM

## 2025-03-24 PROCEDURE — 99024 POSTOP FOLLOW-UP VISIT: CPT | Performed by: SURGERY

## 2025-03-25 NOTE — PROGRESS NOTES
Meghan Carpenter (:  1955) is a 69 y.o. female,Established patient, here for evaluation of the following chief complaint(s):  Post-Op Check (Wound check)         Assessment & Plan  Skin ulcer of buttock, limited to breakdown of skin (HCC)     Follow up with me as needed                Subjective   HPI  Doing well overall. Minimal drainage and pain is nearly resolved. Appears to be nearly healed. No intervention today. Follow up with me as needed    Review of Systems       Objective   Physical Exam       Electronically signed by IRENE DAVILA MD on 3/25/2025 at 10:13 AM        An electronic signature was used to authenticate this note.    --IRENE DAVILA MD

## 2025-03-30 DIAGNOSIS — M79.7 FIBROMYALGIA: ICD-10-CM

## 2025-03-31 RX ORDER — GABAPENTIN 800 MG/1
TABLET ORAL
Qty: 60 TABLET | Refills: 0 | Status: SHIPPED | OUTPATIENT
Start: 2025-03-31 | End: 2025-04-30

## 2025-04-14 ENCOUNTER — OFFICE VISIT (OUTPATIENT)
Dept: SURGERY | Age: 70
End: 2025-04-14

## 2025-04-14 VITALS — WEIGHT: 96 LBS | HEIGHT: 58 IN | BODY MASS INDEX: 20.15 KG/M2

## 2025-04-14 DIAGNOSIS — L98.411 SKIN ULCER OF BUTTOCK, LIMITED TO BREAKDOWN OF SKIN (HCC): Primary | ICD-10-CM

## 2025-04-14 PROCEDURE — 99024 POSTOP FOLLOW-UP VISIT: CPT | Performed by: SURGERY

## 2025-04-15 NOTE — PROGRESS NOTES
Meghan Carpenter (:  1955) is a 69 y.o. female,Established patient, here for evaluation of the following chief complaint(s):  Post-Op Check (Wound check)         Assessment & Plan  Skin ulcer of buttock, limited to breakdown of skin (HCC)            Follow up in one month       Subjective   HPI  Doing better overall. Still with some pain but no drainage. Wound is healed but edges of the ulcer are slightly raised, may need debridement at some point. Follow up in one month or sooner if symptoms worsen.    Review of Systems       Objective   Physical Exam       Electronically signed by IRENE DAVILA MD on 4/15/2025 at 9:17 AM        An electronic signature was used to authenticate this note.    --IRENE DAVILA MD

## 2025-04-21 RX ORDER — SPIRONOLACTONE 25 MG/1
12.5 TABLET ORAL DAILY
Qty: 45 TABLET | Refills: 1 | Status: SHIPPED | OUTPATIENT
Start: 2025-04-21

## 2025-04-28 DIAGNOSIS — B00.9 HERPES SIMPLEX: ICD-10-CM

## 2025-04-29 RX ORDER — VALACYCLOVIR HYDROCHLORIDE 1 G/1
1000 TABLET, FILM COATED ORAL DAILY
Qty: 81 TABLET | Refills: 0 | Status: SHIPPED | OUTPATIENT
Start: 2025-04-29

## 2025-04-30 DIAGNOSIS — E78.00 HYPERCHOLESTEREMIA: ICD-10-CM

## 2025-04-30 RX ORDER — ATORVASTATIN CALCIUM 40 MG/1
40 TABLET, FILM COATED ORAL DAILY
Qty: 90 TABLET | Refills: 2 | Status: SHIPPED | OUTPATIENT
Start: 2025-04-30

## 2025-05-02 DIAGNOSIS — M79.7 FIBROMYALGIA: ICD-10-CM

## 2025-05-02 RX ORDER — GABAPENTIN 800 MG/1
800 TABLET ORAL 2 TIMES DAILY
Qty: 60 TABLET | Refills: 3 | Status: SHIPPED | OUTPATIENT
Start: 2025-05-02 | End: 2025-06-01

## 2025-05-12 ENCOUNTER — OFFICE VISIT (OUTPATIENT)
Dept: SURGERY | Age: 70
End: 2025-05-12

## 2025-05-12 VITALS — WEIGHT: 96 LBS | BODY MASS INDEX: 20.15 KG/M2 | HEIGHT: 58 IN

## 2025-05-12 DIAGNOSIS — L98.411 SKIN ULCER OF BUTTOCK, LIMITED TO BREAKDOWN OF SKIN (HCC): Primary | ICD-10-CM

## 2025-05-12 PROCEDURE — 99024 POSTOP FOLLOW-UP VISIT: CPT | Performed by: SURGERY

## 2025-05-12 NOTE — PROGRESS NOTES
Meghan Carpenter (:  1955) is a 69 y.o. female,Established patient, here for evaluation of the following chief complaint(s):  Post-Op Check (Wound check)         Assessment & Plan  Skin ulcer of buttock, limited to breakdown of skin (HCC)            Follow up with me as needed         Subjective   HPI  Doing better. Wound has closed but  at times  Observe for now. Follow up with me as needed    Review of Systems       Objective   Physical Exam       Electronically signed by IRENE DAVLIA MD on 2025 at 2:22 PM        An electronic signature was used to authenticate this note.    --IRENE DAVILA MD

## 2025-05-16 ENCOUNTER — HOSPITAL ENCOUNTER (OUTPATIENT)
Dept: CT IMAGING | Age: 70
Discharge: HOME | End: 2025-05-16
Attending: INTERNAL MEDICINE
Payer: MEDICARE

## 2025-05-16 DIAGNOSIS — C34.32 MALIGNANT NEOPLASM OF LOWER LOBE OF LEFT LUNG (HCC): ICD-10-CM

## 2025-05-16 PROCEDURE — 71250 CT THORAX DX C-: CPT

## 2025-06-02 ENCOUNTER — OFFICE VISIT (OUTPATIENT)
Dept: SURGERY | Age: 70
End: 2025-06-02

## 2025-06-02 VITALS — BODY MASS INDEX: 20.57 KG/M2 | WEIGHT: 98 LBS | HEIGHT: 58 IN

## 2025-06-02 DIAGNOSIS — L98.411 SKIN ULCER OF BUTTOCK, LIMITED TO BREAKDOWN OF SKIN (HCC): Primary | ICD-10-CM

## 2025-06-02 PROBLEM — G56.02 CARPAL TUNNEL SYNDROME, LEFT: Status: RESOLVED | Noted: 2022-02-14 | Resolved: 2025-06-02

## 2025-06-02 PROBLEM — M21.512 CLAW HAND OF LEFT UPPER EXTREMITY: Status: RESOLVED | Noted: 2022-04-04 | Resolved: 2025-06-02

## 2025-06-02 PROBLEM — F33.0 MILD EPISODE OF RECURRENT MAJOR DEPRESSIVE DISORDER: Status: RESOLVED | Noted: 2022-07-29 | Resolved: 2025-06-02

## 2025-06-02 PROBLEM — R94.131 ABNORMAL EMG: Status: RESOLVED | Noted: 2022-04-04 | Resolved: 2025-06-02

## 2025-06-02 PROBLEM — M54.2 CHRONIC NECK PAIN: Status: RESOLVED | Noted: 2022-09-16 | Resolved: 2025-06-02

## 2025-06-02 PROBLEM — G89.29 CHRONIC LOW BACK PAIN: Status: RESOLVED | Noted: 2022-09-16 | Resolved: 2025-06-02

## 2025-06-02 PROBLEM — M54.50 CHRONIC LOW BACK PAIN: Status: RESOLVED | Noted: 2022-09-16 | Resolved: 2025-06-02

## 2025-06-02 PROBLEM — G89.29 CHRONIC NECK PAIN: Status: RESOLVED | Noted: 2022-09-16 | Resolved: 2025-06-02

## 2025-06-02 PROCEDURE — 99024 POSTOP FOLLOW-UP VISIT: CPT | Performed by: SURGERY

## 2025-06-02 NOTE — PROGRESS NOTES
Meghan Carpenter (:  1955) is a 69 y.o. female,Established patient, here for evaluation of the following chief complaint(s):  Post-Op Check (Wound check)         Assessment & Plan  Skin ulcer of buttock, limited to breakdown of skin (HCC)     Follow up with me as needed                Subjective   HPI  Doing well with less pain, no drainage. Nearly healed on exam. Follow up with me as needed    Review of Systems       Objective   Physical Exam       Electronically signed by IRENE DAVILA MD on 2025 at 3:37 PM        An electronic signature was used to authenticate this note.    --IRENE DAVILA MD

## 2025-06-10 DIAGNOSIS — J30.1 ALLERGIC RHINITIS DUE TO POLLEN, UNSPECIFIED SEASONALITY: ICD-10-CM

## 2025-06-10 RX ORDER — AZELASTINE HYDROCHLORIDE 137 UG/1
2 SPRAY, METERED NASAL DAILY PRN
Qty: 30 ML | Refills: 5 | Status: SHIPPED | OUTPATIENT
Start: 2025-06-10 | End: 2025-06-13 | Stop reason: SDUPTHER

## 2025-06-13 DIAGNOSIS — J30.1 ALLERGIC RHINITIS DUE TO POLLEN, UNSPECIFIED SEASONALITY: ICD-10-CM

## 2025-06-13 RX ORDER — AZELASTINE HYDROCHLORIDE 137 UG/1
2 SPRAY, METERED NASAL DAILY PRN
Qty: 30 ML | Refills: 5 | Status: SHIPPED | OUTPATIENT
Start: 2025-06-13

## 2025-07-23 ENCOUNTER — HOSPITAL ENCOUNTER (OUTPATIENT)
Dept: WOMENS IMAGING | Age: 70
Discharge: HOME | End: 2025-07-23
Payer: MEDICARE

## 2025-07-23 VITALS — WEIGHT: 98 LBS | HEIGHT: 58 IN | BODY MASS INDEX: 20.57 KG/M2

## 2025-07-23 DIAGNOSIS — Z12.31 SCREENING MAMMOGRAM, ENCOUNTER FOR: ICD-10-CM

## 2025-07-23 DIAGNOSIS — Z78.0 ASYMPTOMATIC MENOPAUSAL STATE: ICD-10-CM

## 2025-07-23 PROCEDURE — 77080 DXA BONE DENSITY AXIAL: CPT

## 2025-07-23 PROCEDURE — 77063 BREAST TOMOSYNTHESIS BI: CPT

## 2025-08-08 ENCOUNTER — OFFICE VISIT (OUTPATIENT)
Dept: CARDIOLOGY CLINIC | Age: 70
End: 2025-08-08
Payer: MEDICARE

## 2025-08-08 VITALS
OXYGEN SATURATION: 96 % | SYSTOLIC BLOOD PRESSURE: 132 MMHG | HEIGHT: 58 IN | DIASTOLIC BLOOD PRESSURE: 80 MMHG | BODY MASS INDEX: 20.78 KG/M2 | WEIGHT: 99 LBS | HEART RATE: 86 BPM

## 2025-08-08 DIAGNOSIS — I10 ESSENTIAL HYPERTENSION: ICD-10-CM

## 2025-08-08 DIAGNOSIS — I34.0 NONRHEUMATIC MITRAL VALVE REGURGITATION: ICD-10-CM

## 2025-08-08 DIAGNOSIS — I50.42 CHRONIC COMBINED SYSTOLIC AND DIASTOLIC CONGESTIVE HEART FAILURE (HCC): Primary | ICD-10-CM

## 2025-08-08 DIAGNOSIS — I25.10 CORONARY ARTERY DISEASE INVOLVING NATIVE CORONARY ARTERY OF NATIVE HEART WITHOUT ANGINA PECTORIS: ICD-10-CM

## 2025-08-08 PROCEDURE — 3017F COLORECTAL CA SCREEN DOC REV: CPT | Performed by: INTERNAL MEDICINE

## 2025-08-08 PROCEDURE — 1090F PRES/ABSN URINE INCON ASSESS: CPT | Performed by: INTERNAL MEDICINE

## 2025-08-08 PROCEDURE — 3075F SYST BP GE 130 - 139MM HG: CPT | Performed by: INTERNAL MEDICINE

## 2025-08-08 PROCEDURE — G8420 CALC BMI NORM PARAMETERS: HCPCS | Performed by: INTERNAL MEDICINE

## 2025-08-08 PROCEDURE — 1159F MED LIST DOCD IN RCRD: CPT | Performed by: INTERNAL MEDICINE

## 2025-08-08 PROCEDURE — 99214 OFFICE O/P EST MOD 30 MIN: CPT | Performed by: INTERNAL MEDICINE

## 2025-08-08 PROCEDURE — 1123F ACP DISCUSS/DSCN MKR DOCD: CPT | Performed by: INTERNAL MEDICINE

## 2025-08-08 PROCEDURE — G8399 PT W/DXA RESULTS DOCUMENT: HCPCS | Performed by: INTERNAL MEDICINE

## 2025-08-08 PROCEDURE — 3079F DIAST BP 80-89 MM HG: CPT | Performed by: INTERNAL MEDICINE

## 2025-08-08 PROCEDURE — 4004F PT TOBACCO SCREEN RCVD TLK: CPT | Performed by: INTERNAL MEDICINE

## 2025-08-08 PROCEDURE — G8427 DOCREV CUR MEDS BY ELIG CLIN: HCPCS | Performed by: INTERNAL MEDICINE

## 2025-08-19 ENCOUNTER — TELEPHONE (OUTPATIENT)
Dept: CARDIOLOGY CLINIC | Age: 70
End: 2025-08-19

## 2025-08-19 ENCOUNTER — APPOINTMENT (OUTPATIENT)
Dept: CT IMAGING | Age: 70
DRG: 641 | End: 2025-08-19
Payer: MEDICARE

## 2025-08-19 ENCOUNTER — HOSPITAL ENCOUNTER (INPATIENT)
Age: 70
LOS: 1 days | Discharge: HOME | DRG: 641 | End: 2025-08-21
Admitting: HOSPITALIST
Payer: MEDICARE

## 2025-08-19 DIAGNOSIS — E87.1 HYPONATREMIA: ICD-10-CM

## 2025-08-19 DIAGNOSIS — R29.810 FACIAL DROOP: Primary | ICD-10-CM

## 2025-08-19 DIAGNOSIS — I45.5 SINUS PAUSE: ICD-10-CM

## 2025-08-19 DIAGNOSIS — I63.9 CEREBROVASCULAR ACCIDENT (CVA), UNSPECIFIED MECHANISM (HCC): ICD-10-CM

## 2025-08-19 LAB
ALBUMIN SERPL-MCNC: 3.9 G/DL (ref 3.4–5)
ALBUMIN/GLOB SERPL: 1.8 {RATIO} (ref 1.1–2.2)
ALP SERPL-CCNC: 102 U/L (ref 40–129)
ALT SERPL-CCNC: 22 U/L (ref 10–40)
ANION GAP SERPL CALCULATED.3IONS-SCNC: 12 MMOL/L (ref 3–16)
AST SERPL-CCNC: 37 U/L (ref 15–37)
BASOPHILS # BLD: 0 K/UL (ref 0–0.2)
BASOPHILS NFR BLD: 0.5 %
BILIRUB SERPL-MCNC: 0.3 MG/DL (ref 0–1)
BUN SERPL-MCNC: 13 MG/DL (ref 7–20)
CALCIUM SERPL-MCNC: 9.1 MG/DL (ref 8.3–10.6)
CHLORIDE SERPL-SCNC: 92 MMOL/L (ref 99–110)
CO2 SERPL-SCNC: 23 MMOL/L (ref 21–32)
CREAT SERPL-MCNC: 0.8 MG/DL (ref 0.6–1.2)
DEPRECATED RDW RBC AUTO: 13.3 % (ref 12.4–15.4)
EOSINOPHIL # BLD: 0.1 K/UL (ref 0–0.6)
EOSINOPHIL NFR BLD: 1.5 %
GFR SERPLBLD CREATININE-BSD FMLA CKD-EPI: 78 ML/MIN/{1.73_M2}
GLUCOSE BLD-MCNC: 111 MG/DL (ref 70–99)
GLUCOSE SERPL-MCNC: 102 MG/DL (ref 70–99)
HCT VFR BLD AUTO: 36.9 % (ref 36–48)
HGB BLD-MCNC: 13.1 G/DL (ref 12–16)
LYMPHOCYTES # BLD: 0.7 K/UL (ref 1–5.1)
LYMPHOCYTES NFR BLD: 9 %
MCH RBC QN AUTO: 36.3 PG (ref 26–34)
MCHC RBC AUTO-ENTMCNC: 35.5 G/DL (ref 31–36)
MCV RBC AUTO: 102.5 FL (ref 80–100)
MONOCYTES # BLD: 0.7 K/UL (ref 0–1.3)
MONOCYTES NFR BLD: 9.5 %
NEUTROPHILS # BLD: 6.2 K/UL (ref 1.7–7.7)
NEUTROPHILS NFR BLD: 79.5 %
PERFORMED ON: ABNORMAL
PLATELET # BLD AUTO: 252 K/UL (ref 135–450)
PMV BLD AUTO: 6.4 FL (ref 5–10.5)
POTASSIUM SERPL-SCNC: 4.3 MMOL/L (ref 3.5–5.1)
PROT SERPL-MCNC: 6.1 G/DL (ref 6.4–8.2)
RBC # BLD AUTO: 3.6 M/UL (ref 4–5.2)
SODIUM SERPL-SCNC: 127 MMOL/L (ref 136–145)
WBC # BLD AUTO: 7.8 K/UL (ref 4–11)

## 2025-08-19 PROCEDURE — 2500000003 HC RX 250 WO HCPCS: Performed by: HOSPITALIST

## 2025-08-19 PROCEDURE — 96360 HYDRATION IV INFUSION INIT: CPT

## 2025-08-19 PROCEDURE — G0378 HOSPITAL OBSERVATION PER HR: HCPCS

## 2025-08-19 PROCEDURE — 70450 CT HEAD/BRAIN W/O DYE: CPT

## 2025-08-19 PROCEDURE — 6370000000 HC RX 637 (ALT 250 FOR IP): Performed by: HOSPITALIST

## 2025-08-19 PROCEDURE — 96361 HYDRATE IV INFUSION ADD-ON: CPT

## 2025-08-19 PROCEDURE — 36415 COLL VENOUS BLD VENIPUNCTURE: CPT

## 2025-08-19 PROCEDURE — 2580000003 HC RX 258: Performed by: HOSPITALIST

## 2025-08-19 PROCEDURE — 93005 ELECTROCARDIOGRAM TRACING: CPT

## 2025-08-19 PROCEDURE — 99285 EMERGENCY DEPT VISIT HI MDM: CPT

## 2025-08-19 PROCEDURE — 85025 COMPLETE CBC W/AUTO DIFF WBC: CPT

## 2025-08-19 PROCEDURE — 80053 COMPREHEN METABOLIC PANEL: CPT

## 2025-08-19 RX ORDER — VALACYCLOVIR HYDROCHLORIDE 500 MG/1
1000 TABLET, FILM COATED ORAL
Status: DISCONTINUED | OUTPATIENT
Start: 2025-08-19 | End: 2025-08-21 | Stop reason: HOSPADM

## 2025-08-19 RX ORDER — SODIUM CHLORIDE 0.9 % (FLUSH) 0.9 %
5-40 SYRINGE (ML) INJECTION PRN
Status: DISCONTINUED | OUTPATIENT
Start: 2025-08-19 | End: 2025-08-21 | Stop reason: HOSPADM

## 2025-08-19 RX ORDER — ONDANSETRON 2 MG/ML
4 INJECTION INTRAMUSCULAR; INTRAVENOUS EVERY 6 HOURS PRN
Status: DISCONTINUED | OUTPATIENT
Start: 2025-08-19 | End: 2025-08-21 | Stop reason: HOSPADM

## 2025-08-19 RX ORDER — SODIUM CHLORIDE 9 MG/ML
INJECTION, SOLUTION INTRAVENOUS PRN
Status: DISCONTINUED | OUTPATIENT
Start: 2025-08-19 | End: 2025-08-21 | Stop reason: HOSPADM

## 2025-08-19 RX ORDER — LEVOTHYROXINE SODIUM 50 UG/1
50 TABLET ORAL DAILY
Status: DISCONTINUED | OUTPATIENT
Start: 2025-08-20 | End: 2025-08-21 | Stop reason: HOSPADM

## 2025-08-19 RX ORDER — TRAZODONE HYDROCHLORIDE 50 MG/1
50 TABLET ORAL NIGHTLY
Status: DISCONTINUED | OUTPATIENT
Start: 2025-08-19 | End: 2025-08-21 | Stop reason: HOSPADM

## 2025-08-19 RX ORDER — SACUBITRIL AND VALSARTAN 49; 51 MG/1; MG/1
1 TABLET ORAL 2 TIMES DAILY
Status: DISCONTINUED | OUTPATIENT
Start: 2025-08-19 | End: 2025-08-21 | Stop reason: HOSPADM

## 2025-08-19 RX ORDER — POLYETHYLENE GLYCOL 3350 17 G/17G
17 POWDER, FOR SOLUTION ORAL DAILY PRN
Status: DISCONTINUED | OUTPATIENT
Start: 2025-08-19 | End: 2025-08-21 | Stop reason: HOSPADM

## 2025-08-19 RX ORDER — ATORVASTATIN CALCIUM 80 MG/1
80 TABLET, FILM COATED ORAL NIGHTLY
Status: DISCONTINUED | OUTPATIENT
Start: 2025-08-19 | End: 2025-08-21 | Stop reason: HOSPADM

## 2025-08-19 RX ORDER — ASPIRIN 300 MG/1
300 SUPPOSITORY RECTAL DAILY
Status: DISCONTINUED | OUTPATIENT
Start: 2025-08-19 | End: 2025-08-21 | Stop reason: HOSPADM

## 2025-08-19 RX ORDER — OXYCODONE AND ACETAMINOPHEN 7.5; 325 MG/1; MG/1
1 TABLET ORAL EVERY 6 HOURS PRN
Refills: 0 | Status: DISCONTINUED | OUTPATIENT
Start: 2025-08-19 | End: 2025-08-21 | Stop reason: HOSPADM

## 2025-08-19 RX ORDER — CARVEDILOL 12.5 MG/1
12.5 TABLET ORAL 2 TIMES DAILY WITH MEALS
Status: DISCONTINUED | OUTPATIENT
Start: 2025-08-19 | End: 2025-08-20

## 2025-08-19 RX ORDER — SODIUM CHLORIDE 0.9 % (FLUSH) 0.9 %
5-40 SYRINGE (ML) INJECTION EVERY 12 HOURS SCHEDULED
Status: DISCONTINUED | OUTPATIENT
Start: 2025-08-19 | End: 2025-08-21 | Stop reason: HOSPADM

## 2025-08-19 RX ORDER — SODIUM CHLORIDE, SODIUM LACTATE, POTASSIUM CHLORIDE, CALCIUM CHLORIDE 600; 310; 30; 20 MG/100ML; MG/100ML; MG/100ML; MG/100ML
INJECTION, SOLUTION INTRAVENOUS CONTINUOUS
Status: DISCONTINUED | OUTPATIENT
Start: 2025-08-19 | End: 2025-08-20

## 2025-08-19 RX ORDER — ARIPIPRAZOLE 15 MG/1
7.5 TABLET ORAL DAILY
Status: DISCONTINUED | OUTPATIENT
Start: 2025-08-20 | End: 2025-08-21 | Stop reason: HOSPADM

## 2025-08-19 RX ORDER — ONDANSETRON 4 MG/1
4 TABLET, ORALLY DISINTEGRATING ORAL EVERY 8 HOURS PRN
Status: DISCONTINUED | OUTPATIENT
Start: 2025-08-19 | End: 2025-08-21 | Stop reason: HOSPADM

## 2025-08-19 RX ORDER — ENOXAPARIN SODIUM 100 MG/ML
30 INJECTION SUBCUTANEOUS DAILY
Status: DISCONTINUED | OUTPATIENT
Start: 2025-08-19 | End: 2025-08-19

## 2025-08-19 RX ORDER — DULOXETIN HYDROCHLORIDE 60 MG/1
120 CAPSULE, DELAYED RELEASE ORAL DAILY
Status: DISCONTINUED | OUTPATIENT
Start: 2025-08-20 | End: 2025-08-21 | Stop reason: HOSPADM

## 2025-08-19 RX ORDER — ASPIRIN 81 MG/1
81 TABLET, CHEWABLE ORAL DAILY
Status: DISCONTINUED | OUTPATIENT
Start: 2025-08-19 | End: 2025-08-21 | Stop reason: HOSPADM

## 2025-08-19 RX ORDER — GABAPENTIN 400 MG/1
800 CAPSULE ORAL 2 TIMES DAILY
Status: DISCONTINUED | OUTPATIENT
Start: 2025-08-19 | End: 2025-08-21 | Stop reason: HOSPADM

## 2025-08-19 RX ORDER — BUPROPION HYDROCHLORIDE 150 MG/1
300 TABLET ORAL EVERY MORNING
Status: DISCONTINUED | OUTPATIENT
Start: 2025-08-20 | End: 2025-08-21 | Stop reason: HOSPADM

## 2025-08-19 RX ADMIN — SACUBITRIL AND VALSARTAN 1 TABLET: 49; 51 TABLET, FILM COATED ORAL at 21:06

## 2025-08-19 RX ADMIN — VALACYCLOVIR 1000 MG: 500 TABLET, FILM COATED ORAL at 21:06

## 2025-08-19 RX ADMIN — SODIUM CHLORIDE, PRESERVATIVE FREE 10 ML: 5 INJECTION INTRAVENOUS at 20:18

## 2025-08-19 RX ADMIN — TRAZODONE HYDROCHLORIDE 50 MG: 50 TABLET ORAL at 21:06

## 2025-08-19 RX ADMIN — GABAPENTIN 800 MG: 400 CAPSULE ORAL at 21:06

## 2025-08-19 RX ADMIN — ASPIRIN 81 MG: 81 TABLET, CHEWABLE ORAL at 21:06

## 2025-08-19 RX ADMIN — OXCARBAZEPINE 450 MG: 300 TABLET, FILM COATED ORAL at 21:06

## 2025-08-19 RX ADMIN — ATORVASTATIN CALCIUM 80 MG: 80 TABLET, FILM COATED ORAL at 21:06

## 2025-08-19 RX ADMIN — APIXABAN 5 MG: 5 TABLET, FILM COATED ORAL at 21:06

## 2025-08-19 RX ADMIN — SODIUM CHLORIDE, SODIUM LACTATE, POTASSIUM CHLORIDE, AND CALCIUM CHLORIDE: .6; .31; .03; .02 INJECTION, SOLUTION INTRAVENOUS at 20:19

## 2025-08-19 RX ADMIN — CARVEDILOL 12.5 MG: 12.5 TABLET, FILM COATED ORAL at 21:05

## 2025-08-19 ASSESSMENT — PAIN SCALES - GENERAL
PAINLEVEL_OUTOF10: 0
PAINLEVEL_OUTOF10: 0

## 2025-08-19 ASSESSMENT — PAIN - FUNCTIONAL ASSESSMENT: PAIN_FUNCTIONAL_ASSESSMENT: 0-10

## 2025-08-20 ENCOUNTER — APPOINTMENT (OUTPATIENT)
Dept: MRI IMAGING | Age: 70
DRG: 641 | End: 2025-08-20
Payer: MEDICARE

## 2025-08-20 ENCOUNTER — APPOINTMENT (OUTPATIENT)
Age: 70
DRG: 641 | End: 2025-08-20
Attending: HOSPITALIST
Payer: MEDICARE

## 2025-08-20 PROBLEM — E44.0 MODERATE MALNUTRITION: Chronic | Status: ACTIVE | Noted: 2025-08-20

## 2025-08-20 LAB
ANION GAP SERPL CALCULATED.3IONS-SCNC: 8 MMOL/L (ref 3–16)
ANION GAP SERPL CALCULATED.3IONS-SCNC: 8 MMOL/L (ref 3–16)
ANION GAP SERPL CALCULATED.3IONS-SCNC: 9 MMOL/L (ref 3–16)
BUN SERPL-MCNC: 13 MG/DL (ref 7–20)
BUN SERPL-MCNC: 15 MG/DL (ref 7–20)
BUN SERPL-MCNC: 17 MG/DL (ref 7–20)
CALCIUM SERPL-MCNC: 8.3 MG/DL (ref 8.3–10.6)
CALCIUM SERPL-MCNC: 8.5 MG/DL (ref 8.3–10.6)
CALCIUM SERPL-MCNC: 8.6 MG/DL (ref 8.3–10.6)
CHLORIDE SERPL-SCNC: 93 MMOL/L (ref 99–110)
CHLORIDE SERPL-SCNC: 96 MMOL/L (ref 99–110)
CHLORIDE SERPL-SCNC: 96 MMOL/L (ref 99–110)
CHOLEST SERPL-MCNC: 110 MG/DL (ref 0–199)
CO2 SERPL-SCNC: 24 MMOL/L (ref 21–32)
CO2 SERPL-SCNC: 25 MMOL/L (ref 21–32)
CO2 SERPL-SCNC: 26 MMOL/L (ref 21–32)
CREAT SERPL-MCNC: 0.7 MG/DL (ref 0.6–1.2)
CREAT SERPL-MCNC: 0.7 MG/DL (ref 0.6–1.2)
CREAT SERPL-MCNC: 0.8 MG/DL (ref 0.6–1.2)
DEPRECATED RDW RBC AUTO: 13.4 % (ref 12.4–15.4)
ECHO AO ROOT DIAM: 3.1 CM
ECHO AO ROOT INDEX: 2.28 CM/M2
ECHO AR MAX VEL PISA: 4.4 M/S
ECHO AV AREA PEAK VELOCITY: 2.1 CM2
ECHO AV AREA VTI: 2.1 CM2
ECHO AV AREA/BSA PEAK VELOCITY: 1.5 CM2/M2
ECHO AV AREA/BSA VTI: 1.5 CM2/M2
ECHO AV MEAN GRADIENT: 3 MMHG
ECHO AV MEAN VELOCITY: 0.9 M/S
ECHO AV PEAK GRADIENT: 7 MMHG
ECHO AV PEAK VELOCITY: 1.3 M/S
ECHO AV REGURGITANT PHT: 579 MS
ECHO AV VELOCITY RATIO: 0.69
ECHO AV VTI: 28.1 CM
ECHO EST RA PRESSURE: 3 MMHG
ECHO IVC EXP: 1.7 CM
ECHO IVC INSP: 0.4 CM
ECHO LA AREA 2C: 15.2 CM2
ECHO LA AREA 4C: 14.1 CM2
ECHO LA MAJOR AXIS: 4.8 CM
ECHO LA MINOR AXIS: 4.7 CM
ECHO LA VOL BP: 36 ML (ref 22–52)
ECHO LA VOL MOD A2C: 38 ML (ref 22–52)
ECHO LA VOL MOD A4C: 33 ML (ref 22–52)
ECHO LA VOL/BSA BIPLANE: 26 ML/M2 (ref 16–34)
ECHO LA VOLUME INDEX MOD A2C: 28 ML/M2 (ref 16–34)
ECHO LA VOLUME INDEX MOD A4C: 24 ML/M2 (ref 16–34)
ECHO LV E' LATERAL VELOCITY: 7.18 CM/S
ECHO LV E' SEPTAL VELOCITY: 7.18 CM/S
ECHO LV EDV 3D: 88 ML
ECHO LV EDV INDEX 3D: 65 ML/M2
ECHO LV EF PHYSICIAN: 60 %
ECHO LV ESV 3D: 43 ML
ECHO LV ESV INDEX 3D: 32 ML/M2
ECHO LV FRACTIONAL SHORTENING: 23 % (ref 28–44)
ECHO LV INTERNAL DIMENSION DIASTOLE INDEX: 2.87 CM/M2
ECHO LV INTERNAL DIMENSION DIASTOLIC: 3.9 CM (ref 3.9–5.3)
ECHO LV INTERNAL DIMENSION SYSTOLIC INDEX: 2.21 CM/M2
ECHO LV INTERNAL DIMENSION SYSTOLIC: 3 CM
ECHO LV IVSD: 1 CM (ref 0.6–0.9)
ECHO LV MASS 2D: 122.1 G (ref 67–162)
ECHO LV MASS 3D INDEX: 80.9 G/M2
ECHO LV MASS 3D: 110 G
ECHO LV MASS INDEX 2D: 89.8 G/M2 (ref 43–95)
ECHO LV POSTERIOR WALL DIASTOLIC: 1 CM (ref 0.6–0.9)
ECHO LV RELATIVE WALL THICKNESS RATIO: 0.51
ECHO LVOT AREA: 3.1 CM2
ECHO LVOT AV VTI INDEX: 0.7
ECHO LVOT DIAM: 2 CM
ECHO LVOT MEAN GRADIENT: 1 MMHG
ECHO LVOT PEAK GRADIENT: 3 MMHG
ECHO LVOT PEAK VELOCITY: 0.9 M/S
ECHO LVOT STROKE VOLUME INDEX: 45.5 ML/M2
ECHO LVOT SV: 61.9 ML
ECHO LVOT VTI: 19.7 CM
ECHO MV A VELOCITY: 0.87 M/S
ECHO MV AREA VTI: 2.8 CM2
ECHO MV E DECELERATION TIME (DT): 161 MS
ECHO MV E VELOCITY: 0.72 M/S
ECHO MV E/A RATIO: 0.83
ECHO MV E/E' LATERAL: 10.03
ECHO MV E/E' RATIO (AVERAGED): 10.03
ECHO MV E/E' SEPTAL: 10.03
ECHO MV LVOT VTI INDEX: 1.12
ECHO MV MAX VELOCITY: 1.1 M/S
ECHO MV MEAN GRADIENT: 2 MMHG
ECHO MV MEAN VELOCITY: 0.7 M/S
ECHO MV PEAK GRADIENT: 4 MMHG
ECHO MV VTI: 22.1 CM
ECHO RA AREA 4C: 7.2 CM2
ECHO RA END SYSTOLIC VOLUME APICAL 4 CHAMBER INDEX BSA: 7 ML/M2
ECHO RA VOLUME: 10 ML
ECHO RIGHT VENTRICULAR SYSTOLIC PRESSURE (RVSP): 44 MMHG
ECHO RV FREE WALL PEAK S': 10.1 CM/S
ECHO RV INTERNAL DIMENSION: 2.1 CM
ECHO RV MID DIMENSION: 1 CM
ECHO RV TAPSE: 1.4 CM (ref 1.7–?)
ECHO TV REGURGITANT MAX VELOCITY: 3.2 M/S
ECHO TV REGURGITANT PEAK GRADIENT: 41 MMHG
EKG ATRIAL RATE: 70 BPM
EKG DIAGNOSIS: NORMAL
EKG P AXIS: 80 DEGREES
EKG P-R INTERVAL: 176 MS
EKG Q-T INTERVAL: 440 MS
EKG QRS DURATION: 138 MS
EKG QTC CALCULATION (BAZETT): 475 MS
EKG R AXIS: -71 DEGREES
EKG T AXIS: 29 DEGREES
EKG VENTRICULAR RATE: 70 BPM
EST. AVERAGE GLUCOSE BLD GHB EST-MCNC: 114 MG/DL
GFR SERPLBLD CREATININE-BSD FMLA CKD-EPI: 78 ML/MIN/{1.73_M2}
GFR SERPLBLD CREATININE-BSD FMLA CKD-EPI: >90 ML/MIN/{1.73_M2}
GFR SERPLBLD CREATININE-BSD FMLA CKD-EPI: >90 ML/MIN/{1.73_M2}
GLUCOSE BLD-MCNC: 105 MG/DL (ref 70–99)
GLUCOSE BLD-MCNC: 112 MG/DL (ref 70–99)
GLUCOSE SERPL-MCNC: 100 MG/DL (ref 70–99)
GLUCOSE SERPL-MCNC: 128 MG/DL (ref 70–99)
GLUCOSE SERPL-MCNC: 88 MG/DL (ref 70–99)
HBA1C MFR BLD: 5.6 %
HCT VFR BLD AUTO: 37.2 % (ref 36–48)
HDLC SERPL-MCNC: 59 MG/DL (ref 40–60)
HGB BLD-MCNC: 13 G/DL (ref 12–16)
LDLC SERPL CALC-MCNC: 41 MG/DL
MCH RBC QN AUTO: 35.9 PG (ref 26–34)
MCHC RBC AUTO-ENTMCNC: 34.9 G/DL (ref 31–36)
MCV RBC AUTO: 102.9 FL (ref 80–100)
PERFORMED ON: ABNORMAL
PERFORMED ON: ABNORMAL
PLATELET # BLD AUTO: 249 K/UL (ref 135–450)
PMV BLD AUTO: 6.7 FL (ref 5–10.5)
POTASSIUM SERPL-SCNC: 4 MMOL/L (ref 3.5–5.1)
POTASSIUM SERPL-SCNC: 4.5 MMOL/L (ref 3.5–5.1)
POTASSIUM SERPL-SCNC: 4.7 MMOL/L (ref 3.5–5.1)
RBC # BLD AUTO: 3.62 M/UL (ref 4–5.2)
SODIUM SERPL-SCNC: 127 MMOL/L (ref 136–145)
SODIUM SERPL-SCNC: 129 MMOL/L (ref 136–145)
SODIUM SERPL-SCNC: 129 MMOL/L (ref 136–145)
SODIUM UR-SCNC: 77 MMOL/L
TRIGL SERPL-MCNC: 50 MG/DL (ref 0–150)
VLDLC SERPL CALC-MCNC: 10 MG/DL
WBC # BLD AUTO: 7.3 K/UL (ref 4–11)

## 2025-08-20 PROCEDURE — 83036 HEMOGLOBIN GLYCOSYLATED A1C: CPT

## 2025-08-20 PROCEDURE — 2500000003 HC RX 250 WO HCPCS: Performed by: HOSPITALIST

## 2025-08-20 PROCEDURE — 6370000000 HC RX 637 (ALT 250 FOR IP): Performed by: HOSPITALIST

## 2025-08-20 PROCEDURE — 97165 OT EVAL LOW COMPLEX 30 MIN: CPT

## 2025-08-20 PROCEDURE — 97116 GAIT TRAINING THERAPY: CPT | Performed by: PHYSICAL THERAPIST

## 2025-08-20 PROCEDURE — 93010 ELECTROCARDIOGRAM REPORT: CPT | Performed by: INTERNAL MEDICINE

## 2025-08-20 PROCEDURE — 85027 COMPLETE CBC AUTOMATED: CPT

## 2025-08-20 PROCEDURE — 94760 N-INVAS EAR/PLS OXIMETRY 1: CPT

## 2025-08-20 PROCEDURE — 84300 ASSAY OF URINE SODIUM: CPT

## 2025-08-20 PROCEDURE — 94640 AIRWAY INHALATION TREATMENT: CPT

## 2025-08-20 PROCEDURE — 2580000003 HC RX 258: Performed by: HOSPITALIST

## 2025-08-20 PROCEDURE — 97530 THERAPEUTIC ACTIVITIES: CPT

## 2025-08-20 PROCEDURE — 9990000010 HC NO CHARGE VISIT: Performed by: PHYSICAL THERAPIST

## 2025-08-20 PROCEDURE — 36415 COLL VENOUS BLD VENIPUNCTURE: CPT

## 2025-08-20 PROCEDURE — 76376 3D RENDER W/INTRP POSTPROCES: CPT | Performed by: INTERNAL MEDICINE

## 2025-08-20 PROCEDURE — G0378 HOSPITAL OBSERVATION PER HR: HCPCS

## 2025-08-20 PROCEDURE — 70551 MRI BRAIN STEM W/O DYE: CPT

## 2025-08-20 PROCEDURE — 96361 HYDRATE IV INFUSION ADD-ON: CPT

## 2025-08-20 PROCEDURE — 80048 BASIC METABOLIC PNL TOTAL CA: CPT

## 2025-08-20 PROCEDURE — 97161 PT EVAL LOW COMPLEX 20 MIN: CPT | Performed by: PHYSICAL THERAPIST

## 2025-08-20 PROCEDURE — 76376 3D RENDER W/INTRP POSTPROCES: CPT

## 2025-08-20 PROCEDURE — 93306 TTE W/DOPPLER COMPLETE: CPT | Performed by: INTERNAL MEDICINE

## 2025-08-20 PROCEDURE — 80061 LIPID PANEL: CPT

## 2025-08-20 RX ORDER — MORPHINE SULFATE 2 MG/ML
2 INJECTION, SOLUTION INTRAMUSCULAR; INTRAVENOUS ONCE
Status: DISCONTINUED | OUTPATIENT
Start: 2025-08-20 | End: 2025-08-21 | Stop reason: HOSPADM

## 2025-08-20 RX ORDER — SODIUM CHLORIDE 9 MG/ML
INJECTION, SOLUTION INTRAVENOUS CONTINUOUS
Status: DISCONTINUED | OUTPATIENT
Start: 2025-08-20 | End: 2025-08-21 | Stop reason: HOSPADM

## 2025-08-20 RX ORDER — CARVEDILOL 6.25 MG/1
6.25 TABLET ORAL 2 TIMES DAILY WITH MEALS
Status: DISCONTINUED | OUTPATIENT
Start: 2025-08-20 | End: 2025-08-21 | Stop reason: HOSPADM

## 2025-08-20 RX ORDER — ACETAMINOPHEN 325 MG/1
650 TABLET ORAL EVERY 4 HOURS PRN
Status: DISCONTINUED | OUTPATIENT
Start: 2025-08-20 | End: 2025-08-21 | Stop reason: HOSPADM

## 2025-08-20 RX ADMIN — GABAPENTIN 800 MG: 400 CAPSULE ORAL at 20:12

## 2025-08-20 RX ADMIN — SODIUM CHLORIDE: 0.9 INJECTION, SOLUTION INTRAVENOUS at 15:25

## 2025-08-20 RX ADMIN — VALACYCLOVIR 1000 MG: 500 TABLET, FILM COATED ORAL at 20:12

## 2025-08-20 RX ADMIN — TIOTROPIUM BROMIDE AND OLODATEROL 2 PUFF: 3.124; 2.736 SPRAY, METERED RESPIRATORY (INHALATION) at 12:08

## 2025-08-20 RX ADMIN — ACETAMINOPHEN 650 MG: 325 TABLET ORAL at 16:03

## 2025-08-20 RX ADMIN — SACUBITRIL AND VALSARTAN 1 TABLET: 49; 51 TABLET, FILM COATED ORAL at 08:03

## 2025-08-20 RX ADMIN — EMPAGLIFLOZIN 10 MG: 10 TABLET, FILM COATED ORAL at 08:04

## 2025-08-20 RX ADMIN — ASPIRIN 81 MG: 81 TABLET, CHEWABLE ORAL at 08:04

## 2025-08-20 RX ADMIN — OXCARBAZEPINE 450 MG: 300 TABLET, FILM COATED ORAL at 08:04

## 2025-08-20 RX ADMIN — TRAZODONE HYDROCHLORIDE 50 MG: 50 TABLET ORAL at 20:12

## 2025-08-20 RX ADMIN — APIXABAN 5 MG: 5 TABLET, FILM COATED ORAL at 20:12

## 2025-08-20 RX ADMIN — SODIUM CHLORIDE, PRESERVATIVE FREE 10 ML: 5 INJECTION INTRAVENOUS at 08:11

## 2025-08-20 RX ADMIN — BUPROPION HYDROCHLORIDE 300 MG: 150 TABLET, EXTENDED RELEASE ORAL at 08:03

## 2025-08-20 RX ADMIN — LEVOTHYROXINE SODIUM 50 MCG: 0.05 TABLET ORAL at 05:08

## 2025-08-20 RX ADMIN — SACUBITRIL AND VALSARTAN 1 TABLET: 49; 51 TABLET, FILM COATED ORAL at 20:13

## 2025-08-20 RX ADMIN — OXCARBAZEPINE 450 MG: 300 TABLET, FILM COATED ORAL at 21:17

## 2025-08-20 RX ADMIN — APIXABAN 5 MG: 5 TABLET, FILM COATED ORAL at 08:03

## 2025-08-20 RX ADMIN — OXYCODONE AND ACETAMINOPHEN 1 TABLET: 7.5; 325 TABLET ORAL at 08:10

## 2025-08-20 RX ADMIN — DULOXETINE 120 MG: 60 CAPSULE, DELAYED RELEASE ORAL at 08:04

## 2025-08-20 RX ADMIN — ARIPIPRAZOLE 7.5 MG: 15 TABLET ORAL at 08:04

## 2025-08-20 RX ADMIN — ATORVASTATIN CALCIUM 80 MG: 80 TABLET, FILM COATED ORAL at 20:12

## 2025-08-20 RX ADMIN — CARVEDILOL 12.5 MG: 12.5 TABLET, FILM COATED ORAL at 08:04

## 2025-08-20 RX ADMIN — GABAPENTIN 800 MG: 400 CAPSULE ORAL at 08:04

## 2025-08-20 ASSESSMENT — PAIN DESCRIPTION - DESCRIPTORS
DESCRIPTORS: ACHING
DESCRIPTORS: ACHING

## 2025-08-20 ASSESSMENT — PAIN DESCRIPTION - LOCATION
LOCATION: HEAD
LOCATION: HEAD;NECK

## 2025-08-20 ASSESSMENT — PAIN SCALES - GENERAL
PAINLEVEL_OUTOF10: 7
PAINLEVEL_OUTOF10: 0
PAINLEVEL_OUTOF10: 7

## 2025-08-20 ASSESSMENT — PAIN DESCRIPTION - ORIENTATION
ORIENTATION: ANTERIOR;POSTERIOR
ORIENTATION: ANTERIOR;UPPER

## 2025-08-20 ASSESSMENT — PAIN - FUNCTIONAL ASSESSMENT
PAIN_FUNCTIONAL_ASSESSMENT: 0-10
PAIN_FUNCTIONAL_ASSESSMENT: 0-10

## 2025-08-21 VITALS
HEIGHT: 58 IN | BODY MASS INDEX: 21.16 KG/M2 | WEIGHT: 100.8 LBS | HEART RATE: 83 BPM | RESPIRATION RATE: 18 BRPM | DIASTOLIC BLOOD PRESSURE: 85 MMHG | OXYGEN SATURATION: 93 % | TEMPERATURE: 98.8 F | SYSTOLIC BLOOD PRESSURE: 135 MMHG

## 2025-08-21 PROBLEM — E87.1 HYPONATREMIA: Status: ACTIVE | Noted: 2025-08-21

## 2025-08-21 LAB
ANION GAP SERPL CALCULATED.3IONS-SCNC: 10 MMOL/L (ref 3–16)
ANION GAP SERPL CALCULATED.3IONS-SCNC: 12 MMOL/L (ref 3–16)
BUN SERPL-MCNC: 11 MG/DL (ref 7–20)
BUN SERPL-MCNC: 13 MG/DL (ref 7–20)
CALCIUM SERPL-MCNC: 8.6 MG/DL (ref 8.3–10.6)
CALCIUM SERPL-MCNC: 8.8 MG/DL (ref 8.3–10.6)
CHLORIDE SERPL-SCNC: 97 MMOL/L (ref 99–110)
CHLORIDE SERPL-SCNC: 98 MMOL/L (ref 99–110)
CO2 SERPL-SCNC: 21 MMOL/L (ref 21–32)
CO2 SERPL-SCNC: 22 MMOL/L (ref 21–32)
CREAT SERPL-MCNC: 0.6 MG/DL (ref 0.6–1.2)
CREAT SERPL-MCNC: 0.7 MG/DL (ref 0.6–1.2)
GFR SERPLBLD CREATININE-BSD FMLA CKD-EPI: >90 ML/MIN/{1.73_M2}
GFR SERPLBLD CREATININE-BSD FMLA CKD-EPI: >90 ML/MIN/{1.73_M2}
GLUCOSE SERPL-MCNC: 101 MG/DL (ref 70–99)
GLUCOSE SERPL-MCNC: 106 MG/DL (ref 70–99)
POTASSIUM SERPL-SCNC: 4.2 MMOL/L (ref 3.5–5.1)
POTASSIUM SERPL-SCNC: 4.4 MMOL/L (ref 3.5–5.1)
SODIUM SERPL-SCNC: 129 MMOL/L (ref 136–145)
SODIUM SERPL-SCNC: 131 MMOL/L (ref 136–145)

## 2025-08-21 PROCEDURE — 1200000000 HC SEMI PRIVATE

## 2025-08-21 PROCEDURE — 94760 N-INVAS EAR/PLS OXIMETRY 1: CPT

## 2025-08-21 PROCEDURE — 2580000003 HC RX 258: Performed by: HOSPITALIST

## 2025-08-21 PROCEDURE — 94640 AIRWAY INHALATION TREATMENT: CPT

## 2025-08-21 PROCEDURE — 6370000000 HC RX 637 (ALT 250 FOR IP): Performed by: HOSPITALIST

## 2025-08-21 PROCEDURE — 36415 COLL VENOUS BLD VENIPUNCTURE: CPT

## 2025-08-21 PROCEDURE — 97535 SELF CARE MNGMENT TRAINING: CPT

## 2025-08-21 PROCEDURE — 80048 BASIC METABOLIC PNL TOTAL CA: CPT

## 2025-08-21 RX ADMIN — TIOTROPIUM BROMIDE AND OLODATEROL 2 PUFF: 3.124; 2.736 SPRAY, METERED RESPIRATORY (INHALATION) at 08:15

## 2025-08-21 RX ADMIN — SACUBITRIL AND VALSARTAN 1 TABLET: 49; 51 TABLET, FILM COATED ORAL at 09:22

## 2025-08-21 RX ADMIN — OXCARBAZEPINE 450 MG: 300 TABLET, FILM COATED ORAL at 09:21

## 2025-08-21 RX ADMIN — DULOXETINE 120 MG: 60 CAPSULE, DELAYED RELEASE ORAL at 09:20

## 2025-08-21 RX ADMIN — ASPIRIN 81 MG: 81 TABLET, CHEWABLE ORAL at 09:20

## 2025-08-21 RX ADMIN — CARVEDILOL 6.25 MG: 6.25 TABLET, FILM COATED ORAL at 09:20

## 2025-08-21 RX ADMIN — GABAPENTIN 800 MG: 400 CAPSULE ORAL at 09:20

## 2025-08-21 RX ADMIN — ARIPIPRAZOLE 7.5 MG: 15 TABLET ORAL at 09:23

## 2025-08-21 RX ADMIN — BUPROPION HYDROCHLORIDE 300 MG: 150 TABLET, EXTENDED RELEASE ORAL at 09:20

## 2025-08-21 RX ADMIN — APIXABAN 5 MG: 5 TABLET, FILM COATED ORAL at 09:20

## 2025-08-21 RX ADMIN — EMPAGLIFLOZIN 10 MG: 10 TABLET, FILM COATED ORAL at 09:20

## 2025-08-21 RX ADMIN — SODIUM CHLORIDE: 0.9 INJECTION, SOLUTION INTRAVENOUS at 05:08

## 2025-08-21 RX ADMIN — LEVOTHYROXINE SODIUM 50 MCG: 0.05 TABLET ORAL at 05:05

## 2025-08-21 RX ADMIN — SODIUM CHLORIDE: 0.9 INJECTION, SOLUTION INTRAVENOUS at 11:42

## 2025-08-22 ENCOUNTER — TELEPHONE (OUTPATIENT)
Dept: CARDIOLOGY CLINIC | Age: 70
End: 2025-08-22

## 2025-08-22 DIAGNOSIS — I50.22 CHRONIC SYSTOLIC CONGESTIVE HEART FAILURE (HCC): Primary | ICD-10-CM

## (undated) DEVICE — Z CONVERTED USE 2275207 CLOTH PREP W7.5XL7.5IN 2% CHG SKIN ALC AND RNS FREE

## (undated) DEVICE — 48" PROBE COVER W/GEL, ULTRASOUND, STERILE: Brand: SITE-RITE

## (undated) DEVICE — NEEDLE HYPO 25GA L1.5IN BVL ORIENTED ECLIPSE

## (undated) DEVICE — CANISTER, RIGID, 1200CC: Brand: MEDLINE INDUSTRIES, INC.

## (undated) DEVICE — HYPODERMIC SAFETY NEEDLE: Brand: MAGELLAN

## (undated) DEVICE — ELECTRODE PT RET AD L9FT HI MOIST COND ADH HYDRGEL CORDED

## (undated) DEVICE — PAD,ABDOMINAL,8"X7.5",STERILE,LF,1/PK: Brand: MEDLINE

## (undated) DEVICE — DRESSING TRNSPAR W4XL4.75IN STD FRME STYL WTRPRF BARR

## (undated) DEVICE — SET ADMIN PRIMING 67ML L105IN NVENT 180UM FLTR 3 RLER CLMP

## (undated) DEVICE — 3M™ STERI-DRAPE™ INSTRUMENT POUCH 1018: Brand: STERI-DRAPE™

## (undated) DEVICE — CATHETER THORACENTESIS STR 28 FRX23 IN 6 EYELET TAPR TIP LF

## (undated) DEVICE — SOLUTION IV IRRIG POUR BRL 0.9% SODIUM CHL 2F7124

## (undated) DEVICE — KIT,ANTI FOG,W/SPONGE & FLUID,SOFT PACK: Brand: MEDLINE

## (undated) DEVICE — BLADE ES L4IN INSUL EDGE

## (undated) DEVICE — RELOAD STPL H41X2MM DIA45MM GRN THCK TISS NAT ARTC B FORM

## (undated) DEVICE — GAUZE SPONGES,12 PLY: Brand: CURITY

## (undated) DEVICE — DRAPE,LAP,CHOLE,W/TROUGHS,STERILE: Brand: MEDLINE

## (undated) DEVICE — MASTISOL ADHESIVE LIQ 2/3ML

## (undated) DEVICE — SUTURE PERMAHAND SZ 0 L30IN NONABSORBABLE BLK SILK BRAID A306H

## (undated) DEVICE — APPLICATOR PREP 26ML 0.7% IOD POVACRYLEX 74% ISO ALC ST

## (undated) DEVICE — GOWN,AURORA,NONREINF,RAGLAN,XXL,STERILE: Brand: MEDLINE

## (undated) DEVICE — CLEANER ES TIP W2XL2IN ADH BK RADPQ FOR S STL ELECTRD

## (undated) DEVICE — Z DISCONTINUED USE 2516375 APPLICATOR MEDICATED 3 CC CLR STRL CHLORAPREP

## (undated) DEVICE — AGENT HEMSTAT W6XL9IN OXIDIZED REGENERATED CELOS ABSRB FOR

## (undated) DEVICE — CATHETER THOR L21IN DIA28FR R ANG SFT RADPQ STRP SIL

## (undated) DEVICE — STERILE LATEX POWDER-FREE SURGICAL GLOVESWITH NITRILE COATING: Brand: PROTEXIS

## (undated) DEVICE — 36" (91 CM) ARTERIAL PRESSURE TUBING: Brand: ICU MEDICAL

## (undated) DEVICE — Device

## (undated) DEVICE — AEGIS 1" DISK 4MM HOLE, PEEL OPEN: Brand: MEDLINE

## (undated) DEVICE — PAD,NON-ADHERENT,3X8,STERILE,LF,1/PK: Brand: MEDLINE

## (undated) DEVICE — KIT INFUS PMP 270ML 4ML/HR 2ML/SITE SOAK CATH L5IN N NARC

## (undated) DEVICE — SOLUTION PREP PAINT POV IOD FOR SKIN MUCOUS MEM

## (undated) DEVICE — STRIP SKIN CLSR W0.5XL4IN WHT SPUNBOUND FBR NYL STERIL HI ADH

## (undated) DEVICE — SUTURE VCRL SZ 2 L27IN ABSRB UD L65MM TP-1 1/2 CIR J849G

## (undated) DEVICE — INTENDED FOR TISSUE SEPARATION, AND OTHER PROCEDURES THAT REQUIRE A SHARP SURGICAL BLADE TO PUNCTURE OR CUT.: Brand: BARD-PARKER ® STAINLESS STEEL BLADES

## (undated) DEVICE — TOWEL,OR,DSP,NS,BLUE,BULK,100/CS: Brand: MEDLINE

## (undated) DEVICE — SKIN AFFIX SURG ADHESIVE 72/CS 0.55ML: Brand: MEDLINE

## (undated) DEVICE — SKIN MARKER REGULAR TIP WITH RULER CAP AND LABELS: Brand: DEVON

## (undated) DEVICE — CLEANER CAUT TIP W2XL2IN RADPQ STRP STURDY ADH BK FOR EZ MT

## (undated) DEVICE — MINOR SET UP: Brand: MEDLINE INDUSTRIES, INC.

## (undated) DEVICE — DRAPE THER FLUID WARMING 66X44 IN FLAT SLUSH DBL DISC ORS

## (undated) DEVICE — Device: Brand: MEDEX

## (undated) DEVICE — CLIP INT SM WIDE RED TI TRNSVRS GRV CHEVRON SHP W/ PRECIS

## (undated) DEVICE — BLADE CLIPPER GEN PURP NS

## (undated) DEVICE — MAJOR SET UP PK

## (undated) DEVICE — GLOVE ORANGE PI 7   MSG9070

## (undated) DEVICE — CONNECTOR PERF W3 8XH3 8XL3 8IN BASE EQL Y SHP W O LUERLOCK

## (undated) DEVICE — SURGICAL SCRUB TRAY PREM DRY SKIN VLY LF

## (undated) DEVICE — SUTURE MCRYL SZ 4-0 L27IN ABSRB UD L19MM PS-2 1/2 CIR PRIM Y426H

## (undated) DEVICE — Z CONVERTED USE 2271043 CONTAINER SPEC COLL 4OZ SCR ON LID PEEL PCH

## (undated) DEVICE — SUTURE PERMA-HAND SZ 2-0 L144IN NONABSORBABLE BLK LIGAPAK LA55G

## (undated) DEVICE — DEVICE SECUREMENT AD W/ TRICOT ANCHR PD FOR F LTX SIL CATH

## (undated) DEVICE — SPONGE GZ W4XL4IN COT 12 PLY TYP VII WVN C FLD DSGN STERILE

## (undated) DEVICE — TUBING SCTION CONN 1/4X12 RIB

## (undated) DEVICE — CAP TBNG ACC CHEMO SFTY LUER FEM OR M TEXIUM

## (undated) DEVICE — GOWN SIRUS NONREIN XL W/TWL: Brand: MEDLINE INDUSTRIES, INC.

## (undated) DEVICE — AMBU ASCOPE 3 SLIM - VIDEO BRONCHIOSCOPE SINGLE-USE, FLEXIBLE AND STERILE. INSERTION CORD DIA 3.8 MM, CHANNEL WIDTH OF 1.2 MM. BENDING ANGLE OF 130°/130° MIN. ORDERING 5 PCS. = 1 BOX.: Brand: ASCOPE™ 3 SLIM  3.8/1.2FLEXIBLE VIDEOSCOPE - SINGLE USE

## (undated) DEVICE — 3M™ BAIR HUGGER® UNDERBODY BLANKET, PEDIATRIC, SMALL, 10 PER CASE 55501: Brand: BAIR HUGGER™

## (undated) DEVICE — SET EXTN L41IN 2 NDL FREE VALVES FREE INJ PRT

## (undated) DEVICE — SOLUTION IRRIG 1000ML 0.9% SOD CHL USP POUR PLAS BTL

## (undated) DEVICE — TRAY URIN CATH PED 16FR BLLN 5CC 2 CONN SIL STR TIP W/ URIN

## (undated) DEVICE — SUTURE VICRYL + SZ 4-0 L27IN ABSRB UD L26MM SH 1/2 CIR VCP415H

## (undated) DEVICE — STAPLER INT L28CM 45MM B FRM PWR SHT ECHELON FLX

## (undated) DEVICE — MEDI-VAC NON-CONDUCTIVE SUCTION TUBING: Brand: CARDINAL HEALTH

## (undated) DEVICE — 10 FRENCH DRAIN SYSTEM, STERILE: Brand: TLS

## (undated) DEVICE — 3 ML SYRINGE LUER-LOCK TIP: Brand: MONOJECT

## (undated) DEVICE — STERILE SURGICAL LUBRICANT, METAL TUBE: Brand: SURGILUBE

## (undated) DEVICE — SHEET, T, LAPAROTOMY, STERILE: Brand: MEDLINE

## (undated) DEVICE — NEEDLE HYPO 23GA L1IN THN WALL PIVOTING SHLD BVL ORIENTED

## (undated) DEVICE — DISSECTOR LAP DIA5MM BLNT TIP ENDOPATH

## (undated) DEVICE — DRAPE SURG W53XL77IN STD SMS POLYPR 3 QTR ABSRB REINF W/O

## (undated) DEVICE — SUTURE VICRYL + SZ 3-0 L27IN ABSRB UD L26MM SH 1/2 CIR VCP416H

## (undated) DEVICE — Z INACTIVE USE 2535480 CLIP LIG M BLU TI HRT SHP WIRE HORZ 180 PER BX

## (undated) DEVICE — SPONGE GZ STERIL W2XL2IN COT 8 PLY TYP VII WVN C FLD DSGN NON21420

## (undated) DEVICE — STERILE POLYISOPRENE POWDER-FREE SURGICAL GLOVES: Brand: PROTEXIS

## (undated) DEVICE — COVER LT HNDL BLU PLAS

## (undated) DEVICE — SECTO® DISSECTOR, KITTNER, 5/16 IN DIAMETER, (5 EA/POUCH, 24 POUCHES/PK, 4 PK/BX): Brand: SYMMETRY SURGICAL

## (undated) DEVICE — PRESSURE MONITORING SET: Brand: TRUWAVE

## (undated) DEVICE — PREMIUM DRY TRAY LF: Brand: MEDLINE INDUSTRIES, INC.

## (undated) DEVICE — PROBE CRYOABLATION L10CM ALUM SMOOTH MAL CRYOICE

## (undated) DEVICE — STRAP RESTRN W3.5XL18IN STD ALL PURP DISP W/ SLNG RNG

## (undated) DEVICE — GOWN SIRUS NONREIN LG W/TWL: Brand: MEDLINE INDUSTRIES, INC.

## (undated) DEVICE — SUTURE ABSORBABLE BRAIDED 2-0 CT-1 27 IN UD VICRYL J259H

## (undated) DEVICE — TOWEL,OR,DSP,ST,BLUE,STD,4/PK,20PK/CS: Brand: MEDLINE

## (undated) DEVICE — SET CATH 20GA L1.75IN RAD ART POLYUR RADPQ W/ INTEGR

## (undated) DEVICE — GLOVE ORANGE PI 7 1/2   MSG9075

## (undated) DEVICE — SUTURE VCRL + SZ 3-0 L27IN ABSRB UD L26MM SH 1/2 CIR VCP416H

## (undated) DEVICE — STRIP,CLOSURE,WOUND,MEDI-STRIP,1/2X4: Brand: MEDLINE

## (undated) DEVICE — STERILE LATEX POWDER-FREE SURGICAL GLOVESWITH NITRILE AND EMOLLIENT COATINGS: Brand: PROTEXIS

## (undated) DEVICE — SUTURE VCRL SZ 0 L27IN ABSRB UD L36MM CT-1 1/2 CIR J260H

## (undated) DEVICE — SUTURE NONABSORBABLE MONOFILAMENT 4-0 RB-1 36 IN BLU PROLENE 8557H

## (undated) DEVICE — ADHESIVE SKIN CLOSURE TOP 0.8 CC PREM PUR LIQUIBAND RAPID LF

## (undated) DEVICE — SUTURE VCRL SZ 0 L18IN ABSRB UD L36MM CT-1 1/2 CIR J840D

## (undated) DEVICE — DRESSING TRNSPAR W2.375XL2.75IN STD FRME STYL WTRPRF BARR

## (undated) DEVICE — TROCAR ENDOSCP L80MM DIA10/12MM THOR RIG SL DISP FLXPATH

## (undated) DEVICE — 3M™ TEGADERM™ TRANSPARENT FILM DRESSING FRAME STYLE, 1624W, 2-3/8 IN X 2-3/4 IN (6 CM X 7 CM), 100/CT 4CT/CASE: Brand: 3M™ TEGADERM™

## (undated) DEVICE — SET ADMIN PRIMING 12ML L36IN 2ND INCL RLER CLMP SPIN M

## (undated) DEVICE — TUBING, SUCTION, 1/4" X 12', STRAIGHT: Brand: MEDLINE

## (undated) DEVICE — DECANTER FLD L3IN WHT FOR VI TO VI TRNSF

## (undated) DEVICE — YANKAUER,BULB TIP,W/O VENT,RIGID,STERILE: Brand: MEDLINE

## (undated) DEVICE — KIT OR ROOM TURNOVER W/STRAP

## (undated) DEVICE — DRAIN SURG SGL COLL PT TB FOR ATS BG OASIS

## (undated) DEVICE — SUTURE VCRL SZ 2-0 L27IN ABSRB UD L26MM SH 1/2 CIR J417H

## (undated) DEVICE — SYRINGE MED 30ML STD CLR PLAS LUERLOCK TIP N CTRL DISP